# Patient Record
Sex: FEMALE | Race: WHITE | NOT HISPANIC OR LATINO | Employment: OTHER | URBAN - METROPOLITAN AREA
[De-identification: names, ages, dates, MRNs, and addresses within clinical notes are randomized per-mention and may not be internally consistent; named-entity substitution may affect disease eponyms.]

---

## 2017-01-24 ENCOUNTER — HOSPITAL ENCOUNTER (OUTPATIENT)
Dept: SLEEP MEDICINE | Facility: HOSPITAL | Age: 67
Discharge: HOME | End: 2017-01-24
Attending: INTERNAL MEDICINE | Admitting: INTERNAL MEDICINE

## 2017-02-13 ENCOUNTER — HOSPITAL ENCOUNTER (OUTPATIENT)
Dept: LAB | Facility: HOSPITAL | Age: 67
Setting detail: SPECIMEN
Discharge: HOME | End: 2017-02-13
Attending: INTERNAL MEDICINE | Admitting: INTERNAL MEDICINE

## 2017-02-13 LAB
ALBUMIN SERPL-MCNC: 3.3 G/DL (ref 3.5–4.8)
ALBUMIN/GLOB SERPL: 1 {RATIO} (ref 1–1.7)
ALP SERPL-CCNC: 58 IU/L (ref 32–91)
ALT SERPL-CCNC: 16 IU/L (ref 14–54)
ANION GAP SERPL CALC-SCNC: 16.5 MMOL/L (ref 10–20)
AST SERPL-CCNC: 21 IU/L (ref 15–41)
BILIRUB SERPL-MCNC: 0.7 MG/DL (ref 0.3–1.2)
BUN SERPL-MCNC: 9 MG/DL (ref 8–20)
BUN/CREAT SERPL: 11.3 (ref 5.4–26.2)
CALCIUM SERPL-MCNC: 9 MG/DL (ref 8.9–10.3)
CHLORIDE SERPL-SCNC: 103 MMOL/L (ref 101–111)
CHOLEST SERPL-MCNC: 152 MG/DL
CHOLEST/HDLC SERPL: 3.6 {RATIO}
CONV CO2: 27 MMOL/L (ref 22–32)
CONV LDL CHOLESTEROL DIRECT: 89 MG/DL (ref 0–100)
CONV MICROALBUM.,U,RANDOM: 30 MG/L
CONV TOTAL PROTEIN: 6.7 G/DL (ref 6.1–7.9)
CREAT 24H UR-MCNC: 227.2 MG/DL
CREAT UR-MCNC: 0.8 MG/DL (ref 0.4–1)
GLOBULIN UR ELPH-MCNC: 3.4 G/DL (ref 2.5–3.8)
GLUCOSE SERPL-MCNC: 158 MG/DL (ref 65–99)
HDLC SERPL-MCNC: 42 MG/DL
LDLC/HDLC SERPL: 2.1 {RATIO}
LIPID INTERPRETATION: NORMAL
MICROALBUMIN/CREAT UR: 13.2 UG/MG
POTASSIUM SERPL-SCNC: 3.5 MMOL/L (ref 3.6–5.1)
SODIUM SERPL-SCNC: 143 MMOL/L (ref 136–144)
T4 FREE SERPL-MCNC: 1.15 NG/DL (ref 0.58–1.64)
TRIGL SERPL-MCNC: 113 MG/DL
VLDLC SERPL CALC-MCNC: 20.5 MG/DL

## 2017-07-25 ENCOUNTER — HOSPITAL ENCOUNTER (OUTPATIENT)
Dept: SLEEP MEDICINE | Facility: HOSPITAL | Age: 67
Discharge: HOME | End: 2017-07-25
Attending: INTERNAL MEDICINE | Admitting: INTERNAL MEDICINE

## 2017-08-14 ENCOUNTER — HOSPITAL ENCOUNTER (OUTPATIENT)
Dept: LAB | Facility: HOSPITAL | Age: 67
Setting detail: SPECIMEN
Discharge: HOME | End: 2017-08-14
Attending: INTERNAL MEDICINE | Admitting: INTERNAL MEDICINE

## 2017-08-14 LAB
ALBUMIN SERPL-MCNC: 3.4 G/DL (ref 3.5–4.8)
ALBUMIN/GLOB SERPL: 1.2 {RATIO} (ref 1–1.7)
ALP SERPL-CCNC: 58 IU/L (ref 32–91)
ALT SERPL-CCNC: 13 IU/L (ref 14–54)
ANION GAP SERPL CALC-SCNC: 17.8 MMOL/L (ref 10–20)
AST SERPL-CCNC: 20 IU/L (ref 15–41)
BILIRUB SERPL-MCNC: 1.1 MG/DL (ref 0.3–1.2)
BUN SERPL-MCNC: 10 MG/DL (ref 8–20)
BUN/CREAT SERPL: 11.1 (ref 5.4–26.2)
CALCIUM SERPL-MCNC: 8.5 MG/DL (ref 8.9–10.3)
CHLORIDE SERPL-SCNC: 101 MMOL/L (ref 101–111)
CHOLEST SERPL-MCNC: 145 MG/DL
CHOLEST/HDLC SERPL: 3.2 {RATIO}
CONV CO2: 25 MMOL/L (ref 22–32)
CONV LDL CHOLESTEROL DIRECT: 82 MG/DL (ref 0–100)
CONV TOTAL PROTEIN: 6.3 G/DL (ref 6.1–7.9)
CREAT UR-MCNC: 0.9 MG/DL (ref 0.4–1)
GLOBULIN UR ELPH-MCNC: 2.9 G/DL (ref 2.5–3.8)
GLUCOSE SERPL-MCNC: 166 MG/DL (ref 65–99)
HDLC SERPL-MCNC: 45 MG/DL
LDLC/HDLC SERPL: 1.8 {RATIO}
LIPID INTERPRETATION: NORMAL
POTASSIUM SERPL-SCNC: 3.8 MMOL/L (ref 3.6–5.1)
SODIUM SERPL-SCNC: 140 MMOL/L (ref 136–144)
TRIGL SERPL-MCNC: 94 MG/DL
TSH SERPL-ACNC: 1.71 UIU/ML (ref 0.34–5.6)
VLDLC SERPL CALC-MCNC: 17.9 MG/DL

## 2018-02-12 ENCOUNTER — HOSPITAL ENCOUNTER (OUTPATIENT)
Dept: LAB | Facility: HOSPITAL | Age: 68
Setting detail: SPECIMEN
Discharge: HOME | End: 2018-02-12
Attending: INTERNAL MEDICINE | Admitting: INTERNAL MEDICINE

## 2018-02-12 LAB
ALBUMIN SERPL-MCNC: 3.4 G/DL (ref 3.5–4.8)
ALBUMIN/GLOB SERPL: 0.9 {RATIO} (ref 1–1.7)
ALP SERPL-CCNC: 58 IU/L (ref 32–91)
ALT SERPL-CCNC: 15 IU/L (ref 14–54)
ANION GAP SERPL CALC-SCNC: 13.4 MMOL/L (ref 10–20)
AST SERPL-CCNC: 22 IU/L (ref 15–41)
BILIRUB SERPL-MCNC: 0.7 MG/DL (ref 0.3–1.2)
BUN SERPL-MCNC: 10 MG/DL (ref 8–20)
BUN/CREAT SERPL: 12.5 (ref 5.4–26.2)
CALCIUM SERPL-MCNC: 8.9 MG/DL (ref 8.9–10.3)
CHLORIDE SERPL-SCNC: 102 MMOL/L (ref 101–111)
CHOLEST SERPL-MCNC: 148 MG/DL
CHOLEST/HDLC SERPL: 3.7 {RATIO}
CONV CO2: 27 MMOL/L (ref 22–32)
CONV LDL CHOLESTEROL DIRECT: 98 MG/DL (ref 0–100)
CONV MICROALBUM.,U,RANDOM: 656 MG/L
CONV TOTAL PROTEIN: 7 G/DL (ref 6.1–7.9)
CREAT 24H UR-MCNC: 187.1 MG/DL
CREAT UR-MCNC: 0.8 MG/DL (ref 0.4–1)
GLOBULIN UR ELPH-MCNC: 3.6 G/DL (ref 2.5–3.8)
GLUCOSE SERPL-MCNC: 166 MG/DL (ref 65–99)
HDLC SERPL-MCNC: 40 MG/DL
LDLC/HDLC SERPL: 2.4 {RATIO}
LIPID INTERPRETATION: NORMAL
MICROALBUMIN/CREAT UR: 350.6 UG/MG
POTASSIUM SERPL-SCNC: 3.4 MMOL/L (ref 3.6–5.1)
SODIUM SERPL-SCNC: 139 MMOL/L (ref 136–144)
TRIGL SERPL-MCNC: 95 MG/DL
VLDLC SERPL CALC-MCNC: 10.2 MG/DL

## 2018-05-29 ENCOUNTER — HOSPITAL ENCOUNTER (OUTPATIENT)
Dept: LAB | Facility: HOSPITAL | Age: 68
Setting detail: SPECIMEN
Discharge: HOME | End: 2018-05-29
Attending: INTERNAL MEDICINE | Admitting: INTERNAL MEDICINE

## 2018-05-29 LAB
ALBUMIN SERPL-MCNC: 3.3 G/DL (ref 3.5–4.8)
ALBUMIN/GLOB SERPL: 0.9 {RATIO} (ref 1–1.7)
ALP SERPL-CCNC: 63 IU/L (ref 32–91)
ALT SERPL-CCNC: 18 IU/L (ref 14–54)
ANION GAP SERPL CALC-SCNC: 16.4 MMOL/L (ref 10–20)
AST SERPL-CCNC: 25 IU/L (ref 15–41)
BILIRUB SERPL-MCNC: 0.7 MG/DL (ref 0.3–1.2)
BUN SERPL-MCNC: 9 MG/DL (ref 8–20)
BUN/CREAT SERPL: 11.3 (ref 5.4–26.2)
CALCIUM SERPL-MCNC: 9.3 MG/DL (ref 8.9–10.3)
CHLORIDE SERPL-SCNC: 98 MMOL/L (ref 101–111)
CHOLEST SERPL-MCNC: 149 MG/DL
CHOLEST/HDLC SERPL: 3.5 {RATIO}
CONV CO2: 27 MMOL/L (ref 22–32)
CONV LDL CHOLESTEROL DIRECT: 87 MG/DL (ref 0–100)
CONV TOTAL PROTEIN: 7 G/DL (ref 6.1–7.9)
CREAT UR-MCNC: 0.8 MG/DL (ref 0.4–1)
GLOBULIN UR ELPH-MCNC: 3.7 G/DL (ref 2.5–3.8)
GLUCOSE SERPL-MCNC: 219 MG/DL (ref 65–99)
HDLC SERPL-MCNC: 43 MG/DL
LDLC/HDLC SERPL: 2 {RATIO}
LIPID INTERPRETATION: NORMAL
POTASSIUM SERPL-SCNC: 3.4 MMOL/L (ref 3.6–5.1)
SODIUM SERPL-SCNC: 138 MMOL/L (ref 136–144)
TRIGL SERPL-MCNC: 122 MG/DL
VLDLC SERPL CALC-MCNC: 19.3 MG/DL

## 2018-09-05 ENCOUNTER — HOSPITAL ENCOUNTER (OUTPATIENT)
Dept: LAB | Facility: HOSPITAL | Age: 68
Setting detail: SPECIMEN
Discharge: HOME | End: 2018-09-05
Attending: NURSE PRACTITIONER | Admitting: NURSE PRACTITIONER

## 2018-09-05 LAB
ALBUMIN SERPL-MCNC: 3.1 G/DL (ref 3.5–4.8)
ALBUMIN/GLOB SERPL: 0.8 {RATIO} (ref 1–1.7)
ALP SERPL-CCNC: 58 IU/L (ref 32–91)
ALT SERPL-CCNC: 17 IU/L (ref 14–54)
ANION GAP SERPL CALC-SCNC: 16.3 MMOL/L (ref 10–20)
AST SERPL-CCNC: 24 IU/L (ref 15–41)
BILIRUB SERPL-MCNC: 0.7 MG/DL (ref 0.3–1.2)
BUN SERPL-MCNC: 9 MG/DL (ref 8–20)
BUN/CREAT SERPL: 11.3 (ref 5.4–26.2)
CALCIUM SERPL-MCNC: 8.3 MG/DL (ref 8.9–10.3)
CHLORIDE SERPL-SCNC: 101 MMOL/L (ref 101–111)
CHOLEST SERPL-MCNC: 117 MG/DL
CHOLEST/HDLC SERPL: 3.2 {RATIO}
CONV CO2: 24 MMOL/L (ref 22–32)
CONV LDL CHOLESTEROL DIRECT: 63 MG/DL (ref 0–100)
CONV MICROALBUM.,U,RANDOM: 88 MG/L
CONV TOTAL PROTEIN: 6.9 G/DL (ref 6.1–7.9)
CREAT 24H UR-MCNC: 261.5 MG/DL
CREAT UR-MCNC: 0.8 MG/DL (ref 0.4–1)
GLOBULIN UR ELPH-MCNC: 3.8 G/DL (ref 2.5–3.8)
GLUCOSE SERPL-MCNC: 157 MG/DL (ref 65–99)
HDLC SERPL-MCNC: 37 MG/DL
LDLC/HDLC SERPL: 1.7 {RATIO}
LIPID INTERPRETATION: ABNORMAL
MICROALBUMIN/CREAT UR: 33.7 UG/MG
POTASSIUM SERPL-SCNC: 3.3 MMOL/L (ref 3.6–5.1)
SODIUM SERPL-SCNC: 138 MMOL/L (ref 136–144)
TRIGL SERPL-MCNC: 97 MG/DL
VLDLC SERPL CALC-MCNC: 17.9 MG/DL

## 2018-11-30 ENCOUNTER — HOSPITAL ENCOUNTER (OUTPATIENT)
Dept: MAMMOGRAPHY | Facility: HOSPITAL | Age: 68
Discharge: HOME | End: 2018-11-30
Attending: FAMILY MEDICINE | Admitting: FAMILY MEDICINE

## 2018-12-10 ENCOUNTER — HOSPITAL ENCOUNTER (OUTPATIENT)
Dept: LAB | Facility: HOSPITAL | Age: 68
Setting detail: SPECIMEN
Discharge: HOME | End: 2018-12-10
Attending: INTERNAL MEDICINE | Admitting: INTERNAL MEDICINE

## 2018-12-10 LAB
ALBUMIN SERPL-MCNC: 3.4 G/DL (ref 3.5–4.8)
ALBUMIN/GLOB SERPL: 0.9 {RATIO} (ref 1–1.7)
ALP SERPL-CCNC: 58 IU/L (ref 32–91)
ALT SERPL-CCNC: 15 IU/L (ref 14–54)
ANION GAP SERPL CALC-SCNC: 16.8 MMOL/L (ref 10–20)
AST SERPL-CCNC: 23 IU/L (ref 15–41)
BILIRUB SERPL-MCNC: 0.7 MG/DL (ref 0.3–1.2)
BUN SERPL-MCNC: 11 MG/DL (ref 8–20)
BUN/CREAT SERPL: 12.2 (ref 5.4–26.2)
CALCIUM SERPL-MCNC: 8.9 MG/DL (ref 8.9–10.3)
CHLORIDE SERPL-SCNC: 103 MMOL/L (ref 101–111)
CONV CO2: 24 MMOL/L (ref 22–32)
CONV TOTAL PROTEIN: 7.1 G/DL (ref 6.1–7.9)
CREAT UR-MCNC: 0.9 MG/DL (ref 0.4–1)
GLOBULIN UR ELPH-MCNC: 3.7 G/DL (ref 2.5–3.8)
GLUCOSE SERPL-MCNC: 159 MG/DL (ref 65–99)
POTASSIUM SERPL-SCNC: 3.8 MMOL/L (ref 3.6–5.1)
SODIUM SERPL-SCNC: 140 MMOL/L (ref 136–144)
T4 FREE SERPL-MCNC: 1.28 NG/DL (ref 0.58–1.64)
TSH SERPL-ACNC: 0.4 UIU/ML (ref 0.34–5.6)

## 2018-12-11 LAB — HBA1C MFR BLD: 7.2 % (ref 0–5.6)

## 2018-12-17 ENCOUNTER — HOSPITAL ENCOUNTER (OUTPATIENT)
Dept: MAMMOGRAPHY | Facility: HOSPITAL | Age: 68
Discharge: HOME | End: 2018-12-17
Attending: FAMILY MEDICINE | Admitting: FAMILY MEDICINE

## 2019-01-04 ENCOUNTER — HOSPITAL ENCOUNTER (OUTPATIENT)
Dept: ONCOLOGY | Facility: CLINIC | Age: 69
Setting detail: INFUSION SERIES
Discharge: HOME | End: 2019-01-04
Attending: INTERNAL MEDICINE | Admitting: INTERNAL MEDICINE

## 2019-01-04 ENCOUNTER — HOSPITAL ENCOUNTER (OUTPATIENT)
Dept: ONCOLOGY | Facility: HOSPITAL | Age: 69
Discharge: HOME | End: 2019-01-04
Attending: INTERNAL MEDICINE | Admitting: INTERNAL MEDICINE

## 2019-01-04 ENCOUNTER — CLINICAL SUPPORT (OUTPATIENT)
Dept: ONCOLOGY | Facility: HOSPITAL | Age: 69
End: 2019-01-04

## 2019-01-04 LAB
ALBUMIN SERPL-MCNC: 3.3 G/DL (ref 3.5–4.8)
ALBUMIN/GLOB SERPL: 0.9 {RATIO} (ref 1–1.7)
ALP SERPL-CCNC: 66 IU/L (ref 32–91)
ALT SERPL-CCNC: 16 IU/L (ref 14–54)
ANION GAP SERPL CALC-SCNC: 16.5 MMOL/L (ref 10–20)
AST SERPL-CCNC: 22 IU/L (ref 15–41)
BILIRUB SERPL-MCNC: 0.7 MG/DL (ref 0.3–1.2)
BUN SERPL-MCNC: 8 MG/DL (ref 8–20)
BUN/CREAT SERPL: 10 (ref 5.4–26.2)
CALCIUM SERPL-MCNC: 7.6 MG/DL (ref 8.9–10.3)
CHLORIDE SERPL-SCNC: 101 MMOL/L (ref 101–111)
CONV CO2: 24 MMOL/L (ref 22–32)
CONV TOTAL PROTEIN: 7.1 G/DL (ref 6.1–7.9)
CREAT UR-MCNC: 0.8 MG/DL (ref 0.4–1)
GLOBULIN UR ELPH-MCNC: 3.8 G/DL (ref 2.5–3.8)
GLUCOSE SERPL-MCNC: 180 MG/DL (ref 65–99)
POTASSIUM SERPL-SCNC: 3.5 MMOL/L (ref 3.6–5.1)
SODIUM SERPL-SCNC: 138 MMOL/L (ref 136–144)

## 2019-01-04 NOTE — PROGRESS NOTES
PATIENTS ONCOLOGY RECORD LOCATED IN [facility]      Subjective     Name:  KARIN KHANNA HAI     Date:  2019  Address:  [address] [place] [address]  Home: [unfilled]  :  1950 AGE:  68 y.o.        RECORDS OBTAINED:  Patients Oncology Record is located in [facility]

## 2019-01-17 ENCOUNTER — HOSPITAL ENCOUNTER (OUTPATIENT)
Dept: MRI IMAGING | Facility: HOSPITAL | Age: 69
Discharge: HOME | End: 2019-01-17
Attending: SURGERY | Admitting: SURGERY

## 2019-01-21 ENCOUNTER — HOSPITAL ENCOUNTER (OUTPATIENT)
Dept: ONCOLOGY | Facility: HOSPITAL | Age: 69
Discharge: HOME | End: 2019-01-21
Attending: INTERNAL MEDICINE | Admitting: INTERNAL MEDICINE

## 2019-01-28 ENCOUNTER — HOSPITAL ENCOUNTER (OUTPATIENT)
Dept: PREADMISSION TESTING | Facility: HOSPITAL | Age: 69
Discharge: HOME | End: 2019-01-28
Attending: SURGERY | Admitting: SURGERY

## 2019-01-28 LAB
ABO + RH BLD: NORMAL
ANION GAP SERPL CALC-SCNC: 15.4 MMOL/L (ref 10–20)
APTT BLD: 23.7 SEC (ref 24–31)
ARMBAND: NORMAL
BACTERIA SPEC AEROBE CULT: NORMAL
BASOPHILS # BLD AUTO: 0 10*3/UL (ref 0–0.2)
BASOPHILS NFR BLD AUTO: 0 % (ref 0–2)
BLD COMPONENT TYPE: NORMAL
BLD GP AB SCN SERPL QL: NEGATIVE
BUN SERPL-MCNC: 8 MG/DL (ref 8–20)
BUN/CREAT SERPL: 10 (ref 5.4–26.2)
CALCIUM SERPL-MCNC: 8.6 MG/DL (ref 8.9–10.3)
CHLORIDE SERPL-SCNC: 102 MMOL/L (ref 101–111)
CONV CO2: 24 MMOL/L (ref 22–32)
CREAT UR-MCNC: 0.8 MG/DL (ref 0.4–1)
CROSSMATCH EXPIRATION: NORMAL
DIFFERENTIAL METHOD BLD: (no result)
EOSINOPHIL # BLD AUTO: 0.4 10*3/UL (ref 0–0.3)
EOSINOPHIL # BLD AUTO: 3 % (ref 0–3)
ERYTHROCYTE [DISTWIDTH] IN BLOOD BY AUTOMATED COUNT: 16.3 % (ref 11.5–14.5)
GLUCOSE SERPL-MCNC: 172 MG/DL (ref 65–99)
HCT VFR BLD AUTO: 41 % (ref 35–49)
HGB BLD-MCNC: 12.7 G/DL (ref 12–15)
INR PPP: 1.1
LYMPHOCYTES # BLD AUTO: 1.9 10*3/UL (ref 0.8–4.8)
LYMPHOCYTES NFR BLD AUTO: 13 % (ref 18–42)
Lab: NORMAL
MCH RBC QN AUTO: 25.3 PG (ref 26–32)
MCHC RBC AUTO-ENTMCNC: 30.9 G/DL (ref 32–36)
MCV RBC AUTO: 81.7 FL (ref 80–94)
MICRO REPORT STATUS: NORMAL
MONOCYTES # BLD AUTO: 0.4 10*3/UL (ref 0.1–1.3)
MONOCYTES NFR BLD AUTO: 3 % (ref 2–11)
NEUTROPHILS # BLD AUTO: 12.1 10*3/UL (ref 2.3–8.6)
NEUTROPHILS NFR BLD AUTO: 81 % (ref 50–75)
NRBC BLD AUTO-RTO: 0 /100{WBCS}
NRBC/RBC NFR BLD MANUAL: 0 10*3/UL
PLATELET # BLD AUTO: 319 10*3/UL (ref 150–450)
PMV BLD AUTO: 8.4 FL (ref 7.4–10.4)
POTASSIUM SERPL-SCNC: 3.4 MMOL/L (ref 3.6–5.1)
PROTHROMBIN TIME: 11 SEC (ref 9.6–11.7)
RBC # BLD AUTO: 5.01 10*6/UL (ref 4–5.4)
SODIUM SERPL-SCNC: 138 MMOL/L (ref 136–144)
SPECIMEN SOURCE: NORMAL
WBC # BLD AUTO: 14.5 10*3/UL (ref 4.5–11.5)

## 2019-01-31 ENCOUNTER — HOSPITAL ENCOUNTER (OUTPATIENT)
Dept: LAB | Facility: HOSPITAL | Age: 69
Discharge: HOME | End: 2019-01-31
Attending: SURGERY | Admitting: SURGERY

## 2019-01-31 LAB
BILIRUB UR QL STRIP: NEGATIVE MG/DL
CASTS URNS QL MICRO: ABNORMAL /[LPF]
COLOR UR: YELLOW
CONV BACTERIA IN URINE MICRO: NEGATIVE
CONV CLARITY OF URINE: ABNORMAL
CONV HYALINE CASTS IN URINE MICRO: 5 /[LPF] (ref 0–5)
CONV PROTEIN IN URINE BY AUTOMATED TEST STRIP: NEGATIVE MG/DL
CONV SMALL ROUND CELLS: ABNORMAL /[HPF]
CONV UROBILINOGEN IN URINE BY AUTOMATED TEST STRIP: 1 MG/DL
CULTURE INDICATED?: ABNORMAL
GLUCOSE UR QL: NEGATIVE MG/DL
HGB UR QL STRIP: NEGATIVE
KETONES UR QL STRIP: ABNORMAL MG/DL
LEUKOCYTE ESTERASE UR QL STRIP: NEGATIVE
NITRITE UR QL STRIP: NEGATIVE
PH UR STRIP.AUTO: 7.5 [PH] (ref 4.5–8)
RBC #/AREA URNS HPF: 1 /[HPF] (ref 0–3)
SP GR UR: 1.02 (ref 1–1.03)
SPERM URNS QL MICRO: ABNORMAL /[HPF]
SQUAMOUS SPT QL MICRO: 8 /[HPF] (ref 0–5)
UNIDENT CRYS URNS QL MICRO: ABNORMAL /[HPF]
WBC #/AREA URNS HPF: 4 /[HPF] (ref 0–5)
YEAST SPEC QL WET PREP: ABNORMAL /[HPF]

## 2019-02-05 ENCOUNTER — CLINICAL SUPPORT (OUTPATIENT)
Dept: ONCOLOGY | Facility: HOSPITAL | Age: 69
End: 2019-02-05

## 2019-02-05 ENCOUNTER — HOSPITAL ENCOUNTER (OUTPATIENT)
Dept: ONCOLOGY | Facility: CLINIC | Age: 69
Setting detail: INFUSION SERIES
Discharge: HOME | End: 2019-02-05
Attending: INTERNAL MEDICINE | Admitting: INTERNAL MEDICINE

## 2019-02-05 NOTE — PROGRESS NOTES
PATIENTS ONCOLOGY RECORD LOCATED IN [facility]      Subjective     Name:  KARIN KHANNA HAI     Date:  2019  Address:  [address] [place] [address]  Home: [unfilled]  :  1950 AGE:  68 y.o.        RECORDS OBTAINED:  Patients Oncology Record is located in [facility]

## 2019-02-07 ENCOUNTER — HOSPITAL ENCOUNTER (OUTPATIENT)
Dept: PERIOP | Facility: HOSPITAL | Age: 69
Setting detail: HOSPITAL OUTPATIENT SURGERY
Discharge: HOME | End: 2019-02-07
Attending: SURGERY | Admitting: SURGERY

## 2019-02-07 LAB
GLUCOSE BLD-MCNC: 128 MG/DL (ref 70–105)
GLUCOSE BLD-MCNC: 142 MG/DL (ref 70–105)
GLUCOSE BLD-MCNC: 162 MG/DL (ref 70–105)
POTASSIUM SERPL-SCNC: 3.5 MMOL/L (ref 3.6–5.1)

## 2019-02-12 ENCOUNTER — HOSPITAL ENCOUNTER (OUTPATIENT)
Dept: RADIATION ONCOLOGY | Facility: HOSPITAL | Age: 69
Setting detail: RECURRING SERIES
Discharge: HOME | End: 2019-02-12
Attending: RADIOLOGY | Admitting: RADIOLOGY

## 2019-02-20 ENCOUNTER — HOSPITAL ENCOUNTER (OUTPATIENT)
Dept: ONCOLOGY | Facility: CLINIC | Age: 69
Setting detail: INFUSION SERIES
Discharge: HOME | End: 2019-02-20
Attending: INTERNAL MEDICINE | Admitting: INTERNAL MEDICINE

## 2019-02-20 ENCOUNTER — CLINICAL SUPPORT (OUTPATIENT)
Dept: ONCOLOGY | Facility: HOSPITAL | Age: 69
End: 2019-02-20

## 2019-02-20 ENCOUNTER — HOSPITAL ENCOUNTER (OUTPATIENT)
Dept: ONCOLOGY | Facility: HOSPITAL | Age: 69
Discharge: HOME | End: 2019-02-20
Attending: INTERNAL MEDICINE | Admitting: INTERNAL MEDICINE

## 2019-02-20 NOTE — PROGRESS NOTES
PATIENTS ONCOLOGY RECORD LOCATED IN [facility]      Subjective     Name:  KARIN KHANNA HAI     Date:  2019  Address:  [address] [place] [address]  Home: [unfilled]  :  1950 AGE:  68 y.o.        RECORDS OBTAINED:  Patients Oncology Record is located in [facility]

## 2019-03-13 ENCOUNTER — HOSPITAL ENCOUNTER (OUTPATIENT)
Dept: RADIATION ONCOLOGY | Facility: HOSPITAL | Age: 69
Discharge: HOME | End: 2019-03-13
Attending: RADIOLOGY | Admitting: RADIOLOGY

## 2019-03-13 ENCOUNTER — CLINICAL SUPPORT (OUTPATIENT)
Dept: ONCOLOGY | Facility: HOSPITAL | Age: 69
End: 2019-03-13

## 2019-03-13 ENCOUNTER — HOSPITAL ENCOUNTER (OUTPATIENT)
Dept: ONCOLOGY | Facility: CLINIC | Age: 69
Setting detail: INFUSION SERIES
Discharge: HOME | End: 2019-03-13
Attending: INTERNAL MEDICINE | Admitting: INTERNAL MEDICINE

## 2019-03-13 NOTE — PROGRESS NOTES
PATIENTS ONCOLOGY RECORD LOCATED IN [facility]      Subjective     Name:  KARIN KHANNA HAI     Date:  2019  Address:  [address] [place] [address]  Home: [unfilled]  :  1950 AGE:  68 y.o.        RECORDS OBTAINED:  Patients Oncology Record is located in [facility]

## 2019-03-22 ENCOUNTER — HOSPITAL ENCOUNTER (OUTPATIENT)
Dept: MAMMOGRAPHY | Facility: HOSPITAL | Age: 69
Discharge: HOME | End: 2019-03-22
Attending: INTERNAL MEDICINE | Admitting: INTERNAL MEDICINE

## 2019-03-25 ENCOUNTER — HOSPITAL ENCOUNTER (OUTPATIENT)
Dept: RADIATION ONCOLOGY | Facility: HOSPITAL | Age: 69
Setting detail: RECURRING SERIES
Discharge: HOME | End: 2019-06-04
Attending: RADIOLOGY | Admitting: RADIOLOGY

## 2019-04-04 ENCOUNTER — HOSPITAL ENCOUNTER (OUTPATIENT)
Dept: ONCOLOGY | Facility: HOSPITAL | Age: 69
Discharge: HOME | End: 2019-04-04
Attending: INTERNAL MEDICINE | Admitting: INTERNAL MEDICINE

## 2019-04-04 LAB — GLUCOSE BLD-MCNC: 164 MG/DL (ref 70–105)

## 2019-05-08 ENCOUNTER — HOSPITAL ENCOUNTER (OUTPATIENT)
Dept: ONCOLOGY | Facility: HOSPITAL | Age: 69
Discharge: HOME | End: 2019-05-08
Attending: INTERNAL MEDICINE | Admitting: INTERNAL MEDICINE

## 2019-05-08 ENCOUNTER — HOSPITAL ENCOUNTER (OUTPATIENT)
Dept: ONCOLOGY | Facility: CLINIC | Age: 69
Setting detail: INFUSION SERIES
Discharge: HOME | End: 2019-05-08
Attending: INTERNAL MEDICINE | Admitting: INTERNAL MEDICINE

## 2019-05-08 ENCOUNTER — CLINICAL SUPPORT (OUTPATIENT)
Dept: ONCOLOGY | Facility: HOSPITAL | Age: 69
End: 2019-05-08

## 2019-05-08 LAB
ALBUMIN SERPL-MCNC: 3.8 G/DL (ref 3.5–4.8)
ALBUMIN/GLOB SERPL: 1 {RATIO} (ref 1–1.7)
ALP SERPL-CCNC: 67 IU/L (ref 32–91)
ALT SERPL-CCNC: 14 IU/L (ref 14–54)
ANION GAP SERPL CALC-SCNC: 16.9 MMOL/L (ref 10–20)
AST SERPL-CCNC: 21 IU/L (ref 15–41)
BILIRUB SERPL-MCNC: 0.6 MG/DL (ref 0.3–1.2)
BUN SERPL-MCNC: 11 MG/DL (ref 8–20)
BUN/CREAT SERPL: 12.2 (ref 5.4–26.2)
CALCIUM SERPL-MCNC: 8.8 MG/DL (ref 8.9–10.3)
CHLORIDE SERPL-SCNC: 104 MMOL/L (ref 101–111)
CONV CO2: 22 MMOL/L (ref 22–32)
CONV TOTAL PROTEIN: 7.5 G/DL (ref 6.1–7.9)
CREAT UR-MCNC: 0.9 MG/DL (ref 0.4–1)
GLOBULIN UR ELPH-MCNC: 3.7 G/DL (ref 2.5–3.8)
GLUCOSE SERPL-MCNC: 161 MG/DL (ref 65–99)
POTASSIUM SERPL-SCNC: 3.9 MMOL/L (ref 3.6–5.1)
SODIUM SERPL-SCNC: 139 MMOL/L (ref 136–144)

## 2019-05-08 NOTE — PROGRESS NOTES
PATIENTS ONCOLOGY RECORD LOCATED IN [facility]      Subjective     Name:  KARIN KHANNA HAI     Date:  2019  Address:  [address] [place] [address]  Home: [unfilled]  :  1950 AGE:  69 y.o.        RECORDS OBTAINED:  Patients Oncology Record is located in [facility]

## 2019-05-17 ENCOUNTER — HOSPITAL ENCOUNTER (OUTPATIENT)
Dept: RADIATION ONCOLOGY | Facility: HOSPITAL | Age: 69
Discharge: HOME | End: 2019-05-17
Attending: RADIOLOGY | Admitting: RADIOLOGY

## 2019-06-19 RX ORDER — POTASSIUM CHLORIDE 20 MEQ/1
TABLET, EXTENDED RELEASE ORAL
Qty: 180 TABLET | Refills: 0 | Status: SHIPPED | OUTPATIENT
Start: 2019-06-19 | End: 2019-09-11 | Stop reason: SDUPTHER

## 2019-07-01 DIAGNOSIS — I10 HYPERTENSION, UNSPECIFIED TYPE: ICD-10-CM

## 2019-07-01 DIAGNOSIS — E11.65 TYPE 2 DIABETES MELLITUS WITH HYPERGLYCEMIA, UNSPECIFIED WHETHER LONG TERM INSULIN USE (HCC): Primary | ICD-10-CM

## 2019-07-01 DIAGNOSIS — E78.5 HYPERLIPIDEMIA, UNSPECIFIED HYPERLIPIDEMIA TYPE: ICD-10-CM

## 2019-07-01 PROBLEM — E66.3 OVERWEIGHT: Status: ACTIVE | Noted: 2018-05-01

## 2019-07-01 PROBLEM — D72.829 LEUKOCYTOSIS: Status: ACTIVE | Noted: 2019-01-29

## 2019-07-01 PROBLEM — C50.919 PRIMARY MALIGNANT NEOPLASM OF FEMALE BREAST (HCC): Status: ACTIVE | Noted: 2019-02-27

## 2019-07-01 PROBLEM — N63.0 BREAST LUMP OR MASS: Status: ACTIVE | Noted: 2018-11-15

## 2019-07-01 PROBLEM — D05.10 DUCTAL CARCINOMA IN SITU (DCIS) OF BREAST: Status: ACTIVE | Noted: 2018-12-27

## 2019-07-01 PROBLEM — E03.9 HYPOTHYROIDISM: Status: ACTIVE | Noted: 2019-07-01

## 2019-07-01 PROBLEM — E11.21 DIABETIC NEPHROPATHY (HCC): Status: ACTIVE | Noted: 2018-02-19

## 2019-07-08 ENCOUNTER — LAB (OUTPATIENT)
Dept: LAB | Facility: HOSPITAL | Age: 69
End: 2019-07-08

## 2019-07-08 DIAGNOSIS — E11.65 TYPE 2 DIABETES MELLITUS WITH HYPERGLYCEMIA, UNSPECIFIED WHETHER LONG TERM INSULIN USE (HCC): ICD-10-CM

## 2019-07-08 DIAGNOSIS — E78.5 HYPERLIPIDEMIA, UNSPECIFIED HYPERLIPIDEMIA TYPE: ICD-10-CM

## 2019-07-08 DIAGNOSIS — I10 HYPERTENSION, UNSPECIFIED TYPE: ICD-10-CM

## 2019-07-08 LAB
ALBUMIN SERPL-MCNC: 3.7 G/DL (ref 3.5–4.8)
ALBUMIN/GLOB SERPL: 0.9 G/DL (ref 1–1.7)
ALP SERPL-CCNC: 64 U/L (ref 32–91)
ALT SERPL W P-5'-P-CCNC: 14 U/L (ref 14–54)
ANION GAP SERPL CALCULATED.3IONS-SCNC: 16.9 MMOL/L (ref 5–15)
AST SERPL-CCNC: 20 U/L (ref 15–41)
BILIRUB SERPL-MCNC: 0.8 MG/DL (ref 0.3–1.2)
BUN BLD-MCNC: 10 MG/DL (ref 8–20)
BUN/CREAT SERPL: 12.5 (ref 5.4–26.2)
CALCIUM SPEC-SCNC: 9 MG/DL (ref 8.9–10.3)
CHLORIDE SERPL-SCNC: 104 MMOL/L (ref 101–111)
CO2 SERPL-SCNC: 23 MMOL/L (ref 22–32)
CREAT BLD-MCNC: 0.8 MG/DL (ref 0.4–1)
GFR SERPL CREATININE-BSD FRML MDRD: 72 ML/MIN/1.73
GLOBULIN UR ELPH-MCNC: 3.9 GM/DL (ref 2.5–3.8)
GLUCOSE BLD-MCNC: 120 MG/DL (ref 65–99)
HBA1C MFR BLD: 6.7 % (ref 3.5–5.6)
POTASSIUM BLD-SCNC: 3.9 MMOL/L (ref 3.6–5.1)
PROT SERPL-MCNC: 7.6 G/DL (ref 6.1–7.9)
SODIUM BLD-SCNC: 140 MMOL/L (ref 136–144)

## 2019-07-08 PROCEDURE — 82043 UR ALBUMIN QUANTITATIVE: CPT | Performed by: INTERNAL MEDICINE

## 2019-07-08 PROCEDURE — 83036 HEMOGLOBIN GLYCOSYLATED A1C: CPT | Performed by: INTERNAL MEDICINE

## 2019-07-08 PROCEDURE — 80053 COMPREHEN METABOLIC PANEL: CPT | Performed by: INTERNAL MEDICINE

## 2019-07-08 PROCEDURE — 36415 COLL VENOUS BLD VENIPUNCTURE: CPT

## 2019-07-08 PROCEDURE — 82570 ASSAY OF URINE CREATININE: CPT | Performed by: INTERNAL MEDICINE

## 2019-07-11 RX ORDER — ATORVASTATIN CALCIUM 40 MG/1
TABLET, FILM COATED ORAL
Qty: 30 TABLET | Refills: 5 | Status: SHIPPED | OUTPATIENT
Start: 2019-07-11 | End: 2020-02-01

## 2019-07-12 RX ORDER — SYRINGE-NEEDLE,INSULIN,0.5 ML 27GX1/2"
SYRINGE, EMPTY DISPOSABLE MISCELLANEOUS
COMMUNITY
Start: 2018-08-14 | End: 2019-08-20 | Stop reason: SDUPTHER

## 2019-07-12 RX ORDER — ACETAZOLAMIDE 250 MG/1
TABLET ORAL EVERY 24 HOURS
COMMUNITY
Start: 2018-02-06 | End: 2019-08-07 | Stop reason: SDUPTHER

## 2019-07-12 RX ORDER — ANASTROZOLE 1 MG/1
TABLET ORAL
COMMUNITY
Start: 2019-05-08 | End: 2019-11-08

## 2019-07-12 RX ORDER — PANTOPRAZOLE SODIUM 40 MG/1
TABLET, DELAYED RELEASE ORAL EVERY 24 HOURS
COMMUNITY
Start: 2018-02-13 | End: 2019-08-20 | Stop reason: SDUPTHER

## 2019-07-12 RX ORDER — LEVOTHYROXINE SODIUM 0.1 MG/1
TABLET ORAL EVERY 24 HOURS
COMMUNITY
Start: 2018-02-06 | End: 2019-08-13 | Stop reason: SDUPTHER

## 2019-07-12 RX ORDER — ASPIRIN 81 MG/1
TABLET ORAL
COMMUNITY
Start: 2012-05-09 | End: 2022-11-07

## 2019-07-12 RX ORDER — ALBUTEROL SULFATE 90 UG/1
AEROSOL, METERED RESPIRATORY (INHALATION)
COMMUNITY
Start: 2014-06-04 | End: 2019-08-07

## 2019-07-12 RX ORDER — MONTELUKAST SODIUM 10 MG/1
TABLET ORAL
COMMUNITY
Start: 2018-02-06 | End: 2019-08-13 | Stop reason: SDUPTHER

## 2019-07-12 RX ORDER — IBANDRONATE SODIUM 150 MG/1
TABLET, FILM COATED ORAL
COMMUNITY
Start: 2011-11-08 | End: 2020-09-02

## 2019-07-12 RX ORDER — FUROSEMIDE 40 MG/1
TABLET ORAL
COMMUNITY
Start: 2018-08-07 | End: 2019-12-05 | Stop reason: SDUPTHER

## 2019-07-12 RX ORDER — LORATADINE 10 MG/1
TABLET ORAL EVERY 24 HOURS
COMMUNITY
Start: 2018-06-12 | End: 2019-12-26

## 2019-07-12 RX ORDER — RAMIPRIL 1.25 MG/1
CAPSULE ORAL
COMMUNITY
Start: 2018-05-08 | End: 2019-08-13 | Stop reason: SDUPTHER

## 2019-07-12 RX ORDER — POTASSIUM CHLORIDE 1.5 G/1.77G
POWDER, FOR SOLUTION ORAL
COMMUNITY
Start: 2014-06-03 | End: 2020-01-20

## 2019-07-15 ENCOUNTER — OFFICE VISIT (OUTPATIENT)
Dept: ENDOCRINOLOGY | Facility: CLINIC | Age: 69
End: 2019-07-15

## 2019-07-15 VITALS
SYSTOLIC BLOOD PRESSURE: 130 MMHG | OXYGEN SATURATION: 98 % | WEIGHT: 218 LBS | HEIGHT: 55 IN | HEART RATE: 97 BPM | DIASTOLIC BLOOD PRESSURE: 65 MMHG | BODY MASS INDEX: 50.45 KG/M2

## 2019-07-15 DIAGNOSIS — E03.9 ACQUIRED HYPOTHYROIDISM: ICD-10-CM

## 2019-07-15 DIAGNOSIS — E11.21 DIABETIC NEPHROPATHY ASSOCIATED WITH TYPE 2 DIABETES MELLITUS (HCC): ICD-10-CM

## 2019-07-15 DIAGNOSIS — E11.65 TYPE 2 DIABETES MELLITUS WITH HYPERGLYCEMIA, WITH LONG-TERM CURRENT USE OF INSULIN (HCC): Primary | ICD-10-CM

## 2019-07-15 DIAGNOSIS — Z79.4 TYPE 2 DIABETES MELLITUS WITH HYPERGLYCEMIA, WITH LONG-TERM CURRENT USE OF INSULIN (HCC): Primary | ICD-10-CM

## 2019-07-15 DIAGNOSIS — E78.5 HYPERLIPIDEMIA, UNSPECIFIED HYPERLIPIDEMIA TYPE: ICD-10-CM

## 2019-07-15 DIAGNOSIS — I10 ESSENTIAL HYPERTENSION: ICD-10-CM

## 2019-07-15 PROCEDURE — 99214 OFFICE O/P EST MOD 30 MIN: CPT | Performed by: INTERNAL MEDICINE

## 2019-07-15 RX ORDER — PENICILLIN V POTASSIUM 500 MG/1
TABLET ORAL
Refills: 0 | COMMUNITY
Start: 2019-07-11 | End: 2019-08-07

## 2019-07-15 NOTE — PROGRESS NOTES
Endocrine Progress Note Outpatient     Patient Care Team:  Lyell, Reggie Duane, MD as PCP - General (Family Medicine)  Thalia Davis MD as PCP - Claims Attributed  Mukesh De Leon MD as PCP - Family Medicine    Chief Complaint: Follow-up diabetes    HPI: 69-year-old female with history of type 2 diabetes, hypertension, hyperlipidemia, hypothyroidism and diabetic nephropathy is here for follow-up.  Patient is currently taking Kombiglyze XR 2.5/thousand, 1 tablet twice a day and Humulin 70/30 insulin 40 units before breakfast and 32 units before supper.  She did bring in blood sugar records for review and most of the numbers are doing fairly well, I do not see any blood sugars less than 70.  She is tolerating her medications well.  She is trying to work on her diet the best she can.    Hypertension: Well-controlled.  Hyperlipidemia: She is currently on atorvastatin.  Hypothyroidism: Currently on levothyroxine supplementation  Diabetic nephropathy: She is on ramipril.    Past Medical History:   Diagnosis Date   • Cataract    • DM type 2 (diabetes mellitus, type 2) (CMS/[facility])    • Ductal carcinoma in situ (DCIS) of left breast    • Fracture, femur (CMS/[facility]) 2014    fracture of left femur   • Hyperlipidemia    • Hypertension    • Hypothyroidism    • Left breast mass    • Obesity    • Osteopenia    • Pulmonary hypertension (CMS/[facility])        Social History     Socioeconomic History   • Marital status:      Spouse name: Not on file   • Number of children: Not on file   • Years of education: Not on file   • Highest education level: Not on file   Tobacco Use   • Smoking status: Never Smoker   Substance and Sexual Activity   • Alcohol use: No     Frequency: Never   • Drug use: No       Family History   Problem Relation Age of Onset   • Ovarian cancer Sister    • Cancer Other        Allergies no known allergies    ROS:   Constitutional:  Denies fatigue, tiredness.    Eyes:  Denies change in visual acuity   HENT:   Denies nasal congestion or sore throat   Respiratory: denies cough, shortness of breath.   Cardiovascular:  denies chest pain, edema   GI:  Denies abdominal pain, nausea, vomiting.   Musculoskeletal:  Denies back pain or joint pain   Integument:  Denies dry skin and rash   Neurologic:  Denies headache, focal weakness or sensory changes   Endocrine:  Denies polyuria or polydipsia   Psychiatric:  Denies depression or anxiety      Vitals:    07/15/19 1434   BP: 130/65   Pulse: 97   SpO2: 98%       Physical Exam:  GEN: NAD, conversant  EYES: EOMI, PERRL, no conjunctival erythema  NECK: no thyromegaly, full ROM   CV: RRR, no murmurs/rubs/gallops, no peripheral edema  LUNG: CTAB, no wheezes/rales/ronchi  SKIN: no rashes, no acanthosis  MSK: no deformities, full ROM of all extremities  NEURO: no tremors, DTR normal  PSYCH: AOX3, appropriate mood, affect normal      Results Review:     I reviewed the patient's new clinical results.    Lab Results   Component Value Date    HGBA1C 6.7 (H) 07/08/2019    HGBA1C 7.2 (H) 12/10/2018      Lab Results   Component Value Date    GLUCOSE 120 (H) 07/08/2019    BUN 10 07/08/2019    CREATININE 0.80 07/08/2019    EGFRIFNONA 72 07/08/2019    BCR 12.5 07/08/2019    K 3.9 07/08/2019    CO2 23.0 07/08/2019    CALCIUM 9.0 07/08/2019    ALBUMIN 3.70 07/08/2019    LABIL2 1.0 05/08/2019    AST 20 07/08/2019    ALT 14 07/08/2019    CHOL 117 09/05/2018    TRIG 97 09/05/2018    LDL 63 09/05/2018    HDL 37 (L) 09/05/2018     Lab Results   Component Value Date    TSH 0.40 12/10/2018    FREET4 1.28 12/10/2018         Medication Review: Reviewed.       Current Outpatient Medications:   •  acetaZOLAMIDE (DIAMOX) 250 MG tablet, Daily., Disp: , Rfl:   •  anastrozole (ARIMIDEX) 1 MG tablet, , Disp: , Rfl:   •  aspirin (ADULT ASPIRIN EC LOW STRENGTH) 81 MG EC tablet, ADULT ASPIRIN EC LOW STRENGTH 81 MG ORAL TABLET DELAYED RELEASE, Disp: , Rfl:   •  atorvastatin (LIPITOR) 40 MG tablet, TAKE ONE TABLET BY MOUTH  "EVERY NIGHT, Disp: 30 tablet, Rfl: 5  •  Cholecalciferol (VITAMIN D3) 5000 units capsule capsule, 1 capsule Daily., Disp: , Rfl:   •  furosemide (LASIX) 40 MG tablet, FUROSEMIDE 40 MG TABS, Disp: , Rfl:   •  glucose blood (ONE TOUCH ULTRA TEST) test strip, ONETOUCH ULTRA BLUE STRP, Disp: , Rfl:   •  ibandronate (BONIVA) 150 MG tablet, IBANDRONATE SODIUM 150 MG TABS, Disp: , Rfl:   •  insulin NPH-insulin regular (HUMULIN 70/30) (70-30) 100 UNIT/ML injection, HUMULIN 70/30 (70-30) 100 UNIT/ML SUSP, Disp: , Rfl:   •  Insulin Syringe-Needle U-100 (B-D INS SYR ULTRAFINE 1CC/31G) 31G X 5/16\" 1 ML misc, BD INSULIN SYR ULTRAFINE II 31G X 5/16\" 1 ML, Disp: , Rfl:   •  levothyroxine (SYNTHROID, LEVOTHROID) 100 MCG tablet, Daily., Disp: , Rfl:   •  loratadine (CLARITIN) 10 MG tablet, Daily., Disp: , Rfl:   •  montelukast (SINGULAIR) 10 MG tablet, MONTELUKAST SODIUM 10 MG TABS, Disp: , Rfl:   •  pantoprazole (PROTONIX) 40 MG EC tablet, Daily., Disp: , Rfl:   •  penicillin v potassium (VEETID) 500 MG tablet, TAKE ONE TABLET BY MOUTH EVERY SIX HOURS AS NEEDED FOR INFECTION, Disp: , Rfl: 0  •  potassium chloride (KLOR-CON) 20 MEQ packet, KLOR-CON 20 MEQ PACK, Disp: , Rfl:   •  ramipril (ALTACE) 1.25 MG capsule, RAMIPRIL 1.25 MG CAPS, Disp: , Rfl:   •  sAXagliptin-metFORMIN ER (KOMBIGLYZE XR) 2.5-1000 MG tablet sustained-release 24 hour, Every 12 (Twelve) Hours., Disp: , Rfl:   •  albuterol sulfate HFA (PROAIR HFA) 108 (90 Base) MCG/ACT inhaler, PROAIR  (90 Base) MCG/ACT AERS, Disp: , Rfl:   •  potassium chloride (K-DUR,KLOR-CON) 20 MEQ CR tablet, TAKE 1 TABLET BY MOUTH TWICE DAILY, Disp: 180 tablet, Rfl: 0      Assessment/Plan   Diabetes mellitus type 2: Excellent control with A1c 6.7% blood sugars are doing very well.  Will continue current management.  2.  Hypertension: Well-controlled, continue current medications  3.  Hyperlipidemia: On atorvastatin, will follow lipid panel.  4.  Hypothyroidism: Currently on " "levothyroxine supplementation, will follow TSH  5.  Diabetic nephropathy: On ramipril.  Will continue current medications.            Kosta Frank MD FACE.  06/15/19  4:34 PM      EMR Dragon / transcription disclaimer:     \"Dictated utilizing Dragon dictation\".                 "

## 2019-08-07 ENCOUNTER — OFFICE VISIT (OUTPATIENT)
Dept: ONCOLOGY | Facility: CLINIC | Age: 69
End: 2019-08-07

## 2019-08-07 ENCOUNTER — APPOINTMENT (OUTPATIENT)
Dept: LAB | Facility: HOSPITAL | Age: 69
End: 2019-08-07

## 2019-08-07 ENCOUNTER — RESULTS ENCOUNTER (OUTPATIENT)
Dept: ONCOLOGY | Facility: CLINIC | Age: 69
End: 2019-08-07

## 2019-08-07 VITALS
SYSTOLIC BLOOD PRESSURE: 121 MMHG | HEIGHT: 60 IN | DIASTOLIC BLOOD PRESSURE: 65 MMHG | TEMPERATURE: 98.1 F | BODY MASS INDEX: 42.68 KG/M2 | RESPIRATION RATE: 18 BRPM | WEIGHT: 217.4 LBS | HEART RATE: 120 BPM

## 2019-08-07 DIAGNOSIS — C50.919 PRIMARY MALIGNANT NEOPLASM OF FEMALE BREAST (HCC): Primary | ICD-10-CM

## 2019-08-07 DIAGNOSIS — C50.919 PRIMARY MALIGNANT NEOPLASM OF FEMALE BREAST (HCC): ICD-10-CM

## 2019-08-07 LAB
ALBUMIN SERPL-MCNC: 3.7 G/DL (ref 3.5–4.8)
ALBUMIN/GLOB SERPL: 1.1 G/DL (ref 1–1.7)
ALP SERPL-CCNC: 54 U/L (ref 32–91)
ALT SERPL W P-5'-P-CCNC: 16 U/L (ref 14–54)
ANION GAP SERPL CALCULATED.3IONS-SCNC: 18.7 MMOL/L (ref 5–15)
AST SERPL-CCNC: 20 U/L (ref 15–41)
BASOPHILS # BLD AUTO: 0.02 10*3/MM3 (ref 0–0.2)
BASOPHILS NFR BLD AUTO: 0.1 % (ref 0–1.5)
BILIRUB SERPL-MCNC: 1 MG/DL (ref 0.3–1.2)
BUN BLD-MCNC: 11 MG/DL (ref 8–20)
BUN/CREAT SERPL: 12.2 (ref 5.4–26.2)
CALCIUM SPEC-SCNC: 9.3 MG/DL (ref 8.9–10.3)
CHLORIDE SERPL-SCNC: 106 MMOL/L (ref 101–111)
CO2 SERPL-SCNC: 21 MMOL/L (ref 22–32)
CREAT BLD-MCNC: 0.9 MG/DL (ref 0.4–1)
DEPRECATED RDW RBC AUTO: 48.3 FL (ref 37–54)
EOSINOPHIL # BLD AUTO: 0.08 10*3/MM3 (ref 0–0.4)
EOSINOPHIL NFR BLD AUTO: 0.5 % (ref 0.3–6.2)
ERYTHROCYTE [DISTWIDTH] IN BLOOD BY AUTOMATED COUNT: 15.9 % (ref 12.3–15.4)
GFR SERPL CREATININE-BSD FRML MDRD: 62 ML/MIN/1.73
GLOBULIN UR ELPH-MCNC: 3.5 GM/DL (ref 2.5–3.8)
GLUCOSE BLD-MCNC: 129 MG/DL (ref 65–99)
HCT VFR BLD AUTO: 44.5 % (ref 34–46.6)
HGB BLD-MCNC: 13.8 G/DL (ref 12–15.9)
LYMPHOCYTES # BLD AUTO: 0.95 10*3/MM3 (ref 0.7–3.1)
LYMPHOCYTES NFR BLD AUTO: 6.1 % (ref 19.6–45.3)
MCH RBC QN AUTO: 26.3 PG (ref 26.6–33)
MCHC RBC AUTO-ENTMCNC: 31 G/DL (ref 31.5–35.7)
MCV RBC AUTO: 84.9 FL (ref 79–97)
MONOCYTES # BLD AUTO: 0.57 10*3/MM3 (ref 0.1–0.9)
MONOCYTES NFR BLD AUTO: 3.7 % (ref 5–12)
NEUTROPHILS # BLD AUTO: 13.86 10*3/MM3 (ref 1.7–7)
NEUTROPHILS NFR BLD AUTO: 89.6 % (ref 42.7–76)
PLATELET # BLD AUTO: 282 10*3/MM3 (ref 140–450)
PMV BLD AUTO: 10.5 FL (ref 6–12)
POTASSIUM BLD-SCNC: 3.7 MMOL/L (ref 3.6–5.1)
PROT SERPL-MCNC: 7.2 G/DL (ref 6.1–7.9)
RBC # BLD AUTO: 5.24 10*6/MM3 (ref 3.77–5.28)
SODIUM BLD-SCNC: 142 MMOL/L (ref 136–144)
WBC NRBC COR # BLD: 15.48 10*3/MM3 (ref 3.4–10.8)

## 2019-08-07 PROCEDURE — 85025 COMPLETE CBC W/AUTO DIFF WBC: CPT | Performed by: INTERNAL MEDICINE

## 2019-08-07 PROCEDURE — 80053 COMPREHEN METABOLIC PANEL: CPT | Performed by: NURSE PRACTITIONER

## 2019-08-07 PROCEDURE — 36415 COLL VENOUS BLD VENIPUNCTURE: CPT | Performed by: INTERNAL MEDICINE

## 2019-08-07 PROCEDURE — 99214 OFFICE O/P EST MOD 30 MIN: CPT | Performed by: INTERNAL MEDICINE

## 2019-08-07 NOTE — PROGRESS NOTES
Hematology/Oncology Outpatient Follow Up    Christiane Nazario  1950    Primary Care Physician: Lyell, Reggie Duane, MD  Referring Physician: Lyell, Reggie Duane, MD    Chief complaint:  pT 2 N0 infiltrating ductal carcinoma of the left breast  History of Present Illness:   Ms. Nazario had a mammogram in November 2018 for a palpable   abnormality.    · 11/30/18 - Diagnostic mammogram showed palpable abnormality corresponds to a 2.3 x 1.7 x 2.1 cm   spicular shadowing irregular mass in the lateral middle third of the left breast at 2 to 3   o'clock position.  No mammographic evidence of malignancy in the contralateral breast.    · 12/17/18 - Core needle biopsy of breast palpable nodule showed invasive moderately   differentiated ductal carcinoma.  Fenton grade 7/9.  DCIS seen.  Tumor was ER positive, VA   positive and [id number] negative by ICH.    · Patient was sent to the [facility] and seen initially on 1/4/19.    · 1/4/19 - Genetic testing for BRCA 1 and BRCA 2 sequencing and deletion duplication analysis   negative.    · 1/21/19 - CT scan of the chest with contrast showed 2.4 x 2.8 cm left breast mass consistent   with known carcinoma.  Coronary artery calcification consistent with atherosclerotic disease.  A   4 mm right upper lobe pulmonary nodule, stable compared to 2014, consistent with a benign   etiology.    · 2/7/19 - Patient underwent left breast lumpectomy and axillary sentinel lymph node resection.    Pathology report was invasive poorly differentiated ductal carcinoma, 3.3 cm, completely excised.    Negative margins.  Three sentinel lymph nodes were extracted and three were negative.       Fenton score total of 9.  DCIS not identified.  Skin showed invasive carcinoma focally   invading the dermis without skin ulceration.  Margins negative with invasive carcinoma 0.2 cm   from the closest.  Pathologic staging pT2, smpN0.   · 3/6/19 - Oncotype DX recurrent score 25  "(distant recurrence risk at 9 years 12% per TAILORx and   absolute chemotherapy benefit less than 1% per TAILORx).   · 3/22/19 - DEXA scan, normal.   · May 2019 - Patient completed 20 radiation treatments.    2019 patient started Arimidex treatment      Past Medical History:   Diagnosis Date   • Cataract    • DM type 2 (diabetes mellitus, type 2) (CMS/[facility])    • Ductal carcinoma in situ (DCIS) of left breast    • Fracture, femur (CMS/[facility])     fracture of left femur   • Hyperlipidemia    • Hypertension    • Hypothyroidism    • Left breast mass    • Obesity    • Osteopenia    • Pulmonary hypertension (CMS/HCC)        Past Surgical History:   Procedure Laterality Date   • BREAST BIOPSY  2018   •  SECTION     • FEMUR SURGERY Left 2014    @ [facility] - Dr Winston   • MASTECTOMY Left 2019    Dr Bills   • TUBAL ABDOMINAL LIGATION           Current Outpatient Medications:   •  acetaZOLAMIDE (DIAMOX) 250 MG tablet, Daily., Disp: , Rfl:   •  anastrozole (ARIMIDEX) 1 MG tablet, , Disp: , Rfl:   •  aspirin (ADULT ASPIRIN EC LOW STRENGTH) 81 MG EC tablet, ADULT ASPIRIN EC LOW STRENGTH 81 MG ORAL TABLET DELAYED RELEASE, Disp: , Rfl:   •  atorvastatin (LIPITOR) 40 MG tablet, TAKE ONE TABLET BY MOUTH EVERY NIGHT, Disp: 30 tablet, Rfl: 5  •  Cholecalciferol (VITAMIN D3) 5000 units capsule capsule, 1 capsule Daily., Disp: , Rfl:   •  furosemide (LASIX) 40 MG tablet, FUROSEMIDE 40 MG TABS, Disp: , Rfl:   •  glucose blood (ONE TOUCH ULTRA TEST) test strip, ONETOUCH ULTRA BLUE STRP, Disp: , Rfl:   •  ibandronate (BONIVA) 150 MG tablet, IBANDRONATE SODIUM 150 MG TABS, Disp: , Rfl:   •  insulin NPH-insulin regular (HUMULIN 70/30) (70-30) 100 UNIT/ML injection, HUMULIN 70/30 (70-30) 100 UNIT/ML SUSP, Disp: , Rfl:   •  Insulin Syringe-Needle U-100 (B-D INS SYR ULTRAFINE 1CC/31G) 31G X 5/16\" 1 ML misc, BD INSULIN SYR ULTRAFINE II 31G X 5/16\" 1 ML, Disp: , Rfl:   •  levothyroxine (SYNTHROID, LEVOTHROID) 100 MCG " tablet, Daily., Disp: , Rfl:   •  loratadine (CLARITIN) 10 MG tablet, Daily., Disp: , Rfl:   •  montelukast (SINGULAIR) 10 MG tablet, MONTELUKAST SODIUM 10 MG TABS, Disp: , Rfl:   •  pantoprazole (PROTONIX) 40 MG EC tablet, Daily., Disp: , Rfl:   •  potassium chloride (K-DUR,KLOR-CON) 20 MEQ CR tablet, TAKE 1 TABLET BY MOUTH TWICE DAILY, Disp: 180 tablet, Rfl: 0  •  ramipril (ALTACE) 1.25 MG capsule, RAMIPRIL 1.25 MG CAPS, Disp: , Rfl:   •  potassium chloride (KLOR-CON) 20 MEQ packet, KLOR-CON 20 MEQ PACK, Disp: , Rfl:     Allergies   Allergen Reactions   • Calcium-Containing Compounds Nausea Only       Family History   Problem Relation Age of Onset   • Ovarian cancer Sister    • Cancer Other        Cancer-related family history includes Cancer in her other; Ovarian cancer in her sister.    Social History     Tobacco Use   • Smoking status: Never Smoker   Substance Use Topics   • Alcohol use: No     Frequency: Never   • Drug use: No       I have reviewed the history of present illness, past medical history, family history, social history, lab results, all notes and other records since the patient was last seen on Visit date not found    SUBJECTIVE:      Patient is in my office for follow-up.  She is on Arimidex now has hot flashes.  No weight gain or fluid retention.  She is not tolerating oral calcium tablets reports to choking sensation    ROS:       Review of Systems   Constitutional: Negative for fever.   HENT: Negative for nosebleeds and trouble swallowing.    Eyes: Negative for visual disturbance.   Respiratory: Negative for cough, shortness of breath and wheezing.    Cardiovascular: Negative for chest pain.   Gastrointestinal: Negative for abdominal pain and blood in stool.   Endocrine: Negative for cold intolerance.   Genitourinary: Negative for dysuria and hematuria.   Musculoskeletal: Negative for joint swelling.   Skin: Negative for rash.   Allergic/Immunologic: Negative for environmental allergies.  "  Neurological: Negative for seizures.   Hematological: Does not bruise/bleed easily.   Psychiatric/Behavioral: The patient is not nervous/anxious.          Objective:       Vitals:    08/07/19 1057   BP: 121/65   Pulse: 120   Resp: 18   Temp: 98.1 °F (36.7 °C)   Weight: 98.6 kg (217 lb 6.4 oz)   Height: 152.4 cm (60\")   PainSc: 0-No pain         PHYSICAL EXAM:     Physical Exam   Constitutional: She is oriented to person, place, and time. No distress.   HENT:   Head: Normocephalic and atraumatic.   Eyes: Conjunctivae and EOM are normal. Right eye exhibits no discharge. Left eye exhibits no discharge. No scleral icterus.   Neck: Normal range of motion. Neck supple. No thyromegaly present.   Cardiovascular: Normal rate, regular rhythm and normal heart sounds. Exam reveals no gallop and no friction rub.   Pulmonary/Chest: Effort normal. No stridor. No respiratory distress. She has no wheezes.   Abdominal: Soft. Bowel sounds are normal. She exhibits no mass. There is no tenderness. There is no rebound and no guarding.   Musculoskeletal: Normal range of motion. She exhibits no tenderness.   Lymphadenopathy:     She has no cervical adenopathy.   Neurological: She is alert and oriented to person, place, and time. She exhibits normal muscle tone.   Skin: Skin is warm. No rash noted. She is not diaphoretic. No erythema.   Psychiatric: She has a normal mood and affect. Her behavior is normal.   Nursing note and vitals reviewed.       RECENT LABS:     WBC   Date Value Ref Range Status   08/07/2019 15.48 (H) 3.40 - 10.80 10*3/mm3 Final     RBC   Date Value Ref Range Status   08/07/2019 5.24 3.77 - 5.28 10*6/mm3 Final     Hemoglobin   Date Value Ref Range Status   08/07/2019 13.8 12.0 - 15.9 g/dL Final     Hematocrit   Date Value Ref Range Status   08/07/2019 44.5 34.0 - 46.6 % Final     MCV   Date Value Ref Range Status   08/07/2019 84.9 79.0 - 97.0 fL Final     MCH   Date Value Ref Range Status   08/07/2019 26.3 (L) 26.6 - " 33.0 pg Final     MCHC   Date Value Ref Range Status   08/07/2019 31.0 (L) 31.5 - 35.7 g/dL Final     RDW   Date Value Ref Range Status   08/07/2019 15.9 (H) 12.3 - 15.4 % Final     RDW-SD   Date Value Ref Range Status   08/07/2019 48.3 37.0 - 54.0 fl Final     MPV   Date Value Ref Range Status   08/07/2019 10.5 6.0 - 12.0 fL Final     Platelets   Date Value Ref Range Status   08/07/2019 282 140 - 450 10*3/mm3 Final     Neutrophil %   Date Value Ref Range Status   08/07/2019 89.6 (H) 42.7 - 76.0 % Final     Lymphocyte %   Date Value Ref Range Status   08/07/2019 6.1 (L) 19.6 - 45.3 % Final     Monocyte %   Date Value Ref Range Status   08/07/2019 3.7 (L) 5.0 - 12.0 % Final     Eosinophil %   Date Value Ref Range Status   08/07/2019 0.5 0.3 - 6.2 % Final     Basophil %   Date Value Ref Range Status   08/07/2019 0.1 0.0 - 1.5 % Final     Neutrophils, Absolute   Date Value Ref Range Status   08/07/2019 13.86 (H) 1.70 - 7.00 10*3/mm3 Final     Lymphocytes, Absolute   Date Value Ref Range Status   08/07/2019 0.95 0.70 - 3.10 10*3/mm3 Final     Monocytes, Absolute   Date Value Ref Range Status   08/07/2019 0.57 0.10 - 0.90 10*3/mm3 Final     Eosinophils, Absolute   Date Value Ref Range Status   08/07/2019 0.08 0.00 - 0.40 10*3/mm3 Final     Basophils, Absolute   Date Value Ref Range Status   08/07/2019 0.02 0.00 - 0.20 10*3/mm3 Final     nRBC   Date Value Ref Range Status   01/28/2019 0 0 /100[WBCs] Final       Lab Results   Component Value Date    GLUCOSE 120 (H) 07/08/2019    BUN 10 07/08/2019    CREATININE 0.80 07/08/2019    EGFRIFNONA 72 07/08/2019    BCR 12.5 07/08/2019    K 3.9 07/08/2019    CO2 23.0 07/08/2019    CALCIUM 9.0 07/08/2019    ALBUMIN 3.70 07/08/2019    LABIL2 1.0 05/08/2019    AST 20 07/08/2019    ALT 14 07/08/2019         Assessment/Plan      ASSESSMENT:     1. Left breast infiltrating ductal carcinoma  2. Obesity  3. Osteopenia  4. ECOG 1    PLAN:      1. Reviewed the laboratory work-up with the  patient CBC is within normal range.  Continue Arimidex 1 mg a day.  2. Encouraged her to lose weight control blood sugars well.  3. Patient could not tolerate oral calcium tablets, discussed with her about using calcium in gummy bear form  4. I will check CBC CMP and CA-27-29 today and on return to clinic in 3 months    I have reviewed labs results, imaging, vitals, and medications with the patient today.           Patient verbalized understanding and is in agreement of the above plan.          This report was compiled using Dragon voice recognition software. I have made every effort to proof read this document; however, typographical errors may persist.

## 2019-08-08 LAB
ALBUMIN UR-MCNC: 7 MG/L
CREAT UR-MCNC: 45.5 MG/DL
MICROALBUMIN/CREAT UR: 15.4 MG/G (ref 0–30)

## 2019-08-08 RX ORDER — ACETAZOLAMIDE 250 MG/1
TABLET ORAL
Qty: 90 TABLET | Refills: 0 | Status: SHIPPED | OUTPATIENT
Start: 2019-08-08 | End: 2019-11-06 | Stop reason: SDUPTHER

## 2019-08-13 RX ORDER — LEVOTHYROXINE SODIUM 0.1 MG/1
TABLET ORAL
Qty: 90 TABLET | Refills: 0 | Status: SHIPPED | OUTPATIENT
Start: 2019-08-13 | End: 2019-11-12 | Stop reason: SDUPTHER

## 2019-08-13 RX ORDER — MONTELUKAST SODIUM 10 MG/1
TABLET ORAL
Qty: 90 TABLET | Refills: 0 | Status: SHIPPED | OUTPATIENT
Start: 2019-08-13 | End: 2019-11-20 | Stop reason: SDUPTHER

## 2019-08-14 RX ORDER — RAMIPRIL 1.25 MG/1
CAPSULE ORAL
Qty: 90 CAPSULE | Refills: 1 | Status: SHIPPED | OUTPATIENT
Start: 2019-08-14 | End: 2020-03-06

## 2019-08-20 RX ORDER — PEN NEEDLE, DIABETIC 29 G X1/2"
NEEDLE, DISPOSABLE MISCELLANEOUS
Qty: 100 EACH | Refills: 1 | Status: SHIPPED | OUTPATIENT
Start: 2019-08-20 | End: 2020-08-24

## 2019-08-20 RX ORDER — PANTOPRAZOLE SODIUM 40 MG/1
TABLET, DELAYED RELEASE ORAL
Qty: 90 TABLET | Refills: 0 | Status: SHIPPED | OUTPATIENT
Start: 2019-08-20 | End: 2019-11-20 | Stop reason: SDUPTHER

## 2019-09-11 RX ORDER — POTASSIUM CHLORIDE 20 MEQ/1
TABLET, EXTENDED RELEASE ORAL
Qty: 180 TABLET | Refills: 0 | Status: SHIPPED | OUTPATIENT
Start: 2019-09-11 | End: 2019-12-31

## 2019-10-03 RX ORDER — SAXAGLIPTIN AND METFORMIN HYDROCHLORIDE 2.5; 1 MG/1; MG/1
TABLET, FILM COATED, EXTENDED RELEASE ORAL
Qty: 180 TABLET | Refills: 1 | Status: SHIPPED | OUTPATIENT
Start: 2019-10-03 | End: 2020-01-13

## 2019-10-13 ENCOUNTER — RESULTS ENCOUNTER (OUTPATIENT)
Dept: ENDOCRINOLOGY | Facility: CLINIC | Age: 69
End: 2019-10-13

## 2019-10-13 DIAGNOSIS — E03.9 ACQUIRED HYPOTHYROIDISM: ICD-10-CM

## 2019-10-13 DIAGNOSIS — I10 ESSENTIAL HYPERTENSION: ICD-10-CM

## 2019-10-13 DIAGNOSIS — E11.65 TYPE 2 DIABETES MELLITUS WITH HYPERGLYCEMIA, WITH LONG-TERM CURRENT USE OF INSULIN (HCC): ICD-10-CM

## 2019-10-13 DIAGNOSIS — E11.21 DIABETIC NEPHROPATHY ASSOCIATED WITH TYPE 2 DIABETES MELLITUS (HCC): ICD-10-CM

## 2019-10-13 DIAGNOSIS — E78.5 HYPERLIPIDEMIA, UNSPECIFIED HYPERLIPIDEMIA TYPE: ICD-10-CM

## 2019-10-13 DIAGNOSIS — Z79.4 TYPE 2 DIABETES MELLITUS WITH HYPERGLYCEMIA, WITH LONG-TERM CURRENT USE OF INSULIN (HCC): ICD-10-CM

## 2019-10-22 ENCOUNTER — FLU SHOT (OUTPATIENT)
Dept: FAMILY MEDICINE CLINIC | Facility: CLINIC | Age: 69
End: 2019-10-22

## 2019-10-22 DIAGNOSIS — Z23 IMMUNIZATION, PNEUMOCOCCUS AND INFLUENZA: ICD-10-CM

## 2019-10-22 PROCEDURE — 90653 IIV ADJUVANT VACCINE IM: CPT | Performed by: NURSE PRACTITIONER

## 2019-10-22 PROCEDURE — G0008 ADMIN INFLUENZA VIRUS VAC: HCPCS | Performed by: NURSE PRACTITIONER

## 2019-11-06 ENCOUNTER — RESULTS ENCOUNTER (OUTPATIENT)
Dept: ONCOLOGY | Facility: CLINIC | Age: 69
End: 2019-11-06

## 2019-11-06 ENCOUNTER — APPOINTMENT (OUTPATIENT)
Dept: LAB | Facility: HOSPITAL | Age: 69
End: 2019-11-06

## 2019-11-06 ENCOUNTER — OFFICE VISIT (OUTPATIENT)
Dept: ONCOLOGY | Facility: CLINIC | Age: 69
End: 2019-11-06

## 2019-11-06 VITALS
TEMPERATURE: 97.7 F | WEIGHT: 218.6 LBS | BODY MASS INDEX: 42.92 KG/M2 | SYSTOLIC BLOOD PRESSURE: 144 MMHG | DIASTOLIC BLOOD PRESSURE: 70 MMHG | RESPIRATION RATE: 16 BRPM | HEIGHT: 60 IN | HEART RATE: 104 BPM

## 2019-11-06 DIAGNOSIS — C50.919 PRIMARY MALIGNANT NEOPLASM OF FEMALE BREAST (HCC): ICD-10-CM

## 2019-11-06 DIAGNOSIS — C50.919 PRIMARY MALIGNANT NEOPLASM OF FEMALE BREAST (HCC): Primary | ICD-10-CM

## 2019-11-06 DIAGNOSIS — Z12.31 SCREENING MAMMOGRAM, ENCOUNTER FOR: ICD-10-CM

## 2019-11-06 LAB
ALBUMIN SERPL-MCNC: 4 G/DL (ref 3.5–5.2)
ALBUMIN/GLOB SERPL: 1.2 G/DL
ALP SERPL-CCNC: 90 U/L (ref 39–117)
ALT SERPL W P-5'-P-CCNC: 40 U/L (ref 1–33)
ANION GAP SERPL CALCULATED.3IONS-SCNC: 16 MMOL/L (ref 5–15)
AST SERPL-CCNC: 39 U/L (ref 1–32)
BASOPHILS # BLD AUTO: 0.02 10*3/MM3 (ref 0–0.2)
BASOPHILS NFR BLD AUTO: 0.1 % (ref 0–1.5)
BILIRUB SERPL-MCNC: 0.6 MG/DL (ref 0.2–1.2)
BUN BLD-MCNC: 12 MG/DL (ref 8–23)
BUN/CREAT SERPL: 13.2 (ref 7–25)
CALCIUM SPEC-SCNC: 9.4 MG/DL (ref 8.6–10.5)
CHLORIDE SERPL-SCNC: 100 MMOL/L (ref 98–107)
CO2 SERPL-SCNC: 25 MMOL/L (ref 22–29)
CREAT BLD-MCNC: 0.91 MG/DL (ref 0.57–1)
DEPRECATED RDW RBC AUTO: 49.6 FL (ref 37–54)
EOSINOPHIL # BLD AUTO: 0.08 10*3/MM3 (ref 0–0.4)
EOSINOPHIL NFR BLD AUTO: 0.6 % (ref 0.3–6.2)
ERYTHROCYTE [DISTWIDTH] IN BLOOD BY AUTOMATED COUNT: 16.4 % (ref 12.3–15.4)
GFR SERPL CREATININE-BSD FRML MDRD: 62 ML/MIN/1.73
GLOBULIN UR ELPH-MCNC: 3.4 GM/DL
GLUCOSE BLD-MCNC: 200 MG/DL (ref 65–99)
HCT VFR BLD AUTO: 43.4 % (ref 34–46.6)
HGB BLD-MCNC: 13.5 G/DL (ref 12–15.9)
LYMPHOCYTES # BLD AUTO: 1.23 10*3/MM3 (ref 0.7–3.1)
LYMPHOCYTES NFR BLD AUTO: 8.8 % (ref 19.6–45.3)
MCH RBC QN AUTO: 26.4 PG (ref 26.6–33)
MCHC RBC AUTO-ENTMCNC: 31.1 G/DL (ref 31.5–35.7)
MCV RBC AUTO: 84.9 FL (ref 79–97)
MONOCYTES # BLD AUTO: 0.46 10*3/MM3 (ref 0.1–0.9)
MONOCYTES NFR BLD AUTO: 3.3 % (ref 5–12)
NEUTROPHILS # BLD AUTO: 12.25 10*3/MM3 (ref 1.7–7)
NEUTROPHILS NFR BLD AUTO: 87.2 % (ref 42.7–76)
PLATELET # BLD AUTO: 330 10*3/MM3 (ref 140–450)
PMV BLD AUTO: 10.1 FL (ref 6–12)
POTASSIUM BLD-SCNC: 4.2 MMOL/L (ref 3.5–5.2)
PROT SERPL-MCNC: 7.4 G/DL (ref 6–8.5)
RBC # BLD AUTO: 5.11 10*6/MM3 (ref 3.77–5.28)
SODIUM BLD-SCNC: 141 MMOL/L (ref 136–145)
WBC NRBC COR # BLD: 14.04 10*3/MM3 (ref 3.4–10.8)

## 2019-11-06 PROCEDURE — 99214 OFFICE O/P EST MOD 30 MIN: CPT | Performed by: INTERNAL MEDICINE

## 2019-11-06 PROCEDURE — 85025 COMPLETE CBC W/AUTO DIFF WBC: CPT | Performed by: INTERNAL MEDICINE

## 2019-11-06 PROCEDURE — 80053 COMPREHEN METABOLIC PANEL: CPT | Performed by: NURSE PRACTITIONER

## 2019-11-06 NOTE — PROGRESS NOTES
Hematology/Oncology Outpatient Follow Up    Christiane Nazario  1950    Primary Care Physician: Lyell, Reggie Duane, MD  Referring Physician: Lyell, Reggie Duane, MD  Chief complaint:  pT 2 N0 infiltrating ductal carcinoma of the left breast  History of Present Illness:   Ms. Nazario had a mammogram in November 2018 for a palpable  abnormality.    · 11/30/18 - Diagnostic mammogram showed palpable abnormality corresponds to a 2.3 x 1.7 x 2.1 cm   spicular shadowing irregular mass in the lateral middle third of the left breast at 2 to 3   o'clock position.  No mammographic evidence of malignancy in the contralateral breast.    · 12/17/18 - Core needle biopsy of breast palpable nodule showed invasive moderately   differentiated ductal carcinoma.  Shakira grade 7/9.  DCIS seen.  Tumor was ER positive, WA   positive and [id number] negative by [facility].    · Patient was sent to the [facility] and seen initially on 1/4/19.    · 1/4/19 - Genetic testing for BRCA 1 and BRCA 2 sequencing and deletion duplication analysis   negative.    · 1/21/19 - CT scan of the chest with contrast showed 2.4 x 2.8 cm left breast mass consistent   with known carcinoma.  Coronary artery calcification consistent with atherosclerotic disease.  A   4 mm right upper lobe pulmonary nodule, stable compared to 2014, consistent with a benign   etiology.    · 2/7/19 - Patient underwent left breast lumpectomy and axillary sentinel lymph node resection.    Pathology report was invasive poorly differentiated ductal carcinoma, 3.3 cm, completely excised.    Negative margins.  Three sentinel lymph nodes were extracted and three were negative.       [place] score total of 9.  DCIS not identified.  Skin showed invasive carcinoma focally   invading the dermis without skin ulceration.  Margins negative with invasive carcinoma 0.2 cm   from the closest.  Pathologic staging pT2, smpN0.   · 3/6/19 - Oncotype DX recurrent score 25 (distant  "recurrence risk at 9 years 12% per TAILORx and   absolute chemotherapy benefit less than 1% per TAILORx).   · 3/22/19 - DEXA scan, normal.   · May 2019 - Patient completed 20 radiation treatments.    2019 patient started Arimidex treatment      Past Medical History:   Diagnosis Date   • Cataract    • DM type 2 (diabetes mellitus, type 2) (CMS/HCC)    • Ductal carcinoma in situ (DCIS) of left breast    • Fracture, femur (CMS/HCC)     fracture of left femur   • Hyperlipidemia    • Hypertension    • Hypothyroidism    • Left breast mass    • Obesity    • Osteopenia    • Pulmonary hypertension (CMS/HCC)        Past Surgical History:   Procedure Laterality Date   • BREAST BIOPSY  2018   •  SECTION     • FEMUR SURGERY Left 2014    @ [facility] - Dr Winston   • MASTECTOMY Left 2019    Dr Bills   • TUBAL ABDOMINAL LIGATION           Current Outpatient Medications:   •  acetaZOLAMIDE (DIAMOX) 250 MG tablet, TAKE ONE TABLET BY MOUTH EVERY MORNING, Disp: 90 tablet, Rfl: 0  •  anastrozole (ARIMIDEX) 1 MG tablet, , Disp: , Rfl:   •  aspirin (ADULT ASPIRIN EC LOW STRENGTH) 81 MG EC tablet, ADULT ASPIRIN EC LOW STRENGTH 81 MG ORAL TABLET DELAYED RELEASE, Disp: , Rfl:   •  atorvastatin (LIPITOR) 40 MG tablet, TAKE ONE TABLET BY MOUTH EVERY NIGHT, Disp: 30 tablet, Rfl: 5  •  BD INSULIN SYRINGE U/F 31G X \" 1 ML misc, USE TWICE A DAY WITH INSULIN INJECTIONS, Disp: 100 each, Rfl: 1  •  Cholecalciferol (VITAMIN D3) 5000 units capsule capsule, 1 capsule Daily., Disp: , Rfl:   •  furosemide (LASIX) 40 MG tablet, FUROSEMIDE 40 MG TABS, Disp: , Rfl:   •  glucose blood (ONE TOUCH ULTRA TEST) test strip, ONETOUCH ULTRA BLUE STRP, Disp: , Rfl:   •  ibandronate (BONIVA) 150 MG tablet, IBANDRONATE SODIUM 150 MG TABS, Disp: , Rfl:   •  insulin NPH-insulin regular (HUMULIN 70/30) (70-30) 100 UNIT/ML injection, HUMULIN 70/30 (70-30) 100 UNIT/ML SUSP, Disp: , Rfl:   •  KOMBIGLYZE XR 2.5-1000 MG tablet sustained-release " 24 hour, TAKE ONE TABLET BY MOUTH TWICE DAILY, Disp: 180 tablet, Rfl: 1  •  levothyroxine (SYNTHROID, LEVOTHROID) 100 MCG tablet, TAKE ONE TABLET BY MOUTH EVERY DAY, Disp: 90 tablet, Rfl: 0  •  loratadine (CLARITIN) 10 MG tablet, Daily., Disp: , Rfl:   •  montelukast (SINGULAIR) 10 MG tablet, TAKE ONE TABLET BY MOUTH EVERY NIGHT AT BEDTIME, Disp: 90 tablet, Rfl: 0  •  pantoprazole (PROTONIX) 40 MG EC tablet, TAKE ONE TABLET BY MOUTH EVERY DAY, Disp: 90 tablet, Rfl: 0  •  potassium chloride (K-DUR,KLOR-CON) 20 MEQ CR tablet, TAKE 1 TABLET BY MOUTH TWICE DAILY, Disp: 180 tablet, Rfl: 0  •  potassium chloride (KLOR-CON) 20 MEQ packet, KLOR-CON 20 MEQ PACK, Disp: , Rfl:   •  ramipril (ALTACE) 1.25 MG capsule, TAKE 1 CAPSULE BY MOUTH EVERY EVENING WITH DINNER, Disp: 90 capsule, Rfl: 1    Allergies   Allergen Reactions   • Calcium-Containing Compounds Nausea Only       Family History   Problem Relation Age of Onset   • Ovarian cancer Sister    • Cancer Other        Cancer-related family history includes Cancer in an other family member; Ovarian cancer in her sister.    Social History     Tobacco Use   • Smoking status: Never Smoker   Substance Use Topics   • Alcohol use: No     Frequency: Never   • Drug use: No       I have reviewed the history of present illness, past medical history, family history, social history, lab results, all notes and other records since the patient was last seen on 8/7/2019  SUBJECTIVE:      Patient is in my office for follow-up.  She is on Arimidex now has hot flashes.  No weight gain or fluid retention.  She is taking one calcium tablet a day.      ROS:       Review of Systems   Constitutional: Negative for fever.   HENT: Negative for nosebleeds and trouble swallowing.    Eyes: Negative for visual disturbance.   Respiratory: Negative for cough, shortness of breath and wheezing.    Cardiovascular: Negative for chest pain.   Gastrointestinal: Negative for abdominal pain and blood in stool.  "  Endocrine: Negative for cold intolerance.   Genitourinary: Negative for dysuria and hematuria.   Musculoskeletal: Negative for joint swelling.   Skin: Negative for rash.   Allergic/Immunologic: Negative for environmental allergies.   Neurological: Negative for seizures.   Hematological: Does not bruise/bleed easily.   Psychiatric/Behavioral: The patient is not nervous/anxious.          Objective:       Vitals:    11/06/19 1110   BP: 144/70   Pulse: 104   Resp: 16   Temp: 97.7 °F (36.5 °C)   Weight: 99.2 kg (218 lb 9.6 oz)   Height: 152.4 cm (60\")   PainSc: 0-No pain         PHYSICAL EXAM:     Physical Exam   Constitutional: She is oriented to person, place, and time. No distress.   HENT:   Head: Normocephalic and atraumatic.   Eyes: Conjunctivae and EOM are normal. Right eye exhibits no discharge. Left eye exhibits no discharge. No scleral icterus.   Neck: Normal range of motion. Neck supple. No thyromegaly present.   Cardiovascular: Normal rate, regular rhythm and normal heart sounds. Exam reveals no gallop and no friction rub.   Pulmonary/Chest: Effort normal. No stridor. No respiratory distress. She has no wheezes.   Abdominal: Soft. Bowel sounds are normal. She exhibits no mass. There is no tenderness. There is no rebound and no guarding.   Musculoskeletal: Normal range of motion. She exhibits no tenderness.   Lymphadenopathy:     She has no cervical adenopathy.   Neurological: She is alert and oriented to person, place, and time. She exhibits normal muscle tone.   Skin: Skin is warm. No rash noted. She is not diaphoretic. No erythema.   Psychiatric: She has a normal mood and affect. Her behavior is normal.   Nursing note and vitals reviewed.       RECENT LABS:     WBC   Date Value Ref Range Status   11/06/2019 14.04 (H) 3.40 - 10.80 10*3/mm3 Final     RBC   Date Value Ref Range Status   11/06/2019 5.11 3.77 - 5.28 10*6/mm3 Final     Hemoglobin   Date Value Ref Range Status   11/06/2019 13.5 12.0 - 15.9 g/dL " Final     Hematocrit   Date Value Ref Range Status   11/06/2019 43.4 34.0 - 46.6 % Final     MCV   Date Value Ref Range Status   11/06/2019 84.9 79.0 - 97.0 fL Final     MCH   Date Value Ref Range Status   11/06/2019 26.4 (L) 26.6 - 33.0 pg Final     MCHC   Date Value Ref Range Status   11/06/2019 31.1 (L) 31.5 - 35.7 g/dL Final     RDW   Date Value Ref Range Status   11/06/2019 16.4 (H) 12.3 - 15.4 % Final     RDW-SD   Date Value Ref Range Status   11/06/2019 49.6 37.0 - 54.0 fl Final     MPV   Date Value Ref Range Status   11/06/2019 10.1 6.0 - 12.0 fL Final     Platelets   Date Value Ref Range Status   11/06/2019 330 140 - 450 10*3/mm3 Final     Neutrophil %   Date Value Ref Range Status   11/06/2019 87.2 (H) 42.7 - 76.0 % Final     Lymphocyte %   Date Value Ref Range Status   11/06/2019 8.8 (L) 19.6 - 45.3 % Final     Monocyte %   Date Value Ref Range Status   11/06/2019 3.3 (L) 5.0 - 12.0 % Final     Eosinophil %   Date Value Ref Range Status   11/06/2019 0.6 0.3 - 6.2 % Final     Basophil %   Date Value Ref Range Status   11/06/2019 0.1 0.0 - 1.5 % Final     Neutrophils, Absolute   Date Value Ref Range Status   11/06/2019 12.25 (H) 1.70 - 7.00 10*3/mm3 Final     Lymphocytes, Absolute   Date Value Ref Range Status   11/06/2019 1.23 0.70 - 3.10 10*3/mm3 Final     Monocytes, Absolute   Date Value Ref Range Status   11/06/2019 0.46 0.10 - 0.90 10*3/mm3 Final     Eosinophils, Absolute   Date Value Ref Range Status   11/06/2019 0.08 0.00 - 0.40 10*3/mm3 Final     Basophils, Absolute   Date Value Ref Range Status   11/06/2019 0.02 0.00 - 0.20 10*3/mm3 Final     nRBC   Date Value Ref Range Status   01/28/2019 0 0 /100[WBCs] Final       Lab Results   Component Value Date    GLUCOSE 129 (H) 08/07/2019    BUN 11 08/07/2019    CREATININE 0.90 08/07/2019    EGFRIFNONA 62 08/07/2019    BCR 12.2 08/07/2019    K 3.7 08/07/2019    CO2 21.0 (L) 08/07/2019    CALCIUM 9.3 08/07/2019    ALBUMIN 3.70 08/07/2019    LABIL2 1.0  05/08/2019    AST 20 08/07/2019    ALT 16 08/07/2019         Assessment/Plan      ASSESSMENT:     1. Left breast infiltrating ductal carcinoma  2. Obesity  3. Osteopenia  4. ECOG 1    PLAN:      1. Reviewed the laboratory work-up with the patient CBC is within normal range.  Continue Arimidex 1 mg a day.  2. Encouraged her to lose weight control blood sugars well.  3. Continue using oral calcium tablet   4. Osteoporosis resolved she has osteopenia continue monthly Boniva.  5. I will check CBC CMP and CA-27-29 today and on return to clinic in 3 months  6. Schedule bilateral screening mammogram which she is due for one-year follow-up  7. Schedule bilateral screening mammogram next month    I have reviewed labs results, imaging, vitals, and medications with the patient today.           Patient verbalized understanding and is in agreement of the above plan.          This report was compiled using Dragon voice recognition software. I have made every effort to proof read this document; however, typographical errors may persist.

## 2019-11-07 RX ORDER — ANASTROZOLE 1 MG/1
TABLET ORAL
Qty: 90 TABLET | Refills: 1 | OUTPATIENT
Start: 2019-11-07

## 2019-11-07 RX ORDER — ACETAZOLAMIDE 250 MG/1
TABLET ORAL
Qty: 90 TABLET | Refills: 0 | Status: SHIPPED | OUTPATIENT
Start: 2019-11-07 | End: 2020-02-18 | Stop reason: SDUPTHER

## 2019-11-08 DIAGNOSIS — C50.919 PRIMARY MALIGNANT NEOPLASM OF FEMALE BREAST (HCC): Primary | ICD-10-CM

## 2019-11-08 RX ORDER — ANASTROZOLE 1 MG/1
1 TABLET ORAL DAILY
Qty: 90 TABLET | Refills: 1 | Status: SHIPPED | OUTPATIENT
Start: 2019-11-08 | End: 2021-09-03 | Stop reason: SDUPTHER

## 2019-11-13 RX ORDER — LEVOTHYROXINE SODIUM 0.1 MG/1
TABLET ORAL
Qty: 90 TABLET | Refills: 0 | Status: SHIPPED | OUTPATIENT
Start: 2019-11-13 | End: 2020-02-27

## 2019-11-18 ENCOUNTER — TELEPHONE (OUTPATIENT)
Dept: RADIATION ONCOLOGY | Facility: HOSPITAL | Age: 69
End: 2019-11-18

## 2019-11-21 RX ORDER — PANTOPRAZOLE SODIUM 40 MG/1
TABLET, DELAYED RELEASE ORAL
Qty: 90 TABLET | Refills: 0 | Status: ON HOLD | OUTPATIENT
Start: 2019-11-21 | End: 2022-09-27

## 2019-11-21 RX ORDER — MONTELUKAST SODIUM 10 MG/1
TABLET ORAL
Qty: 90 TABLET | Refills: 0 | Status: SHIPPED | OUTPATIENT
Start: 2019-11-21 | End: 2020-03-06

## 2019-11-26 RX ORDER — FUROSEMIDE 40 MG/1
TABLET ORAL
Qty: 90 TABLET | Refills: 1 | OUTPATIENT
Start: 2019-11-26

## 2019-12-05 RX ORDER — FUROSEMIDE 40 MG/1
TABLET ORAL
Qty: 90 TABLET | Refills: 1 | OUTPATIENT
Start: 2019-12-05

## 2019-12-05 RX ORDER — FUROSEMIDE 40 MG/1
40 TABLET ORAL DAILY
Qty: 30 TABLET | Refills: 0 | Status: SHIPPED | OUTPATIENT
Start: 2019-12-05 | End: 2019-12-31

## 2019-12-18 ENCOUNTER — APPOINTMENT (OUTPATIENT)
Dept: MAMMOGRAPHY | Facility: HOSPITAL | Age: 69
End: 2019-12-18

## 2019-12-26 RX ORDER — LORATADINE 10 MG/1
TABLET ORAL
Qty: 90 TABLET | Refills: 1 | Status: SHIPPED | OUTPATIENT
Start: 2019-12-26 | End: 2022-11-07

## 2019-12-31 RX ORDER — POTASSIUM CHLORIDE 20 MEQ/1
TABLET, EXTENDED RELEASE ORAL
Qty: 180 TABLET | Refills: 0 | Status: SHIPPED | OUTPATIENT
Start: 2019-12-31 | End: 2020-04-09

## 2019-12-31 RX ORDER — FUROSEMIDE 40 MG/1
TABLET ORAL
Qty: 30 TABLET | Refills: 0 | Status: SHIPPED | OUTPATIENT
Start: 2019-12-31 | End: 2020-02-01

## 2020-01-06 ENCOUNTER — OFFICE VISIT (OUTPATIENT)
Dept: FAMILY MEDICINE CLINIC | Facility: CLINIC | Age: 70
End: 2020-01-06

## 2020-01-06 VITALS
BODY MASS INDEX: 42.8 KG/M2 | HEIGHT: 60 IN | OXYGEN SATURATION: 93 % | DIASTOLIC BLOOD PRESSURE: 75 MMHG | RESPIRATION RATE: 18 BRPM | HEART RATE: 102 BPM | SYSTOLIC BLOOD PRESSURE: 129 MMHG | TEMPERATURE: 98.3 F | WEIGHT: 218 LBS

## 2020-01-06 DIAGNOSIS — J45.20 MILD INTERMITTENT ASTHMA WITHOUT COMPLICATION: ICD-10-CM

## 2020-01-06 DIAGNOSIS — E03.9 ACQUIRED HYPOTHYROIDISM: ICD-10-CM

## 2020-01-06 DIAGNOSIS — R60.9 PERIPHERAL EDEMA: ICD-10-CM

## 2020-01-06 DIAGNOSIS — E11.21 DIABETIC NEPHROPATHY ASSOCIATED WITH TYPE 2 DIABETES MELLITUS (HCC): ICD-10-CM

## 2020-01-06 DIAGNOSIS — E78.5 HYPERLIPIDEMIA, UNSPECIFIED HYPERLIPIDEMIA TYPE: ICD-10-CM

## 2020-01-06 DIAGNOSIS — C50.919 PRIMARY MALIGNANT NEOPLASM OF FEMALE BREAST (HCC): ICD-10-CM

## 2020-01-06 DIAGNOSIS — M81.6 LOCALIZED OSTEOPOROSIS WITHOUT CURRENT PATHOLOGICAL FRACTURE: ICD-10-CM

## 2020-01-06 DIAGNOSIS — Z79.4 TYPE 2 DIABETES MELLITUS WITH HYPERGLYCEMIA, WITH LONG-TERM CURRENT USE OF INSULIN (HCC): ICD-10-CM

## 2020-01-06 DIAGNOSIS — D05.12 DUCTAL CARCINOMA IN SITU (DCIS) OF LEFT BREAST: ICD-10-CM

## 2020-01-06 DIAGNOSIS — E11.65 TYPE 2 DIABETES MELLITUS WITH HYPERGLYCEMIA, WITH LONG-TERM CURRENT USE OF INSULIN (HCC): ICD-10-CM

## 2020-01-06 DIAGNOSIS — Z00.00 MEDICARE ANNUAL WELLNESS VISIT, SUBSEQUENT: Primary | ICD-10-CM

## 2020-01-06 DIAGNOSIS — D72.825 BANDEMIA: ICD-10-CM

## 2020-01-06 DIAGNOSIS — I10 ESSENTIAL HYPERTENSION: ICD-10-CM

## 2020-01-06 DIAGNOSIS — J30.1 SEASONAL ALLERGIC RHINITIS DUE TO POLLEN: ICD-10-CM

## 2020-01-06 DIAGNOSIS — K21.9 GASTROESOPHAGEAL REFLUX DISEASE WITHOUT ESOPHAGITIS: ICD-10-CM

## 2020-01-06 PROBLEM — N63.0 BREAST LUMP OR MASS: Status: RESOLVED | Noted: 2018-11-15 | Resolved: 2020-01-06

## 2020-01-06 PROBLEM — R30.0 DYSURIA: Status: ACTIVE | Noted: 2020-01-06

## 2020-01-06 PROCEDURE — G0439 PPPS, SUBSEQ VISIT: HCPCS | Performed by: FAMILY MEDICINE

## 2020-01-06 PROCEDURE — 99214 OFFICE O/P EST MOD 30 MIN: CPT | Performed by: FAMILY MEDICINE

## 2020-01-06 RX ORDER — SULFAMETHOXAZOLE AND TRIMETHOPRIM 800; 160 MG/1; MG/1
1 TABLET ORAL 2 TIMES DAILY
Qty: 20 TABLET | Refills: 0 | Status: SHIPPED | OUTPATIENT
Start: 2020-01-06 | End: 2020-01-20 | Stop reason: SINTOL

## 2020-01-06 NOTE — PROGRESS NOTES
Patient presents for subsequent Medicare wellness visit.  She also presents for follow-up of multiple chronic medical problems.  Her diabetes is managed by endocrinology.  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  I manage her hypertension, hyperlipidemia, hypothyroidism, and osteopenia.  Patient continues on medications without side effect.  She has been going through chemotherapy and radiation treatments and has been tolerating them well.  She continues with heartburn which is controlled by proton pump inhibitor.  She denies any chest pain, cough, dizziness, or headache.  She does have fatigue.    Past Medical History:   Diagnosis Date   • Cataract    • DM type 2 (diabetes mellitus, type 2) (CMS/[facility])    • Ductal carcinoma in situ (DCIS) of left breast    • Fracture, femur (CMS/[facility])     fracture of left femur   • Hyperlipidemia    • Hypertension    • Hypothyroidism    • Osteopenia    • Pulmonary hypertension (CMS/[facility])      Past Surgical History:   Procedure Laterality Date   • BREAST BIOPSY  2018   •  SECTION     • FEMUR SURGERY Left 2014    @ [facility] - Dr Winston   • MASTECTOMY Left 2019    Dr Bills   • TUBAL ABDOMINAL LIGATION       Family History   Problem Relation Age of Onset   • Ovarian cancer Sister    • Cancer Other      Social History     Tobacco Use   • Smoking status: Never Smoker   • Smokeless tobacco: Never Used   Substance Use Topics   • Alcohol use: No     Frequency: Never       No visits with results within 7 Day(s) from this visit.   Latest known visit with results is:   Office Visit on 2019   Component Date Value Ref Range Status   • Glucose 2019 200* 65 - 99 mg/dL Final   • BUN 2019 12  8 - 23 mg/dL Final   • Creatinine 2019 0.91  0.57 - 1.00 mg/dL Final   • Sodium 2019 141  136 - 145 mmol/L Final   • Potassium 2019 4.2  3.5 - 5.2 mmol/L Final   • Chloride 2019 100  98 - 107 mmol/L Final   • CO2 2019 25.0  22.0  - 29.0 mmol/L Final   • Calcium 11/06/2019 9.4  8.6 - 10.5 mg/dL Final   • Total Protein 11/06/2019 7.4  6.0 - 8.5 g/dL Final   • Albumin 11/06/2019 4.00  3.50 - 5.20 g/dL Final   • ALT (SGPT) 11/06/2019 40* 1 - 33 U/L Final   • AST (SGOT) 11/06/2019 39* 1 - 32 U/L Final   • Alkaline Phosphatase 11/06/2019 90  39 - 117 U/L Final   • Total Bilirubin 11/06/2019 0.6  0.2 - 1.2 mg/dL Final   • eGFR Non African Amer 11/06/2019 62  >60 mL/min/1.73 Final   • Globulin 11/06/2019 3.4  gm/dL Final   • A/G Ratio 11/06/2019 1.2  g/dL Final   • BUN/Creatinine Ratio 11/06/2019 13.2  7.0 - 25.0 Final   • Anion Gap 11/06/2019 16.0* 5.0 - 15.0 mmol/L Final   • WBC 11/06/2019 14.04* 3.40 - 10.80 10*3/mm3 Final   • RBC 11/06/2019 5.11  3.77 - 5.28 10*6/mm3 Final   • Hemoglobin 11/06/2019 13.5  12.0 - 15.9 g/dL Final   • Hematocrit 11/06/2019 43.4  34.0 - 46.6 % Final   • MCV 11/06/2019 84.9  79.0 - 97.0 fL Final   • MCH 11/06/2019 26.4* 26.6 - 33.0 pg Final   • MCHC 11/06/2019 31.1* 31.5 - 35.7 g/dL Final   • RDW 11/06/2019 16.4* 12.3 - 15.4 % Final   • RDW-SD 11/06/2019 49.6  37.0 - 54.0 fl Final   • MPV 11/06/2019 10.1  6.0 - 12.0 fL Final   • Platelets 11/06/2019 330  140 - 450 10*3/mm3 Final   • Neutrophil % 11/06/2019 87.2* 42.7 - 76.0 % Final   • Lymphocyte % 11/06/2019 8.8* 19.6 - 45.3 % Final   • Monocyte % 11/06/2019 3.3* 5.0 - 12.0 % Final   • Eosinophil % 11/06/2019 0.6  0.3 - 6.2 % Final   • Basophil % 11/06/2019 0.1  0.0 - 1.5 % Final   • Neutrophils, Absolute 11/06/2019 12.25* 1.70 - 7.00 10*3/mm3 Final   • Lymphocytes, Absolute 11/06/2019 1.23  0.70 - 3.10 10*3/mm3 Final   • Monocytes, Absolute 11/06/2019 0.46  0.10 - 0.90 10*3/mm3 Final   • Eosinophils, Absolute 11/06/2019 0.08  0.00 - 0.40 10*3/mm3 Final   • Basophils, Absolute 11/06/2019 0.02  0.00 - 0.20 10*3/mm3 Final         Diagnoses and all orders for this visit:    1. Medicare annual wellness visit, subsequent (Primary)  Assessment & Plan:  Discussed injury  prevention, diet and exercise, safe sexual practices, and screening for common diseases. Encouraged use of sunscreen and seatbelts. Encouraged SBE, avoidance of tobacco, limiting alcohol, and yearly dental and eye exams.        2. Essential hypertension  Assessment & Plan:  No medication adjustments needed.  Continue beta-blocker and diuretic.  We will continue monitoring blood work for renal function.      3. Hyperlipidemia, unspecified hyperlipidemia type  Assessment & Plan:  We will repeat lipid profile next time she has blood work done for monitoring of her diabetes.  Continue statin therapy.  She will need long-term use of high-CM statin to lessen her risk of ASCVD.      4. Seasonal allergic rhinitis due to pollen    5. Mild intermittent asthma without complication  Assessment & Plan:  No recent flare.  Continue leukotriene inhibitor.          6. Gastroesophageal reflux disease without esophagitis  Assessment & Plan:  Unable to wean off proton pump inhibitor.Discussed link between PPI's and increased risk of hip, wrist, and spine fractures as well as renal issues.       7. Type 2 diabetes mellitus with hyperglycemia, with long-term current use of insulin (CMS/[facility])  Assessment & Plan:  Managed by endocrinology.      8. Acquired hypothyroidism  Assessment & Plan:  We will repeat TSH.  May need adjustment in thyroid medication dosage.      9. Diabetic nephropathy associated with type 2 diabetes mellitus (CMS/[facility])    10. Localized osteoporosis without current pathological fracture  Assessment & Plan:  Continue vitamin D, calcium, and bisphosphonate.  She will be due for a DEXA scan next year.      11. Primary malignant neoplasm of female breast (CMS/[facility])    12. Bandemia    13. Peripheral edema    14. Ductal carcinoma in situ (DCIS) of left breast    Other orders  -     sulfamethoxazole-trimethoprim (BACTRIM DS) 800-160 MG per tablet; Take 1 tablet by mouth 2 (Two) Times a Day.  Dispense: 20 tablet; Refill:  0      Chief Complaint   Patient presents with   • Medicare Wellness-subsequent       Subjective   History of Present Illness:  Christiane Nazario is a 69 y.o. female who presents for a Subsequent Medicare Wellness Visit.    HEALTH RISK ASSESSMENT    Recent Hospitalizations:  Recently treated at the following:  [facility]     Current Medical Providers:  Patient Care Team:  Lyell, Reggie Duane, MD as PCP - General (Family Medicine)  Thalia Davis MD as PCP - Claims Attributed  Kosta Frank MD as Consulting Physician (Endocrinology)  Guillermo Landeros MD as Consulting Physician (Pulmonary Disease)  Thalia Davis MD as Consulting Physician (Hematology and Oncology)    Smoking Status:  Social History     Tobacco Use   Smoking Status Never Smoker   Smokeless Tobacco Never Used       Alcohol Consumption:  Social History     Substance and Sexual Activity   Alcohol Use No   • Frequency: Never       Depression Screen:   PHQ-2/PHQ-9 Depression Screening 1/6/2020   Little interest or pleasure in doing things 0   Feeling down, depressed, or hopeless 0   Trouble falling or staying asleep, or sleeping too much 0   Feeling tired or having little energy 0   Poor appetite or overeating 0   Feeling bad about yourself - or that you are a failure or have let yourself or your family down 0   Trouble concentrating on things, such as reading the newspaper or watching television 0   Moving or speaking so slowly that other people could have noticed. Or the opposite - being so fidgety or restless that you have been moving around a lot more than usual 0   Thoughts that you would be better off dead, or of hurting yourself in some way 0   Total Score 0   If you checked off any problems, how difficult have these problems made it for you to do your work, take care of things at home, or get along with other people? Not difficult at all       Fall Risk Screen:  STEADI Fall Risk Assessment was completed, and patient is at  MODERATE risk for falls. Assessment completed on:1/6/2020    Health Habits and Functional and Cognitive Screening:  Functional & Cognitive Status 1/6/2020   Do you have difficulty preparing food and eating? No   Do you have difficulty bathing yourself, getting dressed or grooming yourself? No   Do you have difficulty using the toilet? No   Do you have difficulty moving around from place to place? No   Do you have trouble with steps or getting out of a bed or a chair? No   Current Diet Unhealthy Diet   Dental Exam Not up to date   Eye Exam Up to date   Exercise (times per week) 0 times per week   Current Exercise Activities Include None   Do you need help using the phone?  No   Are you deaf or do you have serious difficulty hearing?  No   Do you need help with transportation? No   Do you need help shopping? No   Do you need help preparing meals?  No   Do you need help with housework?  No   Do you need help with laundry? No   Do you need help taking your medications? No   Do you need help managing money? No   Do you ever drive or ride in a car without wearing a seat belt? No   Have you felt unusual stress, anger or loneliness in the last month? No   Who do you live with? Spouse   If you need help, do you have trouble finding someone available to you? No   Have you been bothered in the last four weeks by sexual problems? No   Do you have difficulty concentrating, remembering or making decisions? No         Does the patient have evidence of cognitive impairment? Yes    Asprin use counseling:Taking ASA appropriately as indicated    Age-appropriate Screening Schedule:  Refer to the list below for future screening recommendations based on patient's age, sex and/or medical conditions. Orders for these recommended tests are listed in the plan section. The patient has been provided with a written plan.    Health Maintenance   Topic Date Due   • TDAP/TD VACCINES (1 - Tdap) 03/31/1961   • ZOSTER VACCINE (1 of 2) 03/31/2002   •  "DIABETIC FOOT EXAM  01/04/2019   • COLONOSCOPY  01/04/2019   • LIPID PANEL  09/05/2019   • PNEUMOCOCCAL VACCINE (65+ HIGH RISK) (2 of 2 - PPSV23) 09/16/2019   • DIABETIC EYE EXAM  09/20/2019   • HEMOGLOBIN A1C  01/08/2020   • URINE MICROALBUMIN  07/08/2020   • MAMMOGRAM  11/30/2020   • DXA SCAN  03/22/2021   • INFLUENZA VACCINE  Completed          The following portions of the patient's history were reviewed and updated as appropriate: allergies, current medications, past family history, past medical history, past social history, past surgical history and problem list.    Outpatient Medications Prior to Visit   Medication Sig Dispense Refill   • acetaZOLAMIDE (DIAMOX) 250 MG tablet TAKE ONE TABLET BY MOUTH EVERY MORNING 90 tablet 0   • anastrozole (ARIMIDEX) 1 MG tablet Take 1 tablet by mouth Daily. 90 tablet 1   • aspirin (ADULT ASPIRIN EC LOW STRENGTH) 81 MG EC tablet ADULT ASPIRIN EC LOW STRENGTH 81 MG ORAL TABLET DELAYED RELEASE     • atorvastatin (LIPITOR) 40 MG tablet TAKE ONE TABLET BY MOUTH EVERY NIGHT 30 tablet 5   • BD INSULIN SYRINGE U/F 31G X 5/16\" 1 ML misc USE TWICE A DAY WITH INSULIN INJECTIONS 100 each 1   • Cholecalciferol (VITAMIN D3) 5000 units capsule capsule 1 capsule Daily.     • furosemide (LASIX) 40 MG tablet TAKE ONE TABLET BY MOUTH EVERY DAY 30 tablet 0   • glucose blood (ONE TOUCH ULTRA TEST) test strip ONETOUCH ULTRA BLUE STRP     • ibandronate (BONIVA) 150 MG tablet IBANDRONATE SODIUM 150 MG TABS     • insulin NPH-insulin regular (HUMULIN 70/30) (70-30) 100 UNIT/ML injection Inject 40 units SQ every morning and 32 units every evening at dinner DX E11.65 70 mL 2   • KOMBIGLYZE XR 2.5-1000 MG tablet sustained-release 24 hour TAKE ONE TABLET BY MOUTH TWICE DAILY 180 tablet 1   • levothyroxine (SYNTHROID, LEVOTHROID) 100 MCG tablet TAKE ONE TABLET BY MOUTH EVERY DAY 90 tablet 0   • loratadine (CLARITIN) 10 MG tablet TAKE ONE TABLET BY MOUTH EVERY DAY 90 tablet 1   • montelukast (SINGULAIR) " 10 MG tablet TAKE ONE TABLET BY MOUTH EVERY NIGHT AT BEDTIME 90 tablet 0   • pantoprazole (PROTONIX) 40 MG EC tablet TAKE ONE TABLET BY MOUTH EVERY DAY 90 tablet 0   • potassium chloride (K-DUR,KLOR-CON) 20 MEQ CR tablet TAKE 1 TABLET BY MOUTH TWICE DAILY 180 tablet 0   • potassium chloride (KLOR-CON) 20 MEQ packet KLOR-CON 20 MEQ PACK     • ramipril (ALTACE) 1.25 MG capsule TAKE 1 CAPSULE BY MOUTH EVERY EVENING WITH DINNER 90 capsule 1     No facility-administered medications prior to visit.        Patient Active Problem List   Diagnosis   • Allergic rhinitis, seasonal   • Asthma   • Diabetic nephropathy (CMS/H[facility])   • Ductal carcinoma in situ (DCIS) of breast   • Encounter for immunization   • Medicare annual wellness visit, subsequent   • Gastroesophageal reflux disease   • Hyperlipidemia   • Hypertension   • Hypokalemia   • Hypothyroidism   • Leukocytosis   • Body mass index (BMI) of 45.0-49.9 in adult (CMS/H[facility])   • Body mass index 40.0-44.9, adult (CMS/HCC)   • Obesity   • Osteoporosis   • Other long term (current) drug therapy   • Overweight   • Peripheral edema   • Primary malignant neoplasm of female breast (CMS/[facility])   • Primary pulmonary hypertension (CMS/[facility])   • Type 2 diabetes mellitus with hyperglycemia (CMS/[facility])   • Dysuria       Advanced Care Planning:  Patient does not have an advance directive - information provided to the patient today    Review of Systems   Constitutional: Positive for fatigue. Negative for activity change, appetite change, chills, fever and unexpected weight change.   HENT: Negative for congestion, ear discharge, ear pain, facial swelling, hearing loss, nosebleeds, postnasal drip, rhinorrhea, sinus pressure, sinus pain, sneezing, sore throat, tinnitus, trouble swallowing and voice change.    Eyes: Negative for photophobia, pain, discharge, redness, itching and visual disturbance.   Respiratory: Negative for apnea, cough, choking, chest tightness, shortness of breath and wheezing.   "  Cardiovascular: Negative for chest pain and palpitations.   Gastrointestinal: Negative for abdominal distention, abdominal pain, blood in stool, constipation, diarrhea, nausea and vomiting.   Endocrine: Negative for cold intolerance, heat intolerance, polydipsia and polyphagia.   Genitourinary: Negative for difficulty urinating, dysuria, enuresis, flank pain, frequency, hematuria, pelvic pain and urgency.   Musculoskeletal: Positive for back pain. Negative for arthralgias, gait problem, joint swelling, myalgias, neck pain and neck stiffness.   Skin: Negative for pallor, rash and wound.   Allergic/Immunologic: Negative for environmental allergies and food allergies.   Neurological: Negative for dizziness, tremors, seizures, syncope, speech difficulty, weakness, light-headedness, numbness and headaches.   Hematological: Negative for adenopathy. Does not bruise/bleed easily.   Psychiatric/Behavioral: Negative for confusion, decreased concentration, hallucinations and sleep disturbance. The patient is not nervous/anxious.        Compared to one year ago, the patient feels her physical health is worse.  Compared to one year ago, the patient feels her mental health is worse.    Reviewed chart for potential of high risk medication in the elderly: yes  Reviewed chart for potential of harmful drug interactions in the elderly:yes    Objective         Vitals:    01/06/20 1414   BP: 129/75   BP Location: Left arm   Patient Position: Sitting   Cuff Size: Adult   Pulse: 102   Resp: 18   Temp: 98.3 °F (36.8 °C)   TempSrc: Oral   SpO2: 93%   Weight: 98.9 kg (218 lb)   Height: 152.4 cm (60\")   PainSc: 0-No pain       Body mass index is 42.58 kg/m².  Discussed the patient's BMI with her. The BMI is above average; BMI management plan is completed.    Physical Exam   Constitutional: She is oriented to person, place, and time. She appears well-developed and well-nourished.   HENT:   Head: Normocephalic and atraumatic.   Eyes: Pupils " are equal, round, and reactive to light. Conjunctivae and EOM are normal. Right eye exhibits no discharge. Left eye exhibits no discharge. No scleral icterus.   Neck: No thyromegaly present.   Cardiovascular: Normal rate, regular rhythm and normal heart sounds. Exam reveals no gallop and no friction rub.   No murmur heard.  Pulmonary/Chest: Effort normal and breath sounds normal. No respiratory distress. She has no wheezes. She has no rales.   Bring from previous surgery for breast mass   Abdominal: Soft. Bowel sounds are normal. She exhibits no distension and no mass. There is no tenderness. There is no rebound and no guarding.   Musculoskeletal: Normal range of motion. She exhibits no edema, tenderness or deformity.   Lymphadenopathy:     She has no cervical adenopathy.   Neurological: She is alert and oriented to person, place, and time. She displays normal reflexes. No cranial nerve deficit or sensory deficit. She exhibits normal muscle tone. Coordination normal.   Skin: Skin is warm and dry. No rash noted. No erythema.   Psychiatric: She has a normal mood and affect. Her behavior is normal. Judgment and thought content normal.             Assessment/Plan   Medicare Risks and Personalized Health Plan  CMS Preventative Services Quick Reference  Advance Directive Discussion  Fall Risk  Obesity/Overweight     The above risks/problems have been discussed with the patient.  Pertinent information has been shared with the patient in the After Visit Summary.  Follow up plans and orders are seen below in the Assessment/Plan Section.    Diagnoses and all orders for this visit:    1. Medicare annual wellness visit, subsequent (Primary)  Assessment & Plan:  Discussed injury prevention, diet and exercise, safe sexual practices, and screening for common diseases. Encouraged use of sunscreen and seatbelts. Encouraged SBE, avoidance of tobacco, limiting alcohol, and yearly dental and eye exams.        2. Essential  hypertension  Assessment & Plan:  No medication adjustments needed.  Continue beta-blocker and diuretic.  We will continue monitoring blood work for renal function.      3. Hyperlipidemia, unspecified hyperlipidemia type  Assessment & Plan:  We will repeat lipid profile next time she has blood work done for monitoring of her diabetes.  Continue statin therapy.  She will need long-term use of high-CM statin to lessen her risk of ASCVD.      4. Seasonal allergic rhinitis due to pollen    5. Mild intermittent asthma without complication  Assessment & Plan:  No recent flare.  Continue leukotriene inhibitor.          6. Gastroesophageal reflux disease without esophagitis  Assessment & Plan:  Unable to wean off proton pump inhibitor.Discussed link between PPI's and increased risk of hip, wrist, and spine fractures as well as renal issues.       7. Type 2 diabetes mellitus with hyperglycemia, with long-term current use of insulin (CMS/[facility])  Assessment & Plan:  Managed by endocrinology.      8. Acquired hypothyroidism  Assessment & Plan:  We will repeat TSH.  May need adjustment in thyroid medication dosage.      9. Diabetic nephropathy associated with type 2 diabetes mellitus (CMS/[facility])    10. Localized osteoporosis without current pathological fracture  Assessment & Plan:  Continue vitamin D, calcium, and bisphosphonate.  She will be due for a DEXA scan next year.      11. Primary malignant neoplasm of female breast (CMS/[facility])    12. Bandemia    13. Peripheral edema    14. Ductal carcinoma in situ (DCIS) of left breast    Other orders  -     sulfamethoxazole-trimethoprim (BACTRIM DS) 800-160 MG per tablet; Take 1 tablet by mouth 2 (Two) Times a Day.  Dispense: 20 tablet; Refill: 0    Follow Up:  No follow-ups on file.     An After Visit Summary and PPPS were given to the patient.

## 2020-01-08 NOTE — ASSESSMENT & PLAN NOTE
Discussed injury prevention, diet and exercise, safe sexual practices, and screening for common diseases. Encouraged use of sunscreen and seatbelts. Encouraged SBE, avoidance of tobacco, limiting alcohol, and yearly dental and eye exams.

## 2020-01-08 NOTE — ASSESSMENT & PLAN NOTE
No medication adjustments needed.  Continue beta-blocker and diuretic.  We will continue monitoring blood work for renal function.

## 2020-01-08 NOTE — ASSESSMENT & PLAN NOTE
We will repeat lipid profile next time she has blood work done for monitoring of her diabetes.  Continue statin therapy.  She will need long-term use of high-CM statin to lessen her risk of ASCVD.

## 2020-01-08 NOTE — ASSESSMENT & PLAN NOTE
Unable to wean off proton pump inhibitor.Discussed link between PPI's and increased risk of hip, wrist, and spine fractures as well as renal issues.

## 2020-01-11 ENCOUNTER — RESULTS ENCOUNTER (OUTPATIENT)
Dept: ENDOCRINOLOGY | Facility: CLINIC | Age: 70
End: 2020-01-11

## 2020-01-11 DIAGNOSIS — E11.65 TYPE 2 DIABETES MELLITUS WITH HYPERGLYCEMIA, WITH LONG-TERM CURRENT USE OF INSULIN (HCC): ICD-10-CM

## 2020-01-11 DIAGNOSIS — E78.5 HYPERLIPIDEMIA, UNSPECIFIED HYPERLIPIDEMIA TYPE: ICD-10-CM

## 2020-01-11 DIAGNOSIS — E03.9 ACQUIRED HYPOTHYROIDISM: ICD-10-CM

## 2020-01-11 DIAGNOSIS — I10 ESSENTIAL HYPERTENSION: ICD-10-CM

## 2020-01-11 DIAGNOSIS — Z79.4 TYPE 2 DIABETES MELLITUS WITH HYPERGLYCEMIA, WITH LONG-TERM CURRENT USE OF INSULIN (HCC): ICD-10-CM

## 2020-01-11 DIAGNOSIS — E11.21 DIABETIC NEPHROPATHY ASSOCIATED WITH TYPE 2 DIABETES MELLITUS (HCC): ICD-10-CM

## 2020-01-13 ENCOUNTER — LAB (OUTPATIENT)
Dept: LAB | Facility: HOSPITAL | Age: 70
End: 2020-01-13

## 2020-01-13 DIAGNOSIS — E11.65 TYPE 2 DIABETES MELLITUS WITH HYPERGLYCEMIA, UNSPECIFIED WHETHER LONG TERM INSULIN USE (HCC): Primary | ICD-10-CM

## 2020-01-13 DIAGNOSIS — I10 HYPERTENSION, UNSPECIFIED TYPE: ICD-10-CM

## 2020-01-13 DIAGNOSIS — E78.5 HYPERLIPIDEMIA, UNSPECIFIED HYPERLIPIDEMIA TYPE: ICD-10-CM

## 2020-01-13 LAB
ALBUMIN SERPL-MCNC: 3.6 G/DL (ref 3.5–5.2)
ALBUMIN/GLOB SERPL: 1.2 G/DL
ALP SERPL-CCNC: 296 U/L (ref 39–117)
ALT SERPL W P-5'-P-CCNC: 142 U/L (ref 1–33)
ANION GAP SERPL CALCULATED.3IONS-SCNC: 20.9 MMOL/L (ref 5–15)
AST SERPL-CCNC: 175 U/L (ref 1–32)
BILIRUB SERPL-MCNC: 1.4 MG/DL (ref 0.2–1.2)
BUN BLD-MCNC: 15 MG/DL (ref 8–23)
BUN/CREAT SERPL: 8.9 (ref 7–25)
CALCIUM SPEC-SCNC: 7 MG/DL (ref 8.6–10.5)
CHLORIDE SERPL-SCNC: 99 MMOL/L (ref 98–107)
CHOLEST SERPL-MCNC: 118 MG/DL (ref 0–200)
CO2 SERPL-SCNC: 18.1 MMOL/L (ref 22–29)
CREAT BLD-MCNC: 1.68 MG/DL (ref 0.57–1)
GFR SERPL CREATININE-BSD FRML MDRD: 30 ML/MIN/1.73
GLOBULIN UR ELPH-MCNC: 3.1 GM/DL
GLUCOSE BLD-MCNC: 120 MG/DL (ref 65–99)
HBA1C MFR BLD: 7.2 % (ref 3.5–5.6)
HDLC SERPL-MCNC: 39 MG/DL (ref 40–60)
LDLC SERPL CALC-MCNC: 50 MG/DL (ref 0–100)
LDLC/HDLC SERPL: 1.27 {RATIO}
POTASSIUM BLD-SCNC: 5 MMOL/L (ref 3.5–5.2)
PROT SERPL-MCNC: 6.7 G/DL (ref 6–8.5)
SODIUM BLD-SCNC: 138 MMOL/L (ref 136–145)
TRIGL SERPL-MCNC: 147 MG/DL (ref 0–150)
VLDLC SERPL-MCNC: 29.4 MG/DL (ref 5–40)

## 2020-01-13 PROCEDURE — 84443 ASSAY THYROID STIM HORMONE: CPT | Performed by: INTERNAL MEDICINE

## 2020-01-13 PROCEDURE — 80061 LIPID PANEL: CPT

## 2020-01-13 PROCEDURE — 80053 COMPREHEN METABOLIC PANEL: CPT

## 2020-01-13 PROCEDURE — 36415 COLL VENOUS BLD VENIPUNCTURE: CPT

## 2020-01-13 PROCEDURE — 83036 HEMOGLOBIN GLYCOSYLATED A1C: CPT

## 2020-01-13 PROCEDURE — 84439 ASSAY OF FREE THYROXINE: CPT | Performed by: INTERNAL MEDICINE

## 2020-01-13 RX ORDER — SAXAGLIPTIN AND METFORMIN HYDROCHLORIDE 2.5; 1 MG/1; MG/1
TABLET, FILM COATED, EXTENDED RELEASE ORAL
Qty: 180 TABLET | Refills: 1 | Status: SHIPPED | OUTPATIENT
Start: 2020-01-13 | End: 2020-10-12

## 2020-01-14 LAB
T4 FREE SERPL-MCNC: 1.26 NG/DL (ref 0.82–1.77)
TSH SERPL-ACNC: 1.36 UIU/ML (ref 0.45–4.5)

## 2020-01-20 ENCOUNTER — OFFICE VISIT (OUTPATIENT)
Dept: ENDOCRINOLOGY | Facility: CLINIC | Age: 70
End: 2020-01-20

## 2020-01-20 VITALS
HEIGHT: 60 IN | DIASTOLIC BLOOD PRESSURE: 70 MMHG | OXYGEN SATURATION: 97 % | WEIGHT: 198 LBS | SYSTOLIC BLOOD PRESSURE: 125 MMHG | BODY MASS INDEX: 38.87 KG/M2 | HEART RATE: 97 BPM

## 2020-01-20 DIAGNOSIS — E03.9 ACQUIRED HYPOTHYROIDISM: ICD-10-CM

## 2020-01-20 DIAGNOSIS — E78.2 MIXED HYPERLIPIDEMIA: ICD-10-CM

## 2020-01-20 DIAGNOSIS — E83.51 HYPOCALCEMIA: ICD-10-CM

## 2020-01-20 DIAGNOSIS — I10 ESSENTIAL HYPERTENSION: ICD-10-CM

## 2020-01-20 DIAGNOSIS — K75.9 HEPATITIS: ICD-10-CM

## 2020-01-20 DIAGNOSIS — Z79.4 TYPE 2 DIABETES MELLITUS WITH HYPERGLYCEMIA, WITH LONG-TERM CURRENT USE OF INSULIN (HCC): Primary | ICD-10-CM

## 2020-01-20 DIAGNOSIS — E11.65 TYPE 2 DIABETES MELLITUS WITH HYPERGLYCEMIA, WITH LONG-TERM CURRENT USE OF INSULIN (HCC): Primary | ICD-10-CM

## 2020-01-20 PROCEDURE — 99214 OFFICE O/P EST MOD 30 MIN: CPT | Performed by: INTERNAL MEDICINE

## 2020-01-20 NOTE — PATIENT INSTRUCTIONS
DC Bactrim  Check CMP with magnesium and phosphorus and vitamin D in 5 days  If you start having any abdominal pain, nausea, vomiting or any other symptoms you either go to the hospital or you call us or call your family doctor.  Follow-up in 6 months with labs.

## 2020-01-20 NOTE — PROGRESS NOTES
Endocrine Progress Note Outpatient     Patient Care Team:  Lyell, Reggie Duane, MD as PCP - General (Family Medicine)  Thalia Davis MD as PCP - Claims Attributed  Kosta Frank MD as Consulting Physician (Endocrinology)  Guillermo Landeros MD as Consulting Physician (Pulmonary Disease)  Thalia Davis MD as Consulting Physician (Hematology and Oncology)    Chief Complaint: Follow-up diabetes    HPI: 69-year-old female with history of type 2 diabetes, hypertension, hyperlipidemia, hypothyroidism and diabetic nephropathy is here for follow-up.  Patient is currently taking Kombiglyze XR 2.5/thousand, 1 tablet twice a day and Humulin 70/30 insulin 40 units before breakfast and 32 units before supper.  She did bring in blood sugar records for review and most of the numbers are doing fairly well, I do not see any blood sugars less than 70.  She is tolerating her medications well.  She is trying to work on her diet the best she can.    Hypertension: Well-controlled.  Hyperlipidemia: She is currently on atorvastatin.  Hypothyroidism: Currently on levothyroxine supplementation  Diabetic nephropathy: She is on ramipril.    Was recently diagnosed with UTI and was prescribed Bactrim through her primary care physician Dr. Mcneal and she is taking it 1 tablet daily and is still have 3 days of worth of medication left.    Past Medical History:   Diagnosis Date   • Cataract    • DM type 2 (diabetes mellitus, type 2) (CMS/[facility])    • Ductal carcinoma in situ (DCIS) of left breast    • Fracture, femur (CMS/[facility]) 2014    fracture of left femur   • Hyperlipidemia    • Hypertension    • Hypothyroidism    • Osteopenia    • Pulmonary hypertension (CMS/[facility])        Social History     Socioeconomic History   • Marital status:      Spouse name: Not on file   • Number of children: Not on file   • Years of education: Not on file   • Highest education level: Not on file   Tobacco Use   • Smoking status: Never Smoker   • Smokeless  tobacco: Never Used   Substance and Sexual Activity   • Alcohol use: No     Frequency: Never   • Drug use: No       Family History   Problem Relation Age of Onset   • Ovarian cancer Sister    • Cancer Other        Allergies   Allergen Reactions   • Calcium-Containing Compounds Nausea Only       ROS:   Constitutional:  Denies fatigue, tiredness.    Eyes:  Denies change in visual acuity   HENT:  Denies nasal congestion or sore throat   Respiratory: denies cough, shortness of breath.   Cardiovascular:  denies chest pain, edema   GI:  Denies abdominal pain, nausea, vomiting.   Musculoskeletal:  Denies back pain or joint pain   Integument:  Denies dry skin and rash   Neurologic:  Denies headache, focal weakness or sensory changes   Endocrine:  Denies polyuria or polydipsia   Psychiatric:  Denies depression or anxiety      Vitals:    01/20/20 1256   BP: 125/70   Pulse: 97   SpO2: 97%       Physical Exam:  GEN: NAD, conversant  EYES: EOMI, PERRL, no conjunctival erythema  NECK: no thyromegaly, full ROM   CV: RRR, no murmurs/rubs/gallops, no peripheral edema  LUNG: CTAB, no wheezes/rales/ronchi  SKIN: no rashes, no acanthosis  MSK: no deformities, full ROM of all extremities  NEURO: no tremors, DTR normal  PSYCH: AOX3, appropriate mood, affect normal      Results Review:     I reviewed the patient's new clinical results.    Lab Results   Component Value Date    HGBA1C 7.2 (H) 01/13/2020    HGBA1C 6.7 (H) 07/08/2019    HGBA1C 7.2 (H) 12/10/2018      Lab Results   Component Value Date    GLUCOSE 120 (H) 01/13/2020    BUN 15 01/13/2020    CREATININE 1.68 (H) 01/13/2020    EGFRIFNONA 30 (L) 01/13/2020    BCR 8.9 01/13/2020    K 5.0 01/13/2020    CO2 18.1 (L) 01/13/2020    CALCIUM 7.0 (L) 01/13/2020    ALBUMIN 3.60 01/13/2020    LABIL2 1.0 05/08/2019     (H) 01/13/2020     (H) 01/13/2020    CHOL 118 01/13/2020    TRIG 147 01/13/2020    LDL 50 01/13/2020    HDL 39 (L) 01/13/2020     Lab Results   Component Value  "Date    TSH 1.360 01/13/2020    FREET4 1.26 01/13/2020         Medication Review: Reviewed.       Current Outpatient Medications:   •  acetaZOLAMIDE (DIAMOX) 250 MG tablet, TAKE ONE TABLET BY MOUTH EVERY MORNING, Disp: 90 tablet, Rfl: 0  •  anastrozole (ARIMIDEX) 1 MG tablet, Take 1 tablet by mouth Daily., Disp: 90 tablet, Rfl: 1  •  aspirin (ADULT ASPIRIN EC LOW STRENGTH) 81 MG EC tablet, ADULT ASPIRIN EC LOW STRENGTH 81 MG ORAL TABLET DELAYED RELEASE, Disp: , Rfl:   •  atorvastatin (LIPITOR) 40 MG tablet, TAKE ONE TABLET BY MOUTH EVERY NIGHT, Disp: 30 tablet, Rfl: 5  •  BD INSULIN SYRINGE U/F 31G X 5/16\" 1 ML misc, USE TWICE A DAY WITH INSULIN INJECTIONS, Disp: 100 each, Rfl: 1  •  CALCIUM ACETATE IJ, Inject  as directed., Disp: , Rfl:   •  Cholecalciferol (VITAMIN D3) 5000 units capsule capsule, 1 capsule Daily., Disp: , Rfl:   •  furosemide (LASIX) 40 MG tablet, TAKE ONE TABLET BY MOUTH EVERY DAY, Disp: 30 tablet, Rfl: 0  •  glucose blood (ONE TOUCH ULTRA TEST) test strip, ONETOUCH ULTRA BLUE STRP, Disp: , Rfl:   •  ibandronate (BONIVA) 150 MG tablet, IBANDRONATE SODIUM 150 MG TABS, Disp: , Rfl:   •  insulin NPH-insulin regular (HUMULIN 70/30) (70-30) 100 UNIT/ML injection, Inject 40 units SQ every morning and 32 units every evening at dinner DX E11.65, Disp: 70 mL, Rfl: 2  •  KOMBIGLYZE XR 2.5-1000 MG tablet sustained-release 24 hour, TAKE ONE TABLET BY MOUTH TWICE DAILY, Disp: 180 tablet, Rfl: 1  •  levothyroxine (SYNTHROID, LEVOTHROID) 100 MCG tablet, TAKE ONE TABLET BY MOUTH EVERY DAY, Disp: 90 tablet, Rfl: 0  •  loratadine (CLARITIN) 10 MG tablet, TAKE ONE TABLET BY MOUTH EVERY DAY, Disp: 90 tablet, Rfl: 1  •  montelukast (SINGULAIR) 10 MG tablet, TAKE ONE TABLET BY MOUTH EVERY NIGHT AT BEDTIME, Disp: 90 tablet, Rfl: 0  •  pantoprazole (PROTONIX) 40 MG EC tablet, TAKE ONE TABLET BY MOUTH EVERY DAY, Disp: 90 tablet, Rfl: 0  •  potassium chloride (K-DUR,KLOR-CON) 20 MEQ CR tablet, TAKE 1 TABLET BY MOUTH TWICE " "DAILY, Disp: 180 tablet, Rfl: 0  •  ramipril (ALTACE) 1.25 MG capsule, TAKE 1 CAPSULE BY MOUTH EVERY EVENING WITH DINNER, Disp: 90 capsule, Rfl: 1  •  sulfamethoxazole-trimethoprim (BACTRIM DS) 800-160 MG per tablet, Take 1 tablet by mouth 2 (Two) Times a Day., Disp: 20 tablet, Rfl: 0      Assessment/Plan   1.  Diabetes mellitus type 2: Fair control with A1c 7.2% blood sugars are doing very well.  Will continue current management.  2.  Hypertension: Well-controlled, continue current medications  3.  Hyperlipidemia: On atorvastatin, will follow lipid panel.  4.  Hypothyroidism: Currently on levothyroxine supplementation, will follow TSH  5.  Diabetic nephropathy: On ramipril.  Will continue current medications.  6.  Hepatitis with elevated ALT: Could be due to Bactrim, I have asked her to DC Bactrim and notify Dr. Fox and I will check CMP in 2 to 3 days.  7.  Hypocalcemia: Her serum calcium is low at 7 with albumin of 3.4, she is not having any symptoms, will follow CMP and I will check vitamin D level and advised to continue calcium supplementation that she is taking.  You            Kosta Frank MD FACE.  06/15/19  4:34 PM      EMR Dragon / transcription disclaimer:     \"Dictated utilizing Dragon dictation\".                 "

## 2020-01-21 ENCOUNTER — HOSPITAL ENCOUNTER (OUTPATIENT)
Dept: MAMMOGRAPHY | Facility: HOSPITAL | Age: 70
Discharge: HOME | End: 2020-01-21
Admitting: NURSE PRACTITIONER

## 2020-01-21 DIAGNOSIS — C50.919 PRIMARY MALIGNANT NEOPLASM OF FEMALE BREAST (HCC): ICD-10-CM

## 2020-01-21 DIAGNOSIS — Z12.31 SCREENING MAMMOGRAM, ENCOUNTER FOR: ICD-10-CM

## 2020-01-21 PROCEDURE — 77063 BREAST TOMOSYNTHESIS BI: CPT

## 2020-01-21 PROCEDURE — 77067 SCR MAMMO BI INCL CAD: CPT

## 2020-01-27 ENCOUNTER — LAB (OUTPATIENT)
Dept: LAB | Facility: HOSPITAL | Age: 70
End: 2020-01-27

## 2020-01-27 DIAGNOSIS — E11.65 TYPE 2 DIABETES MELLITUS WITH HYPERGLYCEMIA, WITH LONG-TERM CURRENT USE OF INSULIN (HCC): ICD-10-CM

## 2020-01-27 DIAGNOSIS — E78.2 MIXED HYPERLIPIDEMIA: ICD-10-CM

## 2020-01-27 DIAGNOSIS — K75.9 HEPATITIS: ICD-10-CM

## 2020-01-27 DIAGNOSIS — Z79.4 TYPE 2 DIABETES MELLITUS WITH HYPERGLYCEMIA, WITH LONG-TERM CURRENT USE OF INSULIN (HCC): ICD-10-CM

## 2020-01-27 DIAGNOSIS — E03.9 ACQUIRED HYPOTHYROIDISM: ICD-10-CM

## 2020-01-27 DIAGNOSIS — E83.51 HYPOCALCEMIA: ICD-10-CM

## 2020-01-27 DIAGNOSIS — I10 ESSENTIAL HYPERTENSION: ICD-10-CM

## 2020-01-27 LAB
25(OH)D3 SERPL-MCNC: 99.5 NG/ML (ref 30–100)
ALBUMIN SERPL-MCNC: 3.7 G/DL (ref 3.5–5.2)
ALBUMIN/GLOB SERPL: 1.3 G/DL
ALP SERPL-CCNC: 90 U/L (ref 39–117)
ALT SERPL W P-5'-P-CCNC: 12 U/L (ref 1–33)
ANION GAP SERPL CALCULATED.3IONS-SCNC: 13.6 MMOL/L (ref 5–15)
AST SERPL-CCNC: 12 U/L (ref 1–32)
BILIRUB SERPL-MCNC: 0.6 MG/DL (ref 0.2–1.2)
BUN BLD-MCNC: 14 MG/DL (ref 8–23)
BUN/CREAT SERPL: 17.5 (ref 7–25)
CALCIUM SPEC-SCNC: 8.7 MG/DL (ref 8.6–10.5)
CHLORIDE SERPL-SCNC: 104 MMOL/L (ref 98–107)
CO2 SERPL-SCNC: 24.4 MMOL/L (ref 22–29)
CREAT BLD-MCNC: 0.8 MG/DL (ref 0.57–1)
GFR SERPL CREATININE-BSD FRML MDRD: 72 ML/MIN/1.73
GLOBULIN UR ELPH-MCNC: 2.9 GM/DL
GLUCOSE BLD-MCNC: 89 MG/DL (ref 65–99)
MAGNESIUM SERPL-MCNC: 0.9 MG/DL (ref 1.6–2.4)
PHOSPHATE SERPL-MCNC: 3.4 MG/DL (ref 2.5–4.5)
POTASSIUM BLD-SCNC: 4.5 MMOL/L (ref 3.5–5.2)
PROT SERPL-MCNC: 6.6 G/DL (ref 6–8.5)
SODIUM BLD-SCNC: 142 MMOL/L (ref 136–145)

## 2020-01-27 PROCEDURE — 83735 ASSAY OF MAGNESIUM: CPT

## 2020-01-27 PROCEDURE — 36415 COLL VENOUS BLD VENIPUNCTURE: CPT

## 2020-01-27 PROCEDURE — 80053 COMPREHEN METABOLIC PANEL: CPT

## 2020-01-27 PROCEDURE — 82306 VITAMIN D 25 HYDROXY: CPT

## 2020-01-27 PROCEDURE — 84100 ASSAY OF PHOSPHORUS: CPT

## 2020-01-28 ENCOUNTER — TELEPHONE (OUTPATIENT)
Dept: FAMILY MEDICINE CLINIC | Facility: CLINIC | Age: 70
End: 2020-01-28

## 2020-01-28 DIAGNOSIS — E03.9 ACQUIRED HYPOTHYROIDISM: ICD-10-CM

## 2020-01-28 DIAGNOSIS — E11.65 TYPE 2 DIABETES MELLITUS WITH HYPERGLYCEMIA, WITH LONG-TERM CURRENT USE OF INSULIN (HCC): ICD-10-CM

## 2020-01-28 DIAGNOSIS — I10 ESSENTIAL HYPERTENSION: ICD-10-CM

## 2020-01-28 DIAGNOSIS — E78.2 MIXED HYPERLIPIDEMIA: ICD-10-CM

## 2020-01-28 DIAGNOSIS — Z79.4 TYPE 2 DIABETES MELLITUS WITH HYPERGLYCEMIA, WITH LONG-TERM CURRENT USE OF INSULIN (HCC): ICD-10-CM

## 2020-01-28 DIAGNOSIS — E87.6 HYPOKALEMIA: ICD-10-CM

## 2020-01-28 DIAGNOSIS — E83.51 HYPOCALCEMIA: Primary | ICD-10-CM

## 2020-01-28 RX ORDER — MAGNESIUM OXIDE 400 MG/1
400 TABLET ORAL 2 TIMES DAILY
Qty: 60 TABLET | Refills: 3 | Status: SHIPPED | OUTPATIENT
Start: 2020-01-28 | End: 2020-05-22

## 2020-01-28 RX ORDER — MULTIVIT-MIN/IRON/FOLIC ACID/K 18-600-40
2000 CAPSULE ORAL DAILY
Qty: 30 CAPSULE | Refills: 3 | Status: SHIPPED | OUTPATIENT
Start: 2020-01-28 | End: 2020-05-22

## 2020-01-28 NOTE — TELEPHONE ENCOUNTER
Dr. Frank is wanting patient to stop her  Protonix. She is wanting to make sure you are okay with that. Please advise?

## 2020-02-01 RX ORDER — FUROSEMIDE 40 MG/1
TABLET ORAL
Qty: 30 TABLET | Refills: 0 | Status: SHIPPED | OUTPATIENT
Start: 2020-02-01 | End: 2020-04-09

## 2020-02-01 RX ORDER — ATORVASTATIN CALCIUM 40 MG/1
TABLET, FILM COATED ORAL
Qty: 30 TABLET | Refills: 4 | Status: SHIPPED | OUTPATIENT
Start: 2020-02-01 | End: 2020-08-04

## 2020-02-04 ENCOUNTER — LAB (OUTPATIENT)
Dept: LAB | Facility: HOSPITAL | Age: 70
End: 2020-02-04

## 2020-02-04 DIAGNOSIS — E87.6 HYPOKALEMIA: ICD-10-CM

## 2020-02-04 DIAGNOSIS — E78.2 MIXED HYPERLIPIDEMIA: ICD-10-CM

## 2020-02-04 DIAGNOSIS — I10 ESSENTIAL HYPERTENSION: ICD-10-CM

## 2020-02-04 DIAGNOSIS — Z79.4 TYPE 2 DIABETES MELLITUS WITH HYPERGLYCEMIA, WITH LONG-TERM CURRENT USE OF INSULIN (HCC): ICD-10-CM

## 2020-02-04 DIAGNOSIS — E03.9 ACQUIRED HYPOTHYROIDISM: ICD-10-CM

## 2020-02-04 DIAGNOSIS — E83.51 HYPOCALCEMIA: ICD-10-CM

## 2020-02-04 DIAGNOSIS — E11.65 TYPE 2 DIABETES MELLITUS WITH HYPERGLYCEMIA, WITH LONG-TERM CURRENT USE OF INSULIN (HCC): ICD-10-CM

## 2020-02-04 LAB — MAGNESIUM SERPL-MCNC: 1.4 MG/DL (ref 1.6–2.4)

## 2020-02-04 PROCEDURE — 83735 ASSAY OF MAGNESIUM: CPT

## 2020-02-04 PROCEDURE — 80053 COMPREHEN METABOLIC PANEL: CPT | Performed by: INTERNAL MEDICINE

## 2020-02-04 PROCEDURE — 36415 COLL VENOUS BLD VENIPUNCTURE: CPT

## 2020-02-05 ENCOUNTER — OFFICE VISIT (OUTPATIENT)
Dept: LAB | Facility: HOSPITAL | Age: 70
End: 2020-02-05

## 2020-02-05 ENCOUNTER — OFFICE VISIT (OUTPATIENT)
Dept: ONCOLOGY | Facility: CLINIC | Age: 70
End: 2020-02-05

## 2020-02-05 VITALS
HEART RATE: 96 BPM | DIASTOLIC BLOOD PRESSURE: 62 MMHG | SYSTOLIC BLOOD PRESSURE: 106 MMHG | BODY MASS INDEX: 39.7 KG/M2 | TEMPERATURE: 98.3 F | HEIGHT: 60 IN | WEIGHT: 202.2 LBS | RESPIRATION RATE: 24 BRPM

## 2020-02-05 DIAGNOSIS — E83.51 HYPOCALCEMIA: Primary | ICD-10-CM

## 2020-02-05 DIAGNOSIS — C50.919 PRIMARY MALIGNANT NEOPLASM OF FEMALE BREAST (HCC): Primary | ICD-10-CM

## 2020-02-05 LAB
ALBUMIN SERPL-MCNC: 3.9 G/DL (ref 3.5–5.2)
ALBUMIN/GLOB SERPL: 1.2 G/DL
ALP SERPL-CCNC: 69 U/L (ref 39–117)
ALT SERPL W P-5'-P-CCNC: 7 U/L (ref 1–33)
ANION GAP SERPL CALCULATED.3IONS-SCNC: 16.4 MMOL/L (ref 5–15)
AST SERPL-CCNC: 13 U/L (ref 1–32)
BASOPHILS # BLD AUTO: 0.03 10*3/MM3 (ref 0–0.2)
BASOPHILS NFR BLD AUTO: 0.2 % (ref 0–1.5)
BILIRUB SERPL-MCNC: 0.5 MG/DL (ref 0.2–1.2)
BUN BLD-MCNC: 12 MG/DL (ref 8–23)
BUN/CREAT SERPL: 14.1 (ref 7–25)
CALCIUM SPEC-SCNC: 10.2 MG/DL (ref 8.6–10.5)
CHLORIDE SERPL-SCNC: 102 MMOL/L (ref 98–107)
CO2 SERPL-SCNC: 24.6 MMOL/L (ref 22–29)
CREAT BLD-MCNC: 0.85 MG/DL (ref 0.57–1)
DEPRECATED RDW RBC AUTO: 64.7 FL (ref 37–54)
EOSINOPHIL # BLD AUTO: 0.14 10*3/MM3 (ref 0–0.4)
EOSINOPHIL NFR BLD AUTO: 1.1 % (ref 0.3–6.2)
ERYTHROCYTE [DISTWIDTH] IN BLOOD BY AUTOMATED COUNT: 19.6 % (ref 12.3–15.4)
GFR SERPL CREATININE-BSD FRML MDRD: 67 ML/MIN/1.73
GLOBULIN UR ELPH-MCNC: 3.2 GM/DL
GLUCOSE BLD-MCNC: 96 MG/DL (ref 65–99)
HCT VFR BLD AUTO: 39 % (ref 34–46.6)
HGB BLD-MCNC: 11.9 G/DL (ref 12–15.9)
LYMPHOCYTES # BLD AUTO: 1.68 10*3/MM3 (ref 0.7–3.1)
LYMPHOCYTES NFR BLD AUTO: 12.7 % (ref 19.6–45.3)
MCH RBC QN AUTO: 28.2 PG (ref 26.6–33)
MCHC RBC AUTO-ENTMCNC: 30.5 G/DL (ref 31.5–35.7)
MCV RBC AUTO: 92.4 FL (ref 79–97)
MONOCYTES # BLD AUTO: 0.51 10*3/MM3 (ref 0.1–0.9)
MONOCYTES NFR BLD AUTO: 3.9 % (ref 5–12)
NEUTROPHILS # BLD AUTO: 10.87 10*3/MM3 (ref 1.7–7)
NEUTROPHILS NFR BLD AUTO: 82.1 % (ref 42.7–76)
PLATELET # BLD AUTO: 316 10*3/MM3 (ref 140–450)
PMV BLD AUTO: 9.7 FL (ref 6–12)
POTASSIUM BLD-SCNC: 4.5 MMOL/L (ref 3.5–5.2)
PROT SERPL-MCNC: 7.1 G/DL (ref 6–8.5)
RBC # BLD AUTO: 4.22 10*6/MM3 (ref 3.77–5.28)
SODIUM BLD-SCNC: 143 MMOL/L (ref 136–145)
WBC NRBC COR # BLD: 13.23 10*3/MM3 (ref 3.4–10.8)

## 2020-02-05 PROCEDURE — 36415 COLL VENOUS BLD VENIPUNCTURE: CPT | Performed by: INTERNAL MEDICINE

## 2020-02-05 PROCEDURE — 99214 OFFICE O/P EST MOD 30 MIN: CPT | Performed by: INTERNAL MEDICINE

## 2020-02-05 PROCEDURE — 85025 COMPLETE CBC W/AUTO DIFF WBC: CPT

## 2020-02-05 NOTE — PROGRESS NOTES
Hematology/Oncology Outpatient Follow Up    Christiane Nazario  1950    Primary Care Physician: Lyell, Reggie Duane, MD  Referring Physician: Lyell, Reggie Duane, MD  Chief complaint:  pT 2 N0 infiltrating ductal carcinoma of the left breast  History of Present Illness:   Ms. Nazario had a mammogram in November 2018 for a palpable  abnormality.    · 11/30/18 - Diagnostic mammogram showed palpable abnormality corresponds to a 2.3 x 1.7 x 2.1 cm   spicular shadowing irregular mass in the lateral middle third of the left breast at 2 to 3   o'clock position.  No mammographic evidence of malignancy in the contralateral breast.    · 12/17/18 - Core needle biopsy of breast palpable nodule showed invasive moderately   differentiated ductal carcinoma.  Shakira grade 7/9.  DCIS seen.  Tumor was ER positive, ID   positive and [id number] negative by ICH.    · Patient was sent to the [facility] and seen initially on 1/4/19.    · 1/4/19 - Genetic testing for BRCA 1 and BRCA 2 sequencing and deletion duplication analysis   negative.    · 1/21/19 - CT scan of the chest with contrast showed 2.4 x 2.8 cm left breast mass consistent   with known carcinoma.  Coronary artery calcification consistent with atherosclerotic disease.  A   4 mm right upper lobe pulmonary nodule, stable compared to 2014, consistent with a benign   etiology.    · 2/7/19 - Patient underwent left breast lumpectomy and axillary sentinel lymph node resection.    Pathology report was invasive poorly differentiated ductal carcinoma, 3.3 cm, completely excised.    Negative margins.  Three sentinel lymph nodes were extracted and three were negative.       Anson score total of 9.  DCIS not identified.  Skin showed invasive carcinoma focally   invading the dermis without skin ulceration.  Margins negative with invasive carcinoma 0.2 cm   from the closest.  Pathologic staging pT2, smpN0.   · 3/6/19 - Oncotype DX recurrent score 25 (distant  "recurrence risk at 9 years 12% per TAILORx and   absolute chemotherapy benefit less than 1% per TAILORx).   · 3/22/19 - DEXA scan, normal.   · May 2019 - Patient completed 20 radiation treatments.    2019 patient started Arimidex treatment  2020 bilateral screening mammogram benign      Past Medical History:   Diagnosis Date   • Cataract    • DM type 2 (diabetes mellitus, type 2) (CMS/[facility])    • Ductal carcinoma in situ (DCIS) of left breast    • Fracture, femur (CMS/[facility])     fracture of left femur   • Hyperlipidemia    • Hypertension    • Hypothyroidism    • Osteopenia    • Pulmonary hypertension (CMS/HCC)        Past Surgical History:   Procedure Laterality Date   • BREAST BIOPSY  2018   • CATARACT EXTRACTION     •  SECTION     • FEMUR SURGERY Left 2014    @ [facility] - Dr Winston   • MASTECTOMY Left 2019    Dr Bills   • TUBAL ABDOMINAL LIGATION           Current Outpatient Medications:   •  acetaZOLAMIDE (DIAMOX) 250 MG tablet, TAKE ONE TABLET BY MOUTH EVERY MORNING, Disp: 90 tablet, Rfl: 0  •  anastrozole (ARIMIDEX) 1 MG tablet, Take 1 tablet by mouth Daily., Disp: 90 tablet, Rfl: 1  •  aspirin (ADULT ASPIRIN EC LOW STRENGTH) 81 MG EC tablet, ADULT ASPIRIN EC LOW STRENGTH 81 MG ORAL TABLET DELAYED RELEASE, Disp: , Rfl:   •  atorvastatin (LIPITOR) 40 MG tablet, T1T PO EVERY NIGHT, Disp: 30 tablet, Rfl: 4  •  BD INSULIN SYRINGE U/F 31G X \" 1 ML misc, USE TWICE A DAY WITH INSULIN INJECTIONS, Disp: 100 each, Rfl: 1  •  CALCIUM ACETATE IJ, Inject  as directed., Disp: , Rfl:   •  Cholecalciferol (VITAMIN D) 50 MCG (2000) capsule, Take 1 capsule by mouth Daily., Disp: 30 capsule, Rfl: 3  •  furosemide (LASIX) 40 MG tablet, T1T PO QD, Disp: 30 tablet, Rfl: 0  •  glucose blood (ONE TOUCH ULTRA TEST) test strip, ONETOUCH ULTRA BLUE STRP, Disp: , Rfl:   •  ibandronate (BONIVA) 150 MG tablet, IBANDRONATE SODIUM 150 MG TABS, Disp: , Rfl:   •  insulin NPH-insulin regular (HUMULIN 70/30) " (70-30) 100 UNIT/ML injection, Inject 40 units SQ every morning and 32 units every evening at dinner DX E11.65, Disp: 70 mL, Rfl: 2  •  KOMBIGLYZE XR 2.5-1000 MG tablet sustained-release 24 hour, TAKE ONE TABLET BY MOUTH TWICE DAILY, Disp: 180 tablet, Rfl: 1  •  levothyroxine (SYNTHROID, LEVOTHROID) 100 MCG tablet, TAKE ONE TABLET BY MOUTH EVERY DAY, Disp: 90 tablet, Rfl: 0  •  loratadine (CLARITIN) 10 MG tablet, TAKE ONE TABLET BY MOUTH EVERY DAY, Disp: 90 tablet, Rfl: 1  •  magnesium oxide (MAG-OX) 400 MG tablet, Take 1 tablet by mouth 2 (Two) Times a Day., Disp: 60 tablet, Rfl: 3  •  montelukast (SINGULAIR) 10 MG tablet, TAKE ONE TABLET BY MOUTH EVERY NIGHT AT BEDTIME, Disp: 90 tablet, Rfl: 0  •  potassium chloride (K-DUR,KLOR-CON) 20 MEQ CR tablet, TAKE 1 TABLET BY MOUTH TWICE DAILY, Disp: 180 tablet, Rfl: 0  •  ramipril (ALTACE) 1.25 MG capsule, TAKE 1 CAPSULE BY MOUTH EVERY EVENING WITH DINNER, Disp: 90 capsule, Rfl: 1  •  pantoprazole (PROTONIX) 40 MG EC tablet, TAKE ONE TABLET BY MOUTH EVERY DAY, Disp: 90 tablet, Rfl: 0    Allergies   Allergen Reactions   • Calcium-Containing Compounds Nausea Only       Family History   Problem Relation Age of Onset   • Ovarian cancer Sister    • Cancer Other        Cancer-related family history includes Cancer in an other family member; Ovarian cancer in her sister.    Social History     Tobacco Use   • Smoking status: Never Smoker   • Smokeless tobacco: Never Used   Substance Use Topics   • Alcohol use: No     Frequency: Never   • Drug use: No       I have reviewed the history of present illness, past medical history, family history, social history, lab results, all notes and other records since the patient was last seen [facility].  SUBJECTIVE:      Patient is in my office for follow-up.  She is on Arimidex now has hot flashes.  No weight gain or fluid retention.  She is taking one calcium tablet a day.      ROS:       Review of Systems   Constitutional:  "Negative for fever.   HENT: Negative for nosebleeds and trouble swallowing.    Eyes: Negative for visual disturbance.   Respiratory: Negative for cough, shortness of breath and wheezing.    Cardiovascular: Negative for chest pain.   Gastrointestinal: Negative for abdominal pain and blood in stool.   Endocrine: Negative for cold intolerance.   Genitourinary: Negative for dysuria and hematuria.   Musculoskeletal: Negative for joint swelling.   Skin: Negative for rash.   Allergic/Immunologic: Negative for environmental allergies.   Neurological: Negative for seizures.   Hematological: Does not bruise/bleed easily.   Psychiatric/Behavioral: The patient is not nervous/anxious.          Objective:       Vitals:    02/05/20 1249   BP: 106/62   Pulse: 96   Resp: 24   Temp: 98.3 °F (36.8 °C)   Weight: 91.7 kg (202 lb 3.2 oz)   Height: 152.4 cm (60\")         PHYSICAL EXAM:     Physical Exam   Constitutional: She is oriented to person, place, and time. No distress.   Moderately built obese   HENT:   Head: Normocephalic and atraumatic.   Eyes: Conjunctivae and EOM are normal. Right eye exhibits no discharge. Left eye exhibits no discharge. No scleral icterus.   Neck: Normal range of motion. Neck supple. No thyromegaly present.   Cardiovascular: Normal rate, regular rhythm and normal heart sounds. Exam reveals no gallop and no friction rub.   Pulmonary/Chest: Effort normal. No stridor. No respiratory distress. She has no wheezes.   Decreased breath sounds both bases   Abdominal: Soft. Bowel sounds are normal. She exhibits no mass. There is no tenderness. There is no rebound and no guarding.   Musculoskeletal: Normal range of motion. She exhibits no tenderness.   Trace bilateral ankle edema   Lymphadenopathy:     She has no cervical adenopathy.   Neurological: She is alert and oriented to person, place, and time. She exhibits normal muscle tone.   Skin: Skin is warm. No rash noted. She is not diaphoretic. No erythema. "   Psychiatric: She has a normal mood and affect. Her behavior is normal.   Nursing note and vitals reviewed.       RECENT LABS:     WBC   Date Value Ref Range Status   02/05/2020 13.23 (H) 3.40 - 10.80 10*3/mm3 Final     RBC   Date Value Ref Range Status   02/05/2020 4.22 3.77 - 5.28 10*6/mm3 Final     Hemoglobin   Date Value Ref Range Status   02/05/2020 11.9 (L) 12.0 - 15.9 g/dL Final     Hematocrit   Date Value Ref Range Status   02/05/2020 39.0 34.0 - 46.6 % Final     MCV   Date Value Ref Range Status   02/05/2020 92.4 79.0 - 97.0 fL Final     MCH   Date Value Ref Range Status   02/05/2020 28.2 26.6 - 33.0 pg Final     MCHC   Date Value Ref Range Status   02/05/2020 30.5 (L) 31.5 - 35.7 g/dL Final     RDW   Date Value Ref Range Status   02/05/2020 19.6 (H) 12.3 - 15.4 % Final     RDW-SD   Date Value Ref Range Status   02/05/2020 64.7 (H) 37.0 - 54.0 fl Final     MPV   Date Value Ref Range Status   02/05/2020 9.7 6.0 - 12.0 fL Final     Platelets   Date Value Ref Range Status   02/05/2020 316 140 - 450 10*3/mm3 Final     Neutrophil %   Date Value Ref Range Status   02/05/2020 82.1 (H) 42.7 - 76.0 % Final     Lymphocyte %   Date Value Ref Range Status   02/05/2020 12.7 (L) 19.6 - 45.3 % Final     Monocyte %   Date Value Ref Range Status   02/05/2020 3.9 (L) 5.0 - 12.0 % Final     Eosinophil %   Date Value Ref Range Status   02/05/2020 1.1 0.3 - 6.2 % Final     Basophil %   Date Value Ref Range Status   02/05/2020 0.2 0.0 - 1.5 % Final     Neutrophils, Absolute   Date Value Ref Range Status   02/05/2020 10.87 (H) 1.70 - 7.00 10*3/mm3 Final     Lymphocytes, Absolute   Date Value Ref Range Status   02/05/2020 1.68 0.70 - 3.10 10*3/mm3 Final     Monocytes, Absolute   Date Value Ref Range Status   02/05/2020 0.51 0.10 - 0.90 10*3/mm3 Final     Eosinophils, Absolute   Date Value Ref Range Status   02/05/2020 0.14 0.00 - 0.40 10*3/mm3 Final     Basophils, Absolute   Date Value Ref Range Status   02/05/2020 0.03 0.00 -  0.20 10*3/mm3 Final     [facility]   Date Value Ref Range Status   01/28/2019 0 0 /100[WBCs] Final       Lab Results   Component Value Date    GLUCOSE 89 01/27/2020    BUN 14 01/27/2020    CREATININE 0.80 01/27/2020    EGFRIFNONA 72 01/27/2020    BCR 17.5 01/27/2020    K 4.5 01/27/2020    CO2 24.4 01/27/2020    CALCIUM 8.7 01/27/2020    ALBUMIN 3.70 01/27/2020    LABIL2 1.0 05/08/2019    AST 12 01/27/2020    ALT 12 01/27/2020         Assessment/Plan      ASSESSMENT:     1. Left breast infiltrating ductal carcinoma  2. Obesity with BMI of 40  3. Osteopenia  4. ECOG 1    PLAN:      1. Reviewed the laboratory work-up with the patient CBC is within normal range.  Continue Arimidex 1 mg a day.  2. Encouraged her to lose weight and control blood sugars well.  3. Continue using oral calcium tablet   4. Osteoporosis resolved she has osteopenia continue monthly Boniva.  5. I will check CBC CMP and CA-27-29 today and on return to clinic in 3 months    I have reviewed labs results, imaging, vitals, and medications with the patient today.   Patient verbalized understanding and is in agreement of the above plan.  Addendum electronically signed by Thalia Davis MD, 03/25/20, 8:18 PM.          This report was compiled using Dragon voice recognition software. I have made every effort to proof read this document; however, typographical errors may persist.

## 2020-02-06 LAB — CANCER AG27-29 SERPL-ACNC: 9.6 U/ML (ref 0–38.6)

## 2020-02-18 RX ORDER — ACETAZOLAMIDE 250 MG/1
TABLET ORAL
Qty: 90 TABLET | Refills: 0 | Status: SHIPPED | OUTPATIENT
Start: 2020-02-18 | End: 2020-05-21

## 2020-02-27 RX ORDER — LEVOTHYROXINE SODIUM 0.1 MG/1
TABLET ORAL
Qty: 90 TABLET | Refills: 0 | Status: SHIPPED | OUTPATIENT
Start: 2020-02-27 | End: 2020-05-26 | Stop reason: SDUPTHER

## 2020-03-06 RX ORDER — MONTELUKAST SODIUM 10 MG/1
TABLET ORAL
Qty: 90 TABLET | Refills: 0 | Status: SHIPPED | OUTPATIENT
Start: 2020-03-06 | End: 2020-06-01 | Stop reason: SDUPTHER

## 2020-03-06 RX ORDER — RAMIPRIL 1.25 MG/1
CAPSULE ORAL
Qty: 90 CAPSULE | Refills: 1 | Status: SHIPPED | OUTPATIENT
Start: 2020-03-06 | End: 2020-09-09

## 2020-04-09 RX ORDER — FUROSEMIDE 40 MG/1
TABLET ORAL
Qty: 30 TABLET | Refills: 0 | Status: SHIPPED | OUTPATIENT
Start: 2020-04-09 | End: 2020-05-06

## 2020-04-09 RX ORDER — POTASSIUM CHLORIDE 20 MEQ/1
TABLET, EXTENDED RELEASE ORAL
Qty: 180 TABLET | Refills: 0 | Status: SHIPPED | OUTPATIENT
Start: 2020-04-09 | End: 2020-07-13

## 2020-05-06 RX ORDER — FUROSEMIDE 40 MG/1
TABLET ORAL
Qty: 30 TABLET | Refills: 0 | Status: SHIPPED | OUTPATIENT
Start: 2020-05-06 | End: 2020-06-08

## 2020-05-21 RX ORDER — ACETAZOLAMIDE 250 MG/1
TABLET ORAL
Qty: 90 TABLET | Refills: 0 | Status: SHIPPED | OUTPATIENT
Start: 2020-05-21 | End: 2020-08-18

## 2020-05-22 DIAGNOSIS — C50.919 PRIMARY MALIGNANT NEOPLASM OF FEMALE BREAST (HCC): ICD-10-CM

## 2020-05-22 RX ORDER — CHOLECALCIFEROL (VITAMIN D3) 50 MCG
CAPSULE ORAL
Qty: 30 CAPSULE | Refills: 2 | Status: SHIPPED | OUTPATIENT
Start: 2020-05-22 | End: 2020-09-25

## 2020-05-22 RX ORDER — ANASTROZOLE 1 MG/1
1 TABLET ORAL DAILY
Qty: 90 TABLET | Refills: 1 | OUTPATIENT
Start: 2020-05-22

## 2020-05-22 NOTE — TELEPHONE ENCOUNTER
PT NEEDS A REFILL ON HER   anastrozole (ARIMIDEX) 1 MG tablet    [facility] [phone]

## 2020-05-26 DIAGNOSIS — C50.919 PRIMARY MALIGNANT NEOPLASM OF FEMALE BREAST (HCC): Primary | ICD-10-CM

## 2020-05-26 RX ORDER — ANASTROZOLE 1 MG/1
1 TABLET ORAL DAILY
Qty: 90 TABLET | Refills: 1 | OUTPATIENT
Start: 2020-05-26 | End: 2020-07-14

## 2020-05-26 RX ORDER — LEVOTHYROXINE SODIUM 0.1 MG/1
TABLET ORAL
Qty: 90 TABLET | Refills: 0 | Status: SHIPPED | OUTPATIENT
Start: 2020-05-26 | End: 2020-08-25

## 2020-05-27 ENCOUNTER — TELEPHONE (OUTPATIENT)
Dept: ONCOLOGY | Facility: CLINIC | Age: 70
End: 2020-05-27

## 2020-05-27 NOTE — TELEPHONE ENCOUNTER
Spoke with C[facility] who states that they did not receive the refill on the Arimidex. After looking into why, it shows that it printed out.      I gave a verbal order for 90 day supply with 1 refill which is what was signed by MALLY Payton NP.   Cherelle repeated back and v/u.

## 2020-06-01 RX ORDER — MONTELUKAST SODIUM 10 MG/1
TABLET ORAL
Qty: 90 TABLET | Refills: 0 | Status: SHIPPED | OUTPATIENT
Start: 2020-06-01 | End: 2020-09-09 | Stop reason: SDUPTHER

## 2020-06-08 RX ORDER — FUROSEMIDE 40 MG/1
TABLET ORAL
Qty: 30 TABLET | Refills: 0 | Status: SHIPPED | OUTPATIENT
Start: 2020-06-08 | End: 2020-07-09

## 2020-07-07 ENCOUNTER — LAB (OUTPATIENT)
Dept: LAB | Facility: HOSPITAL | Age: 70
End: 2020-07-07

## 2020-07-07 DIAGNOSIS — K75.9 HEPATITIS: ICD-10-CM

## 2020-07-07 DIAGNOSIS — E83.51 HYPOCALCEMIA: ICD-10-CM

## 2020-07-07 DIAGNOSIS — Z79.4 TYPE 2 DIABETES MELLITUS WITH HYPERGLYCEMIA, WITH LONG-TERM CURRENT USE OF INSULIN (HCC): ICD-10-CM

## 2020-07-07 DIAGNOSIS — E03.9 ACQUIRED HYPOTHYROIDISM: ICD-10-CM

## 2020-07-07 DIAGNOSIS — E11.65 TYPE 2 DIABETES MELLITUS WITH HYPERGLYCEMIA, WITH LONG-TERM CURRENT USE OF INSULIN (HCC): ICD-10-CM

## 2020-07-07 DIAGNOSIS — I10 ESSENTIAL HYPERTENSION: ICD-10-CM

## 2020-07-07 DIAGNOSIS — E78.2 MIXED HYPERLIPIDEMIA: ICD-10-CM

## 2020-07-07 LAB
ALBUMIN SERPL-MCNC: 4 G/DL (ref 3.5–5.2)
ALBUMIN UR-MCNC: 2.9 MG/DL
ALBUMIN/GLOB SERPL: 1.4 G/DL
ALP SERPL-CCNC: 63 U/L (ref 39–117)
ALT SERPL W P-5'-P-CCNC: 22 U/L (ref 1–33)
ANION GAP SERPL CALCULATED.3IONS-SCNC: 12.8 MMOL/L (ref 5–15)
AST SERPL-CCNC: 26 U/L (ref 1–32)
BILIRUB SERPL-MCNC: 0.3 MG/DL (ref 0–1.2)
BUN SERPL-MCNC: 20 MG/DL (ref 8–23)
BUN/CREAT SERPL: 19.8 (ref 7–25)
CALCIUM SPEC-SCNC: 10.3 MG/DL (ref 8.6–10.5)
CHLORIDE SERPL-SCNC: 104 MMOL/L (ref 98–107)
CHOLEST SERPL-MCNC: 107 MG/DL (ref 0–200)
CO2 SERPL-SCNC: 25.2 MMOL/L (ref 22–29)
CREAT SERPL-MCNC: 1.01 MG/DL (ref 0.57–1)
CREAT UR-MCNC: 93.6 MG/DL
GFR SERPL CREATININE-BSD FRML MDRD: 55 ML/MIN/1.73
GLOBULIN UR ELPH-MCNC: 2.9 GM/DL
GLUCOSE SERPL-MCNC: 83 MG/DL (ref 65–99)
HBA1C MFR BLD: 5.9 % (ref 3.5–5.6)
HDLC SERPL-MCNC: 54 MG/DL (ref 40–60)
LDLC SERPL CALC-MCNC: 37 MG/DL (ref 0–100)
LDLC/HDLC SERPL: 0.69 {RATIO}
MICROALBUMIN/CREAT UR: 31 MG/G
POTASSIUM SERPL-SCNC: 4.4 MMOL/L (ref 3.5–5.2)
PROT SERPL-MCNC: 6.9 G/DL (ref 6–8.5)
SODIUM SERPL-SCNC: 142 MMOL/L (ref 136–145)
T4 FREE SERPL-MCNC: 1.27 NG/DL (ref 0.93–1.7)
TRIGL SERPL-MCNC: 78 MG/DL (ref 0–150)
TSH SERPL DL<=0.05 MIU/L-ACNC: 0.09 UIU/ML (ref 0.27–4.2)
VLDLC SERPL-MCNC: 15.6 MG/DL (ref 5–40)

## 2020-07-07 PROCEDURE — 82043 UR ALBUMIN QUANTITATIVE: CPT

## 2020-07-07 PROCEDURE — 82570 ASSAY OF URINE CREATININE: CPT

## 2020-07-07 PROCEDURE — 84439 ASSAY OF FREE THYROXINE: CPT

## 2020-07-07 PROCEDURE — 83036 HEMOGLOBIN GLYCOSYLATED A1C: CPT

## 2020-07-07 PROCEDURE — 84443 ASSAY THYROID STIM HORMONE: CPT

## 2020-07-07 PROCEDURE — 80053 COMPREHEN METABOLIC PANEL: CPT

## 2020-07-07 PROCEDURE — 36415 COLL VENOUS BLD VENIPUNCTURE: CPT

## 2020-07-07 PROCEDURE — 80061 LIPID PANEL: CPT

## 2020-07-09 RX ORDER — FUROSEMIDE 40 MG/1
TABLET ORAL
Qty: 30 TABLET | Refills: 0 | Status: SHIPPED | OUTPATIENT
Start: 2020-07-09 | End: 2020-08-04

## 2020-07-13 RX ORDER — POTASSIUM CHLORIDE 20 MEQ/1
TABLET, EXTENDED RELEASE ORAL
Qty: 180 TABLET | Refills: 0 | Status: SHIPPED | OUTPATIENT
Start: 2020-07-13 | End: 2021-01-18

## 2020-07-14 ENCOUNTER — OFFICE VISIT (OUTPATIENT)
Dept: ENDOCRINOLOGY | Facility: CLINIC | Age: 70
End: 2020-07-14

## 2020-07-14 VITALS
DIASTOLIC BLOOD PRESSURE: 70 MMHG | TEMPERATURE: 98.4 F | SYSTOLIC BLOOD PRESSURE: 125 MMHG | HEART RATE: 83 BPM | OXYGEN SATURATION: 98 % | HEIGHT: 60 IN | WEIGHT: 201 LBS | BODY MASS INDEX: 39.46 KG/M2

## 2020-07-14 DIAGNOSIS — E03.9 ACQUIRED HYPOTHYROIDISM: ICD-10-CM

## 2020-07-14 DIAGNOSIS — E78.2 MIXED HYPERLIPIDEMIA: ICD-10-CM

## 2020-07-14 DIAGNOSIS — Z79.4 TYPE 2 DIABETES MELLITUS WITH HYPERGLYCEMIA, WITH LONG-TERM CURRENT USE OF INSULIN (HCC): Primary | ICD-10-CM

## 2020-07-14 DIAGNOSIS — R73.9 HYPERGLYCEMIA: ICD-10-CM

## 2020-07-14 DIAGNOSIS — I10 ESSENTIAL HYPERTENSION: ICD-10-CM

## 2020-07-14 DIAGNOSIS — E11.65 TYPE 2 DIABETES MELLITUS WITH HYPERGLYCEMIA, WITH LONG-TERM CURRENT USE OF INSULIN (HCC): Primary | ICD-10-CM

## 2020-07-14 LAB
GLUCOSE BLDC GLUCOMTR-MCNC: 118 MG/DL (ref 70–105)
GLUCOSE BLDC GLUCOMTR-MCNC: 118 MG/DL (ref 70–130)

## 2020-07-14 PROCEDURE — 82962 GLUCOSE BLOOD TEST: CPT | Performed by: INTERNAL MEDICINE

## 2020-07-14 PROCEDURE — 99214 OFFICE O/P EST MOD 30 MIN: CPT | Performed by: INTERNAL MEDICINE

## 2020-07-14 NOTE — PATIENT INSTRUCTIONS
Decrease levothyroxine to 88 mcg p.o. daily  Continue rest of the medication  Check TSH and free T4 in 6 weeks  Follow-up in 6 months with labs  Always keep glucose source in case of low blood sugar  Always get regular eye exam and flu vaccine

## 2020-07-14 NOTE — PROGRESS NOTES
Endocrine Progress Note Outpatient     Patient Care Team:  Lyell, Reggie Duane, MD as PCP - General (Family Medicine)  Thalia Akins MD as PCP - Claims Attributed  Kosta Frank MD as Consulting Physician (Endocrinology)  Guillermo Landeros MD as Consulting Physician (Pulmonary Disease)  Thalia Akins MD as Consulting Physician (Hematology and Oncology)    Chief Complaint: Follow-up diabetes    HPI: 70-year-old female with history of type 2 diabetes, hypertension, hyperlipidemia, hypothyroidism and diabetic nephropathy is here for follow-up.      For type 2 diabetes: Patient is currently taking Kombiglyze XR 2.5/thousand, 1 tablet twice a day and Humulin 70/30 insulin 40 units before breakfast and 32 units before supper.  She did bring in blood sugar records for review and most of the numbers are doing fairly well, I do not see any blood sugars less than 70.  She is tolerating her medications well.  She is trying to work on her diet the best she can.    Hypertension: Well-controlled.  Hyperlipidemia: She is currently on atorvastatin.  Hypothyroidism: Currently on levothyroxine supplementation  Diabetic nephropathy: She is on ramipril.      Past Medical History:   Diagnosis Date   • Cataract    • DM type 2 (diabetes mellitus, type 2) (CMS/[facility])    • Ductal carcinoma in situ (DCIS) of left breast    • Fracture, femur (CMS/[facility]) 2014    fracture of left femur   • Hyperlipidemia    • Hypertension    • Hypothyroidism    • Osteopenia    • Pulmonary hypertension (CMS/[facility])        Social History     Socioeconomic History   • Marital status:      Spouse name: Not on file   • Number of children: Not on file   • Years of education: Not on file   • Highest education level: Not on file   Tobacco Use   • Smoking status: Never Smoker   • Smokeless tobacco: Never Used   Substance and Sexual Activity   • Alcohol use: No     Frequency: Never   • Drug use: No       Family History   Problem Relation Age of Onset   •  Ovarian cancer Sister    • Cancer Other        Allergies   Allergen Reactions   • Calcium-Containing Compounds Nausea Only       ROS:   Constitutional:  Denies fatigue, tiredness.    Eyes:  Denies change in visual acuity   HENT:  Denies nasal congestion or sore throat   Respiratory: denies cough, shortness of breath.   Cardiovascular:  denies chest pain, edema   GI:  Denies abdominal pain, nausea, vomiting.   Musculoskeletal:  Denies back pain or joint pain   Integument:  Denies dry skin and rash   Neurologic:  Denies headache, focal weakness or sensory changes   Endocrine:  Denies polyuria or polydipsia   Psychiatric:  Denies depression or anxiety      There were no vitals filed for this visit.    Physical Exam:  GEN: NAD, conversant  EYES: EOMI, PERRL, no conjunctival erythema  NECK: no thyromegaly, full ROM   CV: RRR, no murmurs/rubs/gallops, no peripheral edema  LUNG: CTAB, no wheezes/rales/ronchi  SKIN: no rashes, no acanthosis  MSK: no deformities, full ROM of all extremities  NEURO: no tremors, DTR normal  PSYCH: AOX3, appropriate mood, affect normal      Results Review:     I reviewed the patient's new clinical results.    Lab Results   Component Value Date    HGBA1C 5.9 (H) 07/07/2020    HGBA1C 7.2 (H) 01/13/2020    HGBA1C 6.7 (H) 07/08/2019      Lab Results   Component Value Date    GLUCOSE 83 07/07/2020    BUN 20 07/07/2020    CREATININE 1.01 (H) 07/07/2020    EGFRIFNONA 55 (L) 07/07/2020    BCR 19.8 07/07/2020    K 4.4 07/07/2020    CO2 25.2 07/07/2020    CALCIUM 10.3 07/07/2020    ALBUMIN 4.00 07/07/2020    LABIL2 1.0 05/08/2019    AST 26 07/07/2020    ALT 22 07/07/2020    CHOL 107 07/07/2020    TRIG 78 07/07/2020    LDL 37 07/07/2020    HDL 54 07/07/2020     Lab Results   Component Value Date    TSH 0.089 (L) 07/07/2020    FREET4 1.27 07/07/2020         Medication Review: Reviewed.       Current Outpatient Medications:   •  acetaZOLAMIDE (DIAMOX) 250 MG tablet, TAKE ONE TABLET BY MOUTH EVERY MORNING,  "Disp: 90 tablet, Rfl: 0  •  anastrozole (ARIMIDEX) 1 MG tablet, Take 1 tablet by mouth Daily., Disp: 90 tablet, Rfl: 1  •  anastrozole (ARIMIDEX) 1 MG tablet, Take 1 tablet by mouth Daily., Disp: 90 tablet, Rfl: 1  •  aspirin (ADULT ASPIRIN EC LOW STRENGTH) 81 MG EC tablet, ADULT ASPIRIN EC LOW STRENGTH 81 MG ORAL TABLET DELAYED RELEASE, Disp: , Rfl:   •  atorvastatin (LIPITOR) 40 MG tablet, T1T PO EVERY NIGHT, Disp: 30 tablet, Rfl: 4  •  BD INSULIN SYRINGE U/F 31G X 5/16\" 1 ML misc, USE TWICE A DAY WITH INSULIN INJECTIONS, Disp: 100 each, Rfl: 1  •  CALCIUM ACETATE IJ, Inject  as directed., Disp: , Rfl:   •  D3 SUPER STRENGTH 50 MCG (2000 UT) capsule, TAKE 1 CAPSULE BY MOUTH DAILY., Disp: 30 capsule, Rfl: 2  •  furosemide (LASIX) 40 MG tablet, TAKE ONE TABLET BY MOUTH EVERY DAY, Disp: 30 tablet, Rfl: 0  •  glucose blood (ONE TOUCH ULTRA TEST) test strip, ONETOUCH ULTRA BLUE STRP, Disp: , Rfl:   •  ibandronate (BONIVA) 150 MG tablet, IBANDRONATE SODIUM 150 MG TABS, Disp: , Rfl:   •  insulin NPH-insulin regular (HUMULIN 70/30) (70-30) 100 UNIT/ML injection, Inject 40 units SQ every morning and 32 units every evening at dinner DX E11.65, Disp: 70 mL, Rfl: 2  •  KOMBIGLYZE XR 2.5-1000 MG tablet sustained-release 24 hour, TAKE ONE TABLET BY MOUTH TWICE DAILY, Disp: 180 tablet, Rfl: 1  •  levothyroxine (SYNTHROID, LEVOTHROID) 100 MCG tablet, TAKE ONE TABLET BY MOUTH EVERY DAY, Disp: 90 tablet, Rfl: 0  •  loratadine (CLARITIN) 10 MG tablet, TAKE ONE TABLET BY MOUTH EVERY DAY, Disp: 90 tablet, Rfl: 1  •   (241.3 Mg) MG tablet tablet, TAKE 1 TABLET BY MOUTH 2 (TWO) TIMES A DAY., Disp: 60 tablet, Rfl: 2  •  montelukast (SINGULAIR) 10 MG tablet, TAKE ONE TABLET BY MOUTH EVERY NIGHT AT BEDTIME, Disp: 90 tablet, Rfl: 0  •  pantoprazole (PROTONIX) 40 MG EC tablet, TAKE ONE TABLET BY MOUTH EVERY DAY, Disp: 90 tablet, Rfl: 0  •  potassium chloride (K-DUR,KLOR-CON) 20 MEQ CR tablet, TAKE 1 TABLET BY MOUTH TWICE DAILY, Disp: " "180 tablet, Rfl: 0  •  ramipril (ALTACE) 1.25 MG capsule, TAKE 1 CAPSULE BY MOUTH EVERY EVENING WITH DINNER, Disp: 90 capsule, Rfl: 1      Assessment/Plan   1.  Diabetes mellitus type 2: Fair control with A1c 5.9% blood sugars are doing very well.  I have asked her to change Humulin 70/30 insulin to 40 units in the morning and 30 units in the evening half an hour before each meal.  Always keep glucose source in case of low blood sugar and get regular eye exam and flu vaccine.  She verbalized understanding.   2.  Hypertension: Well-controlled, continue current medications  3.  Hyperlipidemia: Well-controlled, on atorvastatin, continue current medications.  Will follow lipid panel.  4.  Hypothyroidism: Uncontrolled with low TSH, will reduce levothyroxine to 88 mcg p.o. daily and follow TSH and free T4.  5.  Diabetic nephropathy: On ramipril.  Will continue current medications.  6.  Hepatitis with elevated ALT: Repeat AST and ALT are now normal.            Kosta Frank MD FACE.        EMR Dragon / transcription disclaimer:     \"Dictated utilizing Dragon dictation\".                 "

## 2020-08-03 ENCOUNTER — TELEPHONE (OUTPATIENT)
Dept: ONCOLOGY | Facility: CLINIC | Age: 70
End: 2020-08-03

## 2020-08-04 RX ORDER — ATORVASTATIN CALCIUM 40 MG/1
TABLET, FILM COATED ORAL
Qty: 30 TABLET | Refills: 4 | Status: SHIPPED | OUTPATIENT
Start: 2020-08-04 | End: 2021-01-04

## 2020-08-04 RX ORDER — FUROSEMIDE 40 MG/1
TABLET ORAL
Qty: 30 TABLET | Refills: 0 | Status: SHIPPED | OUTPATIENT
Start: 2020-08-04 | End: 2020-09-09 | Stop reason: SDUPTHER

## 2020-08-05 ENCOUNTER — APPOINTMENT (OUTPATIENT)
Dept: LAB | Facility: HOSPITAL | Age: 70
End: 2020-08-05

## 2020-08-18 RX ORDER — ACETAZOLAMIDE 250 MG/1
TABLET ORAL
Qty: 90 TABLET | Refills: 0 | Status: SHIPPED | OUTPATIENT
Start: 2020-08-18 | End: 2020-11-16 | Stop reason: SDUPTHER

## 2020-08-19 ENCOUNTER — LAB (OUTPATIENT)
Dept: LAB | Facility: HOSPITAL | Age: 70
End: 2020-08-19

## 2020-08-19 ENCOUNTER — OFFICE VISIT (OUTPATIENT)
Dept: ONCOLOGY | Facility: CLINIC | Age: 70
End: 2020-08-19

## 2020-08-19 VITALS
WEIGHT: 204.8 LBS | BODY MASS INDEX: 40.21 KG/M2 | TEMPERATURE: 98.4 F | HEART RATE: 88 BPM | SYSTOLIC BLOOD PRESSURE: 118 MMHG | RESPIRATION RATE: 18 BRPM | HEIGHT: 60 IN | DIASTOLIC BLOOD PRESSURE: 50 MMHG

## 2020-08-19 DIAGNOSIS — C50.919 PRIMARY MALIGNANT NEOPLASM OF FEMALE BREAST (HCC): ICD-10-CM

## 2020-08-19 DIAGNOSIS — C50.919 PRIMARY MALIGNANT NEOPLASM OF FEMALE BREAST (HCC): Primary | ICD-10-CM

## 2020-08-19 LAB
ALBUMIN SERPL-MCNC: 4 G/DL (ref 3.5–5.2)
ALBUMIN/GLOB SERPL: 1.4 G/DL
ALP SERPL-CCNC: 75 U/L (ref 39–117)
ALT SERPL W P-5'-P-CCNC: 18 U/L (ref 1–33)
ANION GAP SERPL CALCULATED.3IONS-SCNC: 13 MMOL/L (ref 5–15)
AST SERPL-CCNC: 20 U/L (ref 1–32)
BASOPHILS # BLD AUTO: 0.01 10*3/MM3 (ref 0–0.2)
BASOPHILS NFR BLD AUTO: 0.1 % (ref 0–1.5)
BILIRUB SERPL-MCNC: 0.4 MG/DL (ref 0–1.2)
BUN SERPL-MCNC: 19 MG/DL (ref 8–23)
BUN SERPL-MCNC: ABNORMAL MG/DL
BUN/CREAT SERPL: ABNORMAL
CALCIUM SPEC-SCNC: 10.1 MG/DL (ref 8.6–10.5)
CHLORIDE SERPL-SCNC: 105 MMOL/L (ref 98–107)
CO2 SERPL-SCNC: 24 MMOL/L (ref 22–29)
CREAT SERPL-MCNC: 1.09 MG/DL (ref 0.57–1)
DEPRECATED RDW RBC AUTO: 46.2 FL (ref 37–54)
EOSINOPHIL # BLD AUTO: 0.1 10*3/MM3 (ref 0–0.4)
EOSINOPHIL NFR BLD AUTO: 0.8 % (ref 0.3–6.2)
ERYTHROCYTE [DISTWIDTH] IN BLOOD BY AUTOMATED COUNT: 14.5 % (ref 12.3–15.4)
GFR SERPL CREATININE-BSD FRML MDRD: 50 ML/MIN/1.73
GLOBULIN UR ELPH-MCNC: 2.9 GM/DL
GLUCOSE SERPL-MCNC: 115 MG/DL (ref 65–99)
HCT VFR BLD AUTO: 39 % (ref 34–46.6)
HGB BLD-MCNC: 11.9 G/DL (ref 12–15.9)
LYMPHOCYTES # BLD AUTO: 1.37 10*3/MM3 (ref 0.7–3.1)
LYMPHOCYTES NFR BLD AUTO: 11.5 % (ref 19.6–45.3)
MCH RBC QN AUTO: 27.4 PG (ref 26.6–33)
MCHC RBC AUTO-ENTMCNC: 30.5 G/DL (ref 31.5–35.7)
MCV RBC AUTO: 89.9 FL (ref 79–97)
MONOCYTES # BLD AUTO: 0.53 10*3/MM3 (ref 0.1–0.9)
MONOCYTES NFR BLD AUTO: 4.4 % (ref 5–12)
NEUTROPHILS NFR BLD AUTO: 83.2 % (ref 42.7–76)
NEUTROPHILS NFR BLD AUTO: 9.94 10*3/MM3 (ref 1.7–7)
PLATELET # BLD AUTO: 150 10*3/MM3 (ref 140–450)
PMV BLD AUTO: 10.9 FL (ref 6–12)
POTASSIUM SERPL-SCNC: 4.2 MMOL/L (ref 3.5–5.2)
PROT SERPL-MCNC: 6.9 G/DL (ref 6–8.5)
RBC # BLD AUTO: 4.34 10*6/MM3 (ref 3.77–5.28)
SODIUM SERPL-SCNC: 142 MMOL/L (ref 136–145)
WBC # BLD AUTO: 11.95 10*3/MM3 (ref 3.4–10.8)

## 2020-08-19 PROCEDURE — 99214 OFFICE O/P EST MOD 30 MIN: CPT | Performed by: INTERNAL MEDICINE

## 2020-08-19 PROCEDURE — 85025 COMPLETE CBC W/AUTO DIFF WBC: CPT

## 2020-08-19 PROCEDURE — 36415 COLL VENOUS BLD VENIPUNCTURE: CPT

## 2020-08-19 PROCEDURE — 80053 COMPREHEN METABOLIC PANEL: CPT

## 2020-08-19 NOTE — ACP (ADVANCE CARE PLANNING)
Advance Care Planning   ACP discussion was declined by the patient. Patient does not have an advance directive, information provided. Patient does not have an advance directive, declines further assistance.

## 2020-08-19 NOTE — PROGRESS NOTES
Hematology/Oncology Outpatient Follow Up    Christiane Nazario  1950    Primary Care Physician: Lyell, Reggie Duane, MD  Referring Physician: Lyell, Reggie Duane, MD  Chief complaint:  pT 2 pN0 infiltrating ductal carcinoma of the left breast  History of Present Illness:   · Ms. Nazario had a mammogram in November 2018 for a palpable  abnormality.    · 11/30/18 - Diagnostic mammogram showed palpable abnormality corresponds to a 2.3 x 1.7 x 2.1 cm spicular shadowing irregular mass in the lateral middle third of the left breast at 2 to 3  o'clock position.  No mammographic evidence of malignancy in the contralateral breast.    · 12/17/18 - Core needle biopsy of breast palpable nodule showed invasive moderately differentiated ductal carcinoma.  Shakira grade 7/9.  DCIS seen.  Tumor was ER positive, PA  positive and ndf2vvq negative by ICH.    · Patient was sent to the [facility] and seen initially on 1/4/19.     · 1/4/19 - Genetic testing for BRCA 1 and BRCA 2 sequencing and deletion duplication analysis negative.    · 1/21/19 - CT scan of the chest with contrast showed 2.4 x 2.8 cm left breast mass consistent  with known carcinoma.  Coronary artery calcification consistent with atherosclerotic disease.  A  4 mm right upper lobe pulmonary nodule, stable compared to 2014, consistent with a benign  etiology.  · 2/7/19 - Patient underwent left breast lumpectomy and axillary sentinel lymph node resection. Pathology report was invasive poorly differentiated ductal carcinoma, 3.3 cm, completely excised.  Negative margins.  Three sentinel lymph nodes were extracted and three were negative.  [place] score total of 9.  DCIS not identified.  Skin showed invasive carcinoma focally  invading the dermis without skin ulceration.  Margins negative with invasive carcinoma 0.2 cm  from the closest.  Pathologic staging pT2, smpN0.    · 3/6/19 - Oncotype DX recurrent score 25 (distant recurrence risk at 9  "years 12% per TAILORx and absolute chemotherapy benefit less than 1% per TAILORx).   · 3/22/19 - DEXA scan, normal.   · May 2019 - Patient completed 20 radiation treatments.    · 2019 patient started Arimidex treatment  · 2020 bilateral screening mammogram benign      Past Medical History:   Diagnosis Date   • Cataract    • DM type 2 (diabetes mellitus, type 2) (CMS/HCC)    • Ductal carcinoma in situ (DCIS) of left breast    • Fracture, femur (CMS/HCC)     fracture of left femur   • Hyperlipidemia    • Hypertension    • Hypothyroidism    • Osteopenia    • Pulmonary hypertension (CMS/HCC)        Past Surgical History:   Procedure Laterality Date   • BREAST BIOPSY  2018   • CATARACT EXTRACTION     •  SECTION     • FEMUR SURGERY Left 2014    @ [facility] - Dr Winston   • MASTECTOMY Left 2019    Dr Bills   • TUBAL ABDOMINAL LIGATION           Current Outpatient Medications:   •  acetaZOLAMIDE (DIAMOX) 250 MG tablet, TAKE ONE TABLET BY MOUTH EVERY MORNING, Disp: 90 tablet, Rfl: 0  •  aspirin (ADULT ASPIRIN EC LOW STRENGTH) 81 MG EC tablet, ADULT ASPIRIN EC LOW STRENGTH 81 MG ORAL TABLET DELAYED RELEASE, Disp: , Rfl:   •  atorvastatin (LIPITOR) 40 MG tablet, TAKE ONE TABLET BY MOUTH EVERY NIGHT, Disp: 30 tablet, Rfl: 4  •  BD INSULIN SYRINGE U/F 31G X \" 1 ML misc, USE TWICE A DAY WITH INSULIN INJECTIONS, Disp: 100 each, Rfl: 1  •  D3 SUPER STRENGTH 50 MCG ( UT) capsule, TAKE 1 CAPSULE BY MOUTH DAILY., Disp: 30 capsule, Rfl: 2  •  furosemide (LASIX) 40 MG tablet, TAKE ONE TABLET BY MOUTH EVERY DAY, Disp: 30 tablet, Rfl: 0  •  glucose blood (ONE TOUCH ULTRA TEST) test strip, ONETOUCH ULTRA BLUE STRP, Disp: , Rfl:   •  ibandronate (BONIVA) 150 MG tablet, IBANDRONATE SODIUM 150 MG TABS, Disp: , Rfl:   •  insulin NPH-insulin regular (HumuLIN 70/30) (70-30) 100 UNIT/ML injection, Inject 40 units SQ every morning and 30 units every evening at dinner DX E11.65 (Patient taking differently: " Inject 30 units SQ every morning and 40 units every evening at dinner DX E11.65), Disp: 70 mL, Rfl: 2  •  KOMBIGLYZE XR 2.5-1000 MG tablet sustained-release 24 hour, TAKE ONE TABLET BY MOUTH TWICE DAILY, Disp: 180 tablet, Rfl: 1  •  levothyroxine (SYNTHROID, LEVOTHROID) 100 MCG tablet, TAKE ONE TABLET BY MOUTH EVERY DAY, Disp: 90 tablet, Rfl: 0  •  loratadine (CLARITIN) 10 MG tablet, TAKE ONE TABLET BY MOUTH EVERY DAY, Disp: 90 tablet, Rfl: 1  •   (241.3 Mg) MG tablet tablet, TAKE ONE TABLET BY MOUTH TWICE DAILY, Disp: 60 tablet, Rfl: 5  •  montelukast (SINGULAIR) 10 MG tablet, TAKE ONE TABLET BY MOUTH EVERY NIGHT AT BEDTIME, Disp: 90 tablet, Rfl: 0  •  pantoprazole (PROTONIX) 40 MG EC tablet, TAKE ONE TABLET BY MOUTH EVERY DAY, Disp: 90 tablet, Rfl: 0  •  potassium chloride (K-DUR,KLOR-CON) 20 MEQ CR tablet, TAKE 1 TABLET BY MOUTH TWICE DAILY, Disp: 180 tablet, Rfl: 0  •  ramipril (ALTACE) 1.25 MG capsule, TAKE 1 CAPSULE BY MOUTH EVERY EVENING WITH DINNER, Disp: 90 capsule, Rfl: 1  •  anastrozole (ARIMIDEX) 1 MG tablet, Take 1 tablet by mouth Daily., Disp: 90 tablet, Rfl: 1  •  CALCIUM ACETATE IJ, Inject  as directed., Disp: , Rfl:     Allergies   Allergen Reactions   • Calcium-Containing Compounds Nausea Only       Family History   Problem Relation Age of Onset   • Ovarian cancer Sister    • Cancer Other        Cancer-related family history includes Cancer in an other family member; Ovarian cancer in her sister.    Social History     Tobacco Use   • Smoking status: Never Smoker   • Smokeless tobacco: Never Used   Substance Use Topics   • Alcohol use: No     Frequency: Never   • Drug use: No       I have reviewed the history of present illness, past medical history, family history, social history, lab results, all notes and other records since the patient was last seen [facility] the [facility].  SUBJECTIVE:      Patient is in my office for follow-up.  She is on Arimidex now. Has chronic arthritis,  "ocassional  hot flashes.  No weight gain or fluid retention.  She is taking one calcium tablet a day.      ROS:       Review of Systems   Constitutional: Negative for fever.   HENT: Negative for nosebleeds and trouble swallowing.    Eyes: Negative for visual disturbance.   Respiratory: Negative for cough, shortness of breath and wheezing.    Cardiovascular: Negative for chest pain.   Gastrointestinal: Negative for abdominal pain and blood in stool.   Endocrine: Negative for cold intolerance.   Genitourinary: Negative for dysuria and hematuria.   Musculoskeletal: Negative for joint swelling.   Skin: Negative for rash.   Allergic/Immunologic: Negative for environmental allergies.   Neurological: Negative for seizures.   Hematological: Does not bruise/bleed easily.   Psychiatric/Behavioral: The patient is not nervous/anxious.      MD performed ROS and are negative except as mentioned in Subjective.      Objective:       Vitals:    08/19/20 1304   BP: 118/50   Pulse: 88   Resp: 18   Temp: 98.4 °F (36.9 °C)   Weight: 92.9 kg (204 lb 12.8 oz)   Height: 152.4 cm (60\")   PainSc: 0-No pain         PHYSICAL EXAM:     Physical Exam   Constitutional: She is oriented to person, place, and time. No distress.   Moderately built obese   HENT:   Head: Normocephalic and atraumatic.   Eyes: Conjunctivae and EOM are normal. Right eye exhibits no discharge. Left eye exhibits no discharge. No scleral icterus.   Neck: Normal range of motion. Neck supple. No thyromegaly present.   Cardiovascular: Normal rate, regular rhythm and normal heart sounds. Exam reveals no gallop and no friction rub.   Pulmonary/Chest: Effort normal. No stridor. No respiratory distress. She has no wheezes.   Decreased breath sounds both bases   Abdominal: Soft. Bowel sounds are normal. She exhibits no mass. There is no tenderness. There is no rebound and no guarding.   Musculoskeletal: Normal range of motion. She exhibits no tenderness.   Trace bilateral ankle " edema   Lymphadenopathy:     She has no cervical adenopathy.   Neurological: She is alert and oriented to person, place, and time. She exhibits normal muscle tone.   Skin: Skin is warm. No rash noted. She is not diaphoretic. No erythema.   Psychiatric: She has a normal mood and affect. Her behavior is normal.   Nursing note and vitals reviewed.   Physical exam done by MD.      RECENT LABS:     WBC   Date Value Ref Range Status   08/19/2020 11.95 (H) 3.40 - 10.80 10*3/mm3 Final     RBC   Date Value Ref Range Status   08/19/2020 4.34 3.77 - 5.28 10*6/mm3 Final     Hemoglobin   Date Value Ref Range Status   08/19/2020 11.9 (L) 12.0 - 15.9 g/dL Final     Hematocrit   Date Value Ref Range Status   08/19/2020 39.0 34.0 - 46.6 % Final     MCV   Date Value Ref Range Status   08/19/2020 89.9 79.0 - 97.0 fL Final     MCH   Date Value Ref Range Status   08/19/2020 27.4 26.6 - 33.0 pg Final     MCHC   Date Value Ref Range Status   08/19/2020 30.5 (L) 31.5 - 35.7 g/dL Final     RDW   Date Value Ref Range Status   08/19/2020 14.5 12.3 - 15.4 % Final     RDW-SD   Date Value Ref Range Status   08/19/2020 46.2 37.0 - 54.0 fl Final     MPV   Date Value Ref Range Status   08/19/2020 10.9 6.0 - 12.0 fL Final     Platelets   Date Value Ref Range Status   08/19/2020 150 140 - 450 10*3/mm3 Final     Neutrophil %   Date Value Ref Range Status   08/19/2020 83.2 (H) 42.7 - 76.0 % Final     Lymphocyte %   Date Value Ref Range Status   08/19/2020 11.5 (L) 19.6 - 45.3 % Final     Monocyte %   Date Value Ref Range Status   08/19/2020 4.4 (L) 5.0 - 12.0 % Final     Eosinophil %   Date Value Ref Range Status   08/19/2020 0.8 0.3 - 6.2 % Final     Basophil %   Date Value Ref Range Status   08/19/2020 0.1 0.0 - 1.5 % Final     Neutrophils, Absolute   Date Value Ref Range Status   08/19/2020 9.94 (H) 1.70 - 7.00 10*3/mm3 Final     Lymphocytes, Absolute   Date Value Ref Range Status   08/19/2020 1.37 0.70 - 3.10 10*3/mm3 Final     Monocytes, Absolute    Date Value Ref Range Status   08/19/2020 0.53 0.10 - 0.90 10*3/mm3 Final     Eosinophils, Absolute   Date Value Ref Range Status   08/19/2020 0.10 0.00 - 0.40 10*3/mm3 Final     Basophils, Absolute   Date Value Ref Range Status   08/19/2020 0.01 0.00 - 0.20 10*3/mm3 Final     nRBC   Date Value Ref Range Status   01/28/2019 0 0 /100[WBCs] Final       Lab Results   Component Value Date    GLUCOSE 115 (H) 08/19/2020    BUN  08/19/2020      Comment:      Testing performed by alternate method    BUN 19 08/19/2020    CREATININE 1.09 (H) 08/19/2020    EGFRIFNONA 50 (L) 08/19/2020    BCR  08/19/2020      Comment:      Testing not performed    K 4.2 08/19/2020    CO2 24.0 08/19/2020    CALCIUM 10.1 08/19/2020    ALBUMIN 4.00 08/19/2020    LABIL2 1.0 05/08/2019    AST 20 08/19/2020    ALT 18 08/19/2020         Assessment/Plan      ASSESSMENT:     1. Left breast infiltrating ductal carcinoma  2. Obesity with BMI of 40  3. Osteopenia  4. ECOG 1    PLAN:      1. Reviewed the laboratory work-up with the patient CBC is within normal range.  Continue Arimidex 1 mg a day.  2. Encouraged her to lose weight and control blood sugars well.  3. Continue using oral calcium tablet   4. Continue Boniva monthly for osteopenia.   5. I will check CBC CMP and CA-27-29 today and on return to clinic in 3 months    I have reviewed labs results, imaging, vitals, and medications with the patient today.   Patient verbalized understanding and is in agreement of the above plan.  Electronically signed by Thalia Davis MD, 08/20/20, 5:43 PM.            This report was compiled using Dragon voice recognition software. I have made every effort to proof read this document; however, typographical errors may persist.

## 2020-08-20 LAB — CANCER AG27-29 SERPL-ACNC: 9.8 U/ML (ref 0–38.6)

## 2020-08-24 RX ORDER — PEN NEEDLE, DIABETIC 29 G X1/2"
NEEDLE, DISPOSABLE MISCELLANEOUS
Qty: 200 EACH | Refills: 3 | Status: SHIPPED | OUTPATIENT
Start: 2020-08-24 | End: 2021-10-11

## 2020-08-25 ENCOUNTER — LAB (OUTPATIENT)
Dept: LAB | Facility: HOSPITAL | Age: 70
End: 2020-08-25

## 2020-08-25 DIAGNOSIS — E78.2 MIXED HYPERLIPIDEMIA: ICD-10-CM

## 2020-08-25 DIAGNOSIS — E11.65 TYPE 2 DIABETES MELLITUS WITH HYPERGLYCEMIA, WITH LONG-TERM CURRENT USE OF INSULIN (HCC): ICD-10-CM

## 2020-08-25 DIAGNOSIS — I10 ESSENTIAL HYPERTENSION: ICD-10-CM

## 2020-08-25 DIAGNOSIS — E03.9 ACQUIRED HYPOTHYROIDISM: ICD-10-CM

## 2020-08-25 DIAGNOSIS — Z79.4 TYPE 2 DIABETES MELLITUS WITH HYPERGLYCEMIA, WITH LONG-TERM CURRENT USE OF INSULIN (HCC): ICD-10-CM

## 2020-08-25 LAB
T4 FREE SERPL-MCNC: 1.2 NG/DL (ref 0.93–1.7)
TSH SERPL DL<=0.05 MIU/L-ACNC: 0.11 UIU/ML (ref 0.27–4.2)

## 2020-08-25 PROCEDURE — 84439 ASSAY OF FREE THYROXINE: CPT

## 2020-08-25 PROCEDURE — 36415 COLL VENOUS BLD VENIPUNCTURE: CPT

## 2020-08-25 PROCEDURE — 84443 ASSAY THYROID STIM HORMONE: CPT

## 2020-08-25 RX ORDER — LEVOTHYROXINE SODIUM 0.1 MG/1
TABLET ORAL
Qty: 90 TABLET | Refills: 0 | Status: SHIPPED | OUTPATIENT
Start: 2020-08-25 | End: 2020-08-28

## 2020-08-28 DIAGNOSIS — E03.9 ACQUIRED HYPOTHYROIDISM: Primary | ICD-10-CM

## 2020-08-28 RX ORDER — LEVOTHYROXINE SODIUM 88 UG/1
88 TABLET ORAL DAILY
Qty: 30 TABLET | Refills: 5 | Status: SHIPPED | OUTPATIENT
Start: 2020-08-28 | End: 2021-03-03

## 2020-09-02 RX ORDER — IBANDRONATE SODIUM 150 MG/1
TABLET, FILM COATED ORAL
Qty: 3 TABLET | Refills: 2 | Status: SHIPPED | OUTPATIENT
Start: 2020-09-02 | End: 2021-08-02

## 2020-09-02 RX ORDER — IBANDRONATE SODIUM 150 MG/1
TABLET, FILM COATED ORAL
Qty: 3 TABLET | Refills: 2 | OUTPATIENT
Start: 2020-09-02

## 2020-09-09 RX ORDER — MONTELUKAST SODIUM 10 MG/1
TABLET ORAL
Qty: 90 TABLET | Refills: 0 | Status: SHIPPED | OUTPATIENT
Start: 2020-09-09 | End: 2020-12-08

## 2020-09-09 RX ORDER — RAMIPRIL 1.25 MG/1
CAPSULE ORAL
Qty: 90 CAPSULE | Refills: 0 | Status: SHIPPED | OUTPATIENT
Start: 2020-09-09 | End: 2020-12-08

## 2020-09-09 RX ORDER — FUROSEMIDE 40 MG/1
TABLET ORAL
Qty: 30 TABLET | Refills: 0 | Status: SHIPPED | OUTPATIENT
Start: 2020-09-09 | End: 2020-10-07

## 2020-09-25 RX ORDER — CHOLECALCIFEROL (VITAMIN D3) 50 MCG
CAPSULE ORAL
Qty: 30 CAPSULE | Refills: 3 | Status: SHIPPED | OUTPATIENT
Start: 2020-09-25 | End: 2021-01-26

## 2020-10-06 ENCOUNTER — TELEPHONE (OUTPATIENT)
Dept: ONCOLOGY | Facility: CLINIC | Age: 70
End: 2020-10-06

## 2020-10-06 NOTE — TELEPHONE ENCOUNTER
Patient said she wants to make sure whoever she sees in February 2021 that it is a doctor and not a nurse. Would like callback to confirm appointment with her new doctor.    Callback# [phone]  
no

## 2020-10-07 RX ORDER — FUROSEMIDE 40 MG/1
TABLET ORAL
Qty: 30 TABLET | Refills: 0 | Status: SHIPPED | OUTPATIENT
Start: 2020-10-07 | End: 2020-11-12

## 2020-10-08 ENCOUNTER — FLU SHOT (OUTPATIENT)
Dept: FAMILY MEDICINE CLINIC | Facility: CLINIC | Age: 70
End: 2020-10-08

## 2020-10-08 DIAGNOSIS — Z23 NEED FOR INFLUENZA VACCINATION: ICD-10-CM

## 2020-10-08 PROCEDURE — G0008 ADMIN INFLUENZA VIRUS VAC: HCPCS | Performed by: FAMILY MEDICINE

## 2020-10-08 PROCEDURE — 90694 VACC AIIV4 NO PRSRV 0.5ML IM: CPT | Performed by: FAMILY MEDICINE

## 2020-10-08 NOTE — PATIENT INSTRUCTIONS
"Chief Complaint   Patient presents with     Gyn Exam       Initial /80   Ht 1.626 m (5' 4\")   Wt 59.3 kg (130 lb 12.8 oz)   LMP 2020 (Approximate)   BMI 22.45 kg/m   Estimated body mass index is 22.45 kg/m  as calculated from the following:    Height as of this encounter: 1.626 m (5' 4\").    Weight as of this encounter: 59.3 kg (130 lb 12.8 oz).  BP completed using cuff size: regular    Questioned patient about current smoking habits.  Current smoker        The following HM Due: NONE             " Please continue current medications  Please get all labs done before follow-up in 6 months

## 2020-10-12 ENCOUNTER — LAB (OUTPATIENT)
Dept: LAB | Facility: HOSPITAL | Age: 70
End: 2020-10-12

## 2020-10-12 DIAGNOSIS — E03.9 ACQUIRED HYPOTHYROIDISM: ICD-10-CM

## 2020-10-12 LAB
T4 FREE SERPL-MCNC: 1.16 NG/DL (ref 0.93–1.7)
TSH SERPL DL<=0.05 MIU/L-ACNC: 1.68 UIU/ML (ref 0.27–4.2)

## 2020-10-12 PROCEDURE — 84443 ASSAY THYROID STIM HORMONE: CPT

## 2020-10-12 PROCEDURE — 84439 ASSAY OF FREE THYROXINE: CPT

## 2020-10-12 PROCEDURE — 36415 COLL VENOUS BLD VENIPUNCTURE: CPT

## 2020-10-12 RX ORDER — SAXAGLIPTIN AND METFORMIN HYDROCHLORIDE 2.5; 1 MG/1; MG/1
TABLET, FILM COATED, EXTENDED RELEASE ORAL
Qty: 180 TABLET | Refills: 1 | Status: SHIPPED | OUTPATIENT
Start: 2020-10-12 | End: 2021-04-20

## 2020-11-12 RX ORDER — FUROSEMIDE 40 MG/1
TABLET ORAL
Qty: 30 TABLET | Refills: 0 | Status: SHIPPED | OUTPATIENT
Start: 2020-11-12 | End: 2020-12-08

## 2020-11-16 RX ORDER — ACETAZOLAMIDE 250 MG/1
TABLET ORAL
Qty: 90 TABLET | Refills: 0 | Status: SHIPPED | OUTPATIENT
Start: 2020-11-16 | End: 2021-02-16

## 2020-11-19 ENCOUNTER — TELEPHONE (OUTPATIENT)
Dept: ONCOLOGY | Facility: CLINIC | Age: 70
End: 2020-11-19

## 2020-11-19 DIAGNOSIS — C50.919 PRIMARY MALIGNANT NEOPLASM OF FEMALE BREAST (HCC): Primary | ICD-10-CM

## 2020-11-19 RX ORDER — ANASTROZOLE 1 MG/1
1 TABLET ORAL DAILY
Qty: 90 TABLET | Refills: 1 | Status: SHIPPED | OUTPATIENT
Start: 2020-11-19 | End: 2021-06-02

## 2020-12-08 RX ORDER — MONTELUKAST SODIUM 10 MG/1
TABLET ORAL
Qty: 90 TABLET | Refills: 0 | Status: SHIPPED | OUTPATIENT
Start: 2020-12-08 | End: 2021-03-16 | Stop reason: SDUPTHER

## 2020-12-08 RX ORDER — FUROSEMIDE 40 MG/1
TABLET ORAL
Qty: 30 TABLET | Refills: 0 | Status: SHIPPED | OUTPATIENT
Start: 2020-12-08 | End: 2021-01-12

## 2020-12-08 RX ORDER — RAMIPRIL 1.25 MG/1
CAPSULE ORAL
Qty: 90 CAPSULE | Refills: 3 | Status: SHIPPED | OUTPATIENT
Start: 2020-12-08 | End: 2021-12-21

## 2021-01-04 RX ORDER — ATORVASTATIN CALCIUM 40 MG/1
TABLET, FILM COATED ORAL
Qty: 30 TABLET | Refills: 3 | Status: SHIPPED | OUTPATIENT
Start: 2021-01-04 | End: 2021-05-11

## 2021-01-07 ENCOUNTER — LAB (OUTPATIENT)
Dept: LAB | Facility: HOSPITAL | Age: 71
End: 2021-01-07

## 2021-01-07 DIAGNOSIS — E78.2 MIXED HYPERLIPIDEMIA: ICD-10-CM

## 2021-01-07 DIAGNOSIS — Z79.4 TYPE 2 DIABETES MELLITUS WITH HYPERGLYCEMIA, WITH LONG-TERM CURRENT USE OF INSULIN (HCC): ICD-10-CM

## 2021-01-07 DIAGNOSIS — I10 ESSENTIAL HYPERTENSION: ICD-10-CM

## 2021-01-07 DIAGNOSIS — E11.65 TYPE 2 DIABETES MELLITUS WITH HYPERGLYCEMIA, WITH LONG-TERM CURRENT USE OF INSULIN (HCC): ICD-10-CM

## 2021-01-07 DIAGNOSIS — E03.9 ACQUIRED HYPOTHYROIDISM: ICD-10-CM

## 2021-01-07 LAB
ALBUMIN SERPL-MCNC: 4.4 G/DL (ref 3.5–5.2)
ALBUMIN UR-MCNC: 2.8 MG/DL
ALBUMIN/GLOB SERPL: 1.5 G/DL
ALP SERPL-CCNC: 69 U/L (ref 39–117)
ALT SERPL W P-5'-P-CCNC: 16 U/L (ref 1–33)
ANION GAP SERPL CALCULATED.3IONS-SCNC: 12.1 MMOL/L (ref 5–15)
AST SERPL-CCNC: 18 U/L (ref 1–32)
BILIRUB SERPL-MCNC: 0.5 MG/DL (ref 0–1.2)
BUN SERPL-MCNC: 26 MG/DL (ref 8–23)
BUN/CREAT SERPL: 22 (ref 7–25)
CALCIUM SPEC-SCNC: 9.8 MG/DL (ref 8.6–10.5)
CHLORIDE SERPL-SCNC: 102 MMOL/L (ref 98–107)
CHOLEST SERPL-MCNC: 122 MG/DL (ref 0–200)
CO2 SERPL-SCNC: 24.9 MMOL/L (ref 22–29)
CREAT SERPL-MCNC: 1.18 MG/DL (ref 0.57–1)
CREAT UR-MCNC: 140.9 MG/DL
GFR SERPL CREATININE-BSD FRML MDRD: 46 ML/MIN/1.73
GLOBULIN UR ELPH-MCNC: 2.9 GM/DL
GLUCOSE SERPL-MCNC: 109 MG/DL (ref 65–99)
HBA1C MFR BLD: 6.1 % (ref 3.5–5.6)
HDLC SERPL-MCNC: 52 MG/DL (ref 40–60)
LDLC SERPL CALC-MCNC: 54 MG/DL (ref 0–100)
LDLC/HDLC SERPL: 1.05 {RATIO}
MICROALBUMIN/CREAT UR: 19.9 MG/G
POTASSIUM SERPL-SCNC: 4.2 MMOL/L (ref 3.5–5.2)
PROT SERPL-MCNC: 7.3 G/DL (ref 6–8.5)
SODIUM SERPL-SCNC: 139 MMOL/L (ref 136–145)
TRIGL SERPL-MCNC: 78 MG/DL (ref 0–150)
VLDLC SERPL-MCNC: 16 MG/DL (ref 5–40)

## 2021-01-07 PROCEDURE — 82570 ASSAY OF URINE CREATININE: CPT

## 2021-01-07 PROCEDURE — 80053 COMPREHEN METABOLIC PANEL: CPT

## 2021-01-07 PROCEDURE — 83036 HEMOGLOBIN GLYCOSYLATED A1C: CPT

## 2021-01-07 PROCEDURE — 80061 LIPID PANEL: CPT

## 2021-01-07 PROCEDURE — 84439 ASSAY OF FREE THYROXINE: CPT

## 2021-01-07 PROCEDURE — 36415 COLL VENOUS BLD VENIPUNCTURE: CPT

## 2021-01-07 PROCEDURE — 82043 UR ALBUMIN QUANTITATIVE: CPT

## 2021-01-07 PROCEDURE — 84443 ASSAY THYROID STIM HORMONE: CPT

## 2021-01-08 LAB
T4 FREE SERPL-MCNC: 1.28 NG/DL (ref 0.93–1.7)
TSH SERPL DL<=0.05 MIU/L-ACNC: 0.63 UIU/ML (ref 0.27–4.2)

## 2021-01-11 ENCOUNTER — OFFICE VISIT (OUTPATIENT)
Dept: FAMILY MEDICINE CLINIC | Facility: CLINIC | Age: 71
End: 2021-01-11

## 2021-01-11 VITALS
HEIGHT: 60 IN | TEMPERATURE: 97.5 F | DIASTOLIC BLOOD PRESSURE: 69 MMHG | HEART RATE: 79 BPM | RESPIRATION RATE: 16 BRPM | BODY MASS INDEX: 41.62 KG/M2 | OXYGEN SATURATION: 97 % | WEIGHT: 212 LBS | SYSTOLIC BLOOD PRESSURE: 133 MMHG

## 2021-01-11 DIAGNOSIS — Z00.00 MEDICARE ANNUAL WELLNESS VISIT, SUBSEQUENT: Primary | ICD-10-CM

## 2021-01-11 DIAGNOSIS — M81.6 LOCALIZED OSTEOPOROSIS WITHOUT CURRENT PATHOLOGICAL FRACTURE: ICD-10-CM

## 2021-01-11 DIAGNOSIS — J45.20 MILD INTERMITTENT ASTHMA WITHOUT COMPLICATION: ICD-10-CM

## 2021-01-11 DIAGNOSIS — E11.65 TYPE 2 DIABETES MELLITUS WITH HYPERGLYCEMIA, WITH LONG-TERM CURRENT USE OF INSULIN (HCC): ICD-10-CM

## 2021-01-11 DIAGNOSIS — J30.1 SEASONAL ALLERGIC RHINITIS DUE TO POLLEN: ICD-10-CM

## 2021-01-11 DIAGNOSIS — K21.9 GASTROESOPHAGEAL REFLUX DISEASE WITHOUT ESOPHAGITIS: ICD-10-CM

## 2021-01-11 DIAGNOSIS — Z12.11 COLON CANCER SCREENING: ICD-10-CM

## 2021-01-11 DIAGNOSIS — I27.0 PRIMARY PULMONARY HYPERTENSION (HCC): ICD-10-CM

## 2021-01-11 DIAGNOSIS — E78.2 MIXED HYPERLIPIDEMIA: ICD-10-CM

## 2021-01-11 DIAGNOSIS — E03.9 ACQUIRED HYPOTHYROIDISM: ICD-10-CM

## 2021-01-11 DIAGNOSIS — I10 ESSENTIAL HYPERTENSION: ICD-10-CM

## 2021-01-11 DIAGNOSIS — D05.12 DUCTAL CARCINOMA IN SITU (DCIS) OF LEFT BREAST: ICD-10-CM

## 2021-01-11 DIAGNOSIS — E11.21 DIABETIC NEPHROPATHY ASSOCIATED WITH TYPE 2 DIABETES MELLITUS (HCC): ICD-10-CM

## 2021-01-11 DIAGNOSIS — Z79.4 TYPE 2 DIABETES MELLITUS WITH HYPERGLYCEMIA, WITH LONG-TERM CURRENT USE OF INSULIN (HCC): ICD-10-CM

## 2021-01-11 PROBLEM — E83.51 HYPOCALCEMIA: Status: RESOLVED | Noted: 2020-01-20 | Resolved: 2021-01-11

## 2021-01-11 PROBLEM — E66.3 OVERWEIGHT: Status: RESOLVED | Noted: 2018-05-01 | Resolved: 2021-01-11

## 2021-01-11 PROBLEM — R30.0 DYSURIA: Status: RESOLVED | Noted: 2020-01-06 | Resolved: 2021-01-11

## 2021-01-11 PROCEDURE — 99214 OFFICE O/P EST MOD 30 MIN: CPT | Performed by: FAMILY MEDICINE

## 2021-01-11 PROCEDURE — G0439 PPPS, SUBSEQ VISIT: HCPCS | Performed by: FAMILY MEDICINE

## 2021-01-11 NOTE — ASSESSMENT & PLAN NOTE
Updated annual wellness visit checklist.  Immunizations up-to-date.  Screening up-to-date.  Recommend yearly dental and eye exams. Also discussed monitoring of blood pressure and lipids.

## 2021-01-11 NOTE — ASSESSMENT & PLAN NOTE
Bilateral diagnostic mammograms are ordered.  Her oncologist is no longer in the area and she will be seeing a new one but is unsure who that will be.  We will continue yearly monitoring and make recommendations based upon mammogram report.

## 2021-01-11 NOTE — PROGRESS NOTES
"Chief Complaint  Medicare Wellness-subsequent    Subjective    History of Present Illness    Christiane Nazario presents for Annual Wellness Visit as well as for follow-up on chronic medical problems including hypertension, hyperlipidemia, hypothyroidism, arthritis and chronic back pain. Patient denies chest pain and abdominal pain. Patient complains of shortness of breath, muscle aches and fatigue. Patient is here for monitoring of chronic issues due to need for monitoring of renal function, liver function, side effects and interactions   Past Medical History:   Diagnosis Date   • Cataract    • DM type 2 (diabetes mellitus, type 2) (CMS/[facility])    • Ductal carcinoma in situ (DCIS) of left breast    • Fracture, femur (CMS/[facility])    • Hyperlipidemia    • Hypertension    • Hypothyroidism    • Osteopenia    • Pulmonary hypertension (CMS/[facility])      Past Surgical History:   Procedure Laterality Date   • BREAST BIOPSY  2018   • CATARACT EXTRACTION     •  SECTION     • FEMUR SURGERY Left 2014    @ [facility] - Dr Winston   • MASTECTOMY Left 2019    Dr Bills   • TUBAL ABDOMINAL LIGATION       Family History   Problem Relation Age of Onset   • Ovarian cancer Sister    • Cancer Other      Social History     Tobacco Use   • Smoking status: Never Smoker   • Smokeless tobacco: Never Used   Substance Use Topics   • Alcohol use: No     Frequency: Never     Current Outpatient Medications on File Prior to Visit   Medication Sig Dispense Refill   • acetaZOLAMIDE (DIAMOX) 250 MG tablet TAKE ONE TABLET BY MOUTH EVERY MORNING 90 tablet 0   • anastrozole (ARIMIDEX) 1 MG tablet Take 1 tablet by mouth Daily. 90 tablet 1   • aspirin (ADULT ASPIRIN EC LOW STRENGTH) 81 MG EC tablet ADULT ASPIRIN EC LOW STRENGTH 81 MG ORAL TABLET DELAYED RELEASE     • atorvastatin (LIPITOR) 40 MG tablet TAKE ONE TABLET BY MOUTH EVERY NIGHT 30 tablet 3   • BD INSULIN SYRINGE U/F 31G X \" 1 ML misc USE TWICE A DAY WITH INSULIN INJECTIONS 200 " each 3   • CALCIUM ACETATE IJ Inject  as directed.     • D3 Super Strength 50 MCG (2000 UT) capsule TAKE 1 CAPSULE BY MOUTH EVERY DAY 30 capsule 3   • furosemide (LASIX) 40 MG tablet TAKE ONE TABLET BY MOUTH EVERY DAY 30 tablet 0   • glucose blood (ONE TOUCH ULTRA TEST) test strip ONETOUCH ULTRA BLUE STRP     • ibandronate (BONIVA) 150 MG tablet TAKE ONE TABLET BY MOUTH EVERY MONTH 3 tablet 2   • insulin NPH-insulin regular (HumuLIN 70/30) (70-30) 100 UNIT/ML injection Inject 40 units SQ every morning and 30 units every evening at dinner DX E11.65 (Patient taking differently: Inject 30 units SQ every morning and 40 units every evening at dinner DX E11.65) 70 mL 2   • Kombiglyze XR 2.5-1000 MG tablet sustained-release 24 hour TAKE ONE TABLET BY MOUTH TWICE DAILY 180 tablet 1   • levothyroxine (Synthroid) 88 MCG tablet Take 1 tablet by mouth Daily. 30 tablet 5   • loratadine (CLARITIN) 10 MG tablet TAKE ONE TABLET BY MOUTH EVERY DAY 90 tablet 1   •  (241.3 Mg) MG tablet tablet TAKE ONE TABLET BY MOUTH TWICE DAILY 60 tablet 5   • montelukast (SINGULAIR) 10 MG tablet TAKE ONE TABLET BY MOUTH EVERY NIGHT AT BEDTIME 90 tablet 0   • pantoprazole (PROTONIX) 40 MG EC tablet TAKE ONE TABLET BY MOUTH EVERY DAY 90 tablet 0   • potassium chloride (K-DUR,KLOR-CON) 20 MEQ CR tablet TAKE 1 TABLET BY MOUTH TWICE DAILY 180 tablet 0   • ramipril (ALTACE) 1.25 MG capsule TAKE 1 CAPSULE BY MOUTH EVERY EVENING WITH DINNER 90 capsule 3   • anastrozole (ARIMIDEX) 1 MG tablet Take 1 tablet by mouth Daily. 90 tablet 1   • [DISCONTINUED] acetaZOLAMIDE (DIAMOX) 250 MG tablet TAKE ONE TABLET BY MOUTH EVERY MORNING 90 tablet 0     No current facility-administered medications on file prior to visit.       Vitals:    01/11/21 1058   BP: 133/69   BP Location: Right arm   Patient Position: Sitting   Cuff Size: Large Adult   Pulse: 79   Resp: 16   Temp: 97.5 °F (36.4 °C)   TempSrc: Temporal   SpO2: 97%   Weight: 96.2 kg (212 lb)   Height:  "152.4 cm (60\")     Body mass index is 41.4 kg/m².    Result Review :                    Chief Complaint   Patient presents with   • Medicare Wellness-subsequent       Subjective   History of Present Illness:  Christiane Nazario is a 70 y.o. female who presents for a Subsequent Medicare Wellness Visit.    HEALTH RISK ASSESSMENT    Recent Hospitalizations:  No hospitalization(s) within the last year.    Current Medical Providers:  Patient Care Team:  Lyell, Reggie Duane, MD as PCP - General (Family Medicine)  Kosta Frank MD as Consulting Physician (Endocrinology)  Guillermo Landeros MD as Consulting Physician (Pulmonary Disease)  Thalia Davis MD as Consulting Physician (Hematology and Oncology)    Smoking Status:  Social History     Tobacco Use   Smoking Status Never Smoker   Smokeless Tobacco Never Used       Alcohol Consumption:  Social History     Substance and Sexual Activity   Alcohol Use No   • Frequency: Never       Depression Screen:   PHQ-2/PHQ-9 Depression Screening 1/11/2021   Little interest or pleasure in doing things 0   Feeling down, depressed, or hopeless 0   Trouble falling or staying asleep, or sleeping too much 0   Feeling tired or having little energy 0   Poor appetite or overeating 0   Feeling bad about yourself - or that you are a failure or have let yourself or your family down 0   Trouble concentrating on things, such as reading the newspaper or watching television 0   Moving or speaking so slowly that other people could have noticed. Or the opposite - being so fidgety or restless that you have been moving around a lot more than usual 0   Thoughts that you would be better off dead, or of hurting yourself in some way 0   Total Score 0   If you checked off any problems, how difficult have these problems made it for you to do your work, take care of things at home, or get along with other people? Not difficult at all       Fall Risk Screen:  TONYADI Fall Risk Assessment was completed, and " patient is at LOW risk for falls.Assessment completed on:1/11/2021    Health Habits and Functional and Cognitive Screening:  Functional & Cognitive Status 1/11/2021   Do you have difficulty preparing food and eating? -   Do you have difficulty bathing yourself, getting dressed or grooming yourself? No   Do you have difficulty using the toilet? No   Do you have difficulty moving around from place to place? No   Do you have trouble with steps or getting out of a bed or a chair? No   Current Diet Limited Junk Food   Dental Exam Other        Dental Exam Comment was up to date before covid   Eye Exam Up to date   Exercise (times per week) 0 times per week   Current Exercises Include No Regular Exercise   Current Exercise Activities Include -   Do you need help using the phone?  No   Are you deaf or do you have serious difficulty hearing?  No   Do you need help with transportation? No   Do you need help shopping? No   Do you need help preparing meals?  No   Do you need help with housework?  No   Do you need help with laundry? No   Do you need help taking your medications? No   Do you need help managing money? No   Do you ever drive or ride in a car without wearing a seat belt? No   Have you felt unusual stress, anger or loneliness in the last month? No   Who do you live with? Spouse   If you need help, do you have trouble finding someone available to you? No   Have you been bothered in the last four weeks by sexual problems? No   Do you have difficulty concentrating, remembering or making decisions? No         Does the patient have evidence of cognitive impairment? No    Asprin use counseling:Does not need ASA (and currently is not on it)    Age-appropriate Screening Schedule:  Refer to the list below for future screening recommendations based on patient's age, sex and/or medical conditions. Orders for these recommended tests are listed in the plan section. The patient has been provided with a written plan.    Health  "Maintenance   Topic Date Due   • COLONOSCOPY  1950   • TDAP/TD VACCINES (1 - Tdap) 03/31/1971   • ZOSTER VACCINE (1 of 2) 03/31/2002   • DIABETIC FOOT EXAM  01/04/2019   • MAMMOGRAM  01/21/2021   • DXA SCAN  03/22/2021   • DIABETIC EYE EXAM  07/02/2021   • HEMOGLOBIN A1C  07/07/2021   • LIPID PANEL  01/07/2022   • URINE MICROALBUMIN  01/07/2022   • INFLUENZA VACCINE  Completed          The following portions of the patient's history were reviewed and updated as appropriate: allergies, current medications, past family history, past medical history, past social history, past surgical history and problem list.    Outpatient Medications Prior to Visit   Medication Sig Dispense Refill   • acetaZOLAMIDE (DIAMOX) 250 MG tablet TAKE ONE TABLET BY MOUTH EVERY MORNING 90 tablet 0   • anastrozole (ARIMIDEX) 1 MG tablet Take 1 tablet by mouth Daily. 90 tablet 1   • aspirin (ADULT ASPIRIN EC LOW STRENGTH) 81 MG EC tablet ADULT ASPIRIN EC LOW STRENGTH 81 MG ORAL TABLET DELAYED RELEASE     • atorvastatin (LIPITOR) 40 MG tablet TAKE ONE TABLET BY MOUTH EVERY NIGHT 30 tablet 3   • BD INSULIN SYRINGE U/F 31G X 5/16\" 1 ML misc USE TWICE A DAY WITH INSULIN INJECTIONS 200 each 3   • CALCIUM ACETATE IJ Inject  as directed.     • D3 Super Strength 50 MCG (2000 UT) capsule TAKE 1 CAPSULE BY MOUTH EVERY DAY 30 capsule 3   • furosemide (LASIX) 40 MG tablet TAKE ONE TABLET BY MOUTH EVERY DAY 30 tablet 0   • glucose blood (ONE TOUCH ULTRA TEST) test strip ONETOUCH ULTRA BLUE STRP     • ibandronate (BONIVA) 150 MG tablet TAKE ONE TABLET BY MOUTH EVERY MONTH 3 tablet 2   • insulin NPH-insulin regular (HumuLIN 70/30) (70-30) 100 UNIT/ML injection Inject 40 units SQ every morning and 30 units every evening at dinner DX E11.65 (Patient taking differently: Inject 30 units SQ every morning and 40 units every evening at dinner DX E11.65) 70 mL 2   • Kombiglyze XR 2.5-1000 MG tablet sustained-release 24 hour TAKE ONE TABLET BY MOUTH TWICE DAILY " 180 tablet 1   • levothyroxine (Synthroid) 88 MCG tablet Take 1 tablet by mouth Daily. 30 tablet 5   • loratadine (CLARITIN) 10 MG tablet TAKE ONE TABLET BY MOUTH EVERY DAY 90 tablet 1   •  (241.3 Mg) MG tablet tablet TAKE ONE TABLET BY MOUTH TWICE DAILY 60 tablet 5   • montelukast (SINGULAIR) 10 MG tablet TAKE ONE TABLET BY MOUTH EVERY NIGHT AT BEDTIME 90 tablet 0   • pantoprazole (PROTONIX) 40 MG EC tablet TAKE ONE TABLET BY MOUTH EVERY DAY 90 tablet 0   • potassium chloride (K-DUR,KLOR-CON) 20 MEQ CR tablet TAKE 1 TABLET BY MOUTH TWICE DAILY 180 tablet 0   • ramipril (ALTACE) 1.25 MG capsule TAKE 1 CAPSULE BY MOUTH EVERY EVENING WITH DINNER 90 capsule 3   • anastrozole (ARIMIDEX) 1 MG tablet Take 1 tablet by mouth Daily. 90 tablet 1     No facility-administered medications prior to visit.        Patient Active Problem List   Diagnosis   • Allergic rhinitis, seasonal   • Asthma   • Diabetic nephropathy (CMS/HCC)   • Ductal carcinoma in situ (DCIS) of breast   • Medicare annual wellness visit, subsequent   • Gastroesophageal reflux disease   • Mixed hyperlipidemia   • Essential hypertension   • Acquired hypothyroidism   • Leukocytosis   • Body mass index 40.0-44.9, adult (CMS/HCC)   • Osteoporosis   • Other long term (current) drug therapy   • Peripheral edema   • Primary malignant neoplasm of female breast (CMS/HCC)   • Primary pulmonary hypertension (CMS/HCC)   • Type 2 diabetes mellitus with hyperglycemia, with long-term current use of insulin (CMS/HCC)   • Hepatitis       Advanced Care Planning:  ACP discussion was held with the patient during this visit. Patient does not have an advance directive, information provided.    Review of Systems   Constitutional: Positive for fatigue. Negative for activity change, appetite change, chills, fever and unexpected weight change.   HENT: Negative for congestion, ear discharge, ear pain, facial swelling, hearing loss, nosebleeds, postnasal drip, rhinorrhea, sinus  "pressure, sinus pain, sneezing, sore throat, tinnitus, trouble swallowing and voice change.    Eyes: Negative for photophobia, pain, discharge, redness, itching and visual disturbance.   Respiratory: Positive for shortness of breath. Negative for apnea, cough, choking, chest tightness and wheezing.    Cardiovascular: Negative for chest pain and palpitations.   Gastrointestinal: Negative for abdominal distention, abdominal pain, blood in stool, constipation, diarrhea, nausea and vomiting.   Endocrine: Negative for cold intolerance, heat intolerance, polydipsia and polyphagia.   Genitourinary: Negative for difficulty urinating, dysuria, enuresis, flank pain, frequency, hematuria, pelvic pain and urgency.   Musculoskeletal: Positive for arthralgias, back pain, gait problem and neck stiffness. Negative for joint swelling, myalgias and neck pain.   Skin: Negative for pallor, rash and wound.   Allergic/Immunologic: Positive for environmental allergies. Negative for food allergies.   Neurological: Positive for weakness. Negative for dizziness, tremors, seizures, syncope, speech difficulty, light-headedness, numbness and headaches.   Hematological: Negative for adenopathy. Does not bruise/bleed easily.   Psychiatric/Behavioral: Negative for confusion, decreased concentration, hallucinations and sleep disturbance. The patient is not nervous/anxious.        Compared to one year ago, the patient feels her physical health is the same.  Compared to one year ago, the patient feels her mental health is the same.    Reviewed chart for potential of high risk medication in the elderly: yes  Reviewed chart for potential of harmful drug interactions in the elderly:yes    Objective         Vitals:    01/11/21 1058   BP: 133/69   BP Location: Right arm   Patient Position: Sitting   Cuff Size: Large Adult   Pulse: 79   Resp: 16   Temp: 97.5 °F (36.4 °C)   TempSrc: Temporal   SpO2: 97%   Weight: 96.2 kg (212 lb)   Height: 152.4 cm (60\") "   PainSc: 0-No pain       Body mass index is 41.4 kg/m².  Discussed the patient's BMI with her. The BMI is above average; BMI management plan is completed.    Physical Exam  Constitutional:       Appearance: She is well-developed. She is ill-appearing.   HENT:      Head: Normocephalic and atraumatic.   Eyes:      General: No scleral icterus.        Right eye: No discharge.         Left eye: No discharge.      Conjunctiva/sclera: Conjunctivae normal.      Pupils: Pupils are equal, round, and reactive to light.   Neck:      Thyroid: No thyromegaly.   Cardiovascular:      Rate and Rhythm: Normal rate and regular rhythm.      Heart sounds: Normal heart sounds. No murmur. No friction rub. No gallop.    Pulmonary:      Effort: Pulmonary effort is normal. No respiratory distress.      Breath sounds: Normal breath sounds. No wheezing or rales.   Abdominal:      General: Bowel sounds are normal. There is no distension.      Palpations: Abdomen is soft. There is no mass.      Tenderness: There is no abdominal tenderness. There is no guarding or rebound.   Musculoskeletal: Normal range of motion.         General: No tenderness or deformity.      Comments: Uses walker for ambulation   Lymphadenopathy:      Cervical: No cervical adenopathy.   Skin:     General: Skin is warm and dry.      Findings: No erythema or rash.   Neurological:      Mental Status: She is alert and oriented to person, place, and time.      Cranial Nerves: No cranial nerve deficit.      Sensory: No sensory deficit.      Motor: No abnormal muscle tone.      Coordination: Coordination normal.      Deep Tendon Reflexes: Reflexes normal.   Psychiatric:         Behavior: Behavior normal.         Thought Content: Thought content normal.         Judgment: Judgment normal.         Lab Results   Component Value Date    TRIG 78 01/07/2021    HDL 52 01/07/2021    LDL 54 01/07/2021    VLDL 16 01/07/2021    HGBA1C 6.1 (H) 01/07/2021        Assessment/Plan   Medicare  Risks and Personalized Health Plan  CMS Preventative Services Quick Reference  Advance Directive Discussion  Fall Risk  Obesity/Overweight     The above risks/problems have been discussed with the patient.  Pertinent information has been shared with the patient in the After Visit Summary.  Follow up plans and orders are seen below in the Assessment/Plan Section.    Diagnoses and all orders for this visit:    1. Medicare annual wellness visit, subsequent (Primary)  Assessment & Plan:  Updated annual wellness visit checklist.  Immunizations up-to-date.  Screening up-to-date.  Recommend yearly dental and eye exams. Also discussed monitoring of blood pressure and lipids.      2. Seasonal allergic rhinitis due to pollen  Assessment & Plan:  Responds to loratadine and Singulair.      3. Essential hypertension  Assessment & Plan:  Stable.  Continue ACE inhibitor and diuretic.      4. Mixed hyperlipidemia  Assessment & Plan:  Continues on high potency statin therapy.  Atorvastatin continued at current dose.      5. Acquired hypothyroidism  Assessment & Plan:  TSH reviewed.  Continue to monitor.  Continue current dose of thyroid replacement.      6. Body mass index 40.0-44.9, adult (CMS/[facility])    7. Type 2 diabetes mellitus with hyperglycemia, with long-term current use of insulin (CMS/[facility])  Assessment & Plan:  Managed by endocrinology.  Most recent A1c was 6.1% with no evidence of hypoglycemic episodes.      8. Gastroesophageal reflux disease without esophagitis  Assessment & Plan:  Unable to wean off of proton pump inhibitor.  We will continue pantoprazole and monitor for hypomagnesemia and chronic kidney disease as well as risk for C. difficile enterocolitis.      9. Diabetic nephropathy associated with type 2 diabetes mellitus (CMS/[facility])    10. Ductal carcinoma in situ (DCIS) of left breast  Assessment & Plan:  Bilateral diagnostic mammograms are ordered.  Her oncologist is no longer in the area and she will be seeing a new  one but is unsure who that will be.  We will continue yearly monitoring and make recommendations based upon mammogram report.    Orders:  -     Mammo Diagnostic Digital Tomosynthesis Left With CAD; Future  -     Mammo Diagnostic Digital Tomosynthesis Right With CAD; Future    11. Localized osteoporosis without current pathological fracture  Assessment & Plan:  Repeat DEXA after March 2021.       12. Primary pulmonary hypertension (CMS/HCC)  Assessment & Plan:  Recent change in her baseline dyspnea.  Continue to monitor.  Stable on diuretics.      13. Mild intermittent asthma without complication  Assessment & Plan:  At baseline.          14. Colon cancer screening  -     Cologuard - Stool, Per Rectum; Future    Follow Up:  Return in about 6 months (around 7/11/2021) for Recheck blood pressure.     An After Visit Summary and PPPS were given to the patient.

## 2021-01-11 NOTE — ASSESSMENT & PLAN NOTE
Unable to wean off of proton pump inhibitor.  We will continue pantoprazole and monitor for hypomagnesemia and chronic kidney disease as well as risk for C. difficile enterocolitis.

## 2021-01-12 RX ORDER — FUROSEMIDE 40 MG/1
TABLET ORAL
Qty: 30 TABLET | Refills: 0 | Status: SHIPPED | OUTPATIENT
Start: 2021-01-12 | End: 2021-02-08

## 2021-01-14 ENCOUNTER — OFFICE VISIT (OUTPATIENT)
Dept: ENDOCRINOLOGY | Facility: CLINIC | Age: 71
End: 2021-01-14

## 2021-01-14 VITALS
TEMPERATURE: 97 F | BODY MASS INDEX: 41.82 KG/M2 | WEIGHT: 213 LBS | HEART RATE: 76 BPM | DIASTOLIC BLOOD PRESSURE: 80 MMHG | OXYGEN SATURATION: 95 % | SYSTOLIC BLOOD PRESSURE: 145 MMHG | HEIGHT: 60 IN

## 2021-01-14 DIAGNOSIS — E11.21 DIABETIC NEPHROPATHY ASSOCIATED WITH TYPE 2 DIABETES MELLITUS (HCC): ICD-10-CM

## 2021-01-14 DIAGNOSIS — E11.65 TYPE 2 DIABETES MELLITUS WITH HYPERGLYCEMIA, WITH LONG-TERM CURRENT USE OF INSULIN (HCC): Primary | ICD-10-CM

## 2021-01-14 DIAGNOSIS — Z79.4 TYPE 2 DIABETES MELLITUS WITH HYPERGLYCEMIA, WITH LONG-TERM CURRENT USE OF INSULIN (HCC): Primary | ICD-10-CM

## 2021-01-14 DIAGNOSIS — I10 ESSENTIAL HYPERTENSION: ICD-10-CM

## 2021-01-14 DIAGNOSIS — E03.9 ACQUIRED HYPOTHYROIDISM: ICD-10-CM

## 2021-01-14 DIAGNOSIS — E78.2 MIXED HYPERLIPIDEMIA: ICD-10-CM

## 2021-01-14 LAB — GLUCOSE BLDC GLUCOMTR-MCNC: 130 MG/DL (ref 70–105)

## 2021-01-14 PROCEDURE — 82962 GLUCOSE BLOOD TEST: CPT | Performed by: INTERNAL MEDICINE

## 2021-01-14 PROCEDURE — 99214 OFFICE O/P EST MOD 30 MIN: CPT | Performed by: INTERNAL MEDICINE

## 2021-01-14 NOTE — PROGRESS NOTES
Endocrine Progress Note Outpatient     Patient Care Team:  Lyell, Reggie Duane, MD as PCP - General (Family Medicine)  Kosta Frank MD as Consulting Physician (Endocrinology)  Guillermo Landeros MD as Consulting Physician (Pulmonary Disease)  Thalia Dvais MD as Consulting Physician (Hematology and Oncology)    Chief Complaint: Follow-up diabetes    HPI: 70-year-old female with history of type 2 diabetes, hypertension, hyperlipidemia, hypothyroidism and diabetic nephropathy is here for follow-up.      For type 2 diabetes: Patient is currently taking Kombiglyze XR 2.5/thousand, 1 tablet twice a day and Humulin 70/30 insulin 40 units before breakfast and 30 units before supper.  However she is taking 30 units in the morning and 40 in the evening and she is having some low blood sugars.  She has not required any assistance or hospitalization since last seen.      Hypertension: Well-controlled.    Hyperlipidemia: She is currently on atorvastatin.    Hypothyroidism: Currently on levothyroxine supplementation.    Diabetic nephropathy: She is on ramipril.      Past Medical History:   Diagnosis Date   • Cataract    • DM type 2 (diabetes mellitus, type 2) (CMS/[facility])    • Ductal carcinoma in situ (DCIS) of left breast    • Fracture, femur (CMS/[facility]) 2014    fracture of left femur   • Hyperlipidemia    • Hypertension    • Hypothyroidism    • Osteopenia    • Pulmonary hypertension (CMS/[facility])        Social History     Socioeconomic History   • Marital status:      Spouse name: Not on file   • Number of children: Not on file   • Years of education: Not on file   • Highest education level: Not on file   Tobacco Use   • Smoking status: Never Smoker   • Smokeless tobacco: Never Used   Substance and Sexual Activity   • Alcohol use: No     Frequency: Never   • Drug use: No   • Sexual activity: Defer       Family History   Problem Relation Age of Onset   • Ovarian cancer Sister    • Cancer Other        Allergies   Allergen  Reactions   • Calcium-Containing Compounds Nausea Only       ROS:   Constitutional:  Denies fatigue, tiredness.    Eyes:  Denies change in visual acuity   HENT:  Denies nasal congestion or sore throat   Respiratory: denies cough, shortness of breath.   Cardiovascular:  denies chest pain, edema   GI:  Denies abdominal pain, nausea, vomiting.   Musculoskeletal:  Denies back pain or joint pain   Integument:  Denies dry skin and rash   Neurologic:  Denies headache, focal weakness or sensory changes   Endocrine:  Denies polyuria or polydipsia   Psychiatric:  Denies depression or anxiety      Vitals:    01/14/21 1122   BP: 145/80   Pulse: 76   Temp: 97 °F (36.1 °C)   SpO2: 95%       Physical Exam:  GEN: NAD, conversant  EYES: EOMI, PERRL, no conjunctival erythema  NECK: no thyromegaly, full ROM   CV: RRR, no murmurs/rubs/gallops, no peripheral edema  LUNG: CTAB, no wheezes/rales/ronchi  SKIN: no rashes, no acanthosis  MSK: no deformities, full ROM of all extremities  NEURO: no tremors, DTR normal  PSYCH: AOX3, appropriate mood, affect normal      Results Review:     I reviewed the patient's new clinical results.    Lab Results   Component Value Date    HGBA1C 6.1 (H) 01/07/2021    HGBA1C 5.9 (H) 07/07/2020    HGBA1C 7.2 (H) 01/13/2020      Lab Results   Component Value Date    GLUCOSE 109 (H) 01/07/2021    BUN 26 (H) 01/07/2021    CREATININE 1.18 (H) 01/07/2021    EGFRIFNONA 46 (L) 01/07/2021    BCR 22.0 01/07/2021    K 4.2 01/07/2021    CO2 24.9 01/07/2021    CALCIUM 9.8 01/07/2021    ALBUMIN 4.40 01/07/2021    LABIL2 1.0 05/08/2019    AST 18 01/07/2021    ALT 16 01/07/2021    CHOL 122 01/07/2021    TRIG 78 01/07/2021    LDL 54 01/07/2021    HDL 52 01/07/2021     Lab Results   Component Value Date    TSH 0.633 01/07/2021    FREET4 1.28 01/07/2021         Medication Review: Reviewed.       Current Outpatient Medications:   •  acetaZOLAMIDE (DIAMOX) 250 MG tablet, TAKE ONE TABLET BY MOUTH EVERY MORNING, Disp: 90 tablet,  "Rfl: 0  •  anastrozole (ARIMIDEX) 1 MG tablet, Take 1 tablet by mouth Daily., Disp: 90 tablet, Rfl: 1  •  aspirin (ADULT ASPIRIN EC LOW STRENGTH) 81 MG EC tablet, ADULT ASPIRIN EC LOW STRENGTH 81 MG ORAL TABLET DELAYED RELEASE, Disp: , Rfl:   •  atorvastatin (LIPITOR) 40 MG tablet, TAKE ONE TABLET BY MOUTH EVERY NIGHT, Disp: 30 tablet, Rfl: 3  •  BD INSULIN SYRINGE U/F 31G X 5/16\" 1 ML misc, USE TWICE A DAY WITH INSULIN INJECTIONS, Disp: 200 each, Rfl: 3  •  CALCIUM ACETATE IJ, Inject  as directed., Disp: , Rfl:   •  D3 Super Strength 50 MCG (2000 UT) capsule, TAKE 1 CAPSULE BY MOUTH EVERY DAY, Disp: 30 capsule, Rfl: 3  •  furosemide (LASIX) 40 MG tablet, TAKE ONE TABLET BY MOUTH EVERY DAY, Disp: 30 tablet, Rfl: 0  •  glucose blood (ONE TOUCH ULTRA TEST) test strip, ONETOUCH ULTRA BLUE STRP, Disp: , Rfl:   •  ibandronate (BONIVA) 150 MG tablet, TAKE ONE TABLET BY MOUTH EVERY MONTH, Disp: 3 tablet, Rfl: 2  •  insulin NPH-insulin regular (HumuLIN 70/30) (70-30) 100 UNIT/ML injection, Inject 40 units SQ every morning and 30 units every evening at dinner DX E11.65 (Patient taking differently: Inject 30 units SQ every morning and 40 units every evening at dinner DX E11.65), Disp: 70 mL, Rfl: 2  •  Kombiglyze XR 2.5-1000 MG tablet sustained-release 24 hour, TAKE ONE TABLET BY MOUTH TWICE DAILY, Disp: 180 tablet, Rfl: 1  •  levothyroxine (Synthroid) 88 MCG tablet, Take 1 tablet by mouth Daily., Disp: 30 tablet, Rfl: 5  •  loratadine (CLARITIN) 10 MG tablet, TAKE ONE TABLET BY MOUTH EVERY DAY, Disp: 90 tablet, Rfl: 1  •   (241.3 Mg) MG tablet tablet, TAKE ONE TABLET BY MOUTH TWICE DAILY, Disp: 60 tablet, Rfl: 5  •  montelukast (SINGULAIR) 10 MG tablet, TAKE ONE TABLET BY MOUTH EVERY NIGHT AT BEDTIME, Disp: 90 tablet, Rfl: 0  •  pantoprazole (PROTONIX) 40 MG EC tablet, TAKE ONE TABLET BY MOUTH EVERY DAY, Disp: 90 tablet, Rfl: 0  •  potassium chloride (K-DUR,KLOR-CON) 20 MEQ CR tablet, TAKE 1 TABLET BY MOUTH TWICE " "DAILY, Disp: 180 tablet, Rfl: 0  •  ramipril (ALTACE) 1.25 MG capsule, TAKE 1 CAPSULE BY MOUTH EVERY EVENING WITH DINNER, Disp: 90 capsule, Rfl: 3  •  anastrozole (ARIMIDEX) 1 MG tablet, Take 1 tablet by mouth Daily., Disp: 90 tablet, Rfl: 1      Assessment/Plan   1.  Diabetes mellitus type 2: A1c 6.1%, has some low BS, advised to change Humulin 70/30 insulin to 40 units in the morning and 30 units in the evening half an hour before each meal.  Always keep glucose source in case of low blood sugar and get regular eye exam and flu vaccine.  She verbalized understanding.     2.  Hypertension: Well-controlled, continue current medications    3.  Hyperlipidemia: Well-controlled, on atorvastatin, continue current medications.  Will follow lipid panel.    4.  Hypothyroidism: Well controlled. CPM.     5.  Diabetic nephropathy: On ramipril.  Will continue current medications.                Kosta Frank MD FACE.        EMR Dragon / transcription disclaimer:     \"Dictated utilizing Dragon dictation\".                 "

## 2021-01-14 NOTE — PATIENT INSTRUCTIONS
Please change your 70/30 insulin to 40 units half an hour before breakfast and 30 units on her before supper  Continue to work on your diet and activity  Always keep glucose source in case of low blood sugar  Annual eye exam and flu vaccine  Labs before follow-up.

## 2021-01-18 RX ORDER — POTASSIUM CHLORIDE 20 MEQ/1
TABLET, EXTENDED RELEASE ORAL
Qty: 180 TABLET | Refills: 0 | Status: SHIPPED | OUTPATIENT
Start: 2021-01-18 | End: 2021-04-27

## 2021-01-26 RX ORDER — CHOLECALCIFEROL (VITAMIN D3) 50 MCG
CAPSULE ORAL
Qty: 30 CAPSULE | Refills: 2 | Status: SHIPPED | OUTPATIENT
Start: 2021-01-26 | End: 2021-04-20

## 2021-02-08 RX ORDER — FUROSEMIDE 40 MG/1
TABLET ORAL
Qty: 30 TABLET | Refills: 0 | Status: SHIPPED | OUTPATIENT
Start: 2021-02-08 | End: 2021-03-10

## 2021-02-10 ENCOUNTER — RESEARCH ENCOUNTER (OUTPATIENT)
Dept: ONCOLOGY | Facility: CLINIC | Age: 71
End: 2021-02-10

## 2021-02-10 NOTE — RESEARCH
Reviewed chart to determine if patient is candidate for ERIN trial; patient does not meet criteria due to staging

## 2021-02-11 ENCOUNTER — APPOINTMENT (OUTPATIENT)
Dept: MAMMOGRAPHY | Facility: HOSPITAL | Age: 71
End: 2021-02-11

## 2021-02-16 ENCOUNTER — TELEPHONE (OUTPATIENT)
Dept: ONCOLOGY | Facility: CLINIC | Age: 71
End: 2021-02-16

## 2021-02-16 RX ORDER — ACETAZOLAMIDE 250 MG/1
TABLET ORAL
Qty: 90 TABLET | Refills: 0 | Status: SHIPPED | OUTPATIENT
Start: 2021-02-16 | End: 2021-05-18

## 2021-02-16 NOTE — TELEPHONE ENCOUNTER
Caller: PATIENT    Relationship to patient:     Best call back number: [phone]    Chief complaint: PATIENT WANTS TO RESCHEDULE HER APPT FOR 2-17-21 TO 1 TO 2 WEEKS OUT DUE TO THE WEATHER, PLEASE ADVISE?    Type of visit: LAB & FOLLOWUP    Requested date: 1 TO 2 WEEKS OUT    If rescheduling, when is the original appointment: 2-17-21    Additional notes:

## 2021-02-17 ENCOUNTER — APPOINTMENT (OUTPATIENT)
Dept: LAB | Facility: HOSPITAL | Age: 71
End: 2021-02-17

## 2021-02-25 ENCOUNTER — HOSPITAL ENCOUNTER (OUTPATIENT)
Dept: MAMMOGRAPHY | Facility: HOSPITAL | Age: 71
Discharge: HOME | End: 2021-02-25
Admitting: FAMILY MEDICINE

## 2021-02-25 PROCEDURE — G0279 TOMOSYNTHESIS, MAMMO: HCPCS

## 2021-02-25 PROCEDURE — 77066 DX MAMMO INCL CAD BI: CPT

## 2021-03-03 RX ORDER — LEVOTHYROXINE SODIUM 88 UG/1
TABLET ORAL
Qty: 30 TABLET | Refills: 4 | Status: SHIPPED | OUTPATIENT
Start: 2021-03-03 | End: 2021-08-03

## 2021-03-10 RX ORDER — FUROSEMIDE 40 MG/1
TABLET ORAL
Qty: 30 TABLET | Refills: 0 | Status: SHIPPED | OUTPATIENT
Start: 2021-03-10 | End: 2021-04-06

## 2021-03-16 RX ORDER — MONTELUKAST SODIUM 10 MG/1
TABLET ORAL
Qty: 90 TABLET | Refills: 0 | Status: SHIPPED | OUTPATIENT
Start: 2021-03-16 | End: 2021-06-15

## 2021-03-19 NOTE — PROGRESS NOTES
Hematology/Oncology Outpatient Follow Up    Christiane Nazario  1950    Primary Care Physician: Lyell, Reggie Duane, MD  Referring Physician: Lyell, Reggie Duane, MD  Chief complaint:    pT 2 pN0 infiltrating ductal carcinoma of the left breast    History of Present Illness:     · Ms. Nazario had a mammogram in November 2018 for a palpable  abnormality.    · 11/30/18 - Diagnostic mammogram showed palpable abnormality corresponds to a 2.3 x 1.7 x 2.1 cm spicular shadowing irregular mass in the lateral middle third of the left breast at 2 to 3  o'clock position.  No mammographic evidence of malignancy in the contralateral breast.    · 12/17/18 - Core needle biopsy of breast palpable nodule showed invasive moderately differentiated ductal carcinoma.  [place] grade 7/9.  DCIS seen.  Tumor was ER positive, DC  positive and whc3ciz negative by ICH.    · Patient was sent to the [facility] and seen initially on 1/4/19.     · 1/4/19 - Genetic testing for BRCA 1 and BRCA 2 sequencing and deletion duplication analysis negative.    · 1/21/19 - CT scan of the chest with contrast showed 2.4 x 2.8 cm left breast mass consistent  with known carcinoma.  Coronary artery calcification consistent with atherosclerotic disease.  A  4 mm right upper lobe pulmonary nodule, stable compared to 2014, consistent with a benign  etiology.  · 2/7/19 - Patient underwent left breast lumpectomy and axillary sentinel lymph node resection. Pathology report was invasive poorly differentiated ductal carcinoma, 3.3 cm, completely excised.  Negative margins.  Three sentinel lymph nodes were extracted and three were negative.  [place] score total of 9.  DCIS not identified.  Skin showed invasive carcinoma focally  invading the dermis without skin ulceration.  Margins negative with invasive carcinoma 0.2 cm  from the closest.  Pathologic staging pT2, smpN0.    · 3/6/19 - Oncotype DX recurrent score 25 (distant recurrence  "risk at 9 years 12% per TAILORx and absolute chemotherapy benefit less than 1% per TAILORx).   · 3/22/19 - DEXA scan, normal.   · May 2019 - Patient completed 20 radiation treatments.    · 2019 patient started Arimidex treatment  · 2020 bilateral screening mammogram benign  · 2021: Patient had bilateral screening mammogram      Past Medical History:   Diagnosis Date   • Cataract    • DM type 2 (diabetes mellitus, type 2) (CMS/H[facility])    • Ductal carcinoma in situ (DCIS) of left breast    • Fracture, femur (CMS/HCC)     fracture of left femur   • Hyperlipidemia    • Hypertension    • Hypothyroidism    • Osteopenia    • Pulmonary hypertension (CMS/HCC)        Past Surgical History:   Procedure Laterality Date   • BREAST BIOPSY  2018   • CATARACT EXTRACTION     •  SECTION     • FEMUR SURGERY Left 2014    @ [facility] - Dr Winston   • MASTECTOMY Left 2019    Dr Bills   • TUBAL ABDOMINAL LIGATION           Current Outpatient Medications:   •  acetaZOLAMIDE (DIAMOX) 250 MG tablet, TAKE ONE TABLET BY MOUTH EVERY MORNING, Disp: 90 tablet, Rfl: 0  •  anastrozole (ARIMIDEX) 1 MG tablet, Take 1 tablet by mouth Daily., Disp: 90 tablet, Rfl: 1  •  anastrozole (ARIMIDEX) 1 MG tablet, Take 1 tablet by mouth Daily., Disp: 90 tablet, Rfl: 1  •  aspirin (ADULT ASPIRIN EC LOW STRENGTH) 81 MG EC tablet, ADULT ASPIRIN EC LOW STRENGTH 81 MG ORAL TABLET DELAYED RELEASE, Disp: , Rfl:   •  atorvastatin (LIPITOR) 40 MG tablet, TAKE ONE TABLET BY MOUTH EVERY NIGHT, Disp: 30 tablet, Rfl: 3  •  BD INSULIN SYRINGE U/F 31G X \" 1 ML misc, USE TWICE A DAY WITH INSULIN INJECTIONS, Disp: 200 each, Rfl: 3  •  CALCIUM ACETATE IJ, Inject  as directed., Disp: , Rfl:   •  D3 Super Strength 50 MCG ( UT) capsule, TAKE 1 CAPSULE BY MOUTH EVERY DAY, Disp: 30 capsule, Rfl: 2  •  furosemide (LASIX) 40 MG tablet, TAKE ONE TABLET BY MOUTH EVERY DAY, Disp: 30 tablet, Rfl: 0  •  glucose blood (ONE TOUCH ULTRA TEST) " test strip, ONETOUCH ULTRA BLUE STRP, Disp: , Rfl:   •  ibandronate (BONIVA) 150 MG tablet, TAKE ONE TABLET BY MOUTH EVERY MONTH, Disp: 3 tablet, Rfl: 2  •  insulin NPH-insulin regular (HumuLIN 70/30) (70-30) 100 UNIT/ML injection, Inject 40 units SQ every morning and 30 units every evening at dinner DX E11.65 (Patient taking differently: Inject 30 units SQ every morning and 40 units every evening at dinner DX E11.65), Disp: 70 mL, Rfl: 2  •  Kombiglyze XR 2.5-1000 MG tablet sustained-release 24 hour, TAKE ONE TABLET BY MOUTH TWICE DAILY, Disp: 180 tablet, Rfl: 1  •  levothyroxine (SYNTHROID, LEVOTHROID) 88 MCG tablet, TAKE ONE TABLET BY MOUTH EVERY DAY, Disp: 30 tablet, Rfl: 4  •  loratadine (CLARITIN) 10 MG tablet, TAKE ONE TABLET BY MOUTH EVERY DAY, Disp: 90 tablet, Rfl: 1  •  MgO 400 (241.3 Mg) MG tablet tablet, TAKE ONE TABLET BY MOUTH TWICE DAILY, Disp: 60 tablet, Rfl: 4  •  montelukast (SINGULAIR) 10 MG tablet, TAKE ONE TABLET BY MOUTH EVERY NIGHT AT BEDTIME, Disp: 90 tablet, Rfl: 0  •  pantoprazole (PROTONIX) 40 MG EC tablet, TAKE ONE TABLET BY MOUTH EVERY DAY, Disp: 90 tablet, Rfl: 0  •  potassium chloride (K-DUR,KLOR-CON) 20 MEQ CR tablet, TAKE 1 TABLET BY MOUTH TWICE DAILY, Disp: 180 tablet, Rfl: 0  •  ramipril (ALTACE) 1.25 MG capsule, TAKE 1 CAPSULE BY MOUTH EVERY EVENING WITH DINNER, Disp: 90 capsule, Rfl: 3    Allergies   Allergen Reactions   • Calcium-Containing Compounds Nausea Only       Family History   Problem Relation Age of Onset   • Ovarian cancer Sister    • Cancer Other        Cancer-related family history includes Cancer in an other family member; Ovarian cancer in her sister.    Social History     Tobacco Use   • Smoking status: Never Smoker   • Smokeless tobacco: Never Used   Substance Use Topics   • Alcohol use: No   • Drug use: No       I have reviewed and confirmed the accuracy of the patient's history: Chief complaint, HPI and ROS as entered by the MA/LPN/RN. Venita Sarkar MD  03/22/21       SUBJECTIVE:        Patient does not have any specific complaints today.  Remains on Arimidex and calcium    ROS:       Review of Systems   Constitutional: Negative for fever.   HENT: Negative for nosebleeds and trouble swallowing.    Eyes: Negative for visual disturbance.   Respiratory: Negative for cough, shortness of breath and wheezing.    Cardiovascular: Negative for chest pain.   Gastrointestinal: Negative for abdominal pain and blood in stool.   Endocrine: Negative for cold intolerance.   Genitourinary: Negative for dysuria and hematuria.   Musculoskeletal: Negative for joint swelling.   Skin: Negative for rash.   Allergic/Immunologic: Negative for environmental allergies.   Neurological: Negative for seizures.   Hematological: Does not bruise/bleed easily.   Psychiatric/Behavioral: The patient is not nervous/anxious.            Objective:       There were no vitals filed for this visit.      PHYSICAL EXAM:     Physical Exam   Constitutional: She is oriented to person, place, and time. No distress.   Moderately built obese   HENT:   Head: Normocephalic and atraumatic.   Eyes: Conjunctivae are normal. Right eye exhibits no discharge. Left eye exhibits no discharge. No scleral icterus.   Neck: No thyromegaly present.   Cardiovascular: Normal rate, regular rhythm and normal heart sounds. Exam reveals no gallop and no friction rub.   Pulmonary/Chest: Effort normal. No stridor. No respiratory distress. She has no wheezes.   Decreased breath sounds both bases   Abdominal: Soft. Bowel sounds are normal. She exhibits no mass. There is no abdominal tenderness. There is no rebound and no guarding.   Musculoskeletal: Normal range of motion. No tenderness.      Comments: Trace bilateral ankle edema   Lymphadenopathy:     She has no cervical adenopathy.   Neurological: She is alert and oriented to person, place, and time. She exhibits normal muscle tone.   Skin: Skin is warm. No rash noted. She is not  diaphoretic. No erythema.   Psychiatric: Her behavior is normal.   Nursing note and vitals reviewed.    I have reexamined the patient and the results are consistent with the previously documented exam. Venita Sarkar MD     RECENT LABS:     WBC   Date Value Ref Range Status   08/19/2020 11.95 (H) 3.40 - 10.80 10*3/mm3 Final     RBC   Date Value Ref Range Status   08/19/2020 4.34 3.77 - 5.28 10*6/mm3 Final     Hemoglobin   Date Value Ref Range Status   08/19/2020 11.9 (L) 12.0 - 15.9 g/dL Final     Hematocrit   Date Value Ref Range Status   08/19/2020 39.0 34.0 - 46.6 % Final     MCV   Date Value Ref Range Status   08/19/2020 89.9 79.0 - 97.0 fL Final     MCH   Date Value Ref Range Status   08/19/2020 27.4 26.6 - 33.0 pg Final     MCHC   Date Value Ref Range Status   08/19/2020 30.5 (L) 31.5 - 35.7 g/dL Final     RDW   Date Value Ref Range Status   08/19/2020 14.5 12.3 - 15.4 % Final     RDW-SD   Date Value Ref Range Status   08/19/2020 46.2 37.0 - 54.0 fl Final     MPV   Date Value Ref Range Status   08/19/2020 10.9 6.0 - 12.0 fL Final     Platelets   Date Value Ref Range Status   08/19/2020 150 140 - 450 10*3/mm3 Final     Neutrophil %   Date Value Ref Range Status   08/19/2020 83.2 (H) 42.7 - 76.0 % Final     Lymphocyte %   Date Value Ref Range Status   08/19/2020 11.5 (L) 19.6 - 45.3 % Final     Monocyte %   Date Value Ref Range Status   08/19/2020 4.4 (L) 5.0 - 12.0 % Final     Eosinophil %   Date Value Ref Range Status   08/19/2020 0.8 0.3 - 6.2 % Final     Basophil %   Date Value Ref Range Status   08/19/2020 0.1 0.0 - 1.5 % Final     Neutrophils, Absolute   Date Value Ref Range Status   08/19/2020 9.94 (H) 1.70 - 7.00 10*3/mm3 Final     Lymphocytes, Absolute   Date Value Ref Range Status   08/19/2020 1.37 0.70 - 3.10 10*3/mm3 Final     Monocytes, Absolute   Date Value Ref Range Status   08/19/2020 0.53 0.10 - 0.90 10*3/mm3 Final     Eosinophils, Absolute   Date Value Ref Range Status   08/19/2020 0.10  0.00 - 0.40 10*3/mm3 Final     Basophils, Absolute   Date Value Ref Range Status   08/19/2020 0.01 0.00 - 0.20 10*3/mm3 Final     nRBC   Date Value Ref Range Status   01/28/2019 0 0 /100[WBCs] Final       Lab Results   Component Value Date    GLUCOSE 109 (H) 01/07/2021    BUN 26 (H) 01/07/2021    CREATININE 1.18 (H) 01/07/2021    EGFRIFNONA 46 (L) 01/07/2021    BCR 22.0 01/07/2021    K 4.2 01/07/2021    CO2 24.9 01/07/2021    CALCIUM 9.8 01/07/2021    ALBUMIN 4.40 01/07/2021    LABIL2 1.0 05/08/2019    AST 18 01/07/2021    ALT 16 01/07/2021         Assessment/Plan      ASSESSMENT:     1. Left breast infiltrating ductal carcinoma ER +100%, GA positive and HER-2/willi was negative: Status  post left lumpectomy with sentinel lymph node biopsy.  That is post adjuvant left breast radiation.  Oncotype DX assay with recurrence score of 25.  Chemotherapy not recommended.  Hormone receptor positive and has been on Arimidex initiated in June 2019.  On surveillance protocol  2. Obesity with BMI of 40  3. Osteopenia: On Boniva, calcium supplements  4. ECOG 1    PLANS:      1. Reviewed the laboratory work-up with the patient CBC is within normal range.  Continue Arimidex 1 mg a day.  2. Continue Os-Quintin D 600 mg twice a day  3. Bilateral diagnostic mammogram will be due again in February 2022  4. DEXA scan is due now: We will go ahead and schedule  5. Patient to follow-up with her primary care physician  6. Continue using oral calcium tablet   7. Continue Boniva monthly for osteopenia.   8. Follow-up 4 months  9. All questions answered    I have reviewed labs results, imaging, vitals, and medications with the patient today.   Patient verbalized understanding and is in agreement of the above plan.

## 2021-03-22 ENCOUNTER — OFFICE VISIT (OUTPATIENT)
Dept: ONCOLOGY | Facility: CLINIC | Age: 71
End: 2021-03-22

## 2021-03-22 ENCOUNTER — LAB (OUTPATIENT)
Dept: LAB | Facility: HOSPITAL | Age: 71
End: 2021-03-22

## 2021-03-22 VITALS
DIASTOLIC BLOOD PRESSURE: 68 MMHG | HEIGHT: 60 IN | HEART RATE: 96 BPM | TEMPERATURE: 97.1 F | RESPIRATION RATE: 16 BRPM | WEIGHT: 215 LBS | SYSTOLIC BLOOD PRESSURE: 138 MMHG | BODY MASS INDEX: 42.21 KG/M2

## 2021-03-22 DIAGNOSIS — C50.919 PRIMARY MALIGNANT NEOPLASM OF FEMALE BREAST (HCC): Primary | ICD-10-CM

## 2021-03-22 DIAGNOSIS — Z12.31 SCREENING MAMMOGRAM, ENCOUNTER FOR: ICD-10-CM

## 2021-03-22 DIAGNOSIS — Z78.0 POST-MENOPAUSAL: ICD-10-CM

## 2021-03-22 DIAGNOSIS — N64.9 DISORDER OF BREAST, UNSPECIFIED: ICD-10-CM

## 2021-03-22 DIAGNOSIS — C50.919 PRIMARY MALIGNANT NEOPLASM OF FEMALE BREAST (HCC): ICD-10-CM

## 2021-03-22 LAB
BASOPHILS # BLD AUTO: 0.02 10*3/MM3 (ref 0–0.2)
BASOPHILS NFR BLD AUTO: 0.1 % (ref 0–1.5)
DEPRECATED RDW RBC AUTO: 49.3 FL (ref 37–54)
EOSINOPHIL # BLD AUTO: 0.14 10*3/MM3 (ref 0–0.4)
EOSINOPHIL NFR BLD AUTO: 1 % (ref 0.3–6.2)
ERYTHROCYTE [DISTWIDTH] IN BLOOD BY AUTOMATED COUNT: 15.4 % (ref 12.3–15.4)
HCT VFR BLD AUTO: 41.2 % (ref 34–46.6)
HGB BLD-MCNC: 12.3 G/DL (ref 12–15.9)
LYMPHOCYTES # BLD AUTO: 1.54 10*3/MM3 (ref 0.7–3.1)
LYMPHOCYTES NFR BLD AUTO: 11.1 % (ref 19.6–45.3)
MCH RBC QN AUTO: 26.9 PG (ref 26.6–33)
MCHC RBC AUTO-ENTMCNC: 29.9 G/DL (ref 31.5–35.7)
MCV RBC AUTO: 90 FL (ref 79–97)
MONOCYTES # BLD AUTO: 0.65 10*3/MM3 (ref 0.1–0.9)
MONOCYTES NFR BLD AUTO: 4.7 % (ref 5–12)
NEUTROPHILS NFR BLD AUTO: 11.57 10*3/MM3 (ref 1.7–7)
NEUTROPHILS NFR BLD AUTO: 83.1 % (ref 42.7–76)
PLATELET # BLD AUTO: 260 10*3/MM3 (ref 140–450)
PMV BLD AUTO: 10.5 FL (ref 6–12)
RBC # BLD AUTO: 4.58 10*6/MM3 (ref 3.77–5.28)
WBC # BLD AUTO: 13.92 10*3/MM3 (ref 3.4–10.8)

## 2021-03-22 PROCEDURE — 85025 COMPLETE CBC W/AUTO DIFF WBC: CPT

## 2021-03-22 PROCEDURE — 36415 COLL VENOUS BLD VENIPUNCTURE: CPT

## 2021-03-22 PROCEDURE — 99214 OFFICE O/P EST MOD 30 MIN: CPT | Performed by: INTERNAL MEDICINE

## 2021-04-06 RX ORDER — FUROSEMIDE 40 MG/1
TABLET ORAL
Qty: 30 TABLET | Refills: 3 | Status: SHIPPED | OUTPATIENT
Start: 2021-04-06 | End: 2021-08-02

## 2021-04-08 ENCOUNTER — HOSPITAL ENCOUNTER (OUTPATIENT)
Dept: BONE DENSITY | Facility: HOSPITAL | Age: 71
Discharge: HOME | End: 2021-04-08
Admitting: INTERNAL MEDICINE

## 2021-04-08 DIAGNOSIS — C50.919 PRIMARY MALIGNANT NEOPLASM OF FEMALE BREAST (HCC): ICD-10-CM

## 2021-04-08 DIAGNOSIS — Z78.0 POST-MENOPAUSAL: ICD-10-CM

## 2021-04-08 PROCEDURE — 77080 DXA BONE DENSITY AXIAL: CPT

## 2021-04-12 ENCOUNTER — TELEPHONE (OUTPATIENT)
Dept: ONCOLOGY | Facility: CLINIC | Age: 71
End: 2021-04-12

## 2021-04-12 NOTE — TELEPHONE ENCOUNTER
Called pt to let her know that her bone scan showed osteopenia and to continue Boniva and Oscal D twice daily. Pt verbalized understanding.

## 2021-04-12 NOTE — TELEPHONE ENCOUNTER
----- Message from Venita Sarkar MD sent at 4/12/2021  8:10 AM EDT -----  Bone density shows Osteopenia.Continue Boniva and Oscal D twice a day.

## 2021-04-20 RX ORDER — SAXAGLIPTIN AND METFORMIN HYDROCHLORIDE 2.5; 1 MG/1; MG/1
TABLET, FILM COATED, EXTENDED RELEASE ORAL
Qty: 180 TABLET | Refills: 3 | Status: SHIPPED | OUTPATIENT
Start: 2021-04-20 | End: 2022-03-21

## 2021-04-20 RX ORDER — CHOLECALCIFEROL (VITAMIN D3) 50 MCG
CAPSULE ORAL
Qty: 30 CAPSULE | Refills: 11 | Status: SHIPPED | OUTPATIENT
Start: 2021-04-20 | End: 2022-04-19

## 2021-04-27 RX ORDER — POTASSIUM CHLORIDE 20 MEQ/1
TABLET, EXTENDED RELEASE ORAL
Qty: 180 TABLET | Refills: 0 | Status: SHIPPED | OUTPATIENT
Start: 2021-04-27 | End: 2021-07-27 | Stop reason: SDUPTHER

## 2021-05-11 RX ORDER — ATORVASTATIN CALCIUM 40 MG/1
TABLET, FILM COATED ORAL
Qty: 30 TABLET | Refills: 2 | Status: SHIPPED | OUTPATIENT
Start: 2021-05-11 | End: 2021-08-10

## 2021-05-18 RX ORDER — ACETAZOLAMIDE 250 MG/1
TABLET ORAL
Qty: 90 TABLET | Refills: 0 | Status: SHIPPED | OUTPATIENT
Start: 2021-05-18 | End: 2021-08-18

## 2021-06-01 DIAGNOSIS — C50.919 PRIMARY MALIGNANT NEOPLASM OF FEMALE BREAST (HCC): ICD-10-CM

## 2021-06-02 RX ORDER — ANASTROZOLE 1 MG/1
1 TABLET ORAL DAILY
Qty: 90 TABLET | Refills: 0 | Status: SHIPPED | OUTPATIENT
Start: 2021-06-02 | End: 2022-02-25 | Stop reason: SDUPTHER

## 2021-06-15 RX ORDER — MONTELUKAST SODIUM 10 MG/1
TABLET ORAL
Qty: 90 TABLET | Refills: 0 | Status: SHIPPED | OUTPATIENT
Start: 2021-06-15 | End: 2021-09-13

## 2021-07-06 ENCOUNTER — LAB (OUTPATIENT)
Dept: LAB | Facility: HOSPITAL | Age: 71
End: 2021-07-06

## 2021-07-06 DIAGNOSIS — E11.65 TYPE 2 DIABETES MELLITUS WITH HYPERGLYCEMIA, WITH LONG-TERM CURRENT USE OF INSULIN (HCC): ICD-10-CM

## 2021-07-06 DIAGNOSIS — Z79.4 TYPE 2 DIABETES MELLITUS WITH HYPERGLYCEMIA, WITH LONG-TERM CURRENT USE OF INSULIN (HCC): ICD-10-CM

## 2021-07-06 DIAGNOSIS — E03.9 ACQUIRED HYPOTHYROIDISM: ICD-10-CM

## 2021-07-06 LAB
ALBUMIN SERPL-MCNC: 4.2 G/DL (ref 3.5–5.2)
ALBUMIN UR-MCNC: 12.8 MG/DL
ALBUMIN/GLOB SERPL: 1.6 G/DL
ALP SERPL-CCNC: 69 U/L (ref 39–117)
ALT SERPL W P-5'-P-CCNC: 12 U/L (ref 1–33)
ANION GAP SERPL CALCULATED.3IONS-SCNC: 15.1 MMOL/L (ref 5–15)
AST SERPL-CCNC: 17 U/L (ref 1–32)
BILIRUB SERPL-MCNC: 0.6 MG/DL (ref 0–1.2)
BUN SERPL-MCNC: 15 MG/DL (ref 8–23)
BUN/CREAT SERPL: 13.9 (ref 7–25)
CALCIUM SPEC-SCNC: 9.5 MG/DL (ref 8.6–10.5)
CHLORIDE SERPL-SCNC: 102 MMOL/L (ref 98–107)
CHOLEST SERPL-MCNC: 107 MG/DL (ref 0–200)
CO2 SERPL-SCNC: 22.9 MMOL/L (ref 22–29)
CREAT SERPL-MCNC: 1.08 MG/DL (ref 0.57–1)
CREAT UR-MCNC: 239.7 MG/DL
GFR SERPL CREATININE-BSD FRML MDRD: 50 ML/MIN/1.73
GLOBULIN UR ELPH-MCNC: 2.7 GM/DL
GLUCOSE SERPL-MCNC: 150 MG/DL (ref 65–99)
HBA1C MFR BLD: 6.4 % (ref 3.5–5.6)
HDLC SERPL-MCNC: 45 MG/DL (ref 40–60)
LDLC SERPL CALC-MCNC: 46 MG/DL (ref 0–100)
LDLC/HDLC SERPL: 1.03 {RATIO}
MICROALBUMIN/CREAT UR: 53.4 MG/G
POTASSIUM SERPL-SCNC: 4.3 MMOL/L (ref 3.5–5.2)
PROT SERPL-MCNC: 6.9 G/DL (ref 6–8.5)
SODIUM SERPL-SCNC: 140 MMOL/L (ref 136–145)
T4 FREE SERPL-MCNC: 1.33 NG/DL (ref 0.93–1.7)
TRIGL SERPL-MCNC: 78 MG/DL (ref 0–150)
TSH SERPL DL<=0.05 MIU/L-ACNC: 0.51 UIU/ML (ref 0.27–4.2)
VLDLC SERPL-MCNC: 16 MG/DL (ref 5–40)

## 2021-07-06 PROCEDURE — 36415 COLL VENOUS BLD VENIPUNCTURE: CPT

## 2021-07-06 PROCEDURE — 80061 LIPID PANEL: CPT

## 2021-07-06 PROCEDURE — 82043 UR ALBUMIN QUANTITATIVE: CPT

## 2021-07-06 PROCEDURE — 84439 ASSAY OF FREE THYROXINE: CPT

## 2021-07-06 PROCEDURE — 80053 COMPREHEN METABOLIC PANEL: CPT

## 2021-07-06 PROCEDURE — 83036 HEMOGLOBIN GLYCOSYLATED A1C: CPT

## 2021-07-06 PROCEDURE — 82570 ASSAY OF URINE CREATININE: CPT

## 2021-07-06 PROCEDURE — 84443 ASSAY THYROID STIM HORMONE: CPT

## 2021-07-12 ENCOUNTER — OFFICE VISIT (OUTPATIENT)
Dept: FAMILY MEDICINE CLINIC | Facility: CLINIC | Age: 71
End: 2021-07-12

## 2021-07-12 VITALS
RESPIRATION RATE: 16 BRPM | WEIGHT: 211.2 LBS | BODY MASS INDEX: 41.46 KG/M2 | TEMPERATURE: 97.8 F | HEART RATE: 81 BPM | SYSTOLIC BLOOD PRESSURE: 124 MMHG | HEIGHT: 60 IN | OXYGEN SATURATION: 97 % | DIASTOLIC BLOOD PRESSURE: 73 MMHG

## 2021-07-12 DIAGNOSIS — E66.01 MORBIDLY OBESE (HCC): ICD-10-CM

## 2021-07-12 DIAGNOSIS — E03.9 ACQUIRED HYPOTHYROIDISM: ICD-10-CM

## 2021-07-12 DIAGNOSIS — I10 ESSENTIAL HYPERTENSION: Primary | ICD-10-CM

## 2021-07-12 PROCEDURE — 99213 OFFICE O/P EST LOW 20 MIN: CPT | Performed by: FAMILY MEDICINE

## 2021-07-12 NOTE — PROGRESS NOTES
"Chief Complaint  Hypertension    Subjective         Christiane Nazario presents to [facility] for   Routine follow-up of hypertension.  She is doing well without any adverse effects of medications.  She continues to follow-up with endocrinology as well as oncology for her stable medical issues.  She has not had any adverse effects of her medications.    See review of systems below. Pertinent past medical history includes type 2 diabetes, DCIS of the left breast, hypertension, hypothyroidism, pulmonary hypertension.       PHQ-2 Total Score:    PHQ-9 Total Score:      Review of Systems   Constitutional: Negative for chills, fatigue and fever.   Respiratory: Negative for cough and shortness of breath.    Cardiovascular: Negative for chest pain and palpitations.   Gastrointestinal: Negative for constipation, diarrhea, nausea and vomiting.   Musculoskeletal: Negative for back pain and neck pain.   Skin: Negative for rash.   Neurological: Negative for dizziness and headaches.       Objective   Vital Signs:   /73 (BP Location: Right arm, Patient Position: Sitting, Cuff Size: Large Adult)   Pulse 81   Temp 97.8 °F (36.6 °C) (Infrared)   Resp 16   Ht 152.4 cm (60\")   Wt 95.8 kg (211 lb 3.2 oz)   SpO2 97%   BMI 41.25 kg/m²     Physical Exam  Constitutional:       General: She is not in acute distress.     Appearance: She is well-developed.   Cardiovascular:      Rate and Rhythm: Normal rate and regular rhythm.      Heart sounds: Normal heart sounds. No murmur heard.     Pulmonary:      Effort: Pulmonary effort is normal.      Breath sounds: Normal breath sounds. No wheezing or rales.   Abdominal:      General: Bowel sounds are normal. There is no distension.      Palpations: Abdomen is soft.   Musculoskeletal:         General: Normal range of motion.   Skin:     General: Skin is warm and dry.   Neurological:      Mental Status: She is alert and oriented to person, place, and time.   Psychiatric:    "      Behavior: Behavior normal.        Result Review :   The following data was reviewed by: Reggie Duane Lyell, MD on 07/12/2021:  CMP    CMP 8/19/20 8/19/20 1/7/21 7/6/21    1258 1258     Glucose 115 (A)  109 (A) 150 (A)   BUN  19 26 (A) 15   Creatinine 1.09 (A)  1.18 (A) 1.08 (A)   eGFR Non  Am 50 (A)  46 (A) 50 (A)   Sodium 142  139 140   Potassium 4.2  4.2 4.3   Chloride 105  102 102   Calcium 10.1  9.8 9.5   Albumin 4.00  4.40 4.20   Total Bilirubin 0.4  0.5 0.6   Alkaline Phosphatase 75  69 69   AST (SGOT) 20  18 17   ALT (SGPT) 18  16 12   (A) Abnormal value       Comments are available for some flowsheets but are not being displayed.           CBC    CBC 8/19/20 3/22/21   WBC 11.95 (A) 13.92 (A)   RBC 4.34 4.58   Hemoglobin 11.9 (A) 12.3   Hematocrit 39.0 41.2   MCV 89.9 90.0   MCH 27.4 26.9   MCHC 30.5 (A) 29.9 (A)   RDW 14.5 15.4   Platelets 150 260   (A) Abnormal value            TSH    TSH 10/12/20 1/7/21 7/6/21   TSH 1.680 0.633 0.506                       Diagnoses and all orders for this visit:    1. Essential hypertension (Primary)  Assessment & Plan:  Doing well.  Blood work reviewed.  Continue Altace and recheck in 6 months with wellness visit.      2. Acquired hypothyroidism  Assessment & Plan:  Managed by endocrinology.      3. Morbidly obese (CMS/[facility])  Assessment & Plan:  Patient's (Body mass index is 41.25 kg/m².) indicates that they are morbidly obese (BMI > 40 or > 35 with obesity - related health condition) with obesity-related health conditions that include hypertension and diabetes mellitus . Obesity is unchanged. BMI is is above average; BMI management plan is completed. We discussed portion control and increasing exercise.           Follow Up   Return in about 6 months (around 1/12/2022) for Medicare Wellness, Recheck blood pressure.  Patient was given instructions and counseling regarding her condition or for health maintenance advice. Please see specific information pulled into  the AVS if appropriate.     Reggie Duane Lyell, MD  7/12/2021  This note was electronically signed.

## 2021-07-12 NOTE — ASSESSMENT & PLAN NOTE
Patient's (Body mass index is 41.25 kg/m².) indicates that they are morbidly obese (BMI > 40 or > 35 with obesity - related health condition) with obesity-related health conditions that include hypertension and diabetes mellitus . Obesity is unchanged. BMI is is above average; BMI management plan is completed. We discussed portion control and increasing exercise.

## 2021-07-13 ENCOUNTER — OFFICE VISIT (OUTPATIENT)
Dept: ENDOCRINOLOGY | Facility: CLINIC | Age: 71
End: 2021-07-13

## 2021-07-13 VITALS
WEIGHT: 209 LBS | SYSTOLIC BLOOD PRESSURE: 140 MMHG | OXYGEN SATURATION: 98 % | HEIGHT: 60 IN | HEART RATE: 83 BPM | DIASTOLIC BLOOD PRESSURE: 82 MMHG | BODY MASS INDEX: 41.03 KG/M2

## 2021-07-13 DIAGNOSIS — E78.2 MIXED HYPERLIPIDEMIA: ICD-10-CM

## 2021-07-13 DIAGNOSIS — E11.21 DIABETIC NEPHROPATHY ASSOCIATED WITH TYPE 2 DIABETES MELLITUS (HCC): ICD-10-CM

## 2021-07-13 DIAGNOSIS — I10 ESSENTIAL HYPERTENSION: ICD-10-CM

## 2021-07-13 DIAGNOSIS — E03.9 ACQUIRED HYPOTHYROIDISM: ICD-10-CM

## 2021-07-13 DIAGNOSIS — E11.65 TYPE 2 DIABETES MELLITUS WITH HYPERGLYCEMIA, WITH LONG-TERM CURRENT USE OF INSULIN (HCC): Primary | ICD-10-CM

## 2021-07-13 DIAGNOSIS — Z79.4 TYPE 2 DIABETES MELLITUS WITH HYPERGLYCEMIA, WITH LONG-TERM CURRENT USE OF INSULIN (HCC): Primary | ICD-10-CM

## 2021-07-13 LAB — GLUCOSE BLDC GLUCOMTR-MCNC: 142 MG/DL (ref 70–105)

## 2021-07-13 PROCEDURE — 99214 OFFICE O/P EST MOD 30 MIN: CPT | Performed by: INTERNAL MEDICINE

## 2021-07-13 PROCEDURE — 82962 GLUCOSE BLOOD TEST: CPT | Performed by: INTERNAL MEDICINE

## 2021-07-13 NOTE — PROGRESS NOTES
Endocrine Progress Note Outpatient     Patient Care Team:  Lyell, Reggie Duane, MD as PCP - General (Family Medicine)  Kosta Frank MD as Consulting Physician (Endocrinology)  Guillermo Landeros MD as Consulting Physician (Pulmonary Disease)  Thalia Davis MD as Consulting Physician (Hematology and Oncology)    Chief Complaint: Follow-up diabetes    HPI: 71-year-old female with history of type 2 diabetes, hypertension, hyperlipidemia, hypothyroidism and diabetic nephropathy is here for follow-up.      For type 2 diabetes: Patient is currently taking Kombiglyze XR 2.5/thousand, 1 tablet twice a day and Humulin 70/30 insulin 40 units before breakfast and 30 units before supper.  She did bring in blood sugar records for review mostly running good she does have some low blood sugars and she feels like she overslept and did not eat well.  She has not required any assistance or hospitalization since last seen.      Hypertension: Well-controlled.    Hyperlipidemia: She is currently on atorvastatin.    Hypothyroidism: Currently on levothyroxine supplementation.    Diabetic nephropathy: She is on ramipril.      Past Medical History:   Diagnosis Date   • Cataract    • DM type 2 (diabetes mellitus, type 2) (CMS/[facility])    • Ductal carcinoma in situ (DCIS) of left breast    • Fracture, femur (CMS/[facility]) 2014    fracture of left femur   • Hyperlipidemia    • Hypertension    • Hypothyroidism    • Osteopenia    • Pulmonary hypertension (CMS/[facility])        Social History     Socioeconomic History   • Marital status:      Spouse name: Not on file   • Number of children: Not on file   • Years of education: Not on file   • Highest education level: Not on file   Tobacco Use   • Smoking status: Never Smoker   • Smokeless tobacco: Never Used   Vaping Use   • Vaping Use: Never used   Substance and Sexual Activity   • Alcohol use: No   • Drug use: No   • Sexual activity: Defer       Family History   Problem Relation Age of Onset   •  Ovarian cancer Sister    • Cancer Other        Allergies   Allergen Reactions   • Calcium-Containing Compounds Nausea Only       ROS:   Constitutional:  Denies fatigue, tiredness.    Eyes:  Denies change in visual acuity   HENT:  Denies nasal congestion or sore throat   Respiratory: denies cough, shortness of breath.   Cardiovascular:  denies chest pain, edema   GI:  Denies abdominal pain, nausea, vomiting.   Musculoskeletal:  Denies back pain or joint pain   Integument:  Denies dry skin and rash   Neurologic:  Denies headache, focal weakness or sensory changes   Endocrine:  Denies polyuria or polydipsia   Psychiatric:  Denies depression or anxiety      Vitals:    07/13/21 1308   BP: 140/82   Pulse: 83   SpO2: 98%       Physical Exam:  GEN: NAD, conversant  EYES: EOMI, PERRL, no conjunctival erythema  NECK: no thyromegaly, full ROM   CV: RRR, no murmurs/rubs/gallops, no peripheral edema  LUNG: CTAB, no wheezes/rales/ronchi  SKIN: no rashes, no acanthosis  MSK: no deformities, full ROM of all extremities  NEURO: no tremors, DTR normal  PSYCH: AOX3, appropriate mood, affect normal      Results Review:     I reviewed the patient's new clinical results.    Lab Results   Component Value Date    HGBA1C 6.4 (H) 07/06/2021    HGBA1C 6.1 (H) 01/07/2021    HGBA1C 5.9 (H) 07/07/2020      Lab Results   Component Value Date    GLUCOSE 150 (H) 07/06/2021    BUN 15 07/06/2021    CREATININE 1.08 (H) 07/06/2021    EGFRIFNONA 50 (L) 07/06/2021    BCR 13.9 07/06/2021    K 4.3 07/06/2021    CO2 22.9 07/06/2021    CALCIUM 9.5 07/06/2021    ALBUMIN 4.20 07/06/2021    LABIL2 1.0 05/08/2019    AST 17 07/06/2021    ALT 12 07/06/2021    CHOL 107 07/06/2021    TRIG 78 07/06/2021    LDL 46 07/06/2021    HDL 45 07/06/2021     Lab Results   Component Value Date    TSH 0.506 07/06/2021    FREET4 1.33 07/06/2021         Medication Review: Reviewed.       Current Outpatient Medications:   •  acetaZOLAMIDE (DIAMOX) 250 MG tablet, TAKE ONE TABLET BY  "MOUTH EVERY MORNING, Disp: 90 tablet, Rfl: 0  •  anastrozole (ARIMIDEX) 1 MG tablet, Take 1 tablet by mouth Daily., Disp: 90 tablet, Rfl: 1  •  anastrozole (ARIMIDEX) 1 MG tablet, TAKE 1 TABLET BY MOUTH DAILY., Disp: 90 tablet, Rfl: 0  •  aspirin (ADULT ASPIRIN EC LOW STRENGTH) 81 MG EC tablet, ADULT ASPIRIN EC LOW STRENGTH 81 MG ORAL TABLET DELAYED RELEASE, Disp: , Rfl:   •  atorvastatin (LIPITOR) 40 MG tablet, TAKE ONE TABLET BY MOUTH EVERY NIGHT, Disp: 30 tablet, Rfl: 2  •  BD INSULIN SYRINGE U/F 31G X 5/16\" 1 ML misc, USE TWICE A DAY WITH INSULIN INJECTIONS, Disp: 200 each, Rfl: 3  •  CALCIUM ACETATE IJ, Inject  as directed., Disp: , Rfl:   •  D3 Super Strength 50 MCG (2000 UT) capsule, TAKE 1 CAPSULE BY MOUTH EVERY DAY, Disp: 30 capsule, Rfl: 11  •  furosemide (LASIX) 40 MG tablet, TAKE ONE TABLET BY MOUTH EVERY DAY, Disp: 30 tablet, Rfl: 3  •  glucose blood (ONE TOUCH ULTRA TEST) test strip, ONETOUCH ULTRA BLUE STRP, Disp: , Rfl:   •  ibandronate (BONIVA) 150 MG tablet, TAKE ONE TABLET BY MOUTH EVERY MONTH, Disp: 3 tablet, Rfl: 2  •  insulin NPH-insulin regular (HumuLIN 70/30) (70-30) 100 UNIT/ML injection, Inject 40 units SQ every morning and 30 units every evening at dinner DX E11.65 (Patient taking differently: Inject 30 units SQ every morning and 40 units every evening at dinner DX E11.65), Disp: 70 mL, Rfl: 2  •  Kombiglyze XR 2.5-1000 MG tablet sustained-release 24 hour, TAKE ONE TABLET BY MOUTH TWICE DAILY, Disp: 180 tablet, Rfl: 3  •  levothyroxine (SYNTHROID, LEVOTHROID) 88 MCG tablet, TAKE ONE TABLET BY MOUTH EVERY DAY, Disp: 30 tablet, Rfl: 4  •  loratadine (CLARITIN) 10 MG tablet, TAKE ONE TABLET BY MOUTH EVERY DAY, Disp: 90 tablet, Rfl: 1  •  MgO 400 (241.3 Mg) MG tablet tablet, TAKE ONE TABLET BY MOUTH TWICE DAILY, Disp: 60 tablet, Rfl: 11  •  montelukast (SINGULAIR) 10 MG tablet, TAKE ONE TABLET BY MOUTH EVERY NIGHT AT BEDTIME, Disp: 90 tablet, Rfl: 0  •  pantoprazole (PROTONIX) 40 MG EC tablet, " "TAKE ONE TABLET BY MOUTH EVERY DAY, Disp: 90 tablet, Rfl: 0  •  potassium chloride (K-DUR,KLOR-CON) 20 MEQ CR tablet, TAKE 1 TABLET BY MOUTH TWICE DAILY, Disp: 180 tablet, Rfl: 0  •  ramipril (ALTACE) 1.25 MG capsule, TAKE 1 CAPSULE BY MOUTH EVERY EVENING WITH DINNER, Disp: 90 capsule, Rfl: 3      Assessment/Plan   1.  Diabetes mellitus type 2: A1c 6.4%, has some low BS, advised to change Humulin 70/30 insulin to 40 units in the morning and 30 units in the evening half an hour before each meal.  Always keep glucose source in case of low blood sugar and get regular eye exam and flu vaccine.  She verbalized understanding.     2.  Hypertension: Well-controlled, continue current medications    3.  Hyperlipidemia: Well-controlled, on atorvastatin, continue current medications.  Will follow lipid panel.    4.  Hypothyroidism: Well controlled. CPM.     5.  Diabetic nephropathy: On ramipril.  Will continue current medications.                Kosta Frank MD FACE.        EMR Dragon / transcription disclaimer:     \"Dictated utilizing Dragon dictation\".                 "

## 2021-07-14 NOTE — PROGRESS NOTES
Hematology/Oncology Outpatient Follow Up    Christiane Nazario  1950    Primary Care Physician: Lyell, Reggie Duane, MD  Referring Physician: Lyell, Reggie Duane, MD  Chief complaint:    pT 2 pN0 infiltrating ductal carcinoma of the left breast    History of Present Illness:     · Ms. Nazario had a mammogram in November 2018 for a palpable  abnormality.    · 11/30/18 - Diagnostic mammogram showed palpable abnormality corresponds to a 2.3 x 1.7 x 2.1 cm spicular shadowing irregular mass in the lateral middle third of the left breast at 2 to 3  o'clock position.  No mammographic evidence of malignancy in the contralateral breast.    · 12/17/18 - Core needle biopsy of breast palpable nodule showed invasive moderately differentiated ductal carcinoma.  Buckholts grade 7/9.  DCIS seen.  Tumor was ER positive, [place]  positive and [id number] negative by ICH.    · Patient was sent to the [facility] and seen initially on 1/4/19.     · 1/4/19 - Genetic testing for BRCA 1 and BRCA 2 sequencing and deletion duplication analysis negative.    · 1/21/19 - CT scan of the chest with contrast showed 2.4 x 2.8 cm left breast mass consistent  with known carcinoma.  Coronary artery calcification consistent with atherosclerotic disease.  A  4 mm right upper lobe pulmonary nodule, stable compared to 2014, consistent with a benign  etiology.  · 2/7/19 - Patient underwent left breast lumpectomy and axillary sentinel lymph node resection. Pathology report was invasive poorly differentiated ductal carcinoma, 3.3 cm, completely excised.  Negative margins.  Three sentinel lymph nodes were extracted and three were negative.  Buckholts score total of 9.  DCIS not identified.  Skin showed invasive carcinoma focally  invading the dermis without skin ulceration.  Margins negative with invasive carcinoma 0.2 cm  from the closest.  Pathologic staging pT2, smpN0.    · 3/6/19 - Oncotype DX recurrent score 25 (distant recurrence  "risk at 9 years 12% per TAILORx and absolute chemotherapy benefit less than 1% per TAILORx).   · 3/22/19 - DEXA scan, normal.   · May 2019 - Patient completed 20 radiation treatments.    · 2019 patient started Arimidex treatment  · 2020 bilateral screening mammogram benign  · 2021: Patient had bilateral screening mammogram      Past Medical History:   Diagnosis Date   • Cataract    • DM type 2 (diabetes mellitus, type 2) (CMS/HCC)    • Ductal carcinoma in situ (DCIS) of left breast    • Fracture, femur (CMS/HCC)     fracture of left femur   • Hyperlipidemia    • Hypertension    • Hypothyroidism    • Osteopenia    • Pulmonary hypertension (CMS/HCC)        Past Surgical History:   Procedure Laterality Date   • BREAST BIOPSY  2018   • CATARACT EXTRACTION     •  SECTION     • FEMUR SURGERY Left 2014    @ [facility] - Dr Winston   • MASTECTOMY Left 2019    Dr Bills   • TUBAL ABDOMINAL LIGATION           Current Outpatient Medications:   •  acetaZOLAMIDE (DIAMOX) 250 MG tablet, TAKE ONE TABLET BY MOUTH EVERY MORNING, Disp: 90 tablet, Rfl: 0  •  anastrozole (ARIMIDEX) 1 MG tablet, Take 1 tablet by mouth Daily., Disp: 90 tablet, Rfl: 1  •  anastrozole (ARIMIDEX) 1 MG tablet, TAKE 1 TABLET BY MOUTH DAILY., Disp: 90 tablet, Rfl: 0  •  aspirin (ADULT ASPIRIN EC LOW STRENGTH) 81 MG EC tablet, ADULT ASPIRIN EC LOW STRENGTH 81 MG ORAL TABLET DELAYED RELEASE, Disp: , Rfl:   •  atorvastatin (LIPITOR) 40 MG tablet, TAKE ONE TABLET BY MOUTH EVERY NIGHT, Disp: 30 tablet, Rfl: 2  •  BD INSULIN SYRINGE U/F 31G X \" 1 ML misc, USE TWICE A DAY WITH INSULIN INJECTIONS, Disp: 200 each, Rfl: 3  •  CALCIUM ACETATE IJ, Inject  as directed., Disp: , Rfl:   •  D3 Super Strength 50 MCG ( UT) capsule, TAKE 1 CAPSULE BY MOUTH EVERY DAY, Disp: 30 capsule, Rfl: 11  •  furosemide (LASIX) 40 MG tablet, TAKE ONE TABLET BY MOUTH EVERY DAY, Disp: 30 tablet, Rfl: 3  •  glucose blood (ONE TOUCH ULTRA TEST) " test strip, ONETOUCH ULTRA BLUE STRP, Disp: , Rfl:   •  ibandronate (BONIVA) 150 MG tablet, TAKE ONE TABLET BY MOUTH EVERY MONTH, Disp: 3 tablet, Rfl: 2  •  insulin NPH-insulin regular (HumuLIN 70/30) (70-30) 100 UNIT/ML injection, Inject 40 units SQ every morning and 30 units every evening at dinner DX E11.65 (Patient taking differently: Inject 30 units SQ every morning and 40 units every evening at dinner DX E11.65), Disp: 70 mL, Rfl: 2  •  Kombiglyze XR 2.5-1000 MG tablet sustained-release 24 hour, TAKE ONE TABLET BY MOUTH TWICE DAILY, Disp: 180 tablet, Rfl: 3  •  levothyroxine (SYNTHROID, LEVOTHROID) 88 MCG tablet, TAKE ONE TABLET BY MOUTH EVERY DAY, Disp: 30 tablet, Rfl: 4  •  loratadine (CLARITIN) 10 MG tablet, TAKE ONE TABLET BY MOUTH EVERY DAY, Disp: 90 tablet, Rfl: 1  •  MgO 400 (241.3 Mg) MG tablet tablet, TAKE ONE TABLET BY MOUTH TWICE DAILY, Disp: 60 tablet, Rfl: 11  •  montelukast (SINGULAIR) 10 MG tablet, TAKE ONE TABLET BY MOUTH EVERY NIGHT AT BEDTIME, Disp: 90 tablet, Rfl: 0  •  pantoprazole (PROTONIX) 40 MG EC tablet, TAKE ONE TABLET BY MOUTH EVERY DAY, Disp: 90 tablet, Rfl: 0  •  potassium chloride (K-DUR,KLOR-CON) 20 MEQ CR tablet, TAKE 1 TABLET BY MOUTH TWICE DAILY, Disp: 180 tablet, Rfl: 0  •  ramipril (ALTACE) 1.25 MG capsule, TAKE 1 CAPSULE BY MOUTH EVERY EVENING WITH DINNER, Disp: 90 capsule, Rfl: 3    Allergies   Allergen Reactions   • Calcium-Containing Compounds Nausea Only       Family History   Problem Relation Age of Onset   • Ovarian cancer Sister    • Cancer Other        Cancer-related family history includes Cancer in an other family member; Ovarian cancer in her sister.    Social History     Tobacco Use   • Smoking status: Never Smoker   • Smokeless tobacco: Never Used   Vaping Use   • Vaping Use: Never used   Substance Use Topics   • Alcohol use: No   • Drug use: No       I have reviewed and confirmed the accuracy of the patient's history: Chief complaint, HPI and ROS as entered by  "the MA/LPN/RN. Venita Sarkar MD 07/19/21       SUBJECTIVE:        Patient does not have any specific complaints today.  Remains on Arimidex and calcium.  There are no planned surgical procedures.  She denies any breast [place] complaints today.    ROS:       Review of Systems   Constitutional: Negative for fever.   HENT: Negative for nosebleeds and trouble swallowing.    Eyes: Negative for visual disturbance.   Respiratory: Negative for cough, shortness of breath and wheezing.    Cardiovascular: Negative for chest pain.   Gastrointestinal: Negative for abdominal pain and blood in stool.   Endocrine: Negative for cold intolerance.   Genitourinary: Negative for dysuria and hematuria.   Musculoskeletal: Negative for joint swelling.   Skin: Negative for rash.   Allergic/Immunologic: Negative for environmental allergies.   Neurological: Negative for seizures.   Hematological: Does not bruise/bleed easily.   Psychiatric/Behavioral: The patient is not nervous/anxious.            Objective:       Vitals:    07/19/21 1318   BP: 110/59   Pulse: 102   Resp: 20   Temp: 97.7 °F (36.5 °C)   TempSrc: Infrared   Weight: 95.3 kg (210 lb)   Height: 152.4 cm (60\")   PainSc: 0-No pain         PHYSICAL EXAM:     Physical Exam   Constitutional: She is oriented to person, place, and time. No distress.   Moderately built obese   HENT:   Head: Normocephalic and atraumatic.   Eyes: Conjunctivae are normal. Right eye exhibits no discharge. Left eye exhibits no discharge. No scleral icterus.   Neck: No thyromegaly present.   Cardiovascular: Normal rate, regular rhythm and normal heart sounds. Exam reveals no gallop and no friction rub.   Pulmonary/Chest: Effort normal. No stridor. No respiratory distress. She has no wheezes.   Decreased breath sounds both bases   Abdominal: Soft. Bowel sounds are normal. She exhibits no mass. There is no abdominal tenderness. There is no rebound and no guarding.   Musculoskeletal: Normal range of " motion. No tenderness.      Comments: Trace bilateral ankle edema   Lymphadenopathy:     She has no cervical adenopathy.   Neurological: She is alert and oriented to person, place, and time. She exhibits normal muscle tone.   Skin: Skin is warm. No rash noted. She is not diaphoretic. No erythema.   Psychiatric: Her behavior is normal.   Nursing note and vitals reviewed.    Bilateral breast exam does not reveal any palpable masses, nipple discharge or skin discoloration.    I have reexamined the patient and the results are consistent with the previously documented exam. Venita Sarkar MD     RECENT LABS:       WBC   Date Value Ref Range Status   07/19/2021 14.13 (H) 3.40 - 10.80 10*3/mm3 Final     RBC   Date Value Ref Range Status   07/19/2021 4.49 3.77 - 5.28 10*6/mm3 Final     Hemoglobin   Date Value Ref Range Status   07/19/2021 11.9 (L) 12.0 - 15.9 g/dL Final     Hematocrit   Date Value Ref Range Status   07/19/2021 40.1 34.0 - 46.6 % Final     MCV   Date Value Ref Range Status   07/19/2021 89.3 79.0 - 97.0 fL Final     MCH   Date Value Ref Range Status   07/19/2021 26.5 (L) 26.6 - 33.0 pg Final     MCHC   Date Value Ref Range Status   07/19/2021 29.7 (L) 31.5 - 35.7 g/dL Final     RDW   Date Value Ref Range Status   07/19/2021 15.1 12.3 - 15.4 % Final     RDW-SD   Date Value Ref Range Status   07/19/2021 48.2 37.0 - 54.0 fl Final     MPV   Date Value Ref Range Status   07/19/2021 10.3 6.0 - 12.0 fL Final     Platelets   Date Value Ref Range Status   07/19/2021 289 140 - 450 10*3/mm3 Final     Neutrophil %   Date Value Ref Range Status   07/19/2021 85.0 (H) 42.7 - 76.0 % Final     Lymphocyte %   Date Value Ref Range Status   07/19/2021 9.9 (L) 19.6 - 45.3 % Final     Monocyte %   Date Value Ref Range Status   07/19/2021 4.3 (L) 5.0 - 12.0 % Final     Eosinophil %   Date Value Ref Range Status   07/19/2021 0.7 0.3 - 6.2 % Final     Basophil %   Date Value Ref Range Status   07/19/2021 0.1 0.0 - 1.5 % Final      Neutrophils, Absolute   Date Value Ref Range Status   07/19/2021 12.00 (H) 1.70 - 7.00 10*3/mm3 Final     Lymphocytes, Absolute   Date Value Ref Range Status   07/19/2021 1.40 0.70 - 3.10 10*3/mm3 Final     Monocytes, Absolute   Date Value Ref Range Status   07/19/2021 0.61 0.10 - 0.90 10*3/mm3 Final     Eosinophils, Absolute   Date Value Ref Range Status   07/19/2021 0.10 0.00 - 0.40 10*3/mm3 Final     Basophils, Absolute   Date Value Ref Range Status   07/19/2021 0.02 0.00 - 0.20 10*3/mm3 Final     nRBC   Date Value Ref Range Status   01/28/2019 0 0 /100[WBCs] Final       Lab Results   Component Value Date    GLUCOSE 150 (H) 07/06/2021    BUN 15 07/06/2021    CREATININE 1.08 (H) 07/06/2021    EGFRIFNONA 50 (L) 07/06/2021    BCR 13.9 07/06/2021    K 4.3 07/06/2021    CO2 22.9 07/06/2021    CALCIUM 9.5 07/06/2021    ALBUMIN 4.20 07/06/2021    LABIL2 1.0 05/08/2019    AST 17 07/06/2021    ALT 12 07/06/2021         Assessment/Plan      ASSESSMENT:     1. Left breast infiltrating ductal carcinoma ER +100%, SD positive and HER-2/willi was negative: Status  post left lumpectomy with sentinel lymph node biopsy.  Status post adjuvant left breast radiation.  On surveillance protocol  2. Oncotype DX assay with recurrence score of 25.  Chemotherapy not recommended.  Hormone receptor positive and has been on Arimidex initiated in June 2019.  Continue same.  3. Obesity with BMI of 40  4. Osteopenia: On Boniva, calcium supplements reviewed her recent bone density  5. ECOG 1    PLANS:      1. Continue Arimidex  2. Continue Os-Quintin D 600 mg twice a day  3. Bilateral diagnostic mammogram will be due again in February 2022. We will schedule at the next visit  4. DEXA scan will be due again 4/8/2023.  She will continue Boniva  5. Patient to follow-up with her primary care physician  6. Follow-up with Dr. Devon Fox  7. Continue Boniva monthly for osteopenia.  Reviewed  8. Follow-up 4 months  9. All questions answered    I have  reviewed labs results, imaging, vitals, and medications with the patient today.   Patient verbalized understanding and is in agreement of the above plan.          I spent 30 total minutes, face-to-face, caring for Christiane today.  90% of this time involved counseling and/or coordination of care as documented within this note.

## 2021-07-19 ENCOUNTER — OFFICE VISIT (OUTPATIENT)
Dept: ONCOLOGY | Facility: CLINIC | Age: 71
End: 2021-07-19

## 2021-07-19 ENCOUNTER — LAB (OUTPATIENT)
Dept: LAB | Facility: HOSPITAL | Age: 71
End: 2021-07-19

## 2021-07-19 VITALS
TEMPERATURE: 97.7 F | HEART RATE: 102 BPM | SYSTOLIC BLOOD PRESSURE: 110 MMHG | DIASTOLIC BLOOD PRESSURE: 59 MMHG | RESPIRATION RATE: 20 BRPM | WEIGHT: 210 LBS | HEIGHT: 60 IN | BODY MASS INDEX: 41.23 KG/M2

## 2021-07-19 DIAGNOSIS — C50.919 PRIMARY MALIGNANT NEOPLASM OF FEMALE BREAST (HCC): ICD-10-CM

## 2021-07-19 DIAGNOSIS — C50.919 PRIMARY MALIGNANT NEOPLASM OF FEMALE BREAST (HCC): Primary | ICD-10-CM

## 2021-07-19 LAB
BASOPHILS # BLD AUTO: 0.02 10*3/MM3 (ref 0–0.2)
BASOPHILS NFR BLD AUTO: 0.1 % (ref 0–1.5)
DEPRECATED RDW RBC AUTO: 48.2 FL (ref 37–54)
EOSINOPHIL # BLD AUTO: 0.1 10*3/MM3 (ref 0–0.4)
EOSINOPHIL NFR BLD AUTO: 0.7 % (ref 0.3–6.2)
ERYTHROCYTE [DISTWIDTH] IN BLOOD BY AUTOMATED COUNT: 15.1 % (ref 12.3–15.4)
HCT VFR BLD AUTO: 40.1 % (ref 34–46.6)
HGB BLD-MCNC: 11.9 G/DL (ref 12–15.9)
LYMPHOCYTES # BLD AUTO: 1.4 10*3/MM3 (ref 0.7–3.1)
LYMPHOCYTES NFR BLD AUTO: 9.9 % (ref 19.6–45.3)
MCH RBC QN AUTO: 26.5 PG (ref 26.6–33)
MCHC RBC AUTO-ENTMCNC: 29.7 G/DL (ref 31.5–35.7)
MCV RBC AUTO: 89.3 FL (ref 79–97)
MONOCYTES # BLD AUTO: 0.61 10*3/MM3 (ref 0.1–0.9)
MONOCYTES NFR BLD AUTO: 4.3 % (ref 5–12)
NEUTROPHILS NFR BLD AUTO: 12 10*3/MM3 (ref 1.7–7)
NEUTROPHILS NFR BLD AUTO: 85 % (ref 42.7–76)
PLATELET # BLD AUTO: 289 10*3/MM3 (ref 140–450)
PMV BLD AUTO: 10.3 FL (ref 6–12)
RBC # BLD AUTO: 4.49 10*6/MM3 (ref 3.77–5.28)
WBC # BLD AUTO: 14.13 10*3/MM3 (ref 3.4–10.8)

## 2021-07-19 PROCEDURE — 36415 COLL VENOUS BLD VENIPUNCTURE: CPT

## 2021-07-19 PROCEDURE — 99214 OFFICE O/P EST MOD 30 MIN: CPT | Performed by: INTERNAL MEDICINE

## 2021-07-19 PROCEDURE — 85025 COMPLETE CBC W/AUTO DIFF WBC: CPT

## 2021-07-22 RX ORDER — INSULIN NPH HUM/REG INSULIN HM 70-30/ML
VIAL (ML) SUBCUTANEOUS
Qty: 70 ML | Refills: 1 | Status: SHIPPED | OUTPATIENT
Start: 2021-07-22 | End: 2022-01-17 | Stop reason: SDUPTHER

## 2021-07-27 RX ORDER — POTASSIUM CHLORIDE 20 MEQ/1
TABLET, EXTENDED RELEASE ORAL
Qty: 180 TABLET | Refills: 0 | Status: SHIPPED | OUTPATIENT
Start: 2021-07-27 | End: 2021-10-26

## 2021-08-02 RX ORDER — IBANDRONATE SODIUM 150 MG/1
TABLET, FILM COATED ORAL
Qty: 3 TABLET | Refills: 1 | Status: SHIPPED | OUTPATIENT
Start: 2021-08-02 | End: 2022-01-31

## 2021-08-02 RX ORDER — FUROSEMIDE 40 MG/1
TABLET ORAL
Qty: 30 TABLET | Refills: 2 | Status: SHIPPED | OUTPATIENT
Start: 2021-08-02 | End: 2021-11-01

## 2021-08-03 RX ORDER — LEVOTHYROXINE SODIUM 88 UG/1
TABLET ORAL
Qty: 30 TABLET | Refills: 5 | Status: SHIPPED | OUTPATIENT
Start: 2021-08-03 | End: 2022-01-24

## 2021-08-10 RX ORDER — ATORVASTATIN CALCIUM 40 MG/1
TABLET, FILM COATED ORAL
Qty: 30 TABLET | Refills: 1 | Status: SHIPPED | OUTPATIENT
Start: 2021-08-10 | End: 2021-10-11

## 2021-08-18 RX ORDER — ACETAZOLAMIDE 250 MG/1
TABLET ORAL
Qty: 90 TABLET | Refills: 0 | Status: SHIPPED | OUTPATIENT
Start: 2021-08-18 | End: 2021-11-16

## 2021-09-03 DIAGNOSIS — C50.919 PRIMARY MALIGNANT NEOPLASM OF FEMALE BREAST (HCC): ICD-10-CM

## 2021-09-03 RX ORDER — ANASTROZOLE 1 MG/1
1 TABLET ORAL DAILY
Qty: 90 TABLET | Refills: 1 | Status: SHIPPED | OUTPATIENT
Start: 2021-09-03 | End: 2022-02-25 | Stop reason: SDUPTHER

## 2021-09-03 NOTE — TELEPHONE ENCOUNTER
Rx Refill Note  Requested Prescriptions      No prescriptions requested or ordered in this encounter      Last office visit with prescribing clinician: 7/19/2021      Next office visit with prescribing clinician: 11/15/2021            Jolene Solorzano MA  09/03/21, 10:20 EDT

## 2021-09-13 RX ORDER — MONTELUKAST SODIUM 10 MG/1
TABLET ORAL
Qty: 90 TABLET | Refills: 0 | Status: SHIPPED | OUTPATIENT
Start: 2021-09-13 | End: 2021-12-20

## 2021-10-11 RX ORDER — PEN NEEDLE, DIABETIC 29 G X1/2"
NEEDLE, DISPOSABLE MISCELLANEOUS
Qty: 200 EACH | Refills: 4 | Status: SHIPPED | OUTPATIENT
Start: 2021-10-11 | End: 2022-11-07

## 2021-10-11 RX ORDER — ATORVASTATIN CALCIUM 40 MG/1
TABLET, FILM COATED ORAL
Qty: 30 TABLET | Refills: 0 | Status: SHIPPED | OUTPATIENT
Start: 2021-10-11 | End: 2021-11-09

## 2021-10-19 ENCOUNTER — FLU SHOT (OUTPATIENT)
Dept: FAMILY MEDICINE CLINIC | Facility: CLINIC | Age: 71
End: 2021-10-19

## 2021-10-19 DIAGNOSIS — Z23 FLU VACCINE NEED: Primary | ICD-10-CM

## 2021-10-19 PROCEDURE — G0008 ADMIN INFLUENZA VIRUS VAC: HCPCS | Performed by: FAMILY MEDICINE

## 2021-10-19 PROCEDURE — 90662 IIV NO PRSV INCREASED AG IM: CPT | Performed by: FAMILY MEDICINE

## 2021-10-26 RX ORDER — POTASSIUM CHLORIDE 20 MEQ/1
TABLET, EXTENDED RELEASE ORAL
Qty: 180 TABLET | Refills: 0 | Status: SHIPPED | OUTPATIENT
Start: 2021-10-26 | End: 2022-01-25

## 2021-11-01 RX ORDER — FUROSEMIDE 40 MG/1
TABLET ORAL
Qty: 30 TABLET | Refills: 1 | Status: SHIPPED | OUTPATIENT
Start: 2021-11-01 | End: 2021-12-27

## 2021-11-09 RX ORDER — ATORVASTATIN CALCIUM 40 MG/1
TABLET, FILM COATED ORAL
Qty: 30 TABLET | Refills: 0 | Status: SHIPPED | OUTPATIENT
Start: 2021-11-09 | End: 2021-12-13

## 2021-11-12 NOTE — PROGRESS NOTES
Hematology/Oncology Outpatient Follow Up    Christiane Nazario  1950    Primary Care Physician: Lyell, Reggie Duane, MD  Referring Physician: Lyell, Reggie Duane, MD  Chief complaint:    pT 2 pN0 infiltrating ductal carcinoma of the left breast    History of Present Illness:     · Ms. Nazario had a mammogram in November 2018 for a palpable  abnormality.    · 11/30/18 - Diagnostic mammogram showed palpable abnormality corresponds to a 2.3 x 1.7 x 2.1 cm spicular shadowing irregular mass in the lateral middle third of the left breast at 2 to 3  o'clock position.  No mammographic evidence of malignancy in the contralateral breast.    · 12/17/18 - Core needle biopsy of breast palpable nodule showed invasive moderately differentiated ductal carcinoma.  Winslow grade 7/9.  DCIS seen.  Tumor was ER positive, CO  positive and [id number] negative by ICH.    · Patient was sent to the [facility] and seen initially on 1/4/19.     · 1/4/19 - Genetic testing for BRCA 1 and BRCA 2 sequencing and deletion duplication analysis negative.    · 1/21/19 - CT scan of the chest with contrast showed 2.4 x 2.8 cm left breast mass consistent  with known carcinoma.  Coronary artery calcification consistent with atherosclerotic disease.  A  4 mm right upper lobe pulmonary nodule, stable compared to 2014, consistent with a benign  etiology.  · 2/7/19 - Patient underwent left breast lumpectomy and axillary sentinel lymph node resection. Pathology report was invasive poorly differentiated ductal carcinoma, 3.3 cm, completely excised.  Negative margins.  Three sentinel lymph nodes were extracted and three were negative.  Winslow score total of 9.  DCIS not identified.  Skin showed invasive carcinoma focally  invading the dermis without skin ulceration.  Margins negative with invasive carcinoma 0.2 cm  from the closest.  Pathologic staging pT2, smpN0.    · 3/6/19 - Oncotype DX recurrent score 25 (distant recurrence  "risk at 9 years 12% per TAILORx and absolute chemotherapy benefit less than 1% per TAILORx).   · 3/22/19 - DEXA scan, normal.   · May 2019 - Patient completed 20 radiation treatments.    · 2019 patient started Arimidex treatment  · 2020 bilateral screening mammogram benign  · 2021: Patient had bilateral screening mammogram      Past Medical History:   Diagnosis Date   • Cataract    • DM type 2 (diabetes mellitus, type 2) (HCC)    • Ductal carcinoma in situ (DCIS) of left breast    • Fracture, femur (HCC)     fracture of left femur   • Hyperlipidemia    • Hypertension    • Hypothyroidism    • Osteopenia    • Pulmonary hypertension (HCC)        Past Surgical History:   Procedure Laterality Date   • BREAST BIOPSY  2018   • CATARACT EXTRACTION     •  SECTION     • FEMUR SURGERY Left 2014    @ [facility] - Dr Winston   • MASTECTOMY Left 2019    Dr Bills   • TUBAL ABDOMINAL LIGATION           Current Outpatient Medications:   •  acetaZOLAMIDE (DIAMOX) 250 MG tablet, TAKE ONE TABLET BY MOUTH EVERY MORNING, Disp: 90 tablet, Rfl: 0  •  anastrozole (ARIMIDEX) 1 MG tablet, TAKE 1 TABLET BY MOUTH DAILY., Disp: 90 tablet, Rfl: 0  •  anastrozole (ARIMIDEX) 1 MG tablet, Take 1 tablet by mouth Daily., Disp: 90 tablet, Rfl: 1  •  aspirin (ADULT ASPIRIN EC LOW STRENGTH) 81 MG EC tablet, ADULT ASPIRIN EC LOW STRENGTH 81 MG ORAL TABLET DELAYED RELEASE, Disp: , Rfl:   •  atorvastatin (LIPITOR) 40 MG tablet, TAKE ONE TABLET BY MOUTH EVERY NIGHT, Disp: 30 tablet, Rfl: 0  •  BD Insulin Syringe U/F 31G X 5/16\" 1 ML misc, USE TWICE A DAY WITH INSULIN INJECTIONS, Disp: 200 each, Rfl: 4  •  CALCIUM ACETATE IJ, Inject  as directed., Disp: , Rfl:   •  D3 Super Strength 50 MCG (2000 UT) capsule, TAKE 1 CAPSULE BY MOUTH EVERY DAY, Disp: 30 capsule, Rfl: 11  •  furosemide (LASIX) 40 MG tablet, TAKE ONE TABLET BY MOUTH EVERY DAY, Disp: 30 tablet, Rfl: 1  •  glucose blood (ONE TOUCH ULTRA TEST) test strip, " ONETOUCH ULTRA BLUE STRP, Disp: , Rfl:   •  ibandronate (BONIVA) 150 MG tablet, TAKE ONE TABLET BY MOUTH EVERY MONTH, Disp: 3 tablet, Rfl: 1  •  insulin NPH-insulin regular (HumuLIN 70/30) (70-30) 100 UNIT/ML injection, INJECT 40 UNITS SUBCUTANEOUSLY EVERY MORNING AND INJECT 30 UNITS EVERY EVENING AT DINNER, Disp: 70 mL, Rfl: 1  •  Kombiglyze XR 2.5-1000 MG tablet sustained-release 24 hour, TAKE ONE TABLET BY MOUTH TWICE DAILY, Disp: 180 tablet, Rfl: 3  •  levothyroxine (SYNTHROID, LEVOTHROID) 88 MCG tablet, TAKE ONE TABLET BY MOUTH EVERY DAY, Disp: 30 tablet, Rfl: 5  •  loratadine (CLARITIN) 10 MG tablet, TAKE ONE TABLET BY MOUTH EVERY DAY, Disp: 90 tablet, Rfl: 1  •  MgO 400 (241.3 Mg) MG tablet tablet, TAKE ONE TABLET BY MOUTH TWICE DAILY, Disp: 60 tablet, Rfl: 11  •  montelukast (SINGULAIR) 10 MG tablet, TAKE ONE TABLET BY MOUTH EVERY NIGHT AT BEDTIME, Disp: 90 tablet, Rfl: 0  •  pantoprazole (PROTONIX) 40 MG EC tablet, TAKE ONE TABLET BY MOUTH EVERY DAY, Disp: 90 tablet, Rfl: 0  •  potassium chloride (K-DUR,KLOR-CON) 20 MEQ CR tablet, TAKE 1 TABLET BY MOUTH TWICE DAILY, Disp: 180 tablet, Rfl: 0  •  ramipril (ALTACE) 1.25 MG capsule, TAKE 1 CAPSULE BY MOUTH EVERY EVENING WITH DINNER, Disp: 90 capsule, Rfl: 3    Allergies   Allergen Reactions   • Calcium-Containing Compounds Nausea Only       Family History   Problem Relation Age of Onset   • Ovarian cancer Sister    • Cancer Other        Cancer-related family history includes Cancer in an other family member; Ovarian cancer in her sister.    Social History     Tobacco Use   • Smoking status: Never Smoker   • Smokeless tobacco: Never Used   Vaping Use   • Vaping Use: Never used   Substance Use Topics   • Alcohol use: No   • Drug use: No       I have reviewed and confirmed the accuracy of the patient's history: Chief complaint, HPI and ROS as entered by the MA/LPN/RN. Venita Sarkar MD 11/15/21       SUBJECTIVE:        Patient does not have any specific  "complaints today.  Remains on Arimidex and calcium.  There are no planned surgical procedures.     She is accompanied today by her spouse for this appointment    ROS:       Review of Systems   Constitutional: Negative for fever.   HENT: Negative for nosebleeds and trouble swallowing.    Eyes: Negative for visual disturbance.   Respiratory: Negative for cough, shortness of breath and wheezing.    Cardiovascular: Negative for chest pain.   Gastrointestinal: Negative for abdominal pain and blood in stool.   Endocrine: Negative for cold intolerance.   Genitourinary: Negative for dysuria and hematuria.   Musculoskeletal: Negative for joint swelling.   Skin: Negative for rash.   Allergic/Immunologic: Negative for environmental allergies.   Neurological: Negative for seizures.   Hematological: Does not bruise/bleed easily.   Psychiatric/Behavioral: The patient is not nervous/anxious.            Objective:       Vitals:    11/15/21 1310   BP: 127/65   Pulse: 103   Resp: 20   Temp: 97.1 °F (36.2 °C)   TempSrc: Infrared   Weight: 94.8 kg (209 lb)   Height: 152.4 cm (60\")   PainSc: 0-No pain         PHYSICAL EXAM:     Physical Exam   Constitutional: She is oriented to person, place, and time. No distress.   Moderately built obese   HENT:   Head: Normocephalic and atraumatic.   Eyes: Conjunctivae are normal. Right eye exhibits no discharge. Left eye exhibits no discharge. No scleral icterus.   Neck: No thyromegaly present.   Cardiovascular: Normal rate, regular rhythm and normal heart sounds. Exam reveals no gallop and no friction rub.   Pulmonary/Chest: Effort normal. No stridor. No respiratory distress. She has no wheezes.   Decreased breath sounds both bases   Abdominal: Soft. Bowel sounds are normal. She exhibits no mass. There is no abdominal tenderness. There is no rebound and no guarding.   Musculoskeletal: Normal range of motion. No tenderness.      Comments: Trace bilateral ankle edema   Lymphadenopathy:     She has no " cervical adenopathy.   Neurological: She is alert and oriented to person, place, and time. She exhibits normal muscle tone.   Skin: Skin is warm. No rash noted. She is not diaphoretic. No erythema.   Psychiatric: Her behavior is normal.   Nursing note and vitals reviewed.    Bilateral breast exam does not reveal any palpable masses, nipple discharge or skin discoloration.    I have reexamined the patient and the results are consistent with the previously documented exam. Venitasravani Sarkar MD     RECENT LABS:       WBC   Date Value Ref Range Status   11/15/2021 14.59 (H) 3.40 - 10.80 10*3/mm3 Final     RBC   Date Value Ref Range Status   11/15/2021 4.75 3.77 - 5.28 10*6/mm3 Final     Hemoglobin   Date Value Ref Range Status   11/15/2021 12.5 12.0 - 15.9 g/dL Final     Hematocrit   Date Value Ref Range Status   11/15/2021 40.4 34.0 - 46.6 % Final     MCV   Date Value Ref Range Status   11/15/2021 85.1 79.0 - 97.0 fL Final     MCH   Date Value Ref Range Status   11/15/2021 26.3 (L) 26.6 - 33.0 pg Final     MCHC   Date Value Ref Range Status   11/15/2021 30.9 (L) 31.5 - 35.7 g/dL Final     RDW   Date Value Ref Range Status   11/15/2021 15.9 (H) 12.3 - 15.4 % Final     RDW-SD   Date Value Ref Range Status   11/15/2021 48.6 37.0 - 54.0 fl Final     MPV   Date Value Ref Range Status   11/15/2021 11.5 6.0 - 12.0 fL Final     Platelets   Date Value Ref Range Status   11/15/2021 204 140 - 450 10*3/mm3 Final     Neutrophil %   Date Value Ref Range Status   11/15/2021 82.5 (H) 42.7 - 76.0 % Final     Lymphocyte %   Date Value Ref Range Status   11/15/2021 12.0 (L) 19.6 - 45.3 % Final     Monocyte %   Date Value Ref Range Status   11/15/2021 4.5 (L) 5.0 - 12.0 % Final     Eosinophil %   Date Value Ref Range Status   11/15/2021 0.9 0.3 - 6.2 % Final     Basophil %   Date Value Ref Range Status   11/15/2021 0.1 0.0 - 1.5 % Final     Neutrophils, Absolute   Date Value Ref Range Status   11/15/2021 12.03 (H) 1.70 - 7.00  10*3/mm3 Final     Lymphocytes, Absolute   Date Value Ref Range Status   11/15/2021 1.75 0.70 - 3.10 10*3/mm3 Final     Monocytes, Absolute   Date Value Ref Range Status   11/15/2021 0.66 0.10 - 0.90 10*3/mm3 Final     Eosinophils, Absolute   Date Value Ref Range Status   11/15/2021 0.13 0.00 - 0.40 10*3/mm3 Final     Basophils, Absolute   Date Value Ref Range Status   11/15/2021 0.02 0.00 - 0.20 10*3/mm3 Final     nRBC   Date Value Ref Range Status   01/28/2019 0 0 /100[WBCs] Final       Lab Results   Component Value Date    GLUCOSE 150 (H) 07/06/2021    BUN 15 07/06/2021    CREATININE 1.08 (H) 07/06/2021    EGFRIFNONA 50 (L) 07/06/2021    BCR 13.9 07/06/2021    K 4.3 07/06/2021    CO2 22.9 07/06/2021    CALCIUM 9.5 07/06/2021    ALBUMIN 4.20 07/06/2021    LABIL2 1.0 05/08/2019    AST 17 07/06/2021    ALT 12 07/06/2021         Assessment/Plan      ASSESSMENT:     1. Left breast infiltrating ductal carcinoma ER +100%, LA positive and HER-2/willi was negative: Status  post left lumpectomy with sentinel lymph node biopsy.  Status post adjuvant left breast radiation.  On surveillance protocol  2. Oncotype DX assay with recurrence score of 25.  Chemotherapy not recommended.  Hormone receptor positive and has been on Arimidex initiated in June 2019.  She will continue the same.  3. Obesity with BMI of 40  4. Osteopenia: On Boniva, calcium supplements reviewed her recent bone density  5. ECOG 1    PLANS:      1. She will continue Arimidex  2. Continue Os-Quintin D 600 mg twice a day  3. Bilateral diagnostic mammogram will be due again in February 2022.  We will go ahead and schedule  4. DEXA scan will be due again 4/8/2023.  She will continue Boniva  5. Patient to follow-up with her primary care physician  6. Follow-up with Dr. Devon Fox  7. CMP today  8. Continue Boniva monthly for osteopenia.  Reviewed  9. Follow-up 4 months  10. All questions answered    I have reviewed labs results, imaging, vitals, and medications with  the patient today.   Patient verbalized understanding and is in agreement of the above plan.          I spent 30 total minutes, face-to-face, caring for Christiane today.  90% of this time involved counseling and/or coordination of care as documented within this note.

## 2021-11-15 ENCOUNTER — LAB (OUTPATIENT)
Dept: LAB | Facility: HOSPITAL | Age: 71
End: 2021-11-15

## 2021-11-15 ENCOUNTER — OFFICE VISIT (OUTPATIENT)
Dept: ONCOLOGY | Facility: CLINIC | Age: 71
End: 2021-11-15

## 2021-11-15 VITALS
BODY MASS INDEX: 41.03 KG/M2 | SYSTOLIC BLOOD PRESSURE: 127 MMHG | DIASTOLIC BLOOD PRESSURE: 65 MMHG | TEMPERATURE: 97.1 F | RESPIRATION RATE: 20 BRPM | WEIGHT: 209 LBS | HEART RATE: 103 BPM | HEIGHT: 60 IN

## 2021-11-15 DIAGNOSIS — C50.919 PRIMARY MALIGNANT NEOPLASM OF FEMALE BREAST (HCC): ICD-10-CM

## 2021-11-15 DIAGNOSIS — N64.9 DISORDER OF BREAST, UNSPECIFIED: ICD-10-CM

## 2021-11-15 DIAGNOSIS — C50.919 PRIMARY MALIGNANT NEOPLASM OF FEMALE BREAST (HCC): Primary | ICD-10-CM

## 2021-11-15 LAB
BASOPHILS # BLD AUTO: 0.02 10*3/MM3 (ref 0–0.2)
BASOPHILS NFR BLD AUTO: 0.1 % (ref 0–1.5)
DEPRECATED RDW RBC AUTO: 48.6 FL (ref 37–54)
EOSINOPHIL # BLD AUTO: 0.13 10*3/MM3 (ref 0–0.4)
EOSINOPHIL NFR BLD AUTO: 0.9 % (ref 0.3–6.2)
ERYTHROCYTE [DISTWIDTH] IN BLOOD BY AUTOMATED COUNT: 15.9 % (ref 12.3–15.4)
HCT VFR BLD AUTO: 40.4 % (ref 34–46.6)
HGB BLD-MCNC: 12.5 G/DL (ref 12–15.9)
LYMPHOCYTES # BLD AUTO: 1.75 10*3/MM3 (ref 0.7–3.1)
LYMPHOCYTES NFR BLD AUTO: 12 % (ref 19.6–45.3)
MCH RBC QN AUTO: 26.3 PG (ref 26.6–33)
MCHC RBC AUTO-ENTMCNC: 30.9 G/DL (ref 31.5–35.7)
MCV RBC AUTO: 85.1 FL (ref 79–97)
MONOCYTES # BLD AUTO: 0.66 10*3/MM3 (ref 0.1–0.9)
MONOCYTES NFR BLD AUTO: 4.5 % (ref 5–12)
NEUTROPHILS NFR BLD AUTO: 12.03 10*3/MM3 (ref 1.7–7)
NEUTROPHILS NFR BLD AUTO: 82.5 % (ref 42.7–76)
PLATELET # BLD AUTO: 204 10*3/MM3 (ref 140–450)
PMV BLD AUTO: 11.5 FL (ref 6–12)
RBC # BLD AUTO: 4.75 10*6/MM3 (ref 3.77–5.28)
WBC # BLD AUTO: 14.59 10*3/MM3 (ref 3.4–10.8)

## 2021-11-15 PROCEDURE — 85025 COMPLETE CBC W/AUTO DIFF WBC: CPT

## 2021-11-15 PROCEDURE — 99214 OFFICE O/P EST MOD 30 MIN: CPT | Performed by: INTERNAL MEDICINE

## 2021-11-16 RX ORDER — ACETAZOLAMIDE 250 MG/1
TABLET ORAL
Qty: 90 TABLET | Refills: 0 | Status: SHIPPED | OUTPATIENT
Start: 2021-11-16 | End: 2022-02-14

## 2021-12-13 RX ORDER — ATORVASTATIN CALCIUM 40 MG/1
TABLET, FILM COATED ORAL
Qty: 30 TABLET | Refills: 0 | Status: SHIPPED | OUTPATIENT
Start: 2021-12-13 | End: 2022-01-17

## 2021-12-17 NOTE — ASSESSMENT & PLAN NOTE
We will repeat TSH.  May need adjustment in thyroid medication dosage.   Done in electric scooter          UE [place] on L  - Flexion  - Abduction   - ER      , cued to push to end range of motion and then pause    Seated EOB wand lift overhead        Reaching with L UE  - supine, ~40 deg flexion, ~75 deg flex, ~      Supine with 2 pillows,    Seated EOB scap squeezes      Sitting in scooter - Bicep curls on L arm [place] TB                          Therapeutic Activities (77099)              Ballet barre - partial stand from scooter  - requires use of forearms on bar to stand, then once in partial stand can get forearms off of bar. Trials: 22\", 18\", 12\", 49\"             Sitting in scooter - using wash cloth along bar   -elbow ext and horiz abd   - flexion/ext     Facing bar  // to bar      1010      Stair rail flexion slides   2 10 Slight OP from PT at end range, 2nd set used a washcloth    Seated EOB - pushing wand away with resistance from PT    Pushing wand away and up                    Neuromuscular Re-ed (90876)                                                 Manual Intervention (04638)       PROM L shoulder - all directions with distraction                                              Modalities:     Pt. Education:  -patient educated on diagnosis, prognosis and expectations for rehab  -all patient questions were answered    Home Exercise Program:      Therapeutic Exercise and NMR EXR  [x] (45643) Provided verbal/tactile cueing for activities related to strengthening, flexibility, endurance, ROM for improvements in  [] LE / Lumbar: LE, proximal hip, and core control with self care, mobility, lifting, ambulation.   [x] UE / Cervical: cervical, postural, scapular, scapulothoracic and UE control with self care, reaching, carrying, lifting, house/yardwork, driving, computer work.  [] (53837) Provided verbal/tactile cueing for activities related to improving balance, coordination, kinesthetic sense, posture, motor skill, proprioception to assist with   [] LE / lumbar: LE, proximal hip, and core control in self care, mobility, lifting, ambulation and eccentric single leg control. [] UE / cervical: cervical, scapular, scapulothoracic and UE control with self care, reaching, carrying, lifting, house/yardwork, driving, computer work.   [] (14837) Therapist is in constant attendance of 2 or more patients providing skilled therapy interventions, but not providing any significant amount of measurable one-on-one time to either patient, for improvements in  [] LE / lumbar: LE, proximal hip, and core control in self care, mobility, lifting, ambulation and eccentric single leg control. [] UE / cervical: cervical, scapular, scapulothoracic and UE control with self care, reaching, carrying, lifting, house/yardwork, driving, computer work. NMR and Therapeutic Activities:    [] (62237 or 34529) Provided verbal/tactile cueing for activities related to improving balance, coordination, kinesthetic sense, posture, motor skill, proprioception and motor activation to allow for proper function of   [] LE: / Lumbar core, proximal hip and LE with self care and ADLs  [] UE / Cervical: cervical, postural, scapular, scapulothoracic and UE control with self care, carrying, lifting, driving, computer work.   [] (71614) Gait Re-education- Provided training and instruction to the patient for proper LE, core and proximal hip recruitment and positioning and eccentric body weight control with ambulation re-education including up and down stairs     Home Management Training / Self Care:  [] (51074) Provided self-care/home management training related to activities of daily living and compensatory training, and/or use of adaptive equipment for improvement with: ADLs and compensatory training, meal preparation, safety procedures and instruction in use of adaptive equipment, including bathing, grooming, dressing, personal hygiene, basic household cleaning and chores.      Home Exercise Program:    [x] (32868) Reviewed/Progressed HEP activities related to strengthening, flexibility, endurance, ROM of   [] LE / Lumbar: core, proximal hip and LE for functional self-care, mobility, lifting and ambulation/stair navigation   [] UE / Cervical: cervical, postural, scapular, scapulothoracic and UE control with self care, reaching, carrying, lifting, house/yardwork, driving, computer work  [] (05809)Reviewed/Progressed HEP activities related to improving balance, coordination, kinesthetic sense, posture, motor skill, proprioception of   [] LE: core, proximal hip and LE for self care, mobility, lifting, and ambulation/stair navigation    [] UE / Cervical: cervical, postural,  scapular, scapulothoracic and UE control with self care, reaching, carrying, lifting, house/yardwork, driving, computer work    Manual Treatments:  PROM / STM / Oscillations-Mobs:  G-I, II, III, IV (PA's, Inf., Post.)  [] (34546) Provided manual therapy to mobilize LE, proximal hip and/or LS spine soft tissue/joints for the purpose of modulating pain, promoting relaxation,  increasing ROM, reducing/eliminating soft tissue swelling/inflammation/restriction, improving soft tissue extensibility and allowing for proper ROM for normal function with   [] LE / lumbar: self care, mobility, lifting and ambulation. [] UE / Cervical: self care, reaching, carrying, lifting, house/yardwork, driving, computer work. Modalities:  [] (08252) Vasopneumatic compression: Utilized vasopneumatic compression to decrease edema / swelling for the purpose of improving mobility and quad tone / recruitment which will allow for increased overall function including but not limited to self-care, transfers, ambulation, and ascending / descending stairs. Charges:  Timed Code Treatment Minutes:     Total Treatment Minutes:      [] EVAL - LOW (53492)   [] EVAL - MOD (03528)  [] EVAL - HIGH (78436)  [] RE-EVAL (23539)  [x] SZ(06669) x 1      [] Ionto  [] NMR (12792) x       [] Vaso  [] Manual (35527) x [] Ultrasound  [x] TA x 2        [] [facility] Traction (07343)  [] Aquatic Therapy x     [] ES (un) (02244):   [] Home Management Training x  [] ES(attended) (40651)   [] Dry Needling 1-2 muscles (39403):  [] Dry Needling 3+ muscles (863961)  [] Group:      [] Other:     GOALS:   Patient stated goal: to get better use of left arm and leg   []? Progressing: []? Met: []? Not Met: []? Adjusted     Therapist goals for Patient:   Short Term Goals: To be achieved in: 2 weeks  1. Independent in HEP and progression per patient tolerance, in order to prevent re-injury. []? Progressing: []? Met: []? Not Met: []? Adjusted  2. Patient will have a decrease in pain to facilitate improvement in movement, function, and ADLs as indicated by Functional Deficits. []? Progressing: []? Met: []? Not Met: []? Adjusted     Long Term Goals: To be achieved in: 5 weeks  1. Disability index score of 45% or less for the Quick DASH to assist with reaching prior level of function. []? Progressing: []? Met: []? Not Met: []? Adjusted  2. Patient will demonstrate increased L shoulder abduction and flexion AROM to at least 100 deg to allow for proper joint functioning as indicated by patients Functional Deficits. []? Progressing: []? Met: []? Not Met: []? Adjusted  3. Patient will demonstrate an increase in Strength to at least 4-/5 for gross L shoulder strength to allow for proper functional mobility as indicated by patients Functional Deficits. []? Progressing: []? Met: []? Not Met: []? Adjusted  4. Patient will return to functional activities including ability to  light weight items and move them with L UE while completing bathing and other ADLs without increased symptoms or restriction. []? Progressing: []? Met: []? Not Met: []? Adjusted  5. Patient will demonstrate ability to use L UE during transfers, while being able to pull and tolerate weight bearing through the L UE.   []? Progressing: []? Met: []? Not Met: []?  Adjusted Overall Progression Towards Functional goals/ Treatment Progress Update:  [] Patient is progressing as expected towards functional goals listed. [] Progression is slowed due to complexities/Impairments listed. [] Progression has been slowed due to co-morbidities. [x] Plan just implemented, too soon to assess goals progression <30days   [] Goals require adjustment due to lack of progress  [] Patient is not progressing as expected and requires additional follow up with physician  [] Other    Persisting Functional Limitations/Impairments:  []Sleeping []Sitting               []Standing []Transfers        [x]Walking []Kneeling               [x]Stairs [x]Squatting / bending   [x]ADLs [x]Reaching  []Lifting  []Housework  []Driving []Job related tasks  []Sports/Recreation []Other:        ASSESSMENT:  Completed partial standing at [facility] again this date with SBA/CGA. Pt seemed disappointed in her performance this date and stated she has not been doing as much of this at home. Pt reported a strain in the L bicep with the slides along the barre, so pt was instructed in ways to stretch the bicep. Next session will resume supine exercises; it is too difficult to perform both partial standing and supine exercising in same session because the partial stands fatigue the patient so much. Will continue with strengthening and ROM and hopefully after the start of the new year will be able to include some aquatics sessions. Treatment/Activity Tolerance:  [] Patient able to complete tx  [] Patient limited by fatigue  [] Patient limited by pain  [x] Patient limited by other medical complications  [] Other:     Prognosis: [] Good [x] Fair  [] Poor    Patient Requires Follow-up: [x] Yes  [] No    Plan for next treatment session:    PLAN: See emily. PT 3x / week for 5 weeks.    [x] Continue per plan of care [] Alter current plan (see comments)  [] Plan of care initiated [] Hold pending MD visit [] Discharge    Electronically signed by: Can Amato PT  DPT    Note: If patient does not return for scheduled/ recommended follow up visits, this note will serve as a discharge from care along with most recent update on progress.

## 2021-12-20 RX ORDER — MONTELUKAST SODIUM 10 MG/1
TABLET ORAL
Qty: 90 TABLET | Refills: 0 | Status: SHIPPED | OUTPATIENT
Start: 2021-12-20 | End: 2022-03-21

## 2021-12-21 RX ORDER — RAMIPRIL 1.25 MG/1
CAPSULE ORAL
Qty: 90 CAPSULE | Refills: 4 | Status: SHIPPED | OUTPATIENT
Start: 2021-12-21 | End: 2022-11-07

## 2021-12-27 RX ORDER — FUROSEMIDE 40 MG/1
TABLET ORAL
Qty: 30 TABLET | Refills: 0 | Status: SHIPPED | OUTPATIENT
Start: 2021-12-27 | End: 2022-01-31

## 2022-01-10 ENCOUNTER — LAB (OUTPATIENT)
Dept: LAB | Facility: HOSPITAL | Age: 72
End: 2022-01-10

## 2022-01-10 DIAGNOSIS — E03.9 ACQUIRED HYPOTHYROIDISM: ICD-10-CM

## 2022-01-10 DIAGNOSIS — E11.65 TYPE 2 DIABETES MELLITUS WITH HYPERGLYCEMIA, WITH LONG-TERM CURRENT USE OF INSULIN: ICD-10-CM

## 2022-01-10 DIAGNOSIS — C50.919 PRIMARY MALIGNANT NEOPLASM OF FEMALE BREAST: ICD-10-CM

## 2022-01-10 DIAGNOSIS — N64.9 DISORDER OF BREAST, UNSPECIFIED: ICD-10-CM

## 2022-01-10 DIAGNOSIS — Z79.4 TYPE 2 DIABETES MELLITUS WITH HYPERGLYCEMIA, WITH LONG-TERM CURRENT USE OF INSULIN: ICD-10-CM

## 2022-01-10 LAB
ALBUMIN SERPL-MCNC: 4.2 G/DL (ref 3.5–5.2)
ALBUMIN UR-MCNC: 3.9 MG/DL
ALBUMIN/GLOB SERPL: 1.6 G/DL
ALP SERPL-CCNC: 60 U/L (ref 39–117)
ALT SERPL W P-5'-P-CCNC: 15 U/L (ref 1–33)
ANION GAP SERPL CALCULATED.3IONS-SCNC: 10.5 MMOL/L (ref 5–15)
AST SERPL-CCNC: 16 U/L (ref 1–32)
BILIRUB SERPL-MCNC: 0.5 MG/DL (ref 0–1.2)
BUN SERPL-MCNC: 21 MG/DL (ref 8–23)
BUN/CREAT SERPL: 20.2 (ref 7–25)
CALCIUM SPEC-SCNC: 10.1 MG/DL (ref 8.6–10.5)
CHLORIDE SERPL-SCNC: 106 MMOL/L (ref 98–107)
CHOLEST SERPL-MCNC: 99 MG/DL (ref 0–200)
CO2 SERPL-SCNC: 23.5 MMOL/L (ref 22–29)
CREAT SERPL-MCNC: 1.04 MG/DL (ref 0.57–1)
CREAT UR-MCNC: 127.9 MG/DL
GFR SERPL CREATININE-BSD FRML MDRD: 53 ML/MIN/1.73
GLOBULIN UR ELPH-MCNC: 2.7 GM/DL
GLUCOSE SERPL-MCNC: 105 MG/DL (ref 65–99)
HBA1C MFR BLD: 6 % (ref 3.5–5.6)
HDLC SERPL-MCNC: 44 MG/DL (ref 40–60)
LDLC SERPL CALC-MCNC: 40 MG/DL (ref 0–100)
LDLC/HDLC SERPL: 0.92 {RATIO}
MICROALBUMIN/CREAT UR: 30.5 MG/G
POTASSIUM SERPL-SCNC: 4.1 MMOL/L (ref 3.5–5.2)
PROT SERPL-MCNC: 6.9 G/DL (ref 6–8.5)
SODIUM SERPL-SCNC: 140 MMOL/L (ref 136–145)
T4 FREE SERPL-MCNC: 1.22 NG/DL (ref 0.93–1.7)
TRIGL SERPL-MCNC: 73 MG/DL (ref 0–150)
TSH SERPL DL<=0.05 MIU/L-ACNC: 1.22 UIU/ML (ref 0.27–4.2)
VLDLC SERPL-MCNC: 15 MG/DL (ref 5–40)

## 2022-01-10 PROCEDURE — 82570 ASSAY OF URINE CREATININE: CPT

## 2022-01-10 PROCEDURE — 83036 HEMOGLOBIN GLYCOSYLATED A1C: CPT

## 2022-01-10 PROCEDURE — 84439 ASSAY OF FREE THYROXINE: CPT

## 2022-01-10 PROCEDURE — 82043 UR ALBUMIN QUANTITATIVE: CPT

## 2022-01-10 PROCEDURE — 80053 COMPREHEN METABOLIC PANEL: CPT

## 2022-01-10 PROCEDURE — 36415 COLL VENOUS BLD VENIPUNCTURE: CPT

## 2022-01-10 PROCEDURE — 80061 LIPID PANEL: CPT

## 2022-01-10 PROCEDURE — 84443 ASSAY THYROID STIM HORMONE: CPT

## 2022-01-13 ENCOUNTER — OFFICE VISIT (OUTPATIENT)
Dept: FAMILY MEDICINE CLINIC | Facility: CLINIC | Age: 72
End: 2022-01-13

## 2022-01-13 VITALS
TEMPERATURE: 97.5 F | HEART RATE: 99 BPM | BODY MASS INDEX: 40.84 KG/M2 | WEIGHT: 208 LBS | DIASTOLIC BLOOD PRESSURE: 73 MMHG | HEIGHT: 60 IN | SYSTOLIC BLOOD PRESSURE: 131 MMHG | OXYGEN SATURATION: 95 % | RESPIRATION RATE: 18 BRPM

## 2022-01-13 DIAGNOSIS — E78.2 MIXED HYPERLIPIDEMIA: ICD-10-CM

## 2022-01-13 DIAGNOSIS — I27.0 PRIMARY PULMONARY HYPERTENSION: ICD-10-CM

## 2022-01-13 DIAGNOSIS — M81.6 LOCALIZED OSTEOPOROSIS WITHOUT CURRENT PATHOLOGICAL FRACTURE: ICD-10-CM

## 2022-01-13 DIAGNOSIS — R60.9 PERIPHERAL EDEMA: ICD-10-CM

## 2022-01-13 DIAGNOSIS — Z79.4 TYPE 2 DIABETES MELLITUS WITH HYPERGLYCEMIA, WITH LONG-TERM CURRENT USE OF INSULIN: ICD-10-CM

## 2022-01-13 DIAGNOSIS — E66.01 MORBIDLY OBESE: ICD-10-CM

## 2022-01-13 DIAGNOSIS — E03.9 ACQUIRED HYPOTHYROIDISM: ICD-10-CM

## 2022-01-13 DIAGNOSIS — J30.1 SEASONAL ALLERGIC RHINITIS DUE TO POLLEN: ICD-10-CM

## 2022-01-13 DIAGNOSIS — D05.12 DUCTAL CARCINOMA IN SITU (DCIS) OF LEFT BREAST: ICD-10-CM

## 2022-01-13 DIAGNOSIS — K21.9 GASTROESOPHAGEAL REFLUX DISEASE WITHOUT ESOPHAGITIS: ICD-10-CM

## 2022-01-13 DIAGNOSIS — E11.21 DIABETIC NEPHROPATHY ASSOCIATED WITH TYPE 2 DIABETES MELLITUS: ICD-10-CM

## 2022-01-13 DIAGNOSIS — E11.65 TYPE 2 DIABETES MELLITUS WITH HYPERGLYCEMIA, WITH LONG-TERM CURRENT USE OF INSULIN: ICD-10-CM

## 2022-01-13 DIAGNOSIS — J45.20 MILD INTERMITTENT ASTHMA WITHOUT COMPLICATION: ICD-10-CM

## 2022-01-13 DIAGNOSIS — Z00.00 MEDICARE ANNUAL WELLNESS VISIT, SUBSEQUENT: Primary | ICD-10-CM

## 2022-01-13 DIAGNOSIS — I10 ESSENTIAL HYPERTENSION: ICD-10-CM

## 2022-01-13 PROCEDURE — 1159F MED LIST DOCD IN RCRD: CPT | Performed by: FAMILY MEDICINE

## 2022-01-13 PROCEDURE — 99214 OFFICE O/P EST MOD 30 MIN: CPT | Performed by: FAMILY MEDICINE

## 2022-01-13 PROCEDURE — 1170F FXNL STATUS ASSESSED: CPT | Performed by: FAMILY MEDICINE

## 2022-01-13 PROCEDURE — G0439 PPPS, SUBSEQ VISIT: HCPCS | Performed by: FAMILY MEDICINE

## 2022-01-13 NOTE — ASSESSMENT & PLAN NOTE
Significantly improved.  Last DEXA scan showed osteopenia.  Continue weightbearing exercises and repeat DEXA scan in 2 years.

## 2022-01-13 NOTE — PROGRESS NOTES
"The ABCs of the Annual Wellness Visit  Subsequent Medicare Wellness Visit    Chief Complaint   Patient presents with   • Medicare Wellness-subsequent      Subjective    History of Present Illness:  Christiane Nazario is a 71 y.o. female who presents for a Subsequent Medicare Wellness Visit.    The following portions of the patient's history were reviewed and   updated as appropriate: allergies, past family history, past medical history, past social history, past surgical history and problem list.    Compared to one year ago, the patient feels her physical   health is the same.    Compared to one year ago, the patient feels her mental   health is the same.    Recent Hospitalizations:  She was not admitted to the hospital during the last year.       Current Medical Providers:  Patient Care Team:  Lyell, Reggie Duane, MD as PCP - General (Family Medicine)  Kosta Frank MD as Consulting Physician (Endocrinology)  Guillermo Landeros MD as Consulting Physician (Pulmonary Disease)    Outpatient Medications Prior to Visit   Medication Sig Dispense Refill   • acetaZOLAMIDE (DIAMOX) 250 MG tablet TAKE ONE TABLET BY MOUTH EVERY MORNING 90 tablet 0   • anastrozole (ARIMIDEX) 1 MG tablet TAKE 1 TABLET BY MOUTH DAILY. 90 tablet 0   • anastrozole (ARIMIDEX) 1 MG tablet Take 1 tablet by mouth Daily. 90 tablet 1   • aspirin (ADULT ASPIRIN EC LOW STRENGTH) 81 MG EC tablet ADULT ASPIRIN EC LOW STRENGTH 81 MG ORAL TABLET DELAYED RELEASE     • atorvastatin (LIPITOR) 40 MG tablet TAKE ONE TABLET BY MOUTH EVERY NIGHT 30 tablet 0   • BD Insulin Syringe U/F 31G X 5/16\" 1 ML misc USE TWICE A DAY WITH INSULIN INJECTIONS 200 each 4   • CALCIUM ACETATE IJ Inject  as directed.     • D3 Super Strength 50 MCG (2000 UT) capsule TAKE 1 CAPSULE BY MOUTH EVERY DAY 30 capsule 11   • furosemide (LASIX) 40 MG tablet TAKE ONE TABLET BY MOUTH EVERY DAY 30 tablet 0   • glucose blood (ONE TOUCH ULTRA TEST) test strip ONETOUCH ULTRA BLUE STRP     • ibandronate " (BONIVA) 150 MG tablet TAKE ONE TABLET BY MOUTH EVERY MONTH 3 tablet 1   • insulin NPH-insulin regular (HumuLIN 70/30) (70-30) 100 UNIT/ML injection INJECT 40 UNITS SUBCUTANEOUSLY EVERY MORNING AND INJECT 30 UNITS EVERY EVENING AT DINNER 70 mL 1   • Kombiglyze XR 2.5-1000 MG tablet sustained-release 24 hour TAKE ONE TABLET BY MOUTH TWICE DAILY 180 tablet 3   • levothyroxine (SYNTHROID, LEVOTHROID) 88 MCG tablet TAKE ONE TABLET BY MOUTH EVERY DAY 30 tablet 5   • loratadine (CLARITIN) 10 MG tablet TAKE ONE TABLET BY MOUTH EVERY DAY 90 tablet 1   • MgO 400 (241.3 Mg) MG tablet tablet TAKE ONE TABLET BY MOUTH TWICE DAILY 60 tablet 11   • montelukast (SINGULAIR) 10 MG tablet TAKE ONE TABLET BY MOUTH EVERY NIGHT AT BEDTIME 90 tablet 0   • pantoprazole (PROTONIX) 40 MG EC tablet TAKE ONE TABLET BY MOUTH EVERY DAY 90 tablet 0   • potassium chloride (K-DUR,KLOR-CON) 20 MEQ CR tablet TAKE 1 TABLET BY MOUTH TWICE DAILY 180 tablet 0   • ramipril (ALTACE) 1.25 MG capsule TAKE 1 CAPSULE BY MOUTH EVERY EVENING WITH DINNER 90 capsule 4     No facility-administered medications prior to visit.       No opioid medication identified on active medication list. I have reviewed chart for other potential  high risk medication/s and harmful drug interactions in the elderly.          Aspirin is on active medication list. Aspirin use is not indicated based on review of current medical condition/s. Risk of harm outweighs potential benefits. Patient instructed to discontinue this medication.  .      Patient Active Problem List   Diagnosis   • Allergic rhinitis, seasonal   • Asthma   • Diabetic nephropathy associated with type 2 diabetes mellitus (HCC)   • Ductal carcinoma in situ (DCIS) of breast   • Medicare annual wellness visit, subsequent   • Gastroesophageal reflux disease   • Mixed hyperlipidemia   • Essential hypertension   • Acquired hypothyroidism   • Leukocytosis   • Morbidly obese (HCC)   • Osteoporosis   • Other long term (current)  "drug therapy   • Peripheral edema   • Primary malignant neoplasm of female breast (HCC)   • Primary pulmonary hypertension (HCC)   • Type 2 diabetes mellitus with hyperglycemia, with long-term current use of insulin (HCC)   • Hepatitis     Advance Care Planning  Advance Directive is not on file.  ACP discussion was held with the patient during this visit. Patient does not have an advance directive, information provided.          Objective    Vitals:    01/13/22 1249   BP: 131/73   BP Location: Left arm   Patient Position: Sitting   Cuff Size: Large Adult   Pulse: 99   Resp: 18   Temp: 97.5 °F (36.4 °C)   TempSrc: Infrared   SpO2: 95%   Weight: 94.3 kg (208 lb)   Height: 152.4 cm (60\")     BMI Readings from Last 1 Encounters:   01/13/22 40.62 kg/m²   BMI is above normal parameters. Recommendations include: exercise counseling and nutrition counseling    Does the patient have evidence of cognitive impairment? No    Physical Exam  Lab Results   Component Value Date    TRIG 73 01/10/2022    HDL 44 01/10/2022    LDL 40 01/10/2022    VLDL 15 01/10/2022    HGBA1C 6.0 (H) 01/10/2022            HEALTH RISK ASSESSMENT    Smoking Status:  Social History     Tobacco Use   Smoking Status Never Smoker   Smokeless Tobacco Never Used     Alcohol Consumption:  Social History     Substance and Sexual Activity   Alcohol Use No     Fall Risk Screen:    TONYADI Fall Risk Assessment was completed, and patient is at LOW risk for falls.Assessment completed on:1/13/2022    Depression Screening:  PHQ-2/PHQ-9 Depression Screening 1/13/2022   Little interest or pleasure in doing things 0   Feeling down, depressed, or hopeless 0   Trouble falling or staying asleep, or sleeping too much 0   Feeling tired or having little energy 0   Poor appetite or overeating 0   Feeling bad about yourself - or that you are a failure or have let yourself or your family down 0   Trouble concentrating on things, such as reading the newspaper or watching television " 0   Moving or speaking so slowly that other people could have noticed. Or the opposite - being so fidgety or restless that you have been moving around a lot more than usual 0   Thoughts that you would be better off dead, or of hurting yourself in some way 0   Total Score 0   If you checked off any problems, how difficult have these problems made it for you to do your work, take care of things at home, or get along with other people? Not difficult at all       Health Habits and Functional and Cognitive Screening:  Functional & Cognitive Status 1/13/2022   Do you have difficulty preparing food and eating? No   Do you have difficulty bathing yourself, getting dressed or grooming yourself? No   Do you have difficulty using the toilet? No   Do you have difficulty moving around from place to place? No   Do you have trouble with steps or getting out of a bed or a chair? No   Current Diet Limited Junk Food   Dental Exam Up to date        Dental Exam Comment -   Eye Exam Up to date   Exercise (times per week) 0 times per week   Current Exercises Include No Regular Exercise   Current Exercise Activities Include -   Do you need help using the phone?  No   Are you deaf or do you have serious difficulty hearing?  No   Do you need help with transportation? No   Do you need help shopping? No   Do you need help preparing meals?  No   Do you need help with housework?  No   Do you need help with laundry? No   Do you need help taking your medications? No   Do you need help managing money? No   Do you ever drive or ride in a car without wearing a seat belt? No   Have you felt unusual stress, anger or loneliness in the last month? No   Who do you live with? Spouse   If you need help, do you have trouble finding someone available to you? No   Have you been bothered in the last four weeks by sexual problems? No   Do you have difficulty concentrating, remembering or making decisions? No       Age-appropriate Screening Schedule:  Refer to  the list below for future screening recommendations based on patient's age, sex and/or medical conditions. Orders for these recommended tests are listed in the plan section. The patient has been provided with a written plan.    Health Maintenance   Topic Date Due   • TDAP/TD VACCINES (1 - Tdap) Never done   • ZOSTER VACCINE (1 of 2) Never done   • DIABETIC FOOT EXAM  Never done   • INFLUENZA VACCINE  08/01/2021   • DIABETIC EYE EXAM  12/01/2021   • MAMMOGRAM  02/25/2022   • HEMOGLOBIN A1C  07/10/2022   • LIPID PANEL  01/10/2023   • URINE MICROALBUMIN  01/10/2023   • DXA SCAN  04/08/2023              Assessment/Plan   CMS Preventative Services Quick Reference  Risk Factors Identified During Encounter  Depression/Dysphoria  Fall Risk-High or Moderate  Inactivity/Sedentary  The above risks/problems have been discussed with the patient.  Follow up actions/plans if indicated are seen below in the Assessment/Plan Section.  Pertinent information has been shared with the patient in the After Visit Summary.    Diagnoses and all orders for this visit:    1. Medicare annual wellness visit, subsequent (Primary)  Assessment & Plan:  Updated annual wellness visit checklist.  Immunizations discussed.  Screening up-to-date.  Recommend yearly dental and eye exams. Also discussed monitoring of blood pressure and lipids.      2. Seasonal allergic rhinitis due to pollen  Assessment & Plan:  Currently inactive with mold counts.  Continue montelukast, loratadine, and nasal steroid.      3. Essential hypertension  Assessment & Plan:  Stable.  Continue diuretic.       4. Mixed hyperlipidemia  Assessment & Plan:  Blood work reviewed.  Continue atorvastatin 40 mg daily.      5. Acquired hypothyroidism  Assessment & Plan:  TSH from this week reviewed.  Looks good.  Continue endocrinology follow-up.      6. Morbidly obese (HCC)  Assessment & Plan:  Patient's (Body mass index is 40.62 kg/m².) indicates that they are morbidly obese (BMI > 40 or  > 35 with obesity - related health condition) with health conditions that include hypertension and diabetes mellitus . Weight is unchanged. BMI is is above average; BMI management plan is completed. We discussed portion control and increasing exercise.       7. Type 2 diabetes mellitus with hyperglycemia, with long-term current use of insulin ([facility])  Assessment & Plan:  A1c is 6%.  Continue endocrinology follow-up.  Recent eye exam shows improvement of retinopathy.  Daily foot exams and yearly eye exams continue.      8. Gastroesophageal reflux disease without esophagitis  Assessment & Plan:  Unable to wean off proton pump inhibitor.  Continue pantoprazole.      9. Diabetic nephropathy associated with type 2 diabetes mellitus ([facility])    10. Ductal carcinoma in situ (DCIS) of left breast  Assessment & Plan:  Managed by oncology.  Follow-up appointment in 2 weeks.      11. Localized osteoporosis without current pathological fracture  Assessment & Plan:  Significantly improved.  Last DEXA scan showed osteopenia.  Continue weightbearing exercises and repeat DEXA scan in 2 years.      12. Mild intermittent asthma without complication  Assessment & Plan:  Doing well without need of inhalers.      13. Primary pulmonary hypertension ([facility])    14. Peripheral edema  Assessment & Plan:  Diuretics and potassium as needed.        Follow Up:   No follow-ups on file.     An After Visit Summary and PPPS were made available to the patient.        \plain

## 2022-01-13 NOTE — ASSESSMENT & PLAN NOTE
Patient's (Body mass index is 40.62 kg/m².) indicates that they are morbidly obese (BMI > 40 or > 35 with obesity - related health condition) with health conditions that include hypertension and diabetes mellitus . Weight is unchanged. BMI is is above average; BMI management plan is completed. We discussed portion control and increasing exercise.

## 2022-01-13 NOTE — ASSESSMENT & PLAN NOTE
A1c is 6%.  Continue endocrinology follow-up.  Recent eye exam shows improvement of retinopathy.  Daily foot exams and yearly eye exams continue.

## 2022-01-17 ENCOUNTER — OFFICE VISIT (OUTPATIENT)
Dept: ENDOCRINOLOGY | Facility: CLINIC | Age: 72
End: 2022-01-17

## 2022-01-17 VITALS
DIASTOLIC BLOOD PRESSURE: 75 MMHG | HEART RATE: 99 BPM | TEMPERATURE: 96.4 F | SYSTOLIC BLOOD PRESSURE: 130 MMHG | OXYGEN SATURATION: 93 % | BODY MASS INDEX: 40.44 KG/M2 | WEIGHT: 206 LBS | HEIGHT: 60 IN

## 2022-01-17 DIAGNOSIS — I10 ESSENTIAL HYPERTENSION: ICD-10-CM

## 2022-01-17 DIAGNOSIS — E11.649 TYPE 2 DIABETES MELLITUS WITH HYPOGLYCEMIA WITHOUT COMA, WITH LONG-TERM CURRENT USE OF INSULIN: Primary | ICD-10-CM

## 2022-01-17 DIAGNOSIS — E78.2 MIXED HYPERLIPIDEMIA: ICD-10-CM

## 2022-01-17 DIAGNOSIS — Z79.4 TYPE 2 DIABETES MELLITUS WITH HYPOGLYCEMIA WITHOUT COMA, WITH LONG-TERM CURRENT USE OF INSULIN: Primary | ICD-10-CM

## 2022-01-17 DIAGNOSIS — E03.9 ACQUIRED HYPOTHYROIDISM: ICD-10-CM

## 2022-01-17 DIAGNOSIS — E11.21 DIABETIC NEPHROPATHY ASSOCIATED WITH TYPE 2 DIABETES MELLITUS: ICD-10-CM

## 2022-01-17 LAB — GLUCOSE BLDC GLUCOMTR-MCNC: 179 MG/DL (ref 70–105)

## 2022-01-17 PROCEDURE — 82962 GLUCOSE BLOOD TEST: CPT | Performed by: INTERNAL MEDICINE

## 2022-01-17 PROCEDURE — 99214 OFFICE O/P EST MOD 30 MIN: CPT | Performed by: INTERNAL MEDICINE

## 2022-01-17 RX ORDER — INSULIN NPH HUM/REG INSULIN HM 70-30/ML
VIAL (ML) SUBCUTANEOUS
Qty: 70 ML | Refills: 1 | Status: SHIPPED | OUTPATIENT
Start: 2022-01-17 | End: 2022-11-07

## 2022-01-17 RX ORDER — ATORVASTATIN CALCIUM 40 MG/1
TABLET, FILM COATED ORAL
Qty: 30 TABLET | Refills: 0 | Status: SHIPPED | OUTPATIENT
Start: 2022-01-17 | End: 2022-02-14

## 2022-01-17 NOTE — PROGRESS NOTES
Endocrine Progress Note Outpatient     Patient Care Team:  Lyell, Reggie Duane, MD as PCP - General (Family Medicine)  Kosta Frank MD as Consulting Physician (Endocrinology)  Guillermo Landeros MD as Consulting Physician (Pulmonary Disease)    Chief Complaint: Follow-up type 2 diabetes          HPI: This is a 71-year-old female with history of type 2 diabetes, hypertension, hyperlipidemia, hypothyroidism and diabetic nephropathy is here for follow-up.  For type 2 diabetes: Currently on Kombiglyze Exar 2.5/1000, 1 tablet twice a day and 70/30 insulin, 40 units before breakfast and 30 before supper.  She did bring in blood sugar records for review and she is having some low blood sugars including 61, 63 and so forth.  She has not required any assistance or hospitalization since last seen.    Hypertension: Well-controlled    Hyperlipidemia: Currently on atorvastatin    Hypothyroidism: Currently on levothyroxine supplementation    Diabetic nephropathy: Currently on ramipril.    Past Medical History:   Diagnosis Date   • Cataract    • DM type 2 (diabetes mellitus, type 2) ([facility])    • Ductal carcinoma in situ (DCIS) of left breast    • Fracture, femur ([facility]) 2014    fracture of left femur   • Hyperlipidemia    • Hypertension    • Hypothyroidism    • Osteopenia    • Pulmonary hypertension ([facility])        Social History     Socioeconomic History   • Marital status:    Tobacco Use   • Smoking status: Never Smoker   • Smokeless tobacco: Never Used   Vaping Use   • Vaping Use: Never used   Substance and Sexual Activity   • Alcohol use: No   • Drug use: No   • Sexual activity: Defer       Family History   Problem Relation Age of Onset   • Ovarian cancer Sister    • Cancer Other        Allergies   Allergen Reactions   • Calcium-Containing Compounds Nausea Only       ROS:   Constitutional:  Denies fatigue, tiredness.    Eyes:  Denies change in visual acuity   HENT:  Denies nasal congestion or sore throat   Respiratory: denies  cough, shortness of breath.   Cardiovascular:  denies chest pain, edema   GI:  Denies abdominal pain, nausea, vomiting.   Musculoskeletal:  Denies back pain or joint pain   Integument:  Denies dry skin and rash   Neurologic:  Denies headache, focal weakness or sensory changes   Endocrine:  Denies polyuria or polydipsia   Psychiatric:  Denies depression or anxiety      Vitals:    01/17/22 1344   BP: 130/75   Pulse: 99   Temp: 96.4 °F (35.8 °C)   SpO2: 93%     Body mass index is 40.23 kg/m².     Physical Exam:  GEN: NAD, conversant  EYES: EOMI, PERRL, no conjunctival erythema  NECK: no thyromegaly, full ROM   CV: RRR, no murmurs/rubs/gallops, no peripheral edema  LUNG: CTAB, no wheezes/rales/ronchi  SKIN: no rashes, no acanthosis  MSK: no deformities, full ROM of all extremities  NEURO: no tremors, DTR normal  PSYCH: AOX3, appropriate mood, affect normal      Results Review:     I reviewed the patient's new clinical results.    Lab Results   Component Value Date    HGBA1C 6.0 (H) 01/10/2022    HGBA1C 6.4 (H) 07/06/2021    HGBA1C 6.1 (H) 01/07/2021      Lab Results   Component Value Date    GLUCOSE 105 (H) 01/10/2022    BUN 21 01/10/2022    CREATININE 1.04 (H) 01/10/2022    EGFRIFNONA 53 (L) 01/10/2022    BCR 20.2 01/10/2022    K 4.1 01/10/2022    CO2 23.5 01/10/2022    CALCIUM 10.1 01/10/2022    ALBUMIN 4.20 01/10/2022    LABIL2 1.0 05/08/2019    AST 16 01/10/2022    ALT 15 01/10/2022    CHOL 99 01/10/2022    TRIG 73 01/10/2022    LDL 40 01/10/2022    HDL 44 01/10/2022     Lab Results   Component Value Date    TSH 1.220 01/10/2022    FREET4 1.22 01/10/2022         Medication Review: Reviewed.       Current Outpatient Medications:   •  acetaZOLAMIDE (DIAMOX) 250 MG tablet, TAKE ONE TABLET BY MOUTH EVERY MORNING, Disp: 90 tablet, Rfl: 0  •  anastrozole (ARIMIDEX) 1 MG tablet, TAKE 1 TABLET BY MOUTH DAILY., Disp: 90 tablet, Rfl: 0  •  anastrozole (ARIMIDEX) 1 MG tablet, Take 1 tablet by mouth Daily., Disp: 90 tablet,  "Rfl: 1  •  aspirin (ADULT ASPIRIN EC LOW STRENGTH) 81 MG EC tablet, ADULT ASPIRIN EC LOW STRENGTH 81 MG ORAL TABLET DELAYED RELEASE, Disp: , Rfl:   •  atorvastatin (LIPITOR) 40 MG tablet, TAKE ONE TABLET BY MOUTH EVERY NIGHT, Disp: 30 tablet, Rfl: 0  •  BD Insulin Syringe U/F 31G X 5/16\" 1 ML misc, USE TWICE A DAY WITH INSULIN INJECTIONS, Disp: 200 each, Rfl: 4  •  CALCIUM ACETATE IJ, Inject  as directed., Disp: , Rfl:   •  D3 Super Strength 50 MCG (2000 UT) capsule, TAKE 1 CAPSULE BY MOUTH EVERY DAY, Disp: 30 capsule, Rfl: 11  •  furosemide (LASIX) 40 MG tablet, TAKE ONE TABLET BY MOUTH EVERY DAY, Disp: 30 tablet, Rfl: 0  •  glucose blood (ONE TOUCH ULTRA TEST) test strip, ONETOUCH ULTRA BLUE STRP, Disp: , Rfl:   •  ibandronate (BONIVA) 150 MG tablet, TAKE ONE TABLET BY MOUTH EVERY MONTH, Disp: 3 tablet, Rfl: 1  •  insulin NPH-insulin regular (HumuLIN 70/30) (70-30) 100 UNIT/ML injection, INJECT 40 UNITS SUBCUTANEOUSLY EVERY MORNING AND INJECT 30 UNITS EVERY EVENING AT DINNER, Disp: 70 mL, Rfl: 1  •  Kombiglyze XR 2.5-1000 MG tablet sustained-release 24 hour, TAKE ONE TABLET BY MOUTH TWICE DAILY, Disp: 180 tablet, Rfl: 3  •  levothyroxine (SYNTHROID, LEVOTHROID) 88 MCG tablet, TAKE ONE TABLET BY MOUTH EVERY DAY, Disp: 30 tablet, Rfl: 5  •  loratadine (CLARITIN) 10 MG tablet, TAKE ONE TABLET BY MOUTH EVERY DAY, Disp: 90 tablet, Rfl: 1  •  MgO 400 (241.3 Mg) MG tablet tablet, TAKE ONE TABLET BY MOUTH TWICE DAILY, Disp: 60 tablet, Rfl: 11  •  montelukast (SINGULAIR) 10 MG tablet, TAKE ONE TABLET BY MOUTH EVERY NIGHT AT BEDTIME, Disp: 90 tablet, Rfl: 0  •  pantoprazole (PROTONIX) 40 MG EC tablet, TAKE ONE TABLET BY MOUTH EVERY DAY, Disp: 90 tablet, Rfl: 0  •  potassium chloride (K-DUR,KLOR-CON) 20 MEQ CR tablet, TAKE 1 TABLET BY MOUTH TWICE DAILY, Disp: 180 tablet, Rfl: 0  •  ramipril (ALTACE) 1.25 MG capsule, TAKE 1 CAPSULE BY MOUTH EVERY EVENING WITH DINNER, Disp: 90 capsule, Rfl: 4      Assessment/Plan   1.  " Diabetes mellitus type 2: She is having some low blood sugars, A1c is good at 6.0, I have asked her to decrease her 70/30 insulin to 25 units twice a day half an hour before breakfast and supper and continue Kombiglyze.  Advised to always keep glucose source in case of low blood sugars.    2.  Hypertension: Well-controlled, continue current medication    3.  Hyperlipidemia: Well-controlled, continue atorvastatin    4.  Hypothyroidism: Well-controlled, continue levothyroxine    5.  Diabetic nephropathy: On ramipril.            Kosta Frank MD FACE.

## 2022-01-17 NOTE — PATIENT INSTRUCTIONS
Change 70/30 insulin to 25 units half an hour before breakfast and 25 units half an hour before supper  Always keep glucose source in case of low blood sugar  Annual eye exam and flu vaccine  Labs before follow-up.

## 2022-01-24 RX ORDER — LEVOTHYROXINE SODIUM 88 UG/1
TABLET ORAL
Qty: 30 TABLET | Refills: 6 | Status: SHIPPED | OUTPATIENT
Start: 2022-01-24 | End: 2022-08-23

## 2022-01-25 RX ORDER — POTASSIUM CHLORIDE 20 MEQ/1
TABLET, EXTENDED RELEASE ORAL
Qty: 180 TABLET | Refills: 0 | Status: SHIPPED | OUTPATIENT
Start: 2022-01-25 | End: 2022-04-26

## 2022-01-31 RX ORDER — FUROSEMIDE 40 MG/1
TABLET ORAL
Qty: 30 TABLET | Refills: 0 | Status: SHIPPED | OUTPATIENT
Start: 2022-01-31 | End: 2022-02-28

## 2022-01-31 RX ORDER — IBANDRONATE SODIUM 150 MG/1
TABLET, FILM COATED ORAL
Qty: 3 TABLET | Refills: 0 | Status: SHIPPED | OUTPATIENT
Start: 2022-01-31 | End: 2022-05-09

## 2022-02-14 RX ORDER — ATORVASTATIN CALCIUM 40 MG/1
TABLET, FILM COATED ORAL
Qty: 30 TABLET | Refills: 0 | Status: SHIPPED | OUTPATIENT
Start: 2022-02-14 | End: 2022-03-14

## 2022-02-14 RX ORDER — ACETAZOLAMIDE 250 MG/1
TABLET ORAL
Qty: 90 TABLET | Refills: 0 | Status: SHIPPED | OUTPATIENT
Start: 2022-02-14 | End: 2022-05-16

## 2022-02-25 ENCOUNTER — TELEPHONE (OUTPATIENT)
Dept: ONCOLOGY | Facility: CLINIC | Age: 72
End: 2022-02-25

## 2022-02-25 DIAGNOSIS — C50.919 PRIMARY MALIGNANT NEOPLASM OF FEMALE BREAST: ICD-10-CM

## 2022-02-25 RX ORDER — ANASTROZOLE 1 MG/1
1 TABLET ORAL DAILY
Qty: 90 TABLET | Refills: 1 | Status: SHIPPED | OUTPATIENT
Start: 2022-02-25 | End: 2022-08-24

## 2022-02-25 NOTE — TELEPHONE ENCOUNTER
Caller: [facility] - [place], [place] - [address] - [phone] PH - [phone] FX    Relationship: Pharmacy    Best call back number: [phone]    Requested Prescriptions:   ANASTROZOLE 1 MG     Pharmacy where request should be sent:    [facility] - [place], [place] - [address] - [phone] PH - [phone] FX    Additional details provided by patient: PHARMACY STATED THEY HAVE SENT OVER A REQUEST SEVERAL TIMES WITH NO RESPONSE FROM THE OFFICE    Does the patient have less than a 3 day supply:  [x] Yes  [] No    Lakhwinder DEMPSEY Rep   02/25/22 08:53 EST

## 2022-02-28 ENCOUNTER — HOSPITAL ENCOUNTER (OUTPATIENT)
Dept: MAMMOGRAPHY | Facility: HOSPITAL | Age: 72
Discharge: HOME | End: 2022-02-28
Admitting: INTERNAL MEDICINE

## 2022-02-28 DIAGNOSIS — N64.9 DISORDER OF BREAST, UNSPECIFIED: ICD-10-CM

## 2022-02-28 DIAGNOSIS — Z12.31 SCREENING MAMMOGRAM, ENCOUNTER FOR: ICD-10-CM

## 2022-02-28 DIAGNOSIS — C50.919 PRIMARY MALIGNANT NEOPLASM OF FEMALE BREAST: ICD-10-CM

## 2022-02-28 PROCEDURE — 77066 DX MAMMO INCL CAD BI: CPT

## 2022-02-28 PROCEDURE — G0279 TOMOSYNTHESIS, MAMMO: HCPCS

## 2022-02-28 RX ORDER — FUROSEMIDE 40 MG/1
TABLET ORAL
Qty: 30 TABLET | Refills: 0 | Status: SHIPPED | OUTPATIENT
Start: 2022-02-28 | End: 2022-03-29

## 2022-03-01 ENCOUNTER — TELEPHONE (OUTPATIENT)
Dept: ONCOLOGY | Facility: CLINIC | Age: 72
End: 2022-03-01

## 2022-03-01 NOTE — TELEPHONE ENCOUNTER
Pt returned my call. I told her that there were no signs of malignancy on her mammogram and she will be screened again in one year. She verbalized understanding.

## 2022-03-01 NOTE — TELEPHONE ENCOUNTER
----- Message from TOMMY Newton sent at 2/28/2022  4:29 PM EST -----  Regarding: MMG    No evidence of malignancy.  Screen in one year    Thank  you    Hallie        ----- Message -----  From: Vince, Rad Results [place] In  Sent: 2/28/2022   3:48 PM EST  To: Venita Sarkar MD

## 2022-03-01 NOTE — TELEPHONE ENCOUNTER
----- Message from TOMMY Newton sent at 2/28/2022  4:29 PM EST -----  Regarding: MMG    No evidence of malignancy.  Screen in one year    Thank  you    Hallie        ----- Message -----  From: Vince, Rad Results [place] In  Sent: 2/28/2022   3:48 PM EST  To: Venita Sarkar MD

## 2022-03-01 NOTE — TELEPHONE ENCOUNTER
Attempted to call pt to let her know that there were no signs of malignancy on her mammogram. No answer or identifying VM. Callback number left.

## 2022-03-11 NOTE — PROGRESS NOTES
Hematology/Oncology Outpatient Follow Up    Christiane Nazario  1950    Primary Care Physician: Lyell, Reggie Duane, MD  Referring Physician: Lyell, Reggie Duane, MD  Chief complaint:     Follow up for:    pT 2 pN0 infiltrating ductal carcinoma of the left breast    History of Present Illness:     · Ms. Nazario had a mammogram in November 2018 for a palpable  abnormality.    · 11/30/18 - Diagnostic mammogram showed palpable abnormality corresponds to a 2.3 x 1.7 x 2.1 cm spicular shadowing irregular mass in the lateral middle third of the left breast at 2 to 3  o'clock position.  No mammographic evidence of malignancy in the contralateral breast.    · 12/17/18 - Core needle biopsy of breast palpable nodule showed invasive moderately differentiated ductal carcinoma.  Shakira grade 7/9.  DCIS seen.  Tumor was ER positive, CO  positive and uyr2wtl negative by ICH.    · Patient was sent to the [facility] and seen initially on 1/4/19.     · 1/4/19 - Genetic testing for BRCA 1 and BRCA 2 sequencing and deletion duplication analysis negative.    · 1/21/19 - CT scan of the chest with contrast showed 2.4 x 2.8 cm left breast mass consistent  with known carcinoma.  Coronary artery calcification consistent with atherosclerotic disease.  A  4 mm right upper lobe pulmonary nodule, stable compared to 2014, consistent with a benign  etiology.  · 2/7/19 - Patient underwent left breast lumpectomy and axillary sentinel lymph node resection. Pathology report was invasive poorly differentiated ductal carcinoma, 3.3 cm, completely excised.  Negative margins.  Three sentinel lymph nodes were extracted and three were negative.  Kipling score total of 9.  DCIS not identified.  Skin showed invasive carcinoma focally  invading the dermis without skin ulceration.  Margins negative with invasive carcinoma 0.2 cm  from the closest.  Pathologic staging pT2, smpN0.    · 3/6/19 - Oncotype DX recurrent score 25  "(distant recurrence risk at 9 years 12% per TAILORx and absolute chemotherapy benefit less than 1% per TAILORx).   · 3/22/19 - DEXA scan, normal.   · May 2019 - Patient completed 20 radiation treatments.    · 2019 patient started Arimidex treatment  · 2020 bilateral screening mammogram benign  · 2021: Patient had bilateral screening mammogram  · 2022 patient had bilateral diagnostic mammogram which was essentially unremarkable      Past Medical History:   Diagnosis Date   • Cataract    • DM type 2 (diabetes mellitus, type 2) (HCC)    • Ductal carcinoma in situ (DCIS) of left breast    • Fracture, femur (HCC)     fracture of left femur   • Hyperlipidemia    • Hypertension    • Hypothyroidism    • Osteopenia    • Pulmonary hypertension (HCC)        Past Surgical History:   Procedure Laterality Date   • BREAST BIOPSY  2018   • CATARACT EXTRACTION     •  SECTION     • FEMUR SURGERY Left 2014    @ [facility] - Dr Winston   • MASTECTOMY Left 2019    Dr Bills   • TUBAL ABDOMINAL LIGATION           Current Outpatient Medications:   •  acetaZOLAMIDE (DIAMOX) 250 MG tablet, TAKE ONE TABLET BY MOUTH EVERY MORNING, Disp: 90 tablet, Rfl: 0  •  anastrozole (ARIMIDEX) 1 MG tablet, Take 1 tablet by mouth Daily., Disp: 90 tablet, Rfl: 1  •  aspirin (ADULT ASPIRIN EC LOW STRENGTH) 81 MG EC tablet, ADULT ASPIRIN EC LOW STRENGTH 81 MG ORAL TABLET DELAYED RELEASE, Disp: , Rfl:   •  atorvastatin (LIPITOR) 40 MG tablet, TAKE ONE TABLET BY MOUTH EVERY NIGHT, Disp: 30 tablet, Rfl: 0  •  BD Insulin Syringe U/F 31G X 16\" 1 ML misc, USE TWICE A DAY WITH INSULIN INJECTIONS, Disp: 200 each, Rfl: 4  •  CALCIUM ACETATE IJ, Inject  as directed., Disp: , Rfl:   •  D3 Super Strength 50 MCG (2000 UT) capsule, TAKE 1 CAPSULE BY MOUTH EVERY DAY, Disp: 30 capsule, Rfl: 11  •  furosemide (LASIX) 40 MG tablet, TAKE ONE TABLET BY MOUTH EVERY DAY, Disp: 30 tablet, Rfl: 0  •  glucose blood (ONE TOUCH ULTRA " TEST) test strip, ONETOUCH ULTRA BLUE STRP, Disp: , Rfl:   •  ibandronate (BONIVA) 150 MG tablet, TAKE ONE TABLET BY MOUTH EVERY MONTH, Disp: 3 tablet, Rfl: 0  •  insulin NPH-insulin regular (HumuLIN 70/30) (70-30) 100 UNIT/ML injection, INJECT 25 UNITS SUBCUTANEOUSLY EVERY MORNING AND INJECT 25 UNITS EVERY EVENING AT DINNER, Disp: 70 mL, Rfl: 1  •  Kombiglyze XR 2.5-1000 MG tablet sustained-release 24 hour, TAKE ONE TABLET BY MOUTH TWICE DAILY, Disp: 180 tablet, Rfl: 3  •  levothyroxine (SYNTHROID, LEVOTHROID) 88 MCG tablet, TAKE ONE TABLET BY MOUTH EVERY DAY, Disp: 30 tablet, Rfl: 6  •  loratadine (CLARITIN) 10 MG tablet, TAKE ONE TABLET BY MOUTH EVERY DAY, Disp: 90 tablet, Rfl: 1  •  MgO 400 (241.3 Mg) MG tablet tablet, TAKE ONE TABLET BY MOUTH TWICE DAILY, Disp: 60 tablet, Rfl: 11  •  montelukast (SINGULAIR) 10 MG tablet, TAKE ONE TABLET BY MOUTH EVERY NIGHT AT BEDTIME, Disp: 90 tablet, Rfl: 0  •  pantoprazole (PROTONIX) 40 MG EC tablet, TAKE ONE TABLET BY MOUTH EVERY DAY, Disp: 90 tablet, Rfl: 0  •  potassium chloride (K-DUR,KLOR-CON) 20 MEQ CR tablet, TAKE 1 TABLET BY MOUTH TWICE DAILY, Disp: 180 tablet, Rfl: 0  •  ramipril (ALTACE) 1.25 MG capsule, TAKE 1 CAPSULE BY MOUTH EVERY EVENING WITH DINNER, Disp: 90 capsule, Rfl: 4    Allergies   Allergen Reactions   • Calcium-Containing Compounds Nausea Only   • Sulfa Antibiotics Unknown - Low Severity       Family History   Problem Relation Age of Onset   • Ovarian cancer Sister    • Cancer Other        Cancer-related family history includes Cancer in an other family member; Ovarian cancer in her sister.    Social History     Tobacco Use   • Smoking status: Never Smoker   • Smokeless tobacco: Never Used   Vaping Use   • Vaping Use: Never used   Substance Use Topics   • Alcohol use: No   • Drug use: No       I have reviewed and confirmed the accuracy of the patient's history: Chief complaint, HPI and ROS as entered by the MA/LPN/RN. Venita Sarkar MD 03/15/22        SUBJECTIVE:        Patient does not have any specific complaints today.  Remains on Arimidex and calcium.  There are no planned surgical procedures.     She continues to take Arimidex and has not had any significant issues    ROS:       Review of Systems   Constitutional: Negative for fever.   HENT: Negative for nosebleeds and trouble swallowing.    Eyes: Negative for visual disturbance.   Respiratory: Negative for cough, shortness of breath and wheezing.    Cardiovascular: Negative for chest pain.   Gastrointestinal: Negative for abdominal pain and blood in stool.   Endocrine: Negative for cold intolerance.   Genitourinary: Negative for dysuria and hematuria.   Musculoskeletal: Negative for joint swelling.   Skin: Negative for rash.   Allergic/Immunologic: Negative for environmental allergies.   Neurological: Negative for seizures.   Hematological: Does not bruise/bleed easily.   Psychiatric/Behavioral: The patient is not nervous/anxious.            Objective:       Vitals:    03/15/22 1245   BP: 123/67   Pulse: 101   Resp: 20   Temp: 97.1 °F (36.2 °C)   TempSrc: Infrared   SpO2: 95%   Weight: 92 kg (202 lb 12.8 oz)   PainSc: 0-No pain         PHYSICAL EXAM:     Physical Exam   Constitutional: She is oriented to person, place, and time. No distress.   Moderately built obese   HENT:   Head: Normocephalic and atraumatic.   Eyes: Conjunctivae are normal. Right eye exhibits no discharge. Left eye exhibits no discharge. No scleral icterus.   Neck: No thyromegaly present.   Cardiovascular: Normal rate, regular rhythm and normal heart sounds. Exam reveals no gallop and no friction rub.   Pulmonary/Chest: Effort normal. No stridor. No respiratory distress. She has no wheezes.   Decreased breath sounds both bases   Abdominal: Soft. Bowel sounds are normal. She exhibits no mass. There is no abdominal tenderness. There is no rebound and no guarding.   Musculoskeletal: Normal range of motion. No tenderness.       Comments: Trace bilateral ankle edema   Lymphadenopathy:     She has no cervical adenopathy.   Neurological: She is alert and oriented to person, place, and time. She exhibits normal muscle tone.   Skin: Skin is warm. No rash noted. She is not diaphoretic. No erythema.   Psychiatric: Her behavior is normal.   Nursing note and vitals reviewed.    Bilateral breast exam does not reveal any palpable masses, nipple discharge or skin discoloration.    I have reexamined the patient and the results are consistent with the previously documented exam. Venita Kasie Sarkar MD     RECENT LABS:       WBC   Date Value Ref Range Status   03/15/2022 13.20 (H) 3.40 - 10.80 10*3/mm3 Final     RBC   Date Value Ref Range Status   03/15/2022 4.75 3.77 - 5.28 10*6/mm3 Final     Hemoglobin   Date Value Ref Range Status   03/15/2022 12.4 12.0 - 15.9 g/dL Final     Hematocrit   Date Value Ref Range Status   03/15/2022 41.3 34.0 - 46.6 % Final     MCV   Date Value Ref Range Status   03/15/2022 86.9 79.0 - 97.0 fL Final     MCH   Date Value Ref Range Status   03/15/2022 26.1 (L) 26.6 - 33.0 pg Final     MCHC   Date Value Ref Range Status   03/15/2022 30.0 (L) 31.5 - 35.7 g/dL Final     RDW   Date Value Ref Range Status   03/15/2022 15.8 (H) 12.3 - 15.4 % Final     RDW-SD   Date Value Ref Range Status   03/15/2022 49.3 37.0 - 54.0 fl Final     MPV   Date Value Ref Range Status   03/15/2022 10.8 6.0 - 12.0 fL Final     Platelets   Date Value Ref Range Status   03/15/2022 244 140 - 450 10*3/mm3 Final     Neutrophil %   Date Value Ref Range Status   03/15/2022 81.7 (H) 42.7 - 76.0 % Final     Lymphocyte %   Date Value Ref Range Status   03/15/2022 13.1 (L) 19.6 - 45.3 % Final     Monocyte %   Date Value Ref Range Status   03/15/2022 4.2 (L) 5.0 - 12.0 % Final     Eosinophil %   Date Value Ref Range Status   03/15/2022 0.8 0.3 - 6.2 % Final     Basophil %   Date Value Ref Range Status   03/15/2022 0.2 0.0 - 1.5 % Final     Neutrophils, Absolute    Date Value Ref Range Status   03/15/2022 10.79 (H) 1.70 - 7.00 10*3/mm3 Final     Lymphocytes, Absolute   Date Value Ref Range Status   03/15/2022 1.73 0.70 - 3.10 10*3/mm3 Final     Monocytes, Absolute   Date Value Ref Range Status   03/15/2022 0.56 0.10 - 0.90 10*3/mm3 Final     Eosinophils, Absolute   Date Value Ref Range Status   03/15/2022 0.10 0.00 - 0.40 10*3/mm3 Final     Basophils, Absolute   Date Value Ref Range Status   03/15/2022 0.02 0.00 - 0.20 10*3/mm3 Final     nRBC   Date Value Ref Range Status   01/28/2019 0 0 /100[WBCs] Final       Lab Results   Component Value Date    GLUCOSE 105 (H) 01/10/2022    BUN 21 01/10/2022    CREATININE 1.04 (H) 01/10/2022    EGFRIFNONA 53 (L) 01/10/2022    BCR 20.2 01/10/2022    K 4.1 01/10/2022    CO2 23.5 01/10/2022    CALCIUM 10.1 01/10/2022    ALBUMIN 4.20 01/10/2022    LABIL2 1.0 05/08/2019    AST 16 01/10/2022    ALT 15 01/10/2022         Assessment/Plan      ASSESSMENT:     1. Left breast infiltrating ductal carcinoma, ER +100%, VT positive and HER-2/willi was negative: Status  post left lumpectomy with sentinel lymph node biopsy.  Status post adjuvant left breast radiation.  On surveillance protocol.  We will continue  2. Oncotype DX assay with recurrence score of 25.  Chemotherapy not recommended.  Hormone receptor positive and has been on Arimidex initiated in June 2019.  She will continue the same.  3. Obesity with BMI of 40  4. Osteopenia: On Boniva, calcium supplements reviewed her recent bone density  5. ECOG 1    PLANS:      1. Continue Arimidex  2. Continue Os-Quintin D 600 mg twice a day  3. Bilateral diagnostic mammogram reviewed February 2022.  Next mammogram will be due February 2023  4. DEXA scan will be due again 4/8/2023.  She will continue Boniva  5. Patient to follow-up with her primary care physician: Dr. Jordan office  6. CMP reviewed January 2022  7. Continue Boniva monthly for osteopenia.  Reviewed  8. Follow-up 4 months or earlier as  needed  9. Patient encouraged to continue with weekly breast self-exam and call for lumps nipple discharge or skin discoloration  10. All questions answered    I have reviewed labs results, imaging, vitals, and medications with the patient today.   Patient verbalized understanding and is in agreement of the above plan.          I spent 30 total minutes, face-to-face, caring for Christiane today.  90% of this time involved counseling and/or coordination of care as documented within this note.

## 2022-03-14 RX ORDER — ATORVASTATIN CALCIUM 40 MG/1
TABLET, FILM COATED ORAL
Qty: 30 TABLET | Refills: 0 | Status: SHIPPED | OUTPATIENT
Start: 2022-03-14 | End: 2022-04-19

## 2022-03-15 ENCOUNTER — APPOINTMENT (OUTPATIENT)
Dept: LAB | Facility: HOSPITAL | Age: 72
End: 2022-03-15

## 2022-03-15 ENCOUNTER — OFFICE VISIT (OUTPATIENT)
Dept: ONCOLOGY | Facility: CLINIC | Age: 72
End: 2022-03-15

## 2022-03-15 VITALS
RESPIRATION RATE: 20 BRPM | OXYGEN SATURATION: 95 % | TEMPERATURE: 97.1 F | BODY MASS INDEX: 39.61 KG/M2 | DIASTOLIC BLOOD PRESSURE: 67 MMHG | WEIGHT: 202.8 LBS | SYSTOLIC BLOOD PRESSURE: 123 MMHG | HEART RATE: 101 BPM

## 2022-03-15 DIAGNOSIS — C50.919 PRIMARY MALIGNANT NEOPLASM OF FEMALE BREAST: Primary | ICD-10-CM

## 2022-03-15 DIAGNOSIS — Z79.4 TYPE 2 DIABETES MELLITUS WITH HYPERGLYCEMIA, WITH LONG-TERM CURRENT USE OF INSULIN: Primary | ICD-10-CM

## 2022-03-15 DIAGNOSIS — E11.65 TYPE 2 DIABETES MELLITUS WITH HYPERGLYCEMIA, WITH LONG-TERM CURRENT USE OF INSULIN: Primary | ICD-10-CM

## 2022-03-15 LAB
BASOPHILS # BLD AUTO: 0.02 10*3/MM3 (ref 0–0.2)
BASOPHILS NFR BLD AUTO: 0.2 % (ref 0–1.5)
DEPRECATED RDW RBC AUTO: 49.3 FL (ref 37–54)
EOSINOPHIL # BLD AUTO: 0.1 10*3/MM3 (ref 0–0.4)
EOSINOPHIL NFR BLD AUTO: 0.8 % (ref 0.3–6.2)
ERYTHROCYTE [DISTWIDTH] IN BLOOD BY AUTOMATED COUNT: 15.8 % (ref 12.3–15.4)
HCT VFR BLD AUTO: 41.3 % (ref 34–46.6)
HGB BLD-MCNC: 12.4 G/DL (ref 12–15.9)
HOLD SPECIMEN: NORMAL
HOLD SPECIMEN: NORMAL
LYMPHOCYTES # BLD AUTO: 1.73 10*3/MM3 (ref 0.7–3.1)
LYMPHOCYTES NFR BLD AUTO: 13.1 % (ref 19.6–45.3)
MCH RBC QN AUTO: 26.1 PG (ref 26.6–33)
MCHC RBC AUTO-ENTMCNC: 30 G/DL (ref 31.5–35.7)
MCV RBC AUTO: 86.9 FL (ref 79–97)
MONOCYTES # BLD AUTO: 0.56 10*3/MM3 (ref 0.1–0.9)
MONOCYTES NFR BLD AUTO: 4.2 % (ref 5–12)
NEUTROPHILS NFR BLD AUTO: 10.79 10*3/MM3 (ref 1.7–7)
NEUTROPHILS NFR BLD AUTO: 81.7 % (ref 42.7–76)
PLATELET # BLD AUTO: 244 10*3/MM3 (ref 140–450)
PMV BLD AUTO: 10.8 FL (ref 6–12)
RBC # BLD AUTO: 4.75 10*6/MM3 (ref 3.77–5.28)
WBC NRBC COR # BLD: 13.2 10*3/MM3 (ref 3.4–10.8)

## 2022-03-15 PROCEDURE — 85025 COMPLETE CBC W/AUTO DIFF WBC: CPT | Performed by: INTERNAL MEDICINE

## 2022-03-15 PROCEDURE — 99214 OFFICE O/P EST MOD 30 MIN: CPT | Performed by: INTERNAL MEDICINE

## 2022-03-15 PROCEDURE — 36415 COLL VENOUS BLD VENIPUNCTURE: CPT | Performed by: INTERNAL MEDICINE

## 2022-03-21 RX ORDER — SAXAGLIPTIN AND METFORMIN HYDROCHLORIDE 2.5; 1 MG/1; MG/1
TABLET, FILM COATED, EXTENDED RELEASE ORAL
Qty: 180 TABLET | Refills: 4 | Status: SHIPPED | OUTPATIENT
Start: 2022-03-21 | End: 2022-11-07

## 2022-03-21 RX ORDER — MONTELUKAST SODIUM 10 MG/1
TABLET ORAL
Qty: 90 TABLET | Refills: 0 | Status: SHIPPED | OUTPATIENT
Start: 2022-03-21 | End: 2022-06-27

## 2022-03-29 RX ORDER — FUROSEMIDE 40 MG/1
TABLET ORAL
Qty: 30 TABLET | Refills: 0 | Status: SHIPPED | OUTPATIENT
Start: 2022-03-29 | End: 2022-04-26

## 2022-04-19 RX ORDER — ATORVASTATIN CALCIUM 40 MG/1
TABLET, FILM COATED ORAL
Qty: 30 TABLET | Refills: 0 | Status: SHIPPED | OUTPATIENT
Start: 2022-04-19 | End: 2022-05-23

## 2022-04-19 RX ORDER — CHOLECALCIFEROL (VITAMIN D3) 50 MCG
CAPSULE ORAL
Qty: 30 CAPSULE | Refills: 10 | Status: SHIPPED | OUTPATIENT
Start: 2022-04-19 | End: 2022-11-07

## 2022-04-26 RX ORDER — FUROSEMIDE 40 MG/1
TABLET ORAL
Qty: 30 TABLET | Refills: 0 | Status: SHIPPED | OUTPATIENT
Start: 2022-04-26 | End: 2022-06-06

## 2022-04-26 RX ORDER — POTASSIUM CHLORIDE 20 MEQ/1
TABLET, EXTENDED RELEASE ORAL
Qty: 180 TABLET | Refills: 0 | Status: SHIPPED | OUTPATIENT
Start: 2022-04-26 | End: 2022-08-18 | Stop reason: SDUPTHER

## 2022-05-03 RX ORDER — IBANDRONATE SODIUM 150 MG/1
TABLET, FILM COATED ORAL
Qty: 3 TABLET | Refills: 0 | OUTPATIENT
Start: 2022-05-03

## 2022-05-03 NOTE — TELEPHONE ENCOUNTER
Unable to reach patient to see if she has found another PCP since Dr. Evans has left. If she calls regarding RX refills and has not found another PCP she needs to have a 1 time appointment with Berta in order to get refills and then we can add her to the list to contact her when new providers join the practice.

## 2022-05-04 RX ORDER — IBANDRONATE SODIUM 150 MG/1
TABLET, FILM COATED ORAL
Qty: 3 TABLET | Refills: 0 | OUTPATIENT
Start: 2022-05-04

## 2022-05-09 ENCOUNTER — OFFICE VISIT (OUTPATIENT)
Dept: FAMILY MEDICINE CLINIC | Facility: CLINIC | Age: 72
End: 2022-05-09

## 2022-05-09 VITALS
HEIGHT: 60 IN | BODY MASS INDEX: 39.27 KG/M2 | HEART RATE: 89 BPM | SYSTOLIC BLOOD PRESSURE: 124 MMHG | OXYGEN SATURATION: 97 % | TEMPERATURE: 97.1 F | RESPIRATION RATE: 16 BRPM | WEIGHT: 200 LBS | DIASTOLIC BLOOD PRESSURE: 70 MMHG

## 2022-05-09 DIAGNOSIS — Z76.89 ENCOUNTER TO ESTABLISH CARE: Primary | ICD-10-CM

## 2022-05-09 DIAGNOSIS — M85.80 OSTEOPENIA, UNSPECIFIED LOCATION: ICD-10-CM

## 2022-05-09 PROCEDURE — 99203 OFFICE O/P NEW LOW 30 MIN: CPT | Performed by: NURSE PRACTITIONER

## 2022-05-09 NOTE — PROGRESS NOTES
Chief Complaint  Establish Care    Subjective          Christiane Nazario presents to [facility]  History of Present Illness  Is a new patient, here today to establish care      H/o allergic rhinitis, mixed hyperlipidemia, HTN, obesity, hypothyroidism, T2DM with neuropathy, gerd, hepatitis, DCIS left breast, osteoporosis, asthma, pulmonary HTN    Sees specialists:  Endocrinologist - Dr. Frank (DM, hypothyroid)  Hem/onc - Dr. Sarkar (breast ca)  Pulmonary - Dr. Landeros (sleep apnea)    cologuachaim January 2021 - she tells me it was negative, unable to locate the result  dexa scan 4/8/21  Mammogram 2/28/22    Wants refill of BONIVA  Had normal DEXA scan in 2019  In 2021 her DEXA is positive for osteopenia  At some point - several years ago per Ms. Nazario -  she was started on BONIVA per her pcp,  who has now retired/moved    Since she has been on it for several years, will discontinue it at this time  Encouraged to increase physical activity, especially walking  Resume oral calcium + d supplement    Review of Systems   Constitutional: Negative.  Negative for appetite change, fatigue, fever and unexpected weight change.   Respiratory: Negative.  Negative for cough, shortness of breath and wheezing.         Sleeps with CPAP   Cardiovascular: Positive for leg swelling. Negative for chest pain and palpitations.        Chronic intermittent edema of the right foot   Gastrointestinal: Positive for diarrhea. Negative for constipation, nausea and vomiting.        Occasional diarrhea   Genitourinary: Positive for frequency. Negative for dysuria and urgency.   Neurological: Positive for headaches. Negative for dizziness and weakness.        Occasional headache   Psychiatric/Behavioral: Negative for dysphoric mood and sleep disturbance. The patient is not nervous/anxious.        Objective   Vital Signs:  /70 (BP Location: Left arm, Patient Position: Sitting)   Pulse 89   Temp 97.1  "°F (36.2 °C) (Temporal)   Resp 16   Ht 152.4 cm (60\")   Wt 90.7 kg (200 lb)   SpO2 97%   BMI 39.06 kg/m²           Physical Exam  Vitals reviewed.   Constitutional:       Appearance: Normal appearance.   Neck:      Vascular: No carotid bruit.   Cardiovascular:      Rate and Rhythm: Normal rate and regular rhythm.      Pulses: Normal pulses.      Heart sounds: Normal heart sounds.      Comments: Trace - 1+ b/l foot and ankle edema  Pulmonary:      Effort: Pulmonary effort is normal.      Breath sounds: Normal breath sounds.   Musculoskeletal:      Cervical back: Neck supple.      Right lower le+ Edema present.      Left lower le+ Edema present.   Skin:     General: Skin is warm.   Neurological:      Mental Status: She is alert and oriented to person, place, and time.        Result Review :     CMP    CMP 7/6/21 1/10/22   Glucose 150 (A) 105 (A)   BUN 15 21   Creatinine 1.08 (A) 1.04 (A)   eGFR Non African Am 50 (A) 53 (A)   Sodium 140 140   Potassium 4.3 4.1   Chloride 102 106   Calcium 9.5 10.1   Albumin 4.20 4.20   Total Bilirubin 0.6 0.5   Alkaline Phosphatase 69 60   AST (SGOT) 17 16   ALT (SGPT) 12 15   (A) Abnormal value            CBC    CBC 7/19/21 11/15/21 3/15/22   WBC 14.13 (A) 14.59 (A) 13.20 (A)   RBC 4.49 4.75 4.75   Hemoglobin 11.9 (A) 12.5 12.4   Hematocrit 40.1 40.4 41.3   MCV 89.3 85.1 86.9   MCH 26.5 (A) 26.3 (A) 26.1 (A)   MCHC 29.7 (A) 30.9 (A) 30.0 (A)   RDW 15.1 15.9 (A) 15.8 (A)   Platelets 289 204 244   (A) Abnormal value            Lipid Panel    Lipid Panel 7/6/21 1/10/22   Total Cholesterol 107 99   Triglycerides 78 73   HDL Cholesterol 45 44   VLDL Cholesterol 16 15   LDL Cholesterol  46 40   LDL/HDL Ratio 1.03 0.92           TSH    TSH 7/6/21 1/10/22   TSH 0.506 1.220           A1C Last 3 Results    HGBA1C Last 3 Results 7/6/21 1/10/22   Hemoglobin A1C 6.4 (A) 6.0 (A)   (A) Abnormal value            Microalbumin    Microalbumin 7/6/21 1/10/22   Microalbumin, Urine 12.8 3.9 "                         Assessment and Plan    Diagnoses and all orders for this visit:    1. Encounter to establish care (Primary)    2. Osteopenia, unspecified location    resume use of po calcium chews, increase weight bearing activity/exercise         Follow Up   Return in about 5 months (around 10/9/2022) for Recheck.  Patient was given instructions and counseling regarding her condition or for health maintenance advice. Please see specific information pulled into the AVS if appropriate.

## 2022-05-16 RX ORDER — ACETAZOLAMIDE 250 MG/1
TABLET ORAL
Qty: 90 TABLET | Refills: 0 | Status: SHIPPED | OUTPATIENT
Start: 2022-05-16 | End: 2022-08-16

## 2022-05-23 RX ORDER — ATORVASTATIN CALCIUM 40 MG/1
TABLET, FILM COATED ORAL
Qty: 30 TABLET | Refills: 0 | Status: SHIPPED | OUTPATIENT
Start: 2022-05-23 | End: 2022-06-21

## 2022-05-23 RX ORDER — ATORVASTATIN CALCIUM 40 MG/1
TABLET, FILM COATED ORAL
Qty: 30 TABLET | Refills: 0 | OUTPATIENT
Start: 2022-05-23

## 2022-05-23 RX ORDER — PSYLLIUM HUSK 0.4 G
CAPSULE ORAL
Qty: 60 TABLET | Refills: 10 | Status: SHIPPED | OUTPATIENT
Start: 2022-05-23 | End: 2022-11-07

## 2022-06-06 RX ORDER — FUROSEMIDE 40 MG/1
TABLET ORAL
Qty: 30 TABLET | Refills: 0 | OUTPATIENT
Start: 2022-06-06

## 2022-06-06 RX ORDER — FUROSEMIDE 40 MG/1
TABLET ORAL
Qty: 30 TABLET | Refills: 3 | Status: SHIPPED | OUTPATIENT
Start: 2022-06-06 | End: 2022-09-27

## 2022-06-21 RX ORDER — ATORVASTATIN CALCIUM 40 MG/1
TABLET, FILM COATED ORAL
Qty: 30 TABLET | Refills: 1 | Status: SHIPPED | OUTPATIENT
Start: 2022-06-21 | End: 2022-08-23

## 2022-06-27 RX ORDER — MONTELUKAST SODIUM 10 MG/1
TABLET ORAL
Qty: 90 TABLET | Refills: 0 | OUTPATIENT
Start: 2022-06-27

## 2022-06-27 RX ORDER — MONTELUKAST SODIUM 10 MG/1
TABLET ORAL
Qty: 90 TABLET | Refills: 0 | Status: SHIPPED | OUTPATIENT
Start: 2022-06-27 | End: 2022-10-11

## 2022-07-13 ENCOUNTER — APPOINTMENT (OUTPATIENT)
Dept: LAB | Facility: HOSPITAL | Age: 72
End: 2022-07-13

## 2022-08-08 ENCOUNTER — LAB (OUTPATIENT)
Dept: LAB | Facility: HOSPITAL | Age: 72
End: 2022-08-08

## 2022-08-08 DIAGNOSIS — Z79.4 TYPE 2 DIABETES MELLITUS WITH HYPOGLYCEMIA WITHOUT COMA, WITH LONG-TERM CURRENT USE OF INSULIN: ICD-10-CM

## 2022-08-08 DIAGNOSIS — E11.649 TYPE 2 DIABETES MELLITUS WITH HYPOGLYCEMIA WITHOUT COMA, WITH LONG-TERM CURRENT USE OF INSULIN: ICD-10-CM

## 2022-08-08 LAB
ALBUMIN SERPL-MCNC: 4.2 G/DL (ref 3.5–5.2)
ALBUMIN/GLOB SERPL: 1.4 G/DL
ALP SERPL-CCNC: 77 U/L (ref 39–117)
ALT SERPL W P-5'-P-CCNC: 11 U/L (ref 1–33)
ANION GAP SERPL CALCULATED.3IONS-SCNC: 13.5 MMOL/L (ref 5–15)
AST SERPL-CCNC: 17 U/L (ref 1–32)
BILIRUB SERPL-MCNC: 0.5 MG/DL (ref 0–1.2)
BUN SERPL-MCNC: 19 MG/DL (ref 8–23)
BUN/CREAT SERPL: 16 (ref 7–25)
CALCIUM SPEC-SCNC: 10.4 MG/DL (ref 8.6–10.5)
CHLORIDE SERPL-SCNC: 103 MMOL/L (ref 98–107)
CHOLEST SERPL-MCNC: 106 MG/DL (ref 0–200)
CO2 SERPL-SCNC: 23.5 MMOL/L (ref 22–29)
CREAT SERPL-MCNC: 1.19 MG/DL (ref 0.57–1)
EGFRCR SERPLBLD CKD-EPI 2021: 49.3 ML/MIN/1.73
GLOBULIN UR ELPH-MCNC: 3 GM/DL
GLUCOSE SERPL-MCNC: 118 MG/DL (ref 65–99)
HBA1C MFR BLD: 6.2 % (ref 3.5–5.6)
HDLC SERPL-MCNC: 48 MG/DL (ref 40–60)
LDLC SERPL CALC-MCNC: 40 MG/DL (ref 0–100)
LDLC/HDLC SERPL: 0.81 {RATIO}
POTASSIUM SERPL-SCNC: 4.2 MMOL/L (ref 3.5–5.2)
PROT SERPL-MCNC: 7.2 G/DL (ref 6–8.5)
SODIUM SERPL-SCNC: 140 MMOL/L (ref 136–145)
T4 FREE SERPL-MCNC: 1.29 NG/DL (ref 0.93–1.7)
TRIGL SERPL-MCNC: 95 MG/DL (ref 0–150)
TSH SERPL DL<=0.05 MIU/L-ACNC: 0.9 UIU/ML (ref 0.27–4.2)
VLDLC SERPL-MCNC: 18 MG/DL (ref 5–40)

## 2022-08-08 PROCEDURE — 80053 COMPREHEN METABOLIC PANEL: CPT

## 2022-08-08 PROCEDURE — 80061 LIPID PANEL: CPT

## 2022-08-08 PROCEDURE — 36415 COLL VENOUS BLD VENIPUNCTURE: CPT

## 2022-08-08 PROCEDURE — 84443 ASSAY THYROID STIM HORMONE: CPT

## 2022-08-08 PROCEDURE — 84439 ASSAY OF FREE THYROXINE: CPT

## 2022-08-08 PROCEDURE — 83036 HEMOGLOBIN GLYCOSYLATED A1C: CPT

## 2022-08-09 RX ORDER — POTASSIUM CHLORIDE 20 MEQ/1
TABLET, EXTENDED RELEASE ORAL
Qty: 180 TABLET | Refills: 0 | OUTPATIENT
Start: 2022-08-09

## 2022-08-12 NOTE — PROGRESS NOTES
Hematology/Oncology Outpatient Follow Up    Christiane Nazario  1950    Primary Care Physician: Margarita Melissa, APRN  Referring Physician: Margarita Melissa, EDGARDO*  Chief complaint:     Follow up for:    pT 2 pN0 infiltrating ductal carcinoma of the left breast    History of Present Illness:     · Ms. Nazario had a mammogram in November 2018 for a palpable  abnormality.    · 11/30/18 - Diagnostic mammogram showed palpable abnormality corresponds to a 2.3 x 1.7 x 2.1 cm spicular shadowing irregular mass in the lateral middle third of the left breast at 2 to 3  o'clock position.  No mammographic evidence of malignancy in the contralateral breast.    · 12/17/18 - Core needle biopsy of breast palpable nodule showed invasive moderately differentiated ductal carcinoma.  Shakira grade 7/9.  DCIS seen.  Tumor was ER positive, IN  positive and [id number] negative by ICH.    · Patient was [facility] and seen initially on 1/4/19.     · 1/4/19 - Genetic testing for BRCA 1 and BRCA 2 sequencing and deletion duplication analysis negative.    · 1/21/19 - CT scan of the chest with contrast showed 2.4 x 2.8 cm left breast mass consistent  with known carcinoma.  Coronary artery calcification consistent with atherosclerotic disease.  A  4 mm right upper lobe pulmonary nodule, stable compared to 2014, consistent with a benign  etiology.  · 2/7/19 - Patient underwent left breast lumpectomy and axillary sentinel lymph node resection. Pathology report was invasive poorly differentiated ductal carcinoma, 3.3 cm, completely excised.  Negative margins.  Three sentinel lymph nodes were extracted and three were negative.  Shakira score total of 9.  DCIS not identified.  Skin showed invasive carcinoma focally  invading the dermis without skin ulceration.  Margins negative with invasive carcinoma 0.2 cm  from the closest.  Pathologic staging pT2, smpN0.    · 3/6/19 - Oncotype DX recurrent score 25  "(distant recurrence risk at 9 years 12% per TAILORx and absolute chemotherapy benefit less than 1% per TAILORx).   · 3/22/19 - DEXA scan, normal.   · May 2019 - Patient completed 20 radiation treatments.    · 2019 patient started Arimidex treatment  · 2020 bilateral screening mammogram benign  · 2021: Patient had bilateral screening mammogram  · 2022 patient had bilateral diagnostic mammogram which was essentially unremarkable      Past Medical History:   Diagnosis Date   • Cataract    • DM type 2 (diabetes mellitus, type 2) (HCC)    • Ductal carcinoma in situ (DCIS) of left breast    • Fracture, femur (HCC)     fracture of left femur   • Hyperlipidemia    • Hypertension    • Hypothyroidism    • Osteopenia    • Pulmonary hypertension (HCC)        Past Surgical History:   Procedure Laterality Date   • BREAST BIOPSY  2018   • CATARACT EXTRACTION     •  SECTION     • FEMUR SURGERY Left 2014    @ [facility] - Dr Winston   • MASTECTOMY Left 2019    Dr Bills   • TUBAL ABDOMINAL LIGATION           Current Outpatient Medications:   •  acetaZOLAMIDE (DIAMOX) 250 MG tablet, TAKE ONE TABLET BY MOUTH EVERY MORNING, Disp: 90 tablet, Rfl: 0  •  anastrozole (ARIMIDEX) 1 MG tablet, Take 1 tablet by mouth Daily., Disp: 90 tablet, Rfl: 1  •  aspirin (aspirin) 81 MG EC tablet, ADULT ASPIRIN EC LOW STRENGTH 81 MG ORAL TABLET DELAYED RELEASE, Disp: , Rfl:   •  atorvastatin (LIPITOR) 40 MG tablet, TAKE ONE TABLET BY MOUTH EVERY NIGHT, Disp: 30 tablet, Rfl: 1  •  BD Insulin Syringe U/F 31G X 16\" 1 ML misc, USE TWICE A DAY WITH INSULIN INJECTIONS, Disp: 200 each, Rfl: 4  •  CALCIUM ACETATE IJ, Inject  as directed., Disp: , Rfl:   •  D3 Super Strength 50 MCG ( UT) capsule, TAKE 1 CAPSULE BY MOUTH EVERY DAY, Disp: 30 capsule, Rfl: 10  •  furosemide (LASIX) 40 MG tablet, TAKE ONE TABLET BY MOUTH EVERY DAY, Disp: 30 tablet, Rfl: 3  •  glucose blood (ONE TOUCH ULTRA TEST) test strip, ONETOUCH " ULTRA BLUE STRP, Disp: , Rfl:   •  insulin NPH-insulin regular (HumuLIN 70/30) (70-30) 100 UNIT/ML injection, INJECT 25 UNITS SUBCUTANEOUSLY EVERY MORNING AND INJECT 25 UNITS EVERY EVENING AT DINNER, Disp: 70 mL, Rfl: 1  •  Kombiglyze XR 2.5-1000 MG tablet sustained-release 24 hour, TAKE ONE TABLET BY MOUTH TWICE DAILY, Disp: 180 tablet, Rfl: 4  •  levothyroxine (SYNTHROID, LEVOTHROID) 88 MCG tablet, TAKE ONE TABLET BY MOUTH EVERY DAY, Disp: 30 tablet, Rfl: 6  •  loratadine (CLARITIN) 10 MG tablet, TAKE ONE TABLET BY MOUTH EVERY DAY, Disp: 90 tablet, Rfl: 1  •  MgO 400 (240 Mg) MG tablet, TAKE ONE TABLET BY MOUTH TWICE DAILY, Disp: 60 tablet, Rfl: 10  •  montelukast (SINGULAIR) 10 MG tablet, TAKE ONE TABLET BY MOUTH EVERY NIGHT AT BEDTIME, Disp: 90 tablet, Rfl: 0  •  pantoprazole (PROTONIX) 40 MG EC tablet, TAKE ONE TABLET BY MOUTH EVERY DAY, Disp: 90 tablet, Rfl: 0  •  potassium chloride (K-DUR,KLOR-CON) 20 MEQ CR tablet, TAKE 1 TABLET BY MOUTH TWICE DAILY, Disp: 180 tablet, Rfl: 0  •  ramipril (ALTACE) 1.25 MG capsule, TAKE 1 CAPSULE BY MOUTH EVERY EVENING WITH DINNER, Disp: 90 capsule, Rfl: 4    Allergies   Allergen Reactions   • Calcium-Containing Compounds Nausea Only   • Sulfa Antibiotics Unknown - Low Severity       Family History   Problem Relation Age of Onset   • Ovarian cancer Sister    • Kidney disease Maternal Aunt    • Cancer Other        Cancer-related family history includes Cancer in an other family member; Ovarian cancer in her sister.    Social History     Tobacco Use   • Smoking status: Never Smoker   • Smokeless tobacco: Never Used   Vaping Use   • Vaping Use: Never used   Substance Use Topics   • Alcohol use: No   • Drug use: No       I have reviewed and confirmed the accuracy of the patient's history: Chief complaint, HPI and ROS as entered by the MA/LPN/RN. Venita Sarkar MD 08/16/22       SUBJECTIVE:        Patient does not have any specific complaints today.  Remains on Arimidex and  "calcium.  There are no planned surgical procedures.     She continues to take Arimidex and has not had any significant issues    She is accompanied today by her spouse for this appointment    ROS:       Review of Systems   Constitutional: Negative for fever.   HENT: Negative for nosebleeds and trouble swallowing.    Eyes: Negative for visual disturbance.   Respiratory: Negative for cough, shortness of breath and wheezing.    Cardiovascular: Negative for chest pain.   Gastrointestinal: Negative for abdominal pain and blood in stool.   Endocrine: Negative for cold intolerance.   Genitourinary: Negative for dysuria and hematuria.   Musculoskeletal: Negative for joint swelling.   Skin: Negative for rash.   Allergic/Immunologic: Negative for environmental allergies.   Neurological: Negative for seizures.   Hematological: Does not bruise/bleed easily.   Psychiatric/Behavioral: The patient is not nervous/anxious.            Objective:       Vitals:    08/16/22 1348   BP: 121/65   Pulse: 88   Resp: 20   Temp: 97.1 °F (36.2 °C)   TempSrc: Infrared   Weight: 87.5 kg (192 lb 12.8 oz)   Height: 152.4 cm (60\")   PainSc: 0-No pain         PHYSICAL EXAM:     Physical Exam   Constitutional: She is oriented to person, place, and time. No distress.   Moderately built obese   HENT:   Head: Normocephalic and atraumatic.   Eyes: Conjunctivae are normal. Right eye exhibits no discharge. Left eye exhibits no discharge. No scleral icterus.   Neck: No thyromegaly present.   Cardiovascular: Normal rate, regular rhythm and normal heart sounds. Exam reveals no gallop and no friction rub.   Pulmonary/Chest: Effort normal. No stridor. No respiratory distress. She has no wheezes.   Decreased breath sounds both bases   Abdominal: Soft. Bowel sounds are normal. She exhibits no mass. There is no abdominal tenderness. There is no rebound and no guarding.   Musculoskeletal: Normal range of motion. No tenderness.      Comments: Trace bilateral ankle " edema   Lymphadenopathy:     She has no cervical adenopathy.   Neurological: She is alert and oriented to person, place, and time. She exhibits normal muscle tone.   Skin: Skin is warm. No rash noted. She is not diaphoretic. No erythema.   Psychiatric: Her behavior is normal.   Nursing note and vitals reviewed.    Bilateral breast exam does not reveal any palpable masses, nipple discharge or skin discoloration.    I have reexamined the patient and the results are consistent with the previously documented exam. Venitasravani Sarkar MD     RECENT LABS:       WBC   Date Value Ref Range Status   08/16/2022 11.44 (H) 3.40 - 10.80 10*3/mm3 Final     RBC   Date Value Ref Range Status   08/16/2022 4.77 3.77 - 5.28 10*6/mm3 Final     Hemoglobin   Date Value Ref Range Status   08/16/2022 12.3 12.0 - 15.9 g/dL Final     Hematocrit   Date Value Ref Range Status   08/16/2022 40.9 34.0 - 46.6 % Final     MCV   Date Value Ref Range Status   08/16/2022 85.7 79.0 - 97.0 fL Final     MCH   Date Value Ref Range Status   08/16/2022 25.8 (L) 26.6 - 33.0 pg Final     MCHC   Date Value Ref Range Status   08/16/2022 30.1 (L) 31.5 - 35.7 g/dL Final     RDW   Date Value Ref Range Status   08/16/2022 16.1 (H) 12.3 - 15.4 % Final     RDW-SD   Date Value Ref Range Status   08/16/2022 49.6 37.0 - 54.0 fl Final     MPV   Date Value Ref Range Status   08/16/2022 10.4 6.0 - 12.0 fL Final     Platelets   Date Value Ref Range Status   08/16/2022 314 140 - 450 10*3/mm3 Final     Neutrophil %   Date Value Ref Range Status   08/16/2022 76.8 (H) 42.7 - 76.0 % Final     Lymphocyte %   Date Value Ref Range Status   08/16/2022 16.1 (L) 19.6 - 45.3 % Final     Monocyte %   Date Value Ref Range Status   08/16/2022 6.0 5.0 - 12.0 % Final     Eosinophil %   Date Value Ref Range Status   08/16/2022 0.9 0.3 - 6.2 % Final     Basophil %   Date Value Ref Range Status   08/16/2022 0.2 0.0 - 1.5 % Final     Neutrophils, Absolute   Date Value Ref Range Status    08/16/2022 8.79 (H) 1.70 - 7.00 10*3/mm3 Final     Lymphocytes, Absolute   Date Value Ref Range Status   08/16/2022 1.84 0.70 - 3.10 10*3/mm3 Final     Monocytes, Absolute   Date Value Ref Range Status   08/16/2022 0.69 0.10 - 0.90 10*3/mm3 Final     Eosinophils, Absolute   Date Value Ref Range Status   08/16/2022 0.10 0.00 - 0.40 10*3/mm3 Final     Basophils, Absolute   Date Value Ref Range Status   08/16/2022 0.02 0.00 - 0.20 10*3/mm3 Final     nRBC   Date Value Ref Range Status   01/28/2019 0 0 /100[WBCs] Final       Lab Results   Component Value Date    GLUCOSE 118 (H) 08/08/2022    BUN 19 08/08/2022    CREATININE 1.19 (H) 08/08/2022    EGFRIFNONA 53 (L) 01/10/2022    BCR 16.0 08/08/2022    K 4.2 08/08/2022    CO2 23.5 08/08/2022    CALCIUM 10.4 08/08/2022    ALBUMIN 4.20 08/08/2022    LABIL2 1.0 05/08/2019    AST 17 08/08/2022    ALT 11 08/08/2022         Assessment & Plan      ASSESSMENT:     1. Left breast infiltrating ductal carcinoma, ER +100%, CA positive and HER-2/willi was negative: Status  post left lumpectomy with sentinel lymph node biopsy.  Status post adjuvant left breast radiation.  Ongoing surveillance  2. Oncotype DX assay with recurrence score of 25.  Chemotherapy not recommended.  Hormone receptor positive and has been on Arimidex initiated in June 2019.  She will continue the same.  3. Obesity with BMI of 40  4. Osteopenia: On Boniva, calcium supplements reviewed her recent bone density.  I have encouraged her to continue for now  5. ECOG 1      PLANS:      1. Continue Arimidex.  Patient will complete total of 5 years of treatment in June 2024   2. Continue Os-Quintin D 600 mg twice a day  3. Bilateral diagnostic mammogram reviewed February 2022.  Next mammogram will be due February 2023.  This has been ordered today  4. DEXA scan will be due again 4/8/2023.  She will continue Boniva  5. Patient to follow-up with her primary care physician: Reviewed  6. CMP reviewed August 2022.  7. Continue  Boniva monthly for osteopenia.  Reviewed  8. Follow-up 6 months or earlier as needed  9. Patient encouraged to continue with weekly breast self-exam and call for lumps nipple discharge or skin discoloration  10. All questions answered    I have reviewed labs results, imaging, vitals, and medications with the patient today.   Patient verbalized understanding and is in agreement of the above plan.          I spent 30 total minutes, face-to-face, caring for Christiane today.  90% of this time involved counseling and/or coordination of care as documented within this note.

## 2022-08-15 ENCOUNTER — OFFICE VISIT (OUTPATIENT)
Dept: ENDOCRINOLOGY | Facility: CLINIC | Age: 72
End: 2022-08-15

## 2022-08-15 VITALS
SYSTOLIC BLOOD PRESSURE: 122 MMHG | HEART RATE: 97 BPM | OXYGEN SATURATION: 99 % | DIASTOLIC BLOOD PRESSURE: 68 MMHG | BODY MASS INDEX: 37.5 KG/M2 | HEIGHT: 60 IN | WEIGHT: 191 LBS

## 2022-08-15 DIAGNOSIS — Z79.4 TYPE 2 DIABETES MELLITUS WITH MICROALBUMINURIA, WITH LONG-TERM CURRENT USE OF INSULIN: Primary | ICD-10-CM

## 2022-08-15 DIAGNOSIS — E11.21 DIABETIC NEPHROPATHY ASSOCIATED WITH TYPE 2 DIABETES MELLITUS: ICD-10-CM

## 2022-08-15 DIAGNOSIS — I10 ESSENTIAL HYPERTENSION: ICD-10-CM

## 2022-08-15 DIAGNOSIS — E03.9 ACQUIRED HYPOTHYROIDISM: ICD-10-CM

## 2022-08-15 DIAGNOSIS — E78.2 MIXED HYPERLIPIDEMIA: ICD-10-CM

## 2022-08-15 DIAGNOSIS — R80.9 TYPE 2 DIABETES MELLITUS WITH MICROALBUMINURIA, WITH LONG-TERM CURRENT USE OF INSULIN: Primary | ICD-10-CM

## 2022-08-15 DIAGNOSIS — E11.29 TYPE 2 DIABETES MELLITUS WITH MICROALBUMINURIA, WITH LONG-TERM CURRENT USE OF INSULIN: Primary | ICD-10-CM

## 2022-08-15 LAB — GLUCOSE BLDC GLUCOMTR-MCNC: 83 MG/DL (ref 70–105)

## 2022-08-15 PROCEDURE — 82962 GLUCOSE BLOOD TEST: CPT | Performed by: INTERNAL MEDICINE

## 2022-08-15 PROCEDURE — 99214 OFFICE O/P EST MOD 30 MIN: CPT | Performed by: INTERNAL MEDICINE

## 2022-08-15 NOTE — PATIENT INSTRUCTIONS
Continue current regimen  Always keep glucose source in case of low blood sugar  Annual eye exam  Labs before follow-up.

## 2022-08-16 ENCOUNTER — LAB (OUTPATIENT)
Dept: LAB | Facility: HOSPITAL | Age: 72
End: 2022-08-16

## 2022-08-16 ENCOUNTER — OFFICE VISIT (OUTPATIENT)
Dept: ONCOLOGY | Facility: CLINIC | Age: 72
End: 2022-08-16

## 2022-08-16 VITALS
RESPIRATION RATE: 20 BRPM | HEART RATE: 88 BPM | SYSTOLIC BLOOD PRESSURE: 121 MMHG | DIASTOLIC BLOOD PRESSURE: 65 MMHG | WEIGHT: 192.8 LBS | BODY MASS INDEX: 37.85 KG/M2 | TEMPERATURE: 97.1 F | HEIGHT: 60 IN

## 2022-08-16 DIAGNOSIS — Z79.4 TYPE 2 DIABETES MELLITUS WITH HYPOGLYCEMIA WITHOUT COMA, WITH LONG-TERM CURRENT USE OF INSULIN: Primary | ICD-10-CM

## 2022-08-16 DIAGNOSIS — C50.919 PRIMARY MALIGNANT NEOPLASM OF FEMALE BREAST: ICD-10-CM

## 2022-08-16 DIAGNOSIS — C50.919 PRIMARY MALIGNANT NEOPLASM OF FEMALE BREAST: Primary | ICD-10-CM

## 2022-08-16 DIAGNOSIS — E11.649 TYPE 2 DIABETES MELLITUS WITH HYPOGLYCEMIA WITHOUT COMA, WITH LONG-TERM CURRENT USE OF INSULIN: Primary | ICD-10-CM

## 2022-08-16 DIAGNOSIS — N64.9 DISORDER OF BREAST, UNSPECIFIED: ICD-10-CM

## 2022-08-16 LAB
BASOPHILS # BLD AUTO: 0.02 10*3/MM3 (ref 0–0.2)
BASOPHILS NFR BLD AUTO: 0.2 % (ref 0–1.5)
DEPRECATED RDW RBC AUTO: 49.6 FL (ref 37–54)
EOSINOPHIL # BLD AUTO: 0.1 10*3/MM3 (ref 0–0.4)
EOSINOPHIL NFR BLD AUTO: 0.9 % (ref 0.3–6.2)
ERYTHROCYTE [DISTWIDTH] IN BLOOD BY AUTOMATED COUNT: 16.1 % (ref 12.3–15.4)
HCT VFR BLD AUTO: 40.9 % (ref 34–46.6)
HGB BLD-MCNC: 12.3 G/DL (ref 12–15.9)
HOLD SPECIMEN: NORMAL
HOLD SPECIMEN: NORMAL
LYMPHOCYTES # BLD AUTO: 1.84 10*3/MM3 (ref 0.7–3.1)
LYMPHOCYTES NFR BLD AUTO: 16.1 % (ref 19.6–45.3)
MCH RBC QN AUTO: 25.8 PG (ref 26.6–33)
MCHC RBC AUTO-ENTMCNC: 30.1 G/DL (ref 31.5–35.7)
MCV RBC AUTO: 85.7 FL (ref 79–97)
MONOCYTES # BLD AUTO: 0.69 10*3/MM3 (ref 0.1–0.9)
MONOCYTES NFR BLD AUTO: 6 % (ref 5–12)
NEUTROPHILS NFR BLD AUTO: 76.8 % (ref 42.7–76)
NEUTROPHILS NFR BLD AUTO: 8.79 10*3/MM3 (ref 1.7–7)
PLATELET # BLD AUTO: 314 10*3/MM3 (ref 140–450)
PMV BLD AUTO: 10.4 FL (ref 6–12)
RBC # BLD AUTO: 4.77 10*6/MM3 (ref 3.77–5.28)
WBC NRBC COR # BLD: 11.44 10*3/MM3 (ref 3.4–10.8)

## 2022-08-16 PROCEDURE — 99214 OFFICE O/P EST MOD 30 MIN: CPT | Performed by: INTERNAL MEDICINE

## 2022-08-16 PROCEDURE — 85025 COMPLETE CBC W/AUTO DIFF WBC: CPT

## 2022-08-16 PROCEDURE — 36415 COLL VENOUS BLD VENIPUNCTURE: CPT

## 2022-08-16 RX ORDER — ACETAZOLAMIDE 250 MG/1
TABLET ORAL
Qty: 90 TABLET | Refills: 0 | Status: SHIPPED | OUTPATIENT
Start: 2022-08-16 | End: 2022-10-20

## 2022-08-18 RX ORDER — POTASSIUM CHLORIDE 20 MEQ/1
20 TABLET, EXTENDED RELEASE ORAL 2 TIMES DAILY
Qty: 180 TABLET | Refills: 0 | Status: SHIPPED | OUTPATIENT
Start: 2022-08-18 | End: 2022-12-06 | Stop reason: HOSPADM

## 2022-08-18 NOTE — TELEPHONE ENCOUNTER
Caller: Christiane Nazario    Relationship: Self    Best call back number:[phone]    Requested Prescriptions:   Requested Prescriptions     Pending Prescriptions Disp Refills   • potassium chloride (K-DUR,KLOR-CON) 20 MEQ CR tablet 180 tablet 0     Sig: Take 1 tablet by mouth 2 (Two) Times a Day.        Pharmacy where request should be sent: [facility] - [place], [place] - [address] - [phone] [facility] [phone]      Additional details provided by patient: LESS THAN 3 DAYS OF MEDICATION REMAINING    Does the patient have less than a 3 day supply:  [x] Yes  [] No    Kassy Crane   08/18/22 14:50 EDT

## 2022-08-23 DIAGNOSIS — C50.919 PRIMARY MALIGNANT NEOPLASM OF FEMALE BREAST: ICD-10-CM

## 2022-08-23 RX ORDER — ATORVASTATIN CALCIUM 40 MG/1
TABLET, FILM COATED ORAL
Qty: 30 TABLET | Refills: 10 | Status: SHIPPED | OUTPATIENT
Start: 2022-08-23 | End: 2022-11-07

## 2022-08-23 RX ORDER — LEVOTHYROXINE SODIUM 88 UG/1
TABLET ORAL
Qty: 30 TABLET | Refills: 5 | Status: SHIPPED | OUTPATIENT
Start: 2022-08-23 | End: 2022-11-07

## 2022-08-24 RX ORDER — ANASTROZOLE 1 MG/1
1 TABLET ORAL DAILY
Qty: 90 TABLET | Refills: 1 | Status: SHIPPED | OUTPATIENT
Start: 2022-08-24 | End: 2022-10-07

## 2022-09-15 ENCOUNTER — TELEPHONE (OUTPATIENT)
Dept: FAMILY MEDICINE CLINIC | Facility: CLINIC | Age: 72
End: 2022-09-15

## 2022-09-15 RX ORDER — ALENDRONATE SODIUM 70 MG/1
70 TABLET ORAL
Qty: 4 TABLET | Refills: 6 | Status: SHIPPED | OUTPATIENT
Start: 2022-09-15 | End: 2022-10-07

## 2022-09-15 NOTE — TELEPHONE ENCOUNTER
Caller: Christiane Nazario    Relationship: Self    Best call back number: [phone]    What medication are you requesting: BONIVA NAME BRAND, NOT GENERIC    What are your current symptoms: PAIN IN HER BONES AND JOINTS    How long have you been experiencing symptoms:    Have you had these symptoms before:    [x] Yes  [] No    Have you been treated for these symptoms before:   [x] Yes  [] No    If a prescription is needed, what is your preferred pharmacy and phone number: [facility] - [place], [place] - [address] - [phone] [facility] [phone] FX     Additional notes:

## 2022-09-15 NOTE — TELEPHONE ENCOUNTER
Can not get the brand name any longer- would she like to do the prolia shos instead or generic fosamax?

## 2022-09-27 ENCOUNTER — HOSPITAL ENCOUNTER (INPATIENT)
Facility: HOSPITAL | Age: 72
LOS: 1 days | Discharge: HOME HEALTH | End: 2022-09-30
Attending: EMERGENCY MEDICINE | Admitting: INTERNAL MEDICINE

## 2022-09-27 ENCOUNTER — APPOINTMENT (OUTPATIENT)
Dept: CT IMAGING | Facility: HOSPITAL | Age: 72
End: 2022-09-27

## 2022-09-27 DIAGNOSIS — N39.0 ACUTE UTI: Primary | ICD-10-CM

## 2022-09-27 DIAGNOSIS — C78.00 MALIGNANT NEOPLASM METASTATIC TO LUNG, UNSPECIFIED LATERALITY: ICD-10-CM

## 2022-09-27 DIAGNOSIS — C79.51 BONE METASTASES: ICD-10-CM

## 2022-09-27 DIAGNOSIS — R11.2 INTRACTABLE NAUSEA AND VOMITING: ICD-10-CM

## 2022-09-27 DIAGNOSIS — C78.7 LIVER METASTASES: ICD-10-CM

## 2022-09-27 DIAGNOSIS — R19.7 DIARRHEA, UNSPECIFIED TYPE: ICD-10-CM

## 2022-09-27 DIAGNOSIS — R53.81 DEBILITY: ICD-10-CM

## 2022-09-27 DIAGNOSIS — C50.919 PRIMARY MALIGNANT NEOPLASM OF FEMALE BREAST: ICD-10-CM

## 2022-09-27 PROBLEM — C79.9 METASTATIC CANCER (HCC): Status: ACTIVE | Noted: 2022-09-27

## 2022-09-27 PROBLEM — M84.550A: Status: ACTIVE | Noted: 2022-09-27

## 2022-09-27 LAB
ALBUMIN SERPL-MCNC: 3.5 G/DL (ref 3.5–5.2)
ALBUMIN/GLOB SERPL: 0.9 G/DL
ALP SERPL-CCNC: 163 U/L (ref 39–117)
ALT SERPL W P-5'-P-CCNC: 12 U/L (ref 1–33)
ANION GAP SERPL CALCULATED.3IONS-SCNC: 15 MMOL/L (ref 5–15)
AST SERPL-CCNC: 19 U/L (ref 1–32)
BACTERIA UR QL AUTO: ABNORMAL /HPF
BASOPHILS # BLD AUTO: 0 10*3/MM3 (ref 0–0.2)
BASOPHILS NFR BLD AUTO: 0.3 % (ref 0–1.5)
BILIRUB SERPL-MCNC: 0.4 MG/DL (ref 0–1.2)
BILIRUB UR QL STRIP: NEGATIVE
BUN SERPL-MCNC: 19 MG/DL (ref 8–23)
BUN/CREAT SERPL: 16.4 (ref 7–25)
CALCIUM SPEC-SCNC: 9.4 MG/DL (ref 8.6–10.5)
CHLORIDE SERPL-SCNC: 103 MMOL/L (ref 98–107)
CLARITY UR: CLEAR
CO2 SERPL-SCNC: 23 MMOL/L (ref 22–29)
COLOR UR: YELLOW
CREAT SERPL-MCNC: 1.16 MG/DL (ref 0.57–1)
DEPRECATED RDW RBC AUTO: 49 FL (ref 37–54)
EGFRCR SERPLBLD CKD-EPI 2021: 50.8 ML/MIN/1.73
EOSINOPHIL # BLD AUTO: 0.1 10*3/MM3 (ref 0–0.4)
EOSINOPHIL NFR BLD AUTO: 0.5 % (ref 0.3–6.2)
ERYTHROCYTE [DISTWIDTH] IN BLOOD BY AUTOMATED COUNT: 17.1 % (ref 12.3–15.4)
GLOBULIN UR ELPH-MCNC: 3.7 GM/DL
GLUCOSE SERPL-MCNC: 218 MG/DL (ref 65–99)
GLUCOSE UR STRIP-MCNC: NEGATIVE MG/DL
HCT VFR BLD AUTO: 35.2 % (ref 34–46.6)
HGB BLD-MCNC: 11.2 G/DL (ref 12–15.9)
HGB UR QL STRIP.AUTO: NEGATIVE
HOLD SPECIMEN: NORMAL
HOLD SPECIMEN: NORMAL
HYALINE CASTS UR QL AUTO: ABNORMAL /LPF
KETONES UR QL STRIP: NEGATIVE
LEUKOCYTE ESTERASE UR QL STRIP.AUTO: ABNORMAL
LIPASE SERPL-CCNC: 26 U/L (ref 13–60)
LYMPHOCYTES # BLD AUTO: 0.9 10*3/MM3 (ref 0.7–3.1)
LYMPHOCYTES NFR BLD AUTO: 7.8 % (ref 19.6–45.3)
MCH RBC QN AUTO: 25.3 PG (ref 26.6–33)
MCHC RBC AUTO-ENTMCNC: 31.8 G/DL (ref 31.5–35.7)
MCV RBC AUTO: 79.6 FL (ref 79–97)
MONOCYTES # BLD AUTO: 0.6 10*3/MM3 (ref 0.1–0.9)
MONOCYTES NFR BLD AUTO: 5.2 % (ref 5–12)
NEUTROPHILS NFR BLD AUTO: 10.2 10*3/MM3 (ref 1.7–7)
NEUTROPHILS NFR BLD AUTO: 86.2 % (ref 42.7–76)
NITRITE UR QL STRIP: NEGATIVE
NRBC BLD AUTO-RTO: 0 /100 WBC (ref 0–0.2)
PH UR STRIP.AUTO: 7.5 [PH] (ref 5–8)
PLATELET # BLD AUTO: 311 10*3/MM3 (ref 140–450)
PMV BLD AUTO: 7.8 FL (ref 6–12)
POTASSIUM SERPL-SCNC: 4.5 MMOL/L (ref 3.5–5.2)
PROT SERPL-MCNC: 7.2 G/DL (ref 6–8.5)
PROT UR QL STRIP: NEGATIVE
RBC # BLD AUTO: 4.43 10*6/MM3 (ref 3.77–5.28)
RBC # UR STRIP: ABNORMAL /HPF
REF LAB TEST METHOD: ABNORMAL
SODIUM SERPL-SCNC: 141 MMOL/L (ref 136–145)
SP GR UR STRIP: 1.01 (ref 1–1.03)
SQUAMOUS #/AREA URNS HPF: ABNORMAL /HPF
TROPONIN T SERPL-MCNC: <0.01 NG/ML (ref 0–0.03)
UROBILINOGEN UR QL STRIP: ABNORMAL
WBC # UR STRIP: ABNORMAL /HPF
WBC NRBC COR # BLD: 11.8 10*3/MM3 (ref 3.4–10.8)
WHOLE BLOOD HOLD COAG: NORMAL
WHOLE BLOOD HOLD SPECIMEN: NORMAL

## 2022-09-27 PROCEDURE — 74176 CT ABD & PELVIS W/O CONTRAST: CPT

## 2022-09-27 PROCEDURE — 81001 URINALYSIS AUTO W/SCOPE: CPT | Performed by: EMERGENCY MEDICINE

## 2022-09-27 PROCEDURE — 99284 EMERGENCY DEPT VISIT MOD MDM: CPT

## 2022-09-27 PROCEDURE — 25010000002 CEFTRIAXONE PER 250 MG: Performed by: EMERGENCY MEDICINE

## 2022-09-27 PROCEDURE — 84484 ASSAY OF TROPONIN QUANT: CPT | Performed by: EMERGENCY MEDICINE

## 2022-09-27 PROCEDURE — 85025 COMPLETE CBC W/AUTO DIFF WBC: CPT | Performed by: EMERGENCY MEDICINE

## 2022-09-27 PROCEDURE — 80053 COMPREHEN METABOLIC PANEL: CPT | Performed by: EMERGENCY MEDICINE

## 2022-09-27 PROCEDURE — 83690 ASSAY OF LIPASE: CPT | Performed by: EMERGENCY MEDICINE

## 2022-09-27 PROCEDURE — 36415 COLL VENOUS BLD VENIPUNCTURE: CPT

## 2022-09-27 RX ORDER — INSULIN LISPRO 100 [IU]/ML
0-7 INJECTION, SOLUTION INTRAVENOUS; SUBCUTANEOUS
Status: DISCONTINUED | OUTPATIENT
Start: 2022-09-28 | End: 2022-09-30 | Stop reason: HOSPADM

## 2022-09-27 RX ORDER — AMOXICILLIN 250 MG
2 CAPSULE ORAL 2 TIMES DAILY
Status: DISCONTINUED | OUTPATIENT
Start: 2022-09-28 | End: 2022-09-30 | Stop reason: HOSPADM

## 2022-09-27 RX ORDER — MAGNESIUM SULFATE HEPTAHYDRATE 40 MG/ML
2 INJECTION, SOLUTION INTRAVENOUS AS NEEDED
Status: DISCONTINUED | OUTPATIENT
Start: 2022-09-27 | End: 2022-09-30 | Stop reason: HOSPADM

## 2022-09-27 RX ORDER — CHOLECALCIFEROL (VITAMIN D3) 125 MCG
5 CAPSULE ORAL NIGHTLY PRN
Status: DISCONTINUED | OUTPATIENT
Start: 2022-09-27 | End: 2022-09-30 | Stop reason: HOSPADM

## 2022-09-27 RX ORDER — ACETAMINOPHEN 160 MG/5ML
650 SOLUTION ORAL EVERY 4 HOURS PRN
Status: DISCONTINUED | OUTPATIENT
Start: 2022-09-27 | End: 2022-09-30 | Stop reason: HOSPADM

## 2022-09-27 RX ORDER — SODIUM CHLORIDE 0.9 % (FLUSH) 0.9 %
10 SYRINGE (ML) INJECTION AS NEEDED
Status: DISCONTINUED | OUTPATIENT
Start: 2022-09-27 | End: 2022-09-30 | Stop reason: HOSPADM

## 2022-09-27 RX ORDER — ACETAMINOPHEN 325 MG/1
650 TABLET ORAL EVERY 4 HOURS PRN
Status: DISCONTINUED | OUTPATIENT
Start: 2022-09-27 | End: 2022-09-30 | Stop reason: HOSPADM

## 2022-09-27 RX ORDER — OLANZAPINE 10 MG/2ML
1 INJECTION, POWDER, LYOPHILIZED, FOR SOLUTION INTRAMUSCULAR
Status: DISCONTINUED | OUTPATIENT
Start: 2022-09-27 | End: 2022-09-30 | Stop reason: HOSPADM

## 2022-09-27 RX ORDER — ONDANSETRON 2 MG/ML
4 INJECTION INTRAMUSCULAR; INTRAVENOUS EVERY 6 HOURS PRN
Status: DISCONTINUED | OUTPATIENT
Start: 2022-09-27 | End: 2022-09-30 | Stop reason: HOSPADM

## 2022-09-27 RX ORDER — SODIUM CHLORIDE 9 MG/ML
100 INJECTION, SOLUTION INTRAVENOUS CONTINUOUS
Status: DISCONTINUED | OUTPATIENT
Start: 2022-09-28 | End: 2022-09-28

## 2022-09-27 RX ORDER — POTASSIUM CHLORIDE 20 MEQ/1
40 TABLET, EXTENDED RELEASE ORAL AS NEEDED
Status: DISCONTINUED | OUTPATIENT
Start: 2022-09-27 | End: 2022-09-30 | Stop reason: HOSPADM

## 2022-09-27 RX ORDER — POTASSIUM CHLORIDE 1.5 G/1.77G
40 POWDER, FOR SOLUTION ORAL AS NEEDED
Status: DISCONTINUED | OUTPATIENT
Start: 2022-09-27 | End: 2022-09-30 | Stop reason: HOSPADM

## 2022-09-27 RX ORDER — FUROSEMIDE 40 MG/1
TABLET ORAL
Qty: 30 TABLET | Refills: 2 | Status: SHIPPED | OUTPATIENT
Start: 2022-09-27 | End: 2022-11-07

## 2022-09-27 RX ORDER — BISACODYL 10 MG
10 SUPPOSITORY, RECTAL RECTAL DAILY PRN
Status: DISCONTINUED | OUTPATIENT
Start: 2022-09-27 | End: 2022-09-30 | Stop reason: HOSPADM

## 2022-09-27 RX ORDER — SODIUM CHLORIDE 0.9 % (FLUSH) 0.9 %
10 SYRINGE (ML) INJECTION EVERY 12 HOURS SCHEDULED
Status: DISCONTINUED | OUTPATIENT
Start: 2022-09-27 | End: 2022-09-30 | Stop reason: HOSPADM

## 2022-09-27 RX ORDER — HEPARIN SODIUM 5000 [USP'U]/ML
5000 INJECTION, SOLUTION INTRAVENOUS; SUBCUTANEOUS EVERY 12 HOURS SCHEDULED
Status: DISCONTINUED | OUTPATIENT
Start: 2022-09-28 | End: 2022-09-30 | Stop reason: HOSPADM

## 2022-09-27 RX ORDER — ACETAMINOPHEN 650 MG/1
650 SUPPOSITORY RECTAL EVERY 4 HOURS PRN
Status: DISCONTINUED | OUTPATIENT
Start: 2022-09-27 | End: 2022-09-30 | Stop reason: HOSPADM

## 2022-09-27 RX ORDER — MAGNESIUM SULFATE 1 G/100ML
1 INJECTION INTRAVENOUS AS NEEDED
Status: DISCONTINUED | OUTPATIENT
Start: 2022-09-27 | End: 2022-09-30 | Stop reason: HOSPADM

## 2022-09-27 RX ORDER — NICOTINE POLACRILEX 4 MG
15 LOZENGE BUCCAL
Status: DISCONTINUED | OUTPATIENT
Start: 2022-09-27 | End: 2022-09-30 | Stop reason: HOSPADM

## 2022-09-27 RX ORDER — POLYETHYLENE GLYCOL 3350 17 G/17G
17 POWDER, FOR SOLUTION ORAL DAILY PRN
Status: DISCONTINUED | OUTPATIENT
Start: 2022-09-27 | End: 2022-09-30 | Stop reason: HOSPADM

## 2022-09-27 RX ORDER — DEXTROSE MONOHYDRATE 25 G/50ML
25 INJECTION, SOLUTION INTRAVENOUS
Status: DISCONTINUED | OUTPATIENT
Start: 2022-09-27 | End: 2022-09-30 | Stop reason: HOSPADM

## 2022-09-27 RX ORDER — ONDANSETRON 4 MG/1
4 TABLET, FILM COATED ORAL EVERY 6 HOURS PRN
Status: DISCONTINUED | OUTPATIENT
Start: 2022-09-27 | End: 2022-09-30 | Stop reason: HOSPADM

## 2022-09-27 RX ORDER — BISACODYL 5 MG/1
5 TABLET, DELAYED RELEASE ORAL DAILY PRN
Status: DISCONTINUED | OUTPATIENT
Start: 2022-09-27 | End: 2022-09-30 | Stop reason: HOSPADM

## 2022-09-27 RX ORDER — POTASSIUM CHLORIDE 7.45 MG/ML
10 INJECTION INTRAVENOUS
Status: DISCONTINUED | OUTPATIENT
Start: 2022-09-27 | End: 2022-09-30 | Stop reason: HOSPADM

## 2022-09-27 RX ADMIN — CEFTRIAXONE 1 G: 1 INJECTION, POWDER, FOR SOLUTION INTRAMUSCULAR; INTRAVENOUS at 22:23

## 2022-09-27 RX ADMIN — SODIUM CHLORIDE 500 ML: 9 INJECTION, SOLUTION INTRAVENOUS at 20:55

## 2022-09-27 RX ADMIN — Medication 10 ML: at 23:43

## 2022-09-28 ENCOUNTER — APPOINTMENT (OUTPATIENT)
Dept: CT IMAGING | Facility: HOSPITAL | Age: 72
End: 2022-09-28

## 2022-09-28 LAB
ADV 40+41 DNA STL QL NAA+NON-PROBE: NOT DETECTED
ANION GAP SERPL CALCULATED.3IONS-SCNC: 13 MMOL/L (ref 5–15)
ASTRO TYP 1-8 RNA STL QL NAA+NON-PROBE: NOT DETECTED
BUN SERPL-MCNC: 15 MG/DL (ref 8–23)
BUN/CREAT SERPL: 15.6 (ref 7–25)
C CAYETANENSIS DNA STL QL NAA+NON-PROBE: NOT DETECTED
C COLI+JEJ+UPSA DNA STL QL NAA+NON-PROBE: NOT DETECTED
C DIFF GDH + TOXINS A+B STL QL IA.RAPID: NEGATIVE
C DIFF GDH + TOXINS A+B STL QL IA.RAPID: NEGATIVE
CA-I SERPL ISE-MCNC: 1.23 MMOL/L (ref 1.2–1.3)
CALCIUM SPEC-SCNC: 9.1 MG/DL (ref 8.6–10.5)
CHLORIDE SERPL-SCNC: 109 MMOL/L (ref 98–107)
CO2 SERPL-SCNC: 20 MMOL/L (ref 22–29)
CREAT SERPL-MCNC: 0.96 MG/DL (ref 0.57–1)
CRYPTOSP DNA STL QL NAA+NON-PROBE: NOT DETECTED
E HISTOLYT DNA STL QL NAA+NON-PROBE: NOT DETECTED
EAEC PAA PLAS AGGR+AATA ST NAA+NON-PRB: NOT DETECTED
EC STX1+STX2 GENES STL QL NAA+NON-PROBE: NOT DETECTED
EGFRCR SERPLBLD CKD-EPI 2021: 63.8 ML/MIN/1.73
EPEC EAE GENE STL QL NAA+NON-PROBE: NOT DETECTED
ETEC LTA+ST1A+ST1B TOX ST NAA+NON-PROBE: NOT DETECTED
G LAMBLIA DNA STL QL NAA+NON-PROBE: NOT DETECTED
GLUCOSE BLDC GLUCOMTR-MCNC: 106 MG/DL (ref 70–105)
GLUCOSE BLDC GLUCOMTR-MCNC: 110 MG/DL (ref 70–105)
GLUCOSE BLDC GLUCOMTR-MCNC: 132 MG/DL (ref 70–105)
GLUCOSE BLDC GLUCOMTR-MCNC: 139 MG/DL (ref 70–105)
GLUCOSE BLDC GLUCOMTR-MCNC: 143 MG/DL (ref 70–105)
GLUCOSE BLDC GLUCOMTR-MCNC: 95 MG/DL (ref 70–105)
GLUCOSE SERPL-MCNC: 161 MG/DL (ref 65–99)
HOLD SPECIMEN: NORMAL
MAGNESIUM SERPL-MCNC: 2.1 MG/DL (ref 1.6–2.4)
NOROVIRUS GI+II RNA STL QL NAA+NON-PROBE: NOT DETECTED
P SHIGELLOIDES DNA STL QL NAA+NON-PROBE: NOT DETECTED
POTASSIUM SERPL-SCNC: 4.3 MMOL/L (ref 3.5–5.2)
RVA RNA STL QL NAA+NON-PROBE: NOT DETECTED
S ENT+BONG DNA STL QL NAA+NON-PROBE: NOT DETECTED
SAPO I+II+IV+V RNA STL QL NAA+NON-PROBE: NOT DETECTED
SHIGELLA SP+EIEC IPAH ST NAA+NON-PROBE: NOT DETECTED
SODIUM SERPL-SCNC: 142 MMOL/L (ref 136–145)
V CHOL+PARA+VUL DNA STL QL NAA+NON-PROBE: NOT DETECTED
V CHOLERAE DNA STL QL NAA+NON-PROBE: NOT DETECTED
Y ENTEROCOL DNA STL QL NAA+NON-PROBE: NOT DETECTED

## 2022-09-28 PROCEDURE — G0378 HOSPITAL OBSERVATION PER HR: HCPCS

## 2022-09-28 PROCEDURE — 87507 IADNA-DNA/RNA PROBE TQ 12-25: CPT

## 2022-09-28 PROCEDURE — 82962 GLUCOSE BLOOD TEST: CPT

## 2022-09-28 PROCEDURE — 80048 BASIC METABOLIC PNL TOTAL CA: CPT | Performed by: INTERNAL MEDICINE

## 2022-09-28 PROCEDURE — 87086 URINE CULTURE/COLONY COUNT: CPT | Performed by: INTERNAL MEDICINE

## 2022-09-28 PROCEDURE — 82330 ASSAY OF CALCIUM: CPT | Performed by: INTERNAL MEDICINE

## 2022-09-28 PROCEDURE — 87077 CULTURE AEROBIC IDENTIFY: CPT | Performed by: INTERNAL MEDICINE

## 2022-09-28 PROCEDURE — 87324 CLOSTRIDIUM AG IA: CPT

## 2022-09-28 PROCEDURE — 86300 IMMUNOASSAY TUMOR CA 15-3: CPT | Performed by: NURSE PRACTITIONER

## 2022-09-28 PROCEDURE — 71250 CT THORAX DX C-: CPT

## 2022-09-28 PROCEDURE — 25010000002 CEFTRIAXONE PER 250 MG

## 2022-09-28 PROCEDURE — 63710000001 INSULIN ISOPHANE & REGULAR PER 5 UNITS

## 2022-09-28 PROCEDURE — 87449 NOS EACH ORGANISM AG IA: CPT

## 2022-09-28 PROCEDURE — 99214 OFFICE O/P EST MOD 30 MIN: CPT | Performed by: INTERNAL MEDICINE

## 2022-09-28 PROCEDURE — 82378 CARCINOEMBRYONIC ANTIGEN: CPT | Performed by: NURSE PRACTITIONER

## 2022-09-28 PROCEDURE — 25010000002 HEPARIN (PORCINE) PER 1000 UNITS

## 2022-09-28 PROCEDURE — 87186 SC STD MICRODIL/AGAR DIL: CPT | Performed by: INTERNAL MEDICINE

## 2022-09-28 PROCEDURE — 83735 ASSAY OF MAGNESIUM: CPT | Performed by: INTERNAL MEDICINE

## 2022-09-28 RX ORDER — MELATONIN
2000 DAILY
Refills: 10 | Status: DISCONTINUED | OUTPATIENT
Start: 2022-09-28 | End: 2022-09-30 | Stop reason: HOSPADM

## 2022-09-28 RX ORDER — NYSTATIN 100000 [USP'U]/G
POWDER TOPICAL EVERY 12 HOURS SCHEDULED
Status: DISCONTINUED | OUTPATIENT
Start: 2022-09-28 | End: 2022-09-30 | Stop reason: HOSPADM

## 2022-09-28 RX ORDER — ASPIRIN 81 MG/1
81 TABLET ORAL DAILY
Status: DISCONTINUED | OUTPATIENT
Start: 2022-09-28 | End: 2022-09-30 | Stop reason: HOSPADM

## 2022-09-28 RX ORDER — FUROSEMIDE 40 MG/1
40 TABLET ORAL DAILY
Status: DISCONTINUED | OUTPATIENT
Start: 2022-09-28 | End: 2022-09-30 | Stop reason: HOSPADM

## 2022-09-28 RX ORDER — LISINOPRIL 5 MG/1
5 TABLET ORAL
Refills: 4 | Status: DISCONTINUED | OUTPATIENT
Start: 2022-09-28 | End: 2022-09-30 | Stop reason: HOSPADM

## 2022-09-28 RX ORDER — DIPHENOXYLATE HYDROCHLORIDE AND ATROPINE SULFATE 2.5; .025 MG/1; MG/1
1 TABLET ORAL 4 TIMES DAILY PRN
Status: DISCONTINUED | OUTPATIENT
Start: 2022-09-28 | End: 2022-09-30 | Stop reason: HOSPADM

## 2022-09-28 RX ORDER — ATORVASTATIN CALCIUM 40 MG/1
40 TABLET, FILM COATED ORAL DAILY
Status: DISCONTINUED | OUTPATIENT
Start: 2022-09-28 | End: 2022-09-30 | Stop reason: HOSPADM

## 2022-09-28 RX ORDER — ANASTROZOLE 1 MG/1
1 TABLET ORAL DAILY
Status: DISCONTINUED | OUTPATIENT
Start: 2022-09-28 | End: 2022-09-30 | Stop reason: HOSPADM

## 2022-09-28 RX ORDER — ACETAZOLAMIDE 250 MG/1
250 TABLET ORAL DAILY
Status: DISCONTINUED | OUTPATIENT
Start: 2022-09-28 | End: 2022-09-30 | Stop reason: HOSPADM

## 2022-09-28 RX ORDER — CETIRIZINE HYDROCHLORIDE 10 MG/1
10 TABLET ORAL DAILY
Refills: 1 | Status: DISCONTINUED | OUTPATIENT
Start: 2022-09-28 | End: 2022-09-30 | Stop reason: HOSPADM

## 2022-09-28 RX ORDER — MONTELUKAST SODIUM 10 MG/1
10 TABLET ORAL DAILY
Status: DISCONTINUED | OUTPATIENT
Start: 2022-09-28 | End: 2022-09-30 | Stop reason: HOSPADM

## 2022-09-28 RX ORDER — CYCLOBENZAPRINE HCL 10 MG
10 TABLET ORAL 3 TIMES DAILY PRN
Status: DISCONTINUED | OUTPATIENT
Start: 2022-09-28 | End: 2022-09-30 | Stop reason: HOSPADM

## 2022-09-28 RX ORDER — LEVOTHYROXINE SODIUM 88 UG/1
88 TABLET ORAL
Status: DISCONTINUED | OUTPATIENT
Start: 2022-09-28 | End: 2022-09-30 | Stop reason: HOSPADM

## 2022-09-28 RX ADMIN — Medication 10 ML: at 20:03

## 2022-09-28 RX ADMIN — CEFTRIAXONE 1 G: 1 INJECTION, POWDER, FOR SOLUTION INTRAMUSCULAR; INTRAVENOUS at 20:02

## 2022-09-28 RX ADMIN — Medication 400 MG: at 09:26

## 2022-09-28 RX ADMIN — LISINOPRIL 5 MG: 5 TABLET ORAL at 09:26

## 2022-09-28 RX ADMIN — MONTELUKAST 10 MG: 10 TABLET, FILM COATED ORAL at 09:26

## 2022-09-28 RX ADMIN — INSULIN HUMAN 25 UNITS: 100 INJECTION, SUSPENSION SUBCUTANEOUS at 09:25

## 2022-09-28 RX ADMIN — Medication 400 MG: at 20:02

## 2022-09-28 RX ADMIN — ANASTROZOLE 1 MG: 1 TABLET ORAL at 09:31

## 2022-09-28 RX ADMIN — SODIUM CHLORIDE 100 ML/HR: 9 INJECTION, SOLUTION INTRAVENOUS at 00:10

## 2022-09-28 RX ADMIN — Medication 2000 UNITS: at 09:26

## 2022-09-28 RX ADMIN — FUROSEMIDE 40 MG: 40 TABLET ORAL at 09:26

## 2022-09-28 RX ADMIN — NYSTATIN: 100000 POWDER TOPICAL at 09:28

## 2022-09-28 RX ADMIN — ASPIRIN 81 MG: 81 TABLET, COATED ORAL at 09:26

## 2022-09-28 RX ADMIN — NYSTATIN: 100000 POWDER TOPICAL at 01:09

## 2022-09-28 RX ADMIN — ATORVASTATIN CALCIUM 40 MG: 40 TABLET, FILM COATED ORAL at 09:26

## 2022-09-28 RX ADMIN — ACETAZOLAMIDE 250 MG: 250 TABLET ORAL at 09:26

## 2022-09-28 RX ADMIN — HEPARIN SODIUM 5000 UNITS: 5000 INJECTION INTRAVENOUS; SUBCUTANEOUS at 20:02

## 2022-09-28 RX ADMIN — HEPARIN SODIUM 5000 UNITS: 5000 INJECTION INTRAVENOUS; SUBCUTANEOUS at 09:26

## 2022-09-28 RX ADMIN — Medication 10 ML: at 09:28

## 2022-09-28 RX ADMIN — NYSTATIN: 100000 POWDER TOPICAL at 20:03

## 2022-09-28 RX ADMIN — CETIRIZINE HYDROCHLORIDE 10 MG: 10 TABLET, FILM COATED ORAL at 09:26

## 2022-09-28 RX ADMIN — LEVOTHYROXINE SODIUM 88 MCG: 0.09 TABLET ORAL at 05:17

## 2022-09-29 ENCOUNTER — APPOINTMENT (OUTPATIENT)
Dept: NUCLEAR MEDICINE | Facility: HOSPITAL | Age: 72
End: 2022-09-29

## 2022-09-29 ENCOUNTER — APPOINTMENT (OUTPATIENT)
Dept: CT IMAGING | Facility: HOSPITAL | Age: 72
End: 2022-09-29

## 2022-09-29 LAB
ANION GAP SERPL CALCULATED.3IONS-SCNC: 13 MMOL/L (ref 5–15)
APTT PPP: 27.5 SECONDS (ref 61–76.5)
BUN SERPL-MCNC: 11 MG/DL (ref 8–23)
BUN/CREAT SERPL: 13.1 (ref 7–25)
CALCIUM SPEC-SCNC: 8.9 MG/DL (ref 8.6–10.5)
CANCER AG15-3 SERPL-ACNC: 36.3 U/ML
CEA SERPL-MCNC: 5.02 NG/ML
CHLORIDE SERPL-SCNC: 109 MMOL/L (ref 98–107)
CO2 SERPL-SCNC: 20 MMOL/L (ref 22–29)
CREAT SERPL-MCNC: 0.84 MG/DL (ref 0.57–1)
EGFRCR SERPLBLD CKD-EPI 2021: 74.9 ML/MIN/1.73
GLUCOSE BLDC GLUCOMTR-MCNC: 106 MG/DL (ref 70–105)
GLUCOSE BLDC GLUCOMTR-MCNC: 107 MG/DL (ref 70–105)
GLUCOSE BLDC GLUCOMTR-MCNC: 114 MG/DL (ref 70–105)
GLUCOSE BLDC GLUCOMTR-MCNC: 130 MG/DL (ref 70–105)
GLUCOSE BLDC GLUCOMTR-MCNC: 97 MG/DL (ref 70–105)
GLUCOSE SERPL-MCNC: 103 MG/DL (ref 65–99)
INR PPP: 1.1 (ref 0.93–1.1)
POTASSIUM SERPL-SCNC: 4.1 MMOL/L (ref 3.5–5.2)
PROTHROMBIN TIME: 11.3 SECONDS (ref 9.6–11.7)
SODIUM SERPL-SCNC: 142 MMOL/L (ref 136–145)

## 2022-09-29 PROCEDURE — 88342 IMHCHEM/IMCYTCHM 1ST ANTB: CPT | Performed by: NURSE PRACTITIONER

## 2022-09-29 PROCEDURE — 80048 BASIC METABOLIC PNL TOTAL CA: CPT | Performed by: INTERNAL MEDICINE

## 2022-09-29 PROCEDURE — 25010000002 MIDAZOLAM PER 1 MG: Performed by: RADIOLOGY

## 2022-09-29 PROCEDURE — 0FB23ZX EXCISION OF LEFT LOBE LIVER, PERCUTANEOUS APPROACH, DIAGNOSTIC: ICD-10-PCS | Performed by: RADIOLOGY

## 2022-09-29 PROCEDURE — 25010000002 ONDANSETRON PER 1 MG: Performed by: RADIOLOGY

## 2022-09-29 PROCEDURE — 88342 IMHCHEM/IMCYTCHM 1ST ANTB: CPT

## 2022-09-29 PROCEDURE — 85025 COMPLETE CBC W/AUTO DIFF WBC: CPT | Performed by: INTERNAL MEDICINE

## 2022-09-29 PROCEDURE — 88341 IMHCHEM/IMCYTCHM EA ADD ANTB: CPT | Performed by: NURSE PRACTITIONER

## 2022-09-29 PROCEDURE — 88333 PATH CONSLTJ SURG CYTO XM 1: CPT | Performed by: NURSE PRACTITIONER

## 2022-09-29 PROCEDURE — 77012 CT SCAN FOR NEEDLE BIOPSY: CPT

## 2022-09-29 PROCEDURE — 88307 TISSUE EXAM BY PATHOLOGIST: CPT | Performed by: NURSE PRACTITIONER

## 2022-09-29 PROCEDURE — 63710000001 INSULIN ISOPHANE & REGULAR PER 5 UNITS

## 2022-09-29 PROCEDURE — A9503 TC99M MEDRONATE: HCPCS | Performed by: INTERNAL MEDICINE

## 2022-09-29 PROCEDURE — 99152 MOD SED SAME PHYS/QHP 5/>YRS: CPT

## 2022-09-29 PROCEDURE — 85610 PROTHROMBIN TIME: CPT | Performed by: RADIOLOGY

## 2022-09-29 PROCEDURE — 99232 SBSQ HOSP IP/OBS MODERATE 35: CPT | Performed by: INTERNAL MEDICINE

## 2022-09-29 PROCEDURE — 25010000002 HEPARIN (PORCINE) PER 1000 UNITS

## 2022-09-29 PROCEDURE — 25010000002 FENTANYL CITRATE (PF) 50 MCG/ML SOLUTION: Performed by: RADIOLOGY

## 2022-09-29 PROCEDURE — 78306 BONE IMAGING WHOLE BODY: CPT

## 2022-09-29 PROCEDURE — 82962 GLUCOSE BLOOD TEST: CPT

## 2022-09-29 PROCEDURE — 25010000002 CEFTRIAXONE PER 250 MG

## 2022-09-29 PROCEDURE — 88360 TUMOR IMMUNOHISTOCHEM/MANUAL: CPT

## 2022-09-29 PROCEDURE — 0 LIDOCAINE 1 % SOLUTION: Performed by: RADIOLOGY

## 2022-09-29 PROCEDURE — 85730 THROMBOPLASTIN TIME PARTIAL: CPT | Performed by: RADIOLOGY

## 2022-09-29 PROCEDURE — 0 TECHNETIUM MEDRONATE KIT: Performed by: INTERNAL MEDICINE

## 2022-09-29 PROCEDURE — 88341 IMHCHEM/IMCYTCHM EA ADD ANTB: CPT

## 2022-09-29 RX ORDER — ONDANSETRON 2 MG/ML
INJECTION INTRAMUSCULAR; INTRAVENOUS
Status: COMPLETED | OUTPATIENT
Start: 2022-09-29 | End: 2022-09-29

## 2022-09-29 RX ORDER — LIDOCAINE HYDROCHLORIDE 10 MG/ML
INJECTION, SOLUTION INFILTRATION; PERINEURAL
Status: COMPLETED | OUTPATIENT
Start: 2022-09-29 | End: 2022-09-29

## 2022-09-29 RX ORDER — MIDAZOLAM HYDROCHLORIDE 1 MG/ML
INJECTION INTRAMUSCULAR; INTRAVENOUS
Status: COMPLETED | OUTPATIENT
Start: 2022-09-29 | End: 2022-09-29

## 2022-09-29 RX ORDER — FENTANYL CITRATE 50 UG/ML
INJECTION, SOLUTION INTRAMUSCULAR; INTRAVENOUS
Status: COMPLETED | OUTPATIENT
Start: 2022-09-29 | End: 2022-09-29

## 2022-09-29 RX ORDER — TC 99M MEDRONATE 20 MG/10ML
27.5 INJECTION, POWDER, LYOPHILIZED, FOR SOLUTION INTRAVENOUS
Status: COMPLETED | OUTPATIENT
Start: 2022-09-29 | End: 2022-09-29

## 2022-09-29 RX ADMIN — NYSTATIN: 100000 POWDER TOPICAL at 20:52

## 2022-09-29 RX ADMIN — ASPIRIN 81 MG: 81 TABLET, COATED ORAL at 17:30

## 2022-09-29 RX ADMIN — MIDAZOLAM 1 MG: 1 INJECTION INTRAMUSCULAR; INTRAVENOUS at 14:18

## 2022-09-29 RX ADMIN — LISINOPRIL 5 MG: 5 TABLET ORAL at 10:20

## 2022-09-29 RX ADMIN — Medication 400 MG: at 20:52

## 2022-09-29 RX ADMIN — DIPHENOXYLATE HYDROCHLORIDE AND ATROPINE SULFATE 1 TABLET: 2.5; .025 TABLET ORAL at 10:21

## 2022-09-29 RX ADMIN — MONTELUKAST 10 MG: 10 TABLET, FILM COATED ORAL at 10:20

## 2022-09-29 RX ADMIN — LIDOCAINE HYDROCHLORIDE 10 ML: 10 INJECTION, SOLUTION INFILTRATION; PERINEURAL at 14:24

## 2022-09-29 RX ADMIN — TC 99M MEDRONATE 27.5 MILLICURIE: 20 INJECTION, POWDER, LYOPHILIZED, FOR SOLUTION INTRAVENOUS at 07:18

## 2022-09-29 RX ADMIN — Medication 10 ML: at 10:23

## 2022-09-29 RX ADMIN — FENTANYL CITRATE 50 MCG: 50 INJECTION, SOLUTION INTRAMUSCULAR; INTRAVENOUS at 14:24

## 2022-09-29 RX ADMIN — Medication 2000 UNITS: at 10:20

## 2022-09-29 RX ADMIN — FUROSEMIDE 40 MG: 40 TABLET ORAL at 10:20

## 2022-09-29 RX ADMIN — ANASTROZOLE 1 MG: 1 TABLET ORAL at 10:20

## 2022-09-29 RX ADMIN — GELATIN ABSORBABLE SPONGE 12-7 MM 1 EACH: 12-7 MISC at 14:33

## 2022-09-29 RX ADMIN — DIPHENOXYLATE HYDROCHLORIDE AND ATROPINE SULFATE 1 TABLET: 2.5; .025 TABLET ORAL at 17:30

## 2022-09-29 RX ADMIN — HEPARIN SODIUM 5000 UNITS: 5000 INJECTION INTRAVENOUS; SUBCUTANEOUS at 20:52

## 2022-09-29 RX ADMIN — FENTANYL CITRATE 50 MCG: 50 INJECTION, SOLUTION INTRAMUSCULAR; INTRAVENOUS at 14:18

## 2022-09-29 RX ADMIN — ONDANSETRON 4 MG: 2 INJECTION INTRAMUSCULAR; INTRAVENOUS at 14:10

## 2022-09-29 RX ADMIN — ATORVASTATIN CALCIUM 40 MG: 40 TABLET, FILM COATED ORAL at 10:20

## 2022-09-29 RX ADMIN — NYSTATIN: 100000 POWDER TOPICAL at 10:22

## 2022-09-29 RX ADMIN — ACETAZOLAMIDE 250 MG: 250 TABLET ORAL at 10:20

## 2022-09-29 RX ADMIN — Medication 400 MG: at 10:20

## 2022-09-29 RX ADMIN — LEVOTHYROXINE SODIUM 88 MCG: 0.09 TABLET ORAL at 05:16

## 2022-09-29 RX ADMIN — CEFTRIAXONE 1 G: 1 INJECTION, POWDER, FOR SOLUTION INTRAMUSCULAR; INTRAVENOUS at 20:52

## 2022-09-29 RX ADMIN — Medication 10 ML: at 20:52

## 2022-09-29 RX ADMIN — INSULIN HUMAN 25 UNITS: 100 INJECTION, SUSPENSION SUBCUTANEOUS at 17:30

## 2022-09-29 RX ADMIN — CETIRIZINE HYDROCHLORIDE 10 MG: 10 TABLET, FILM COATED ORAL at 10:20

## 2022-09-30 ENCOUNTER — READMISSION MANAGEMENT (OUTPATIENT)
Dept: CALL CENTER | Facility: HOSPITAL | Age: 72
End: 2022-09-30

## 2022-09-30 VITALS
HEIGHT: 60 IN | WEIGHT: 189.7 LBS | HEART RATE: 89 BPM | BODY MASS INDEX: 37.24 KG/M2 | DIASTOLIC BLOOD PRESSURE: 56 MMHG | OXYGEN SATURATION: 93 % | SYSTOLIC BLOOD PRESSURE: 114 MMHG | TEMPERATURE: 98.2 F | RESPIRATION RATE: 18 BRPM

## 2022-09-30 LAB
ANION GAP SERPL CALCULATED.3IONS-SCNC: 12 MMOL/L (ref 5–15)
BACTERIA SPEC AEROBE CULT: ABNORMAL
BASOPHILS # BLD AUTO: 0 10*3/MM3 (ref 0–0.2)
BASOPHILS NFR BLD AUTO: 0.3 % (ref 0–1.5)
BUN SERPL-MCNC: 16 MG/DL (ref 8–23)
BUN/CREAT SERPL: 16 (ref 7–25)
CALCIUM SPEC-SCNC: 8.6 MG/DL (ref 8.6–10.5)
CANCER AG27-29 SERPL-ACNC: 46.9 U/ML (ref 0–38.6)
CHLORIDE SERPL-SCNC: 108 MMOL/L (ref 98–107)
CO2 SERPL-SCNC: 21 MMOL/L (ref 22–29)
CREAT SERPL-MCNC: 1 MG/DL (ref 0.57–1)
DEPRECATED RDW RBC AUTO: 49.9 FL (ref 37–54)
EGFRCR SERPLBLD CKD-EPI 2021: 60.7 ML/MIN/1.73
EOSINOPHIL # BLD AUTO: 0.2 10*3/MM3 (ref 0–0.4)
EOSINOPHIL NFR BLD AUTO: 1.4 % (ref 0.3–6.2)
ERYTHROCYTE [DISTWIDTH] IN BLOOD BY AUTOMATED COUNT: 17.2 % (ref 12.3–15.4)
GLUCOSE BLDC GLUCOMTR-MCNC: 159 MG/DL (ref 70–105)
GLUCOSE BLDC GLUCOMTR-MCNC: 160 MG/DL (ref 70–105)
GLUCOSE BLDC GLUCOMTR-MCNC: 220 MG/DL (ref 70–105)
GLUCOSE BLDC GLUCOMTR-MCNC: 64 MG/DL (ref 70–105)
GLUCOSE BLDC GLUCOMTR-MCNC: 67 MG/DL (ref 70–105)
GLUCOSE SERPL-MCNC: 107 MG/DL (ref 65–99)
HCT VFR BLD AUTO: 33.5 % (ref 34–46.6)
HGB BLD-MCNC: 10.4 G/DL (ref 12–15.9)
LYMPHOCYTES # BLD AUTO: 1.5 10*3/MM3 (ref 0.7–3.1)
LYMPHOCYTES NFR BLD AUTO: 13.5 % (ref 19.6–45.3)
MCH RBC QN AUTO: 25.1 PG (ref 26.6–33)
MCHC RBC AUTO-ENTMCNC: 30.9 G/DL (ref 31.5–35.7)
MCV RBC AUTO: 81.4 FL (ref 79–97)
MONOCYTES # BLD AUTO: 0.6 10*3/MM3 (ref 0.1–0.9)
MONOCYTES NFR BLD AUTO: 5.2 % (ref 5–12)
NEUTROPHILS NFR BLD AUTO: 79.6 % (ref 42.7–76)
NEUTROPHILS NFR BLD AUTO: 8.7 10*3/MM3 (ref 1.7–7)
NRBC BLD AUTO-RTO: 0.1 /100 WBC (ref 0–0.2)
PLATELET # BLD AUTO: 313 10*3/MM3 (ref 140–450)
PMV BLD AUTO: 8.2 FL (ref 6–12)
POTASSIUM SERPL-SCNC: 4 MMOL/L (ref 3.5–5.2)
RBC # BLD AUTO: 4.12 10*6/MM3 (ref 3.77–5.28)
SODIUM SERPL-SCNC: 141 MMOL/L (ref 136–145)
WBC NRBC COR # BLD: 11 10*3/MM3 (ref 3.4–10.8)

## 2022-09-30 PROCEDURE — 25010000002 HEPARIN (PORCINE) PER 1000 UNITS

## 2022-09-30 PROCEDURE — 99232 SBSQ HOSP IP/OBS MODERATE 35: CPT | Performed by: INTERNAL MEDICINE

## 2022-09-30 PROCEDURE — 97162 PT EVAL MOD COMPLEX 30 MIN: CPT

## 2022-09-30 PROCEDURE — 82962 GLUCOSE BLOOD TEST: CPT

## 2022-09-30 RX ORDER — DIPHENOXYLATE HYDROCHLORIDE AND ATROPINE SULFATE 2.5; .025 MG/1; MG/1
1 TABLET ORAL 4 TIMES DAILY PRN
Qty: 40 TABLET | Refills: 0 | Status: SHIPPED | OUTPATIENT
Start: 2022-09-30 | End: 2022-10-15

## 2022-09-30 RX ORDER — CYCLOBENZAPRINE HCL 10 MG
5 TABLET ORAL 3 TIMES DAILY PRN
Qty: 30 TABLET | Refills: 0 | Status: SHIPPED | OUTPATIENT
Start: 2022-09-30 | End: 2022-10-10

## 2022-09-30 RX ORDER — CEPHALEXIN 500 MG/1
500 CAPSULE ORAL 3 TIMES DAILY
Qty: 15 CAPSULE | Refills: 0 | Status: SHIPPED | OUTPATIENT
Start: 2022-09-30 | End: 2022-10-05

## 2022-09-30 RX ADMIN — LEVOTHYROXINE SODIUM 88 MCG: 0.09 TABLET ORAL at 06:13

## 2022-09-30 RX ADMIN — DEXTROSE MONOHYDRATE 25 G: 25 INJECTION, SOLUTION INTRAVENOUS at 07:53

## 2022-09-30 RX ADMIN — Medication 400 MG: at 09:19

## 2022-09-30 RX ADMIN — ANASTROZOLE 1 MG: 1 TABLET ORAL at 09:19

## 2022-09-30 RX ADMIN — NYSTATIN: 100000 POWDER TOPICAL at 09:21

## 2022-09-30 RX ADMIN — ASPIRIN 81 MG: 81 TABLET, COATED ORAL at 09:19

## 2022-09-30 RX ADMIN — ACETAZOLAMIDE 250 MG: 250 TABLET ORAL at 09:19

## 2022-09-30 RX ADMIN — MONTELUKAST 10 MG: 10 TABLET, FILM COATED ORAL at 09:19

## 2022-09-30 RX ADMIN — LISINOPRIL 5 MG: 5 TABLET ORAL at 09:19

## 2022-09-30 RX ADMIN — Medication 10 ML: at 09:21

## 2022-09-30 RX ADMIN — ATORVASTATIN CALCIUM 40 MG: 40 TABLET, FILM COATED ORAL at 09:19

## 2022-09-30 RX ADMIN — FUROSEMIDE 40 MG: 40 TABLET ORAL at 09:19

## 2022-09-30 RX ADMIN — HEPARIN SODIUM 5000 UNITS: 5000 INJECTION INTRAVENOUS; SUBCUTANEOUS at 09:19

## 2022-09-30 RX ADMIN — CETIRIZINE HYDROCHLORIDE 10 MG: 10 TABLET, FILM COATED ORAL at 09:19

## 2022-09-30 RX ADMIN — Medication 2000 UNITS: at 09:19

## 2022-09-30 NOTE — OUTREACH NOTE
Prep Survey    Flowsheet Row Responses   [facility] facility patient discharged from? [facility]   Is LACE score < 7 ? No   Emergency Room discharge w/ pulse ox? No   Eligibility TCM   Hospital [facility]   Date of Admission 09/27/22   Date of Discharge 09/30/22   Discharge Disposition Home-Health Care [facility]   Discharge diagnosis Acute UTI, metastatic CA - lesions of bones & liver, parhologic fracture of right iliac wing, T2DM   Does the patient have one of the following disease processes/diagnoses(primary or secondary)? Other   Does the patient have Home health ordered? Yes   What [facility] agency?  [facility]    Is there a DME ordered? No   Prep survey completed? Yes          ANDRE MENENDEZ - Registered Nurse

## 2022-10-03 ENCOUNTER — TRANSITIONAL CARE MANAGEMENT TELEPHONE ENCOUNTER (OUTPATIENT)
Dept: CALL CENTER | Facility: HOSPITAL | Age: 72
End: 2022-10-03

## 2022-10-03 NOTE — CASE MANAGEMENT/SOCIAL WORK
Case Management Discharge Note      Final Note: Home with [facility]    Provided Post Acute Provider List?: Yes  Post Acute Provider List: Home Health  N/A Provider List Comment: Anticipate routine home  Delivered To: Patient  Method of Delivery: In person    Selected Continued Care - Discharged on 9/30/2022 Admission date: 9/27/2022 - Discharge disposition: Home-Health Care [facility]        Home Medical Care Coordination complete.    Service Provider Selected Services Address Phone Fax Patient Preferred    [facility] [address] [place] [address] [place] [id number] [phone] -- --                  Transportation Services  Private: Car    Final Discharge Disposition Code: 06 - home with home health care

## 2022-10-03 NOTE — OUTREACH NOTE
Call Center TCM Note    Flowsheet Row Responses   [facility] patient discharged from? [facility]   Does the patient have one of the following disease processes/diagnoses(primary or secondary)? Other   TCM attempt successful? Yes   Call start time 1350   Call end time 1356   Discharge diagnosis Acute UTI, metastatic CA - lesions of bones & liver, parhologic fracture of right iliac wing, T2DM   Is patient permission given to speak with other caregiver? Yes   List who call center can speak with TRENTON VALLES Spouse    Person spoke with today (if not patient) and relationship TRENTON VALLES Spouse    Meds reviewed with patient/caregiver? Yes  [New: cephalexin, cyclobenzaprine, lomotil ]   Does the patient have all medications ordered at discharge? Yes   Is the patient taking all medications as directed (includes completed medication regime)? Yes   Comments TOMMY Nava PCP. Hospital follow up appt scheduled for Friday 10/7  130pm with PCP.    What is the Home health agency?  [facility]    Has home health visited the patient within 72 hours of discharge? Call prior to 72 hours   Psychosocial issues? No   Did the patient receive a copy of their discharge instructions? Yes   Nursing interventions Reviewed instructions with patient   What is the patient's perception of their health status since discharge? Improving   Is the patient/caregiver able to teach back signs and symptoms related to disease process for when to call PCP? Yes   Is the patient/caregiver able to teach back signs and symptoms related to disease process for when to call 911? Yes   Is the patient/caregiver able to teach back the hierarchy of who to call/visit for symptoms/problems? PCP, Specialist, Home health nurse, Urgent Care, ED, 911 Yes   If the patient is a current smoker, are they able to teach back resources for cessation? Not a smoker   TCM call completed? Yes          Shanelle Edwards, LILIA    10/3/2022, 13:57 EDT

## 2022-10-05 ENCOUNTER — TELEPHONE (OUTPATIENT)
Dept: ONCOLOGY | Facility: CLINIC | Age: 72
End: 2022-10-05

## 2022-10-06 ENCOUNTER — TELEPHONE (OUTPATIENT)
Dept: FAMILY MEDICINE CLINIC | Facility: CLINIC | Age: 72
End: 2022-10-06

## 2022-10-06 NOTE — TELEPHONE ENCOUNTER
DELETE AFTER REVIEWING: Telephone encounter to be sent to the clinical pool     Caller: [facility]     Relationship: [facility]     Best call back number: [phone]    What orders are you requesting (i.e. lab or imaging): PHYSICAL THERAPY     In what timeframe would the patient need to come in: AS SOON AS POSSIBLE     Where will you receive your lab/imaging services:     Additional notes: WOULD LIKE ORDERS FOR PHYSICAL THERAPY 1 TIMES A WEEK FOR 9 WEEKS

## 2022-10-07 ENCOUNTER — OFFICE VISIT (OUTPATIENT)
Dept: ONCOLOGY | Facility: CLINIC | Age: 72
End: 2022-10-07

## 2022-10-07 ENCOUNTER — LAB (OUTPATIENT)
Dept: LAB | Facility: HOSPITAL | Age: 72
End: 2022-10-07

## 2022-10-07 ENCOUNTER — HOSPITAL ENCOUNTER (OUTPATIENT)
Dept: ONCOLOGY | Facility: HOSPITAL | Age: 72
Setting detail: INFUSION SERIES
Discharge: HOME | End: 2022-10-07

## 2022-10-07 VITALS
HEIGHT: 60 IN | TEMPERATURE: 96.6 F | BODY MASS INDEX: 37.11 KG/M2 | WEIGHT: 189 LBS | RESPIRATION RATE: 18 BRPM | SYSTOLIC BLOOD PRESSURE: 108 MMHG | DIASTOLIC BLOOD PRESSURE: 69 MMHG | HEART RATE: 96 BPM

## 2022-10-07 DIAGNOSIS — C50.919 PRIMARY MALIGNANT NEOPLASM OF FEMALE BREAST: Primary | ICD-10-CM

## 2022-10-07 DIAGNOSIS — E11.649 TYPE 2 DIABETES MELLITUS WITH HYPOGLYCEMIA WITHOUT COMA, WITH LONG-TERM CURRENT USE OF INSULIN: Primary | ICD-10-CM

## 2022-10-07 DIAGNOSIS — C50.919 PRIMARY MALIGNANT NEOPLASM OF FEMALE BREAST: ICD-10-CM

## 2022-10-07 DIAGNOSIS — R19.7 NAUSEA VOMITING AND DIARRHEA: ICD-10-CM

## 2022-10-07 DIAGNOSIS — E86.0 DEHYDRATION: ICD-10-CM

## 2022-10-07 DIAGNOSIS — R19.7 NAUSEA VOMITING AND DIARRHEA: Primary | ICD-10-CM

## 2022-10-07 DIAGNOSIS — Z79.4 TYPE 2 DIABETES MELLITUS WITH HYPOGLYCEMIA WITHOUT COMA, WITH LONG-TERM CURRENT USE OF INSULIN: Primary | ICD-10-CM

## 2022-10-07 DIAGNOSIS — R11.2 NAUSEA VOMITING AND DIARRHEA: Primary | ICD-10-CM

## 2022-10-07 DIAGNOSIS — R11.2 NAUSEA VOMITING AND DIARRHEA: ICD-10-CM

## 2022-10-07 LAB
ALBUMIN SERPL-MCNC: 3.8 G/DL (ref 3.5–5.2)
ALBUMIN/GLOB SERPL: 1.1 G/DL
ALP SERPL-CCNC: 166 U/L (ref 39–117)
ALT SERPL W P-5'-P-CCNC: 9 U/L (ref 1–33)
ANION GAP SERPL CALCULATED.3IONS-SCNC: 16 MMOL/L (ref 5–15)
AST SERPL-CCNC: 23 U/L (ref 1–32)
BASOPHILS # BLD AUTO: 0.03 10*3/MM3 (ref 0–0.2)
BASOPHILS NFR BLD AUTO: 0.3 % (ref 0–1.5)
BILIRUB SERPL-MCNC: 0.4 MG/DL (ref 0–1.2)
BUN SERPL-MCNC: 29 MG/DL (ref 8–23)
BUN/CREAT SERPL: 22 (ref 7–25)
CALCIUM SPEC-SCNC: 10.1 MG/DL (ref 8.6–10.5)
CHLORIDE SERPL-SCNC: 102 MMOL/L (ref 98–107)
CO2 SERPL-SCNC: 20 MMOL/L (ref 22–29)
CREAT SERPL-MCNC: 1.32 MG/DL (ref 0.57–1)
DEPRECATED RDW RBC AUTO: 53.6 FL (ref 37–54)
EGFRCR SERPLBLD CKD-EPI 2021: 43.5 ML/MIN/1.73
EOSINOPHIL # BLD AUTO: 0.06 10*3/MM3 (ref 0–0.4)
EOSINOPHIL NFR BLD AUTO: 0.6 % (ref 0.3–6.2)
ERYTHROCYTE [DISTWIDTH] IN BLOOD BY AUTOMATED COUNT: 17.9 % (ref 12.3–15.4)
GLOBULIN UR ELPH-MCNC: 3.4 GM/DL
GLUCOSE SERPL-MCNC: 101 MG/DL (ref 65–99)
HCT VFR BLD AUTO: 37.4 % (ref 34–46.6)
HGB BLD-MCNC: 11.4 G/DL (ref 12–15.9)
HOLD SPECIMEN: NORMAL
HOLD SPECIMEN: NORMAL
LYMPHOCYTES # BLD AUTO: 1.05 10*3/MM3 (ref 0.7–3.1)
LYMPHOCYTES NFR BLD AUTO: 11.3 % (ref 19.6–45.3)
MAGNESIUM SERPL-MCNC: 2.5 MG/DL (ref 1.6–2.4)
MCH RBC QN AUTO: 26.1 PG (ref 26.6–33)
MCHC RBC AUTO-ENTMCNC: 30.5 G/DL (ref 31.5–35.7)
MCV RBC AUTO: 85.8 FL (ref 79–97)
MONOCYTES # BLD AUTO: 0.49 10*3/MM3 (ref 0.1–0.9)
MONOCYTES NFR BLD AUTO: 5.3 % (ref 5–12)
NEUTROPHILS NFR BLD AUTO: 7.63 10*3/MM3 (ref 1.7–7)
NEUTROPHILS NFR BLD AUTO: 82.5 % (ref 42.7–76)
PLATELET # BLD AUTO: 345 10*3/MM3 (ref 140–450)
PMV BLD AUTO: 9.3 FL (ref 6–12)
POTASSIUM SERPL-SCNC: 4.9 MMOL/L (ref 3.5–5.2)
PROT SERPL-MCNC: 7.2 G/DL (ref 6–8.5)
RBC # BLD AUTO: 4.36 10*6/MM3 (ref 3.77–5.28)
SODIUM SERPL-SCNC: 138 MMOL/L (ref 136–145)
WBC NRBC COR # BLD: 9.26 10*3/MM3 (ref 3.4–10.8)

## 2022-10-07 PROCEDURE — 96374 THER/PROPH/DIAG INJ IV PUSH: CPT

## 2022-10-07 PROCEDURE — 83735 ASSAY OF MAGNESIUM: CPT

## 2022-10-07 PROCEDURE — 36415 COLL VENOUS BLD VENIPUNCTURE: CPT

## 2022-10-07 PROCEDURE — 96361 HYDRATE IV INFUSION ADD-ON: CPT

## 2022-10-07 PROCEDURE — 80053 COMPREHEN METABOLIC PANEL: CPT

## 2022-10-07 PROCEDURE — 99215 OFFICE O/P EST HI 40 MIN: CPT | Performed by: INTERNAL MEDICINE

## 2022-10-07 PROCEDURE — 25010000002 DEXAMETHASONE PER 1 MG: Performed by: INTERNAL MEDICINE

## 2022-10-07 PROCEDURE — 96360 HYDRATION IV INFUSION INIT: CPT

## 2022-10-07 PROCEDURE — 96375 TX/PRO/DX INJ NEW DRUG ADDON: CPT

## 2022-10-07 PROCEDURE — 85025 COMPLETE CBC W/AUTO DIFF WBC: CPT

## 2022-10-07 RX ORDER — DEXAMETHASONE SODIUM PHOSPHATE 4 MG/ML
6 INJECTION, SOLUTION INTRA-ARTICULAR; INTRALESIONAL; INTRAMUSCULAR; INTRAVENOUS; SOFT TISSUE ONCE
Status: CANCELLED
Start: 2022-10-07 | End: 2022-10-07

## 2022-10-07 RX ORDER — DEXAMETHASONE SODIUM PHOSPHATE 4 MG/ML
6 INJECTION, SOLUTION INTRA-ARTICULAR; INTRALESIONAL; INTRAMUSCULAR; INTRAVENOUS; SOFT TISSUE ONCE
Status: COMPLETED | OUTPATIENT
Start: 2022-10-07 | End: 2022-10-07

## 2022-10-07 RX ORDER — ONDANSETRON HYDROCHLORIDE 8 MG/1
8 TABLET, FILM COATED ORAL EVERY 8 HOURS PRN
Qty: 30 TABLET | Refills: 2 | Status: SHIPPED | OUTPATIENT
Start: 2022-10-07 | End: 2022-11-01 | Stop reason: SDUPTHER

## 2022-10-07 RX ADMIN — SODIUM CHLORIDE 1500 ML: 9 INJECTION, SOLUTION INTRAVENOUS at 13:54

## 2022-10-07 RX ADMIN — DEXAMETHASONE SODIUM PHOSPHATE 6 MG: 4 INJECTION INTRA-ARTICULAR; INTRALESIONAL; INTRAMUSCULAR; INTRAVENOUS; SOFT TISSUE at 14:25

## 2022-10-07 NOTE — PROGRESS NOTES
Hematology/Oncology Outpatient Follow Up    Christiane Nazario  1950    Primary Care Physician: Margarita Melissa, APRN  Referring Physician: Margarita Melissa, EDGARDO*  Chief complaint:     Follow up for: Metastatic breast cancer    pT 2 pN0 infiltrating ductal carcinoma of the left breast    History of Present Illness:     · Ms. Nazario had a mammogram in November 2018 for a palpable  abnormality.    · 11/30/18 - Diagnostic mammogram showed palpable abnormality corresponds to a 2.3 x 1.7 x 2.1 cm spicular shadowing irregular mass in the lateral middle third of the left breast at 2 to 3  o'clock position.  No mammographic evidence of malignancy in the contralateral breast.    · 12/17/18 - Core needle biopsy of breast palpable nodule showed invasive moderately differentiated ductal carcinoma.  Shakira grade 7/9.  DCIS seen.  Tumor was ER positive, SC  positive and [id number] negative by ICH.    · Patient was sent to the [facility] and seen initially on 1/4/19.     · 1/4/19 - Genetic testing for BRCA 1 and BRCA 2 sequencing and deletion duplication analysis negative.    · 1/21/19 - CT scan of the chest with contrast showed 2.4 x 2.8 cm left breast mass consistent  with known carcinoma.  Coronary artery calcification consistent with atherosclerotic disease.  A  4 mm right upper lobe pulmonary nodule, stable compared to 2014, consistent with a benign  etiology.  · 2/7/19 - Patient underwent left breast lumpectomy and axillary sentinel lymph node resection. Pathology report was invasive poorly differentiated ductal carcinoma, 3.3 cm, completely excised.  Negative margins.  Three sentinel lymph nodes were extracted and three were negative.  [place] score total of 9.  DCIS not identified.  Skin showed invasive carcinoma focally  invading the dermis without skin ulceration.  Margins negative with invasive carcinoma 0.2 cm  from the closest.  Pathologic staging pT2, smpN0.    · 3/6/19 -  Oncotype DX recurrent score 25 (distant recurrence risk at 9 years 12% per TAILORx and absolute chemotherapy benefit less than 1% per TAILORx).   · 3/22/19 - DEXA scan, normal.   · May 2019 - Patient completed 20 radiation treatments.    · 2019 patient started Arimidex treatment  · 2020 bilateral screening mammogram benign  · 2021: Patient had bilateral screening mammogram  · 2022 patient had bilateral diagnostic mammogram which was essentially unremarkable  · 2022 patient was admitted to the hospital for abdominal discomfort diarrhea.  She had imaging studies which shows extensive disease involving the liver, pleural cavity, multiple wounds.  She has since then had biopsy of one of the liver lesions and pathology was consistent with metastatic breast cancer.  His malignancy is ER positive FL positive and HER2/willi was ordered and still pending      Past Medical History:   Diagnosis Date   • Cataract    • DM type 2 (diabetes mellitus, type 2) (HCC)    • Ductal carcinoma in situ (DCIS) of left breast    • Fracture, femur (HCC) 2014    fracture of left femur   • Hyperlipidemia    • Hypertension    • Hypothyroidism    • Osteopenia    • Pulmonary hypertension (HCC)        Past Surgical History:   Procedure Laterality Date   • BREAST BIOPSY  2018   • CATARACT EXTRACTION     •  SECTION     • FEMUR SURGERY Left 2014    @ [facility] - Dr Winston   • MASTECTOMY Left 2019    Dr Bills   • TUBAL ABDOMINAL LIGATION           Current Outpatient Medications:   •  acetaZOLAMIDE (DIAMOX) 250 MG tablet, TAKE ONE TABLET BY MOUTH EVERY MORNING, Disp: 90 tablet, Rfl: 0  •  alendronate (Fosamax) 70 MG tablet, Take 1 tablet by mouth Every 7 (Seven) Days., Disp: 4 tablet, Rfl: 6  •  anastrozole (ARIMIDEX) 1 MG tablet, TAKE 1 TABLET BY MOUTH DAILY., Disp: 90 tablet, Rfl: 1  •  aspirin (aspirin) 81 MG EC tablet, ADULT ASPIRIN EC LOW STRENGTH 81 MG ORAL TABLET DELAYED RELEASE, Disp: , Rfl:  "  •  atorvastatin (LIPITOR) 40 MG tablet, TAKE ONE TABLET BY MOUTH EVERY NIGHT, Disp: 30 tablet, Rfl: 10  •  BD Insulin Syringe U/F 31G X 5/16\" 1 ML misc, USE TWICE A DAY WITH INSULIN INJECTIONS, Disp: 200 each, Rfl: 4  •  CALCIUM ACETATE IJ, Inject  as directed., Disp: , Rfl:   •  cyclobenzaprine (FLEXERIL) 10 MG tablet, Take 0.5 tablets by mouth 3 (Three) Times a Day As Needed for Muscle Spasms for up to 10 days., Disp: 30 tablet, Rfl: 0  •  D3 Super Strength 50 MCG (2000 UT) capsule, TAKE 1 CAPSULE BY MOUTH EVERY DAY, Disp: 30 capsule, Rfl: 10  •  diphenoxylate-atropine (LOMOTIL) 2.5-0.025 MG per tablet, Take 1 tablet by mouth 4 (Four) Times a Day As Needed for Diarrhea for up to 15 days., Disp: 40 tablet, Rfl: 0  •  furosemide (LASIX) 40 MG tablet, TAKE ONE TABLET BY MOUTH EVERY DAY, Disp: 30 tablet, Rfl: 2  •  glucose blood (ONE TOUCH ULTRA TEST) test strip, ONETOUCH ULTRA BLUE STRP, Disp: , Rfl:   •  insulin NPH-insulin regular (HumuLIN 70/30) (70-30) 100 UNIT/ML injection, INJECT 25 UNITS SUBCUTANEOUSLY EVERY MORNING AND INJECT 25 UNITS EVERY EVENING AT DINNER, Disp: 70 mL, Rfl: 1  •  Kombiglyze XR 2.5-1000 MG tablet sustained-release 24 hour, TAKE ONE TABLET BY MOUTH TWICE DAILY, Disp: 180 tablet, Rfl: 4  •  levothyroxine (SYNTHROID, LEVOTHROID) 88 MCG tablet, TAKE ONE TABLET BY MOUTH EVERY DAY, Disp: 30 tablet, Rfl: 5  •  loratadine (CLARITIN) 10 MG tablet, TAKE ONE TABLET BY MOUTH EVERY DAY, Disp: 90 tablet, Rfl: 1  •  MgO 400 (240 Mg) MG tablet, TAKE ONE TABLET BY MOUTH TWICE DAILY, Disp: 60 tablet, Rfl: 10  •  montelukast (SINGULAIR) 10 MG tablet, TAKE ONE TABLET BY MOUTH EVERY NIGHT AT BEDTIME, Disp: 90 tablet, Rfl: 0  •  potassium chloride (K-DUR,KLOR-CON) 20 MEQ CR tablet, Take 1 tablet by mouth 2 (Two) Times a Day., Disp: 180 tablet, Rfl: 0  •  ramipril (ALTACE) 1.25 MG capsule, TAKE 1 CAPSULE BY MOUTH EVERY EVENING WITH DINNER, Disp: 90 capsule, Rfl: 4    Allergies   Allergen Reactions   • " "Calcium-Containing Compounds Nausea Only   • Sulfa Antibiotics Unknown - Low Severity       Family History   Problem Relation Age of Onset   • Ovarian cancer Sister    • Kidney disease Maternal Aunt    • Cancer Other        Cancer-related family history includes Cancer in an other family member; Ovarian cancer in her sister.    Social History     Tobacco Use   • Smoking status: Never Smoker   • Smokeless tobacco: Never Used   Vaping Use   • Vaping Use: Never used   Substance Use Topics   • Alcohol use: No   • Drug use: No       I have reviewed and confirmed the accuracy of the patient's history: Chief complaint, HPI, ROS and Subjective as entered by the MA/LPN/RN. Venita Sarkar MD 10/07/22       SUBJECTIVE:        Patient is here today accompanied by her family member as spouse and son for this appointment he still has diarrhea    ROS:       Review of Systems   Constitutional: Positive for fatigue. Negative for fever.   HENT: Negative for nosebleeds and trouble swallowing.    Eyes: Negative for visual disturbance.   Respiratory: Negative for cough, shortness of breath and wheezing.    Cardiovascular: Negative for chest pain.   Gastrointestinal: Positive for diarrhea and nausea. Negative for abdominal pain and blood in stool.   Endocrine: Negative for cold intolerance.   Genitourinary: Negative for dysuria and hematuria.   Musculoskeletal: Negative for joint swelling.   Skin: Negative for rash.   Allergic/Immunologic: Negative for environmental allergies.   Neurological: Positive for weakness. Negative for seizures.   Hematological: Does not bruise/bleed easily.   Psychiatric/Behavioral: The patient is not nervous/anxious.            Objective:       Vitals:    10/07/22 1229   BP: 108/69   Pulse: 96   Resp: 18   Temp: 96.6 °F (35.9 °C)   TempSrc: Infrared   Weight: 85.7 kg (189 lb)   Height: 152.4 cm (60\")   PainSc:   7   PainLoc: Comment: back ache         PHYSICAL EXAM:     Physical Exam   Constitutional: " She is oriented to person, place, and time. No distress.   Moderately built obese, patient appears weak   HENT:   Head: Normocephalic and atraumatic.   Eyes: Conjunctivae are normal. Right eye exhibits no discharge. Left eye exhibits no discharge. No scleral icterus.   Neck: No thyromegaly present.   Cardiovascular: Normal rate, regular rhythm and normal heart sounds. Exam reveals no gallop and no friction rub.   Pulmonary/Chest: Effort normal. No stridor. No respiratory distress. She has no wheezes.   Decreased breath sounds both bases   Abdominal: Soft. Bowel sounds are normal. She exhibits no mass. There is no abdominal tenderness. There is no rebound and no guarding.   Musculoskeletal: Normal range of motion. No tenderness.      Comments: Trace bilateral ankle edema   Lymphadenopathy:     She has no cervical adenopathy.   Neurological: She is alert and oriented to person, place, and time. She exhibits normal muscle tone.   Skin: Skin is warm. No rash noted. She is not diaphoretic. No erythema.   Psychiatric: Her behavior is normal.   Nursing note and vitals reviewed.    I have reexamined the patient and the results are consistent with the previously documented exam. Venita Sarkar MD          RECENT LABS:       WBC   Date Value Ref Range Status   10/07/2022 9.26 3.40 - 10.80 10*3/mm3 Final     RBC   Date Value Ref Range Status   10/07/2022 4.36 3.77 - 5.28 10*6/mm3 Final     Hemoglobin   Date Value Ref Range Status   10/07/2022 11.4 (L) 12.0 - 15.9 g/dL Final     Hematocrit   Date Value Ref Range Status   10/07/2022 37.4 34.0 - 46.6 % Final     MCV   Date Value Ref Range Status   10/07/2022 85.8 79.0 - 97.0 fL Final     MCH   Date Value Ref Range Status   10/07/2022 26.1 (L) 26.6 - 33.0 pg Final     MCHC   Date Value Ref Range Status   10/07/2022 30.5 (L) 31.5 - 35.7 g/dL Final     RDW   Date Value Ref Range Status   10/07/2022 17.9 (H) 12.3 - 15.4 % Final     RDW-SD   Date Value Ref Range Status    10/07/2022 53.6 37.0 - 54.0 fl Final     MPV   Date Value Ref Range Status   10/07/2022 9.3 6.0 - 12.0 fL Final     Platelets   Date Value Ref Range Status   10/07/2022 345 140 - 450 10*3/mm3 Final     Neutrophil %   Date Value Ref Range Status   10/07/2022 82.5 (H) 42.7 - 76.0 % Final     Lymphocyte %   Date Value Ref Range Status   10/07/2022 11.3 (L) 19.6 - 45.3 % Final     Monocyte %   Date Value Ref Range Status   10/07/2022 5.3 5.0 - 12.0 % Final     Eosinophil %   Date Value Ref Range Status   10/07/2022 0.6 0.3 - 6.2 % Final     Basophil %   Date Value Ref Range Status   10/07/2022 0.3 0.0 - 1.5 % Final     Neutrophils, Absolute   Date Value Ref Range Status   10/07/2022 7.63 (H) 1.70 - 7.00 10*3/mm3 Final     Lymphocytes, Absolute   Date Value Ref Range Status   10/07/2022 1.05 0.70 - 3.10 10*3/mm3 Final     Monocytes, Absolute   Date Value Ref Range Status   10/07/2022 0.49 0.10 - 0.90 10*3/mm3 Final     Eosinophils, Absolute   Date Value Ref Range Status   10/07/2022 0.06 0.00 - 0.40 10*3/mm3 Final     Basophils, Absolute   Date Value Ref Range Status   10/07/2022 0.03 0.00 - 0.20 10*3/mm3 Final     nRBC   Date Value Ref Range Status   09/29/2022 0.1 0.0 - 0.2 /100 WBC Final       Lab Results   Component Value Date    GLUCOSE 107 (H) 09/29/2022    BUN 16 09/29/2022    CREATININE 1.00 09/29/2022    EGFRIFNONA 53 (L) 01/10/2022    BCR 16.0 09/29/2022    K 4.0 09/29/2022    CO2 21.0 (L) 09/29/2022    CALCIUM 8.6 09/29/2022    ALBUMIN 3.50 09/27/2022    LABIL2 1.0 05/08/2019    AST 19 09/27/2022    ALT 12 09/27/2022         Assessment & Plan      ASSESSMENT:     1. Metastatic breast carcinoma to the lungs, liver, bones ER positive, ME positive HER2/willi is pending.  This was diagnosed recently September 2022 biopsy-proven on liver specimen  2. Multiple bone metastasis pathologic fracture right iliac wing: Will benefit from biphosphonate therapy  3. Diarrhea with recent admission to the hospital, urinary  tract infection September 2022  4. Dehydration  5. Nausea likely secondary to hepatic metastasis  6. History of left breast infiltrating ductal carcinoma, ER +100%, MA positive and HER-2/willi was negative: Status  post left lumpectomy with sentinel lymph node biopsy.  Status post adjuvant left breast radiation.  Was on endocrine therapy with Arimidex for 3 days.  7. Oncotype DX assay with recurrence score of 25.  Chemotherapy not recommended.  Hormone receptor positive and has been on Arimidex initiated in June 2019.  She will continue the same.  8. Obesity with BMI of 40  9. Osteopenia: On Boniva, calcium supplements reviewed her recent bone density.  I have encouraged her to continue for now  10. ECOG 1    Discussion    Patient has now been diagnosed with metastatic breast cancer with multiple areas of involvement including the liver, lungs and bones.  Given the extent of disease would like to begin with palliative chemotherapy.  Would like to give a combination treatment with Taxol and Gemzar for week response, control of systemic disease and organ preservation.  Notes that this cancer is estrogen and progesterone positive therefore hormonal treatment/endocrine therapy was to be an option along with a CDK 4 inhibitor.  Would like to control this disease as quickly as possible and then transition her to oral agents.    We discussed the side effects of chemotherapy to include but not limited to:  Chemotherapy side effects include, but not limited to, nausea, vomiting, bone marrow suppression, which can result in blood, platelet transfusion. There is also risk of permanent bone marrow destruction, which can cause myelodysplastic syndrome or leukemia years down the line. There is risk of infection which can result in hospitalization and even death. There is also risk of fatigue, asthenia, alopecia which could become permanent. Chemo will help to reduce risk of relapse of cancer, but does not eliminate risk  completely.    Discussed that Taxol chemotherapy can lead to hypersensitivity reaction, peripheral neuropathy, skin toxicity, permanent alopecia    Gemzar can also lead to significant thrombocytopenia, flulike symptoms, skin rash.    We will plan to give her several cycles of chemotherapy and obtain interim CT scans.    Patient will also benefit from biphosphonate therapy with Zometa.  I reviewed the side effects the benefits of Zometa in preventing skeletal events.  Side effects of zometa was discussed with him including, bone aches and pains, electrolyte abnormalities including hypophosphatemia, hypocalcemia, low magnesium.  There is also a risk of renal insufficiency, osteonecrosis of the jaw has been observed in clinical studies.  There is risk of peripheral edema, hypertension, dermatitis, nausea, vomiting, and mild hematologic problems including anemia, thrombocytopenia.  Discussed the need for him to have dental evaluation prior to initiating zometa.  Discussed also the need to notify us of any future dental procedures planned.  Also discussed the need to notify us for jaw pain at any point in time.            PLANS:      1. Discontinue Arimidex  2. Begin palliative chemotherapy combination of Taxol and Gemzar.  3. Please PICC line early next week for chemotherapy  4. Discontinue Fosamax  5. Begin Zometa 4 mg IV monthly  6. Give normal saline IV fluids today with IV dexamethasone 6 mg  7. Begin Zofran 8 mg p.o. every 8 hours for nausea  8. Give patient information on Taxol Gemzar  9. CMP and mag level  10. Continue Os-Quintin D 600 mg twice a day  11. Schedule chemotherapy education with nurse practitioner early next week  12. Home health consultation for care.  13. Follow-up 2 weeks post chemo  14. Patient encouraged to continue with weekly breast self-exam and call for lumps nipple discharge or skin discoloration  15. Discussed with patient and spouse who is present today  16. All questions answered    I have  reviewed labs results, imaging, vitals, and medications with the patient today.   Patient verbalized understanding and is in agreement of the above plan.            I spent 45 total minutes, face-to-face, caring for Christiane today.  90% of this time involved counseling and/or coordination of care as documented within this note.

## 2022-10-07 NOTE — PROGRESS NOTES
Pt received IVF's and IVP dexamethasone.  Pt tolerated treatment.  IV removed.  Pt given AVS w/ next appointment and v/u.  I was asked by Jolene Solorzano MA to let pt know to be at [facility] Monday 10/10/22 at 3:00 pm for PICC line placement.  Also informed pt that per Dr. Sarkar she is to decrease her Magnesium to one tablet daily.  Pt and family v/u of the above.  Pt discharged from clinic w/ family.

## 2022-10-10 ENCOUNTER — HOSPITAL ENCOUNTER (OUTPATIENT)
Dept: OTHER | Facility: HOSPITAL | Age: 72
Discharge: HOME | End: 2022-10-10
Admitting: INTERNAL MEDICINE

## 2022-10-10 ENCOUNTER — OFFICE VISIT (OUTPATIENT)
Dept: ONCOLOGY | Facility: CLINIC | Age: 72
End: 2022-10-10

## 2022-10-10 ENCOUNTER — APPOINTMENT (OUTPATIENT)
Dept: LAB | Facility: HOSPITAL | Age: 72
End: 2022-10-10

## 2022-10-10 ENCOUNTER — CLINICAL SUPPORT (OUTPATIENT)
Dept: ONCOLOGY | Facility: CLINIC | Age: 72
End: 2022-10-10

## 2022-10-10 ENCOUNTER — READMISSION MANAGEMENT (OUTPATIENT)
Dept: CALL CENTER | Facility: HOSPITAL | Age: 72
End: 2022-10-10

## 2022-10-10 VITALS
OXYGEN SATURATION: 94 % | HEART RATE: 85 BPM | WEIGHT: 189 LBS | TEMPERATURE: 96.4 F | SYSTOLIC BLOOD PRESSURE: 108 MMHG | DIASTOLIC BLOOD PRESSURE: 69 MMHG | BODY MASS INDEX: 37.11 KG/M2 | HEIGHT: 60 IN

## 2022-10-10 DIAGNOSIS — G89.3 CANCER RELATED PAIN: Primary | ICD-10-CM

## 2022-10-10 DIAGNOSIS — C79.51 MALIGNANT NEOPLASM METASTATIC TO BONE: Primary | ICD-10-CM

## 2022-10-10 LAB
LAB AP CASE REPORT: NORMAL
LAB AP DIAGNOSIS COMMENT: NORMAL
LAB AP IHC HER2/NEU REPORT,ADDENDUM: NORMAL
Lab: NORMAL
PATH REPORT.FINAL DX SPEC: NORMAL
PATH REPORT.GROSS SPEC: NORMAL

## 2022-10-10 PROCEDURE — 99215 OFFICE O/P EST HI 40 MIN: CPT | Performed by: NURSE PRACTITIONER

## 2022-10-10 PROCEDURE — C1751 CATH, INF, PER/CENT/MIDLINE: HCPCS

## 2022-10-10 RX ORDER — HYDROCODONE BITARTRATE AND ACETAMINOPHEN 5; 325 MG/1; MG/1
1 TABLET ORAL EVERY 8 HOURS PRN
Qty: 90 TABLET | Refills: 0 | Status: SHIPPED | OUTPATIENT
Start: 2022-10-10 | End: 2022-11-07

## 2022-10-10 NOTE — CONSULTS
Double lumen power picc placed in right upper arm under u/s guidance. Tip placement confirmed by sapiens ecg. Pt tolerated well. Patient and son given DC instructions.

## 2022-10-10 NOTE — OUTREACH NOTE
Medical Week 2 Survey    Flowsheet Row Responses   [facility] [place] patient discharged from? [place]   Does the patient have one of the following disease processes/diagnoses(primary or secondary)? Other   Week 2 attempt successful? Yes   Call start time 1117   Call end time 1122   Meds reviewed with patient/caregiver? Yes   Is the patient having any side effects they believe may be caused by any medication additions or changes? No   Does the patient have all medications ordered at discharge? Yes   Is the patient taking all medications as directed (includes completed medication regime)? Yes   Medication comments Starts chemo tomorrow.   Comments regarding appointments PCP appt 10/10/22.  has seen her oncologist. [facility] will have PICC line placed this afternoon, starts chemo tomorrow.   Does the patient have a primary care provider?  Yes   Does the patient have an appointment with their PCP within 7 days of discharge? Greater than 7 days   What is preventing the patient from scheduling follow up appointments within 7 days of discharge? Earlier appointment not available   Nursing Interventions Verified appointment date/time/provider   Has the patient kept scheduled appointments due by today? Yes   Has home health visited the patient within 72 hours of discharge? Yes   Psychosocial issues? No   Did the patient receive a copy of their discharge instructions? Yes   Nursing interventions Reviewed instructions with patient   What is the patient's perception of their health status since discharge? Improving   Is the patient/caregiver able to teach back signs and symptoms related to disease process for when to call PCP? Yes   Is the patient/caregiver able to teach back signs and symptoms related to disease process for when to call 911? Yes   Is the patient/caregiver able to teach back the hierarchy of who to call/visit for symptoms/problems? PCP, Specialist, Home health nurse, Urgent Care, ED, 911 Yes   If the patient is  a current smoker, are they able to teach back resources for cessation? Not a smoker   Week 2 Call Completed? Yes   Wrap up additional comments States is improving related to her UTI. States will start chemo tomorrow. Reports appetite decreased, and will start using Glucerna. Reports continues to be weak, tired. Denies any needs today.          GONZALO KULKARNI - Registered Nurse

## 2022-10-10 NOTE — PROGRESS NOTES
Education for Administration of Chemotherapy and/or Biotherapy     NAME: Christiane Nazario  : 1950  MRN: [id number]  DATE OF SERVICE: 10/10/2022  REASON FOR VISIT: PATIENT EDUCATION    Mrs. Christiane Nazario is here today for education on her upcoming chemotherapy and/or biotherapy recommended for treatment of her disease. The patient was accompanied by her nephew.     I reviewed treatment options, obtained signed consent, and answered any questions she had regarding the administration of paclitaxel and gemcitabine.     Christiane Nazario has already consulted with Venita Sarkar MD  for the treatment of metastatic breast cancer.  The patient's oncologist has discussed the same treatment options with the patient and answered her questions prior to today's visit on 10/7/2022.    TREATMENT GOALS:  The goal of the treatment is to:    [] Curative intent - intent is cure; cure implies patient survival will not be restricted by current cancer diagnosis   [] Control  - intent is to extend survival but not long enough to meet definition of cure for patient with that diagnosis   [x] Palliative - means treatment given in a non-curative setting to optimize symptom control, improve quality of life, and improve survival    This treatment has been explained to Christiane Nazario. Alternative methods of treatment, if any, have been explained to Christiane Nazario, as have the benefits and risks of each. Based on the physician's explanation of the benefits and risks of this treatment and any alternatives available, the patient agrees the potential benefits outweighs the potential risks involved. I have explained to the patient the most likely complications which might occur from this treatment. The patient understands along with the treatment additional medications may be necessary to lessen the side effects.     SIDE EFFECTS:  Possible side effects may include but are not limited to, any of the following, or a  combination of the following:    []  Abdominal pain  [x]  Hypersensitivity reaction [x]  Rash   [x]  Allergic Reaction []  Hypertension []  Secondary malignancies   [x]  Anemia []  Hypertensive crisis  []  Sexual side effects    []  Anxiety []  Hypertriglyceridemia [x]  Shortness of breath   [x]  Back pain []  Hypoalbuminemia [x]  Skin changes   [x]  Body pain []  Hyponatremia  []  Somnolence   []  Blood clots (DVT/PE) []  Immune-mediated reaction []  Sore throat   [x]  Bleeding [x]  Infection  []  Swelling   [x]  Bone pain [x]  Infusion reaction  []  Taste changes   [x]  Bruising []  Injection site reaction  []  Temperature sensitivity   [x]  Cardiovascular events  []  Injection site ulceration [x]  Thrombocytopenia   []  Central neurotoxicity []  Insomnia []  Thyroid changes   []  Chest pain [x]  Itching []  Tinnitus   []  Chills [x]  Joint pain []  Upper respiratory tract infection    []  Confusion []  Kidney damage []  Visual changes   []  Congestive heart failure []  Leukopenia []  Vitlligo   [x]  Constipation [x]  LFT imbalances [x]  Vomiting   []  Cough [x]  Liver damage []  Watery eyes   []  Depression [x]  Loss of appetite [x]  Weakness   [x]  Diarrhea [x]  Low blood pressure []  Weight gain   []  Dizziness []  Lung damage []  Weight loss   [x]  Dry skin []  Menopausal symptoms []  Wound healing complication   []  Ecchymosis []  Menstrual irregularities []  Other   [x]  Electrolyte imbalances []  Metallic taste  []  Other   []  Elevated LDH []  Mood changes []  Other   []  Eye irritation [x]  Mouth sores []  Other   [x]  Fatigue [x]  Muscle aches  []  Other   []  Fertility effects  []  Nephrotic syndrome []  Other   [x]  Fevers [x]  Nail changes []  Other   []  Fistula formation [x]  Nausea  []  Other   [x]  Flu-like symptoms []  Neck pain  []  Other   []  Fluid retention [x]  Neutropenia []  Other   []  Forgetfulness []  Nosebleeds []  Other   []  Gastrointestinal perforation [x]  Pain in arms/legs []   Other   []  Hand foot syndrome []  Pericardial effusion  []  Other   [x]  Hair loss/discoloration [x]  Peripheral neuropathy []  Other   []  Headaches []  Petechiae []  Other   []  Hearing loss/change []  Pharyngitis  []  Other   []  Heart damage []  Photosensitivity  []  Other   []  Hematuria []  Pleural effusion  []  Other   []  Hemorrhage []  Proteinuria  []  Other     VASCULAR ACCESS:  The patient was educated on the possible need for vascular access/port placement.  The patient was advised although uncommon, leakage of an infused medication from the vein or venous access device (port) may lead to skin breakdown and/or other tissue damage.  The patient was advised she may have pain, bleeding, and/or bruising from the insertion of a needle in their vein or venous access device (port).  The patient was further advised despite proper technique, infection with redness and irritation may rarely occur at the site where the needle was inserted.  The patient was advised if complications occur, additional medical treatment is available.    BLOOD COUNT MONITORING:  While receiving treatment, it has been explained to the patient blood counts will be monitored.  This may include but is not limited to a complete blood count (CBC). The patient may develop neutropenia, anemia, or thrombocytopenia. This has been explained and a handout was provided to the patient.     NUTRITION:   It was explained to the patient about nutrition and its importance while undergoing chemotherapy and/or biotherapy. Certain medications will be prescribed during the treatment which may change the way foods taste or smell. These changes may cause poor or no appetite. The patient was advised food is fuel for the body, and if it does not get the fuel it needs, she may become malnourished, which can lead to severe fatigue. It was discussed with the patient about calories and how to add high-calorie foods to her diet.  Protein was also mentioned in  regards to how it will help make new cells for the body. Information was given to Christiane Nazario regarding good protein sources.   It was also discussed with the patient the importance of  eating and drinking every 2-3 hours while awake. We discussed fluid intake of at least 6 to 8 ounce glasses of liquids per day to stay hydrated. Examples are listed below:   Water  Juice (fruit or vegetable)  Soda Sport Drinks Soup   Milk  Ensure, Boost, Glucerna Ice Cream Popsicles Jello   Milkshakes Pudding  Gatorade Sherbert Yogurt     REPRODUCTION:  Reproductive risks were discussed, including appropriate use of birth control, and protection during sexual relations. The risks of becoming pregnant while receiving chemotherapy and/or biotherapy were reviewed for females.  Males were instructed to use appropriate birth control to prevent conception during treatment.  We also discussed the importance of using reliable barrier methods while participating in intimate activities as this may expose their partners to a potentially harmful drug. The importance of pregnancy prevention was emphasized due to risks of increased chance of birth defects and miscarriages.     Christiane Nazario was provided handouts on:   1. Home instructions  2. Complete blood counts and terminology  3. Nutrition during cancer therapy   4. Fluids and dehydration  5. Mouth care  6. Cancer-related fatigue  7. Management of constipation    8. Management of diarrhea   9. Handouts from [employer] on specific drugs   10. Handouts from [employer] on specific drugs if applicable   11. A signed copy of chemotherapy/biotherapy consent     TOPICS EDUCATION PROVIDED EDUCATION REINFORCED COMMENTS   ANEMIA:  role of RBC, cause, s/s, ways to manage, role of transfusion [x] [x]    THROMBOCYTOPENIA:  role of platelet, cause, s/s, ways to prevent bleeding, things to avoid, when to seek help [x] [x]    NEUTROPENIA:  role of WBC, cause, infection precautions, s/s of  infection, when to call MD [x] [x]    NUTRITION & APPETITE CHANGES:  importance of maintaining healthy diet & weight, ways to manage to improve intake, dietary consult, exercise regimen [x] [x]    DIARRHEA:  causes, s/s of dehydration, ways to manage, dietary changes, when to call MD [x] [x]    CONSTIPATION:  causes, ways to manage, dietary changes, when to call MD [x] [x]    NAUSEA & VOMITING:  causes, use of antiemetics, dietary changes, when to call MD [x] [x]    MOUTH SORES:  causes, oral care, ways to manage [x] [x]    ALOPECIA:  causes, ways to manage, resources [x] [x]    INFERTILITY & SEXUALITY:  causes, fertility preservation options, sexuality changes, ways to manage, importance of birth control [x] [x]    NERVOUS SYSTEM CHANGES:  causes, s/s, neuropathies, cognitive changes, ways to manage [x] [x]    PAIN:  causes, ways to manage [x] [x]    SKIN & NAIL CHANGES:  cause, s/s, ways to manage [x] [x]    ORGAN TOXICITIES:  cause, s/s, need for diagnostic tests, labs, when to notify MD [x] [x]    SURVIVORSHIP:  distress, distress assessment, secondary malignancies, early/late effects, follow-up, social issues, social support [x] [x]    HOME CARE:  use of spill kits, storing of PO chemo, how to manage bodily fluids [x] [x]    MISCELLANEOUS:  drug interactions, administration, vesicants  [x] [x]      PAST MEDICAL HISTORY:  Past Medical History:   Diagnosis Date   • Cataract    • DM type 2 (diabetes mellitus, type 2) (HCC)    • Ductal carcinoma in situ (DCIS) of left breast    • Fracture, femur (HCC)     fracture of left femur   • Hyperlipidemia    • Hypertension    • Hypothyroidism    • Osteopenia    • Pulmonary hypertension (HCC)        PAST SURGICAL HISTORY:  Past Surgical History:   Procedure Laterality Date   • BREAST BIOPSY  2018   • CATARACT EXTRACTION     •  SECTION     • FEMUR SURGERY Left 2014    @ [facility] - Dr Winston   • MASTECTOMY Left 2019    Dr Bills   • TUBAL ABDOMINAL  "LIGATION         CURRENT MEDICATIONS:    Current Outpatient Medications:   •  acetaZOLAMIDE (DIAMOX) 250 MG tablet, TAKE ONE TABLET BY MOUTH EVERY MORNING, Disp: 90 tablet, Rfl: 0  •  aspirin (aspirin) 81 MG EC tablet, ADULT ASPIRIN EC LOW STRENGTH 81 MG ORAL TABLET DELAYED RELEASE, Disp: , Rfl:   •  atorvastatin (LIPITOR) 40 MG tablet, TAKE ONE TABLET BY MOUTH EVERY NIGHT, Disp: 30 tablet, Rfl: 10  •  BD Insulin Syringe U/F 31G X 5/16\" 1 ML misc, USE TWICE A DAY WITH INSULIN INJECTIONS, Disp: 200 each, Rfl: 4  •  CALCIUM ACETATE IJ, Inject  as directed., Disp: , Rfl:   •  cyclobenzaprine (FLEXERIL) 10 MG tablet, Take 0.5 tablets by mouth 3 (Three) Times a Day As Needed for Muscle Spasms for up to 10 days., Disp: 30 tablet, Rfl: 0  •  D3 Super Strength 50 MCG (2000 UT) capsule, TAKE 1 CAPSULE BY MOUTH EVERY DAY, Disp: 30 capsule, Rfl: 10  •  diphenoxylate-atropine (LOMOTIL) 2.5-0.025 MG per tablet, Take 1 tablet by mouth 4 (Four) Times a Day As Needed for Diarrhea for up to 15 days., Disp: 40 tablet, Rfl: 0  •  furosemide (LASIX) 40 MG tablet, TAKE ONE TABLET BY MOUTH EVERY DAY, Disp: 30 tablet, Rfl: 2  •  glucose blood (ONE TOUCH ULTRA TEST) test strip, ONETOUCH ULTRA BLUE STRP, Disp: , Rfl:   •  insulin NPH-insulin regular (HumuLIN 70/30) (70-30) 100 UNIT/ML injection, INJECT 25 UNITS SUBCUTANEOUSLY EVERY MORNING AND INJECT 25 UNITS EVERY EVENING AT DINNER, Disp: 70 mL, Rfl: 1  •  Kombiglyze XR 2.5-1000 MG tablet sustained-release 24 hour, TAKE ONE TABLET BY MOUTH TWICE DAILY, Disp: 180 tablet, Rfl: 4  •  levothyroxine (SYNTHROID, LEVOTHROID) 88 MCG tablet, TAKE ONE TABLET BY MOUTH EVERY DAY, Disp: 30 tablet, Rfl: 5  •  loratadine (CLARITIN) 10 MG tablet, TAKE ONE TABLET BY MOUTH EVERY DAY, Disp: 90 tablet, Rfl: 1  •  MgO 400 (240 Mg) MG tablet, TAKE ONE TABLET BY MOUTH TWICE DAILY, Disp: 60 tablet, Rfl: 10  •  montelukast (SINGULAIR) 10 MG tablet, TAKE ONE TABLET BY MOUTH EVERY NIGHT AT BEDTIME, Disp: 90 tablet, " Rfl: 0  •  ondansetron (Zofran) 8 MG tablet, Take 1 tablet by mouth Every 8 (Eight) Hours As Needed for Nausea or Vomiting., Disp: 30 tablet, Rfl: 2  •  potassium chloride (K-DUR,KLOR-CON) 20 MEQ CR tablet, Take 1 tablet by mouth 2 (Two) Times a Day., Disp: 180 tablet, Rfl: 0  •  ramipril (ALTACE) 1.25 MG capsule, TAKE 1 CAPSULE BY MOUTH EVERY EVENING WITH DINNER, Disp: 90 capsule, Rfl: 4    ALLERGIES:  Allergies   Allergen Reactions   • Calcium-Containing Compounds Nausea Only   • Sulfa Antibiotics Unknown - Low Severity       FAMILY HISTORY:  Family History   Problem Relation Age of Onset   • Ovarian cancer Sister    • Kidney disease Maternal Aunt    • Cancer Other        ONCOLOGIC FAMILY HISTORY:  Cancer-related family history includes Cancer in an other family member; Ovarian cancer in her sister.    SOCIAL HISTORY:  Social History     Tobacco Use   • Smoking status: Never   • Smokeless tobacco: Never   Vaping Use   • Vaping Use: Never used   Substance Use Topics   • Alcohol use: No   • Drug use: No       HPI, ROS and PFSH have been reviewed and confirmed on 10/10/2022.     REVIEW OF SYSTEMS:  Review of Systems   Constitutional: Positive for fatigue. Negative for chills and fever.   Respiratory: Negative for shortness of breath.    Cardiovascular: Negative for chest pain.   Gastrointestinal: Positive for diarrhea, nausea and vomiting.   Musculoskeletal:        Rib pain   Neurological: Positive for weakness.   Hematological: Does not bruise/bleed easily.       PHYSICAL EXAMINATION:  Physical Exam  Constitutional:       General: She is not in acute distress.  HENT:      Head: Normocephalic and atraumatic.   Eyes:      Extraocular Movements: Extraocular movements intact.   Pulmonary:      Effort: Pulmonary effort is normal.   Musculoskeletal:      Cervical back: Normal range of motion.      Comments: In wheelchair   Neurological:      Mental Status: She is alert and oriented to person, place, and time.    Psychiatric:         Mood and Affect: Mood normal.         LABS:  WBC   Date Value Ref Range Status   10/07/2022 9.26 3.40 - 10.80 10*3/mm3 Final     RBC   Date Value Ref Range Status   10/07/2022 4.36 3.77 - 5.28 10*6/mm3 Final     Hemoglobin   Date Value Ref Range Status   10/07/2022 11.4 (L) 12.0 - 15.9 g/dL Final     Hematocrit   Date Value Ref Range Status   10/07/2022 37.4 34.0 - 46.6 % Final     MCV   Date Value Ref Range Status   10/07/2022 85.8 79.0 - 97.0 fL Final     MCH   Date Value Ref Range Status   10/07/2022 26.1 (L) 26.6 - 33.0 pg Final     MCHC   Date Value Ref Range Status   10/07/2022 30.5 (L) 31.5 - 35.7 g/dL Final     RDW   Date Value Ref Range Status   10/07/2022 17.9 (H) 12.3 - 15.4 % Final     RDW-SD   Date Value Ref Range Status   10/07/2022 53.6 37.0 - 54.0 fl Final     MPV   Date Value Ref Range Status   10/07/2022 9.3 6.0 - 12.0 fL Final     Platelets   Date Value Ref Range Status   10/07/2022 345 140 - 450 10*3/mm3 Final     Neutrophil %   Date Value Ref Range Status   10/07/2022 82.5 (H) 42.7 - 76.0 % Final     Lymphocyte %   Date Value Ref Range Status   10/07/2022 11.3 (L) 19.6 - 45.3 % Final     Monocyte %   Date Value Ref Range Status   10/07/2022 5.3 5.0 - 12.0 % Final     Eosinophil %   Date Value Ref Range Status   10/07/2022 0.6 0.3 - 6.2 % Final     Basophil %   Date Value Ref Range Status   10/07/2022 0.3 0.0 - 1.5 % Final     Neutrophils, Absolute   Date Value Ref Range Status   10/07/2022 7.63 (H) 1.70 - 7.00 10*3/mm3 Final     Lymphocytes, Absolute   Date Value Ref Range Status   10/07/2022 1.05 0.70 - 3.10 10*3/mm3 Final     Monocytes, Absolute   Date Value Ref Range Status   10/07/2022 0.49 0.10 - 0.90 10*3/mm3 Final     Eosinophils, Absolute   Date Value Ref Range Status   10/07/2022 0.06 0.00 - 0.40 10*3/mm3 Final     Basophils, Absolute   Date Value Ref Range Status   10/07/2022 0.03 0.00 - 0.20 10*3/mm3 Final     nRBC   Date Value Ref Range Status   09/29/2022 0.1  0.0 - 0.2 /100 WBC Final     Lab Results   Component Value Date    GLUCOSE 101 (H) 10/07/2022    BUN 29 (H) 10/07/2022    CREATININE 1.32 (H) 10/07/2022    EGFRIFNONA 53 (L) 01/10/2022    BCR 22.0 10/07/2022    K 4.9 10/07/2022    CO2 20.0 (L) 10/07/2022    CALCIUM 10.1 10/07/2022    ALBUMIN 3.80 10/07/2022    LABIL2 1.0 05/08/2019    AST 23 10/07/2022    ALT 9 10/07/2022       Assessment & Plan   There are no diagnoses linked to this encounter.  ASSESSMENT   1. Encounter for medication management and education of chemotherapy/biotherapy    2. Metastatic breast carcinoma to the lung, liver, bones  3. Multiple bone metastasis  4. Nausea, vomiting, diarrhea likely secondary to hepatic metastasis  5. Cancer related bone pain.  Patient has been using ibuprofen and a muscle relaxer as needed without much relief.  Pain is mostly located in the ribs and she rates it a 6 currently.  We will send in hydrocodone as needed.  Counseled not to take with muscle relaxer.  Counseled that if her platelet counts are low while on treatment we would not want her to take any ibuprofen.    PLAN  • Start cycle 1 day 1 of paclitaxel and gemcitabine on 10/11/2022  • Start monthly Zometa for bone metastasis, begin calcium 600 mg/vitamin d 440 IU supplement  • PICC line placement today  • Continue ondansetron and Lomotil as needed  • Hydrocodone 5/325 every 8 hours as needed pain  • Currently patient has diarrhea but educated that a bowel regimen may need to be put in place if this resolves while on opioid pain medication.  • Notified , financial counselor, and dietician patient starting new treatment   • RTC in 2 weeks with Venita Sarkar MD     I have reviewed labs results, imaging, vitals, and medications with the patient today.    A total of 60 minutes were spent with the patient, with greater then 50% of time spent in education and counseling.    Electronically signed by TOMMY Arteaga, 10/10/22, 7:41 AM EDT.

## 2022-10-10 NOTE — PROGRESS NOTES
OSW met w/ patient/son after APRN infusion teaching.     Patient presented as A&O x 4, sitting in her wheelchair, in NAD. Patient reports living at home with her  and son, all basic needs are met at this time. Patient also, recently, started with HH after her recent hospitalization.     OSW discussed [place] documentation, specifically [place] statues[place]gu[place]. Patient asked what she would need to do if 'I want him (son) making my decisions and left with everything.' OSW discussed HCR form versus a DPOA, that is completed by an , and which would be more appropriate for patient at this juncture. Also discussed where she could obtain services. OSW provided patient with ACP booklet and HCR and Living Will form - requested completion and bring in, to place on file.     OSW discussed various services available here including: OSW, financial counselor, dietician, massage therapy and volunteer chaplaincy program. Also provided a packet of resources including a welcome letter, community, transportation and oncology resources.     OSW provided information and emotional support to patient and son, when discussing what to expect the first day of treatment - starting tomorrow, Tuesday, 10/11.     No other needs/concerns known at this time - OSW will remain available.     Erna Segura, ROXANAW, CSW, MSW  Oncology MSW  [facility]

## 2022-10-11 ENCOUNTER — HOSPITAL ENCOUNTER (OUTPATIENT)
Dept: ONCOLOGY | Facility: HOSPITAL | Age: 72
Setting detail: INFUSION SERIES
Discharge: HOME | End: 2022-10-11

## 2022-10-11 VITALS
SYSTOLIC BLOOD PRESSURE: 110 MMHG | BODY MASS INDEX: 36.11 KG/M2 | HEART RATE: 97 BPM | HEIGHT: 60 IN | OXYGEN SATURATION: 94 % | DIASTOLIC BLOOD PRESSURE: 67 MMHG | RESPIRATION RATE: 22 BRPM | WEIGHT: 183.9 LBS

## 2022-10-11 DIAGNOSIS — C79.52 MALIGNANT NEOPLASM METASTATIC TO BONE MARROW: Primary | ICD-10-CM

## 2022-10-11 DIAGNOSIS — C79.51 MALIGNANT NEOPLASM METASTATIC TO BONE: ICD-10-CM

## 2022-10-11 LAB
ALBUMIN SERPL-MCNC: 3.6 G/DL (ref 3.5–5.2)
ALBUMIN/GLOB SERPL: 1.2 G/DL
ALP SERPL-CCNC: 169 U/L (ref 39–117)
ALT SERPL W P-5'-P-CCNC: 10 U/L (ref 1–33)
ANION GAP SERPL CALCULATED.3IONS-SCNC: 12 MMOL/L (ref 5–15)
AST SERPL-CCNC: 19 U/L (ref 1–32)
BASOPHILS # BLD AUTO: 0.01 10*3/MM3 (ref 0–0.2)
BASOPHILS NFR BLD AUTO: 0.1 % (ref 0–1.5)
BILIRUB SERPL-MCNC: 0.4 MG/DL (ref 0–1.2)
BUN SERPL-MCNC: 27 MG/DL (ref 8–23)
BUN/CREAT SERPL: 20.6 (ref 7–25)
CALCIUM SPEC-SCNC: 9.3 MG/DL (ref 8.6–10.5)
CHLORIDE SERPL-SCNC: 104 MMOL/L (ref 98–107)
CO2 SERPL-SCNC: 22 MMOL/L (ref 22–29)
CREAT BLDA-MCNC: 1.5 MG/DL (ref 0.6–1.3)
CREAT SERPL-MCNC: 1.31 MG/DL (ref 0.57–1)
DEPRECATED RDW RBC AUTO: 56 FL (ref 37–54)
EGFRCR SERPLBLD CKD-EPI 2021: 37.3 ML/MIN/1.73
EGFRCR SERPLBLD CKD-EPI 2021: 43.9 ML/MIN/1.73
EOSINOPHIL # BLD AUTO: 0.03 10*3/MM3 (ref 0–0.4)
EOSINOPHIL NFR BLD AUTO: 0.3 % (ref 0.3–6.2)
ERYTHROCYTE [DISTWIDTH] IN BLOOD BY AUTOMATED COUNT: 18.6 % (ref 12.3–15.4)
GLOBULIN UR ELPH-MCNC: 2.9 GM/DL
GLUCOSE SERPL-MCNC: 167 MG/DL (ref 65–99)
HCT VFR BLD AUTO: 34.9 % (ref 34–46.6)
HGB BLD-MCNC: 10.6 G/DL (ref 12–15.9)
LYMPHOCYTES # BLD AUTO: 0.69 10*3/MM3 (ref 0.7–3.1)
LYMPHOCYTES NFR BLD AUTO: 7.5 % (ref 19.6–45.3)
MAGNESIUM SERPL-MCNC: 2.2 MG/DL (ref 1.6–2.4)
MCH RBC QN AUTO: 26.4 PG (ref 26.6–33)
MCHC RBC AUTO-ENTMCNC: 30.4 G/DL (ref 31.5–35.7)
MCV RBC AUTO: 86.8 FL (ref 79–97)
MONOCYTES # BLD AUTO: 0.57 10*3/MM3 (ref 0.1–0.9)
MONOCYTES NFR BLD AUTO: 6.2 % (ref 5–12)
NEUTROPHILS NFR BLD AUTO: 7.94 10*3/MM3 (ref 1.7–7)
NEUTROPHILS NFR BLD AUTO: 85.9 % (ref 42.7–76)
PHOSPHATE SERPL-MCNC: 3.7 MG/DL (ref 2.5–4.5)
PLATELET # BLD AUTO: 256 10*3/MM3 (ref 140–450)
PMV BLD AUTO: 10 FL (ref 6–12)
POTASSIUM SERPL-SCNC: 4 MMOL/L (ref 3.5–5.2)
PROT SERPL-MCNC: 6.5 G/DL (ref 6–8.5)
RBC # BLD AUTO: 4.02 10*6/MM3 (ref 3.77–5.28)
SODIUM SERPL-SCNC: 138 MMOL/L (ref 136–145)
WBC NRBC COR # BLD: 9.24 10*3/MM3 (ref 3.4–10.8)

## 2022-10-11 PROCEDURE — 96413 CHEMO IV INFUSION 1 HR: CPT

## 2022-10-11 PROCEDURE — 25010000002 ZOLEDRONIC ACID PER 1 MG: Performed by: INTERNAL MEDICINE

## 2022-10-11 PROCEDURE — 25010000002 DEXAMETHASONE SODIUM PHOSPHATE 120 MG/30ML SOLUTION: Performed by: INTERNAL MEDICINE

## 2022-10-11 PROCEDURE — 83735 ASSAY OF MAGNESIUM: CPT | Performed by: INTERNAL MEDICINE

## 2022-10-11 PROCEDURE — 25010000002 GEMCITABINE 200 MG/5.26ML SOLUTION 5.26 ML VIAL: Performed by: INTERNAL MEDICINE

## 2022-10-11 PROCEDURE — 25010000002 DIPHENHYDRAMINE PER 50 MG: Performed by: INTERNAL MEDICINE

## 2022-10-11 PROCEDURE — 96417 CHEMO IV INFUS EACH ADDL SEQ: CPT

## 2022-10-11 PROCEDURE — 96367 TX/PROPH/DG ADDL SEQ IV INF: CPT

## 2022-10-11 PROCEDURE — 80053 COMPREHEN METABOLIC PANEL: CPT | Performed by: INTERNAL MEDICINE

## 2022-10-11 PROCEDURE — 84100 ASSAY OF PHOSPHORUS: CPT | Performed by: INTERNAL MEDICINE

## 2022-10-11 PROCEDURE — 25010000002 GEMCITABINE 1 GM/26.3ML SOLUTION 26.3 ML VIAL: Performed by: INTERNAL MEDICINE

## 2022-10-11 PROCEDURE — 96375 TX/PRO/DX INJ NEW DRUG ADDON: CPT

## 2022-10-11 PROCEDURE — 25010000002 PACLITAXEL PER 1 MG: Performed by: INTERNAL MEDICINE

## 2022-10-11 PROCEDURE — 85025 COMPLETE CBC W/AUTO DIFF WBC: CPT | Performed by: INTERNAL MEDICINE

## 2022-10-11 PROCEDURE — 82565 ASSAY OF CREATININE: CPT

## 2022-10-11 RX ORDER — FAMOTIDINE 10 MG/ML
20 INJECTION, SOLUTION INTRAVENOUS AS NEEDED
Status: CANCELLED | OUTPATIENT
Start: 2022-10-11

## 2022-10-11 RX ORDER — FAMOTIDINE 10 MG/ML
20 INJECTION, SOLUTION INTRAVENOUS ONCE
Status: COMPLETED | OUTPATIENT
Start: 2022-10-11 | End: 2022-10-11

## 2022-10-11 RX ORDER — SODIUM CHLORIDE 0.9 % (FLUSH) 0.9 %
20 SYRINGE (ML) INJECTION AS NEEDED
Status: CANCELLED | OUTPATIENT
Start: 2022-10-11

## 2022-10-11 RX ORDER — SODIUM CHLORIDE 9 MG/ML
250 INJECTION, SOLUTION INTRAVENOUS ONCE
Status: CANCELLED | OUTPATIENT
Start: 2022-10-11

## 2022-10-11 RX ORDER — MONTELUKAST SODIUM 10 MG/1
TABLET ORAL
Qty: 90 TABLET | Refills: 0 | Status: SHIPPED | OUTPATIENT
Start: 2022-10-11 | End: 2022-11-07

## 2022-10-11 RX ORDER — DIPHENHYDRAMINE HYDROCHLORIDE 50 MG/ML
50 INJECTION INTRAMUSCULAR; INTRAVENOUS AS NEEDED
Status: CANCELLED | OUTPATIENT
Start: 2022-10-11

## 2022-10-11 RX ORDER — SODIUM CHLORIDE 9 MG/ML
250 INJECTION, SOLUTION INTRAVENOUS ONCE
Status: COMPLETED | OUTPATIENT
Start: 2022-10-11 | End: 2022-10-11

## 2022-10-11 RX ORDER — SODIUM CHLORIDE 0.9 % (FLUSH) 0.9 %
20 SYRINGE (ML) INJECTION AS NEEDED
Status: DISCONTINUED | OUTPATIENT
Start: 2022-10-11 | End: 2022-10-12 | Stop reason: HOSPADM

## 2022-10-11 RX ORDER — SODIUM CHLORIDE 9 MG/ML
250 INJECTION, SOLUTION INTRAVENOUS ONCE
Status: DISCONTINUED | OUTPATIENT
Start: 2022-10-11 | End: 2022-10-12 | Stop reason: HOSPADM

## 2022-10-11 RX ORDER — FAMOTIDINE 10 MG/ML
20 INJECTION, SOLUTION INTRAVENOUS ONCE
Status: CANCELLED | OUTPATIENT
Start: 2022-10-11

## 2022-10-11 RX ADMIN — PACLITAXEL 145 MG: 300 INJECTION, SOLUTION INTRAVENOUS at 10:40

## 2022-10-11 RX ADMIN — FAMOTIDINE 20 MG: 10 INJECTION INTRAVENOUS at 10:36

## 2022-10-11 RX ADMIN — DEXAMETHASONE SODIUM PHOSPHATE 20 MG: 4 INJECTION, SOLUTION INTRA-ARTICULAR; INTRALESIONAL; INTRAMUSCULAR; INTRAVENOUS; SOFT TISSUE at 10:00

## 2022-10-11 RX ADMIN — GEMCITABINE HYDROCHLORIDE 1450 MG: 1 INJECTION, SOLUTION INTRAVENOUS at 11:47

## 2022-10-11 RX ADMIN — Medication 20 ML: at 12:29

## 2022-10-11 RX ADMIN — DIPHENHYDRAMINE HYDROCHLORIDE 50 MG: 50 INJECTION, SOLUTION INTRAMUSCULAR; INTRAVENOUS at 10:18

## 2022-10-11 RX ADMIN — ZOLEDRONIC ACID 3.3 MG: 4 INJECTION, SOLUTION, CONCENTRATE INTRAVENOUS at 09:03

## 2022-10-11 RX ADMIN — SODIUM CHLORIDE 250 ML: 9 INJECTION, SOLUTION INTRAVENOUS at 09:03

## 2022-10-11 NOTE — PROGRESS NOTES
Pt here for Taxol, Gemzar, and Zometa. I changed pt's picc dressing with sterile technique along with two end caps. I educated pt's son and  on how to flush each line daily along with using the curo cap only once. They both demonstrated flushing the line. I advised them to call office if any further questions. All verbalized understanding.

## 2022-10-12 ENCOUNTER — OFFICE VISIT (OUTPATIENT)
Dept: FAMILY MEDICINE CLINIC | Facility: CLINIC | Age: 72
End: 2022-10-12

## 2022-10-12 VITALS
BODY MASS INDEX: 35.74 KG/M2 | WEIGHT: 183 LBS | DIASTOLIC BLOOD PRESSURE: 60 MMHG | HEART RATE: 94 BPM | RESPIRATION RATE: 16 BRPM | OXYGEN SATURATION: 98 % | SYSTOLIC BLOOD PRESSURE: 118 MMHG

## 2022-10-12 DIAGNOSIS — Z09 HOSPITAL DISCHARGE FOLLOW-UP: Primary | ICD-10-CM

## 2022-10-12 DIAGNOSIS — R11.2 NAUSEA VOMITING AND DIARRHEA: ICD-10-CM

## 2022-10-12 DIAGNOSIS — R19.7 NAUSEA VOMITING AND DIARRHEA: ICD-10-CM

## 2022-10-12 DIAGNOSIS — N39.0 ACUTE UTI: ICD-10-CM

## 2022-10-12 PROCEDURE — 1111F DSCHRG MED/CURRENT MED MERGE: CPT | Performed by: NURSE PRACTITIONER

## 2022-10-12 PROCEDURE — 99495 TRANSJ CARE MGMT MOD F2F 14D: CPT | Performed by: NURSE PRACTITIONER

## 2022-10-12 NOTE — PROGRESS NOTES
Transitional Care Follow Up Visit  Subjective     Christiane Nazario is a 72 y.o. female who presents for a transitional care management visit.    Within 48 business hours after discharge our office contacted her via telephone to coordinate her care and needs.      I reviewed and discussed the details of that call along with the discharge summary, hospital problems, inpatient lab results, inpatient diagnostic studies, and consultation reports with Christiane.     Current outpatient and discharge medications have been reconciled for the patient.  Reviewed by: TOMMY Nava      Date of TCM Phone Call 9/30/2022   [facility]   Date of Admission 9/27/2022   Date of Discharge 9/30/2022   Discharge Disposition Home-Health Care [facility]     Risk for Readmission (LACE) Score: 12 (9/30/2022  6:00 AM)    History of Present Illness   Course During Hospital Stay:  9/27-9/30/22  Presented to the ER with c/o n/v/d, treated for UTI, n/v/d    She was discharged home, stable    She tells me that she is feeling better, she does continue to have some soft stools  She has started chemotherapy for metastatic breast cancer    Has caretenders coming to the house, she is getting PT with them  She tells me that she is not getting up and moving much     The following portions of the patient's history were reviewed and updated as appropriate: allergies, current medications, past family history, past medical history, past social history, past surgical history and problem list.    Review of Systems   Constitutional: Positive for appetite change.        Appetite is poor, family is encouraging her to eat   Respiratory: Negative.    Cardiovascular: Negative.    Gastrointestinal: Negative for abdominal pain.        Endorses occasional nausea and soft stools which can be a little loose   Genitourinary: Negative.    Neurological: Negative.    Psychiatric/Behavioral: Negative.  Negative for sleep disturbance.     Objective   Physical Exam  Vitals  reviewed.   Constitutional:       Appearance: Normal appearance.   Neck:      Vascular: No carotid bruit.   Cardiovascular:      Rate and Rhythm: Normal rate and regular rhythm.      Pulses: Normal pulses.      Heart sounds: Normal heart sounds.   Pulmonary:      Effort: Pulmonary effort is normal.      Breath sounds: Normal breath sounds.   Abdominal:      General: Bowel sounds are normal.      Palpations: Abdomen is soft.      Tenderness: There is no abdominal tenderness.   Musculoskeletal:      Cervical back: Neck supple.      Right lower leg: Edema present.      Left lower leg: Edema present.   Skin:     General: Skin is warm.   Neurological:      Mental Status: She is alert and oriented to person, place, and time.         Assessment & Plan   Diagnoses and all orders for this visit:    1. Hospital discharge follow-up (Primary)    2. Acute UTI    3. Nausea vomiting and diarrhea    continue current supportive care, continue with chemotherapy appointments as planned

## 2022-10-13 LAB
LAB AP CASE REPORT: NORMAL
PATH REPORT.FINAL DX SPEC: NORMAL
PATH REPORT.GROSS SPEC: NORMAL

## 2022-10-17 NOTE — PROGRESS NOTES
Hematology/Oncology Outpatient Follow Up    Christiane Nazario  1950    Primary Care Physician: Margarita Melissa, APRN  Referring Physician: Margarita Melissa, EDGARDO*  Chief complaint:     Follow up for: Metastatic breast cancer    pT 2 pN0 infiltrating ductal carcinoma of the left breast    History of Present Illness:     · Ms. Nazario had a mammogram in November 2018 for a palpable  abnormality.    · 11/30/18 - Diagnostic mammogram showed palpable abnormality corresponds to a 2.3 x 1.7 x 2.1 cm spicular shadowing irregular mass in the lateral middle third of the left breast at 2 to 3  o'clock position.  No mammographic evidence of malignancy in the contralateral breast.    · 12/17/18 - Core needle biopsy of breast palpable nodule showed invasive moderately differentiated ductal carcinoma.  Shakira grade 7/9.  DCIS seen.  Tumor was ER positive, [place]  positive and [id number] negative by ICH.    · Patient was sent to the [facility] and seen initially on 1/4/19.     · 1/4/19 - Genetic testing for BRCA 1 and BRCA 2 sequencing and deletion duplication analysis negative.    · 1/21/19 - CT scan of the chest with contrast showed 2.4 x 2.8 cm left breast mass consistent  with known carcinoma.  Coronary artery calcification consistent with atherosclerotic disease.  A  4 mm right upper lobe pulmonary nodule, stable compared to 2014, consistent with a benign  etiology.  · 2/7/19 - Patient underwent left breast lumpectomy and axillary sentinel lymph node resection. Pathology report was invasive poorly differentiated ductal carcinoma, 3.3 cm, completely excised.  Negative margins.  Three sentinel lymph nodes were extracted and three were negative.  [place] score total of 9.  DCIS not identified.  Skin showed invasive carcinoma focally  invading the dermis without skin ulceration.  Margins negative with invasive carcinoma 0.2 cm  from the closest.  Pathologic staging pT2, smpN0.    · 3/6/19 -  Oncotype DX recurrent score 25 (distant recurrence risk at 9 years 12% per TAILORx and absolute chemotherapy benefit less than 1% per TAILORx).   · 3/22/19 - DEXA scan, normal.   · May 2019 - Patient completed 20 radiation treatments.    · 2019 patient started Arimidex treatment  · 2020 bilateral screening mammogram benign  · 2021: Patient had bilateral screening mammogram  · 2022 patient had bilateral diagnostic mammogram which was essentially unremarkable  · 2022 patient was admitted to the hospital for abdominal discomfort diarrhea.  She had imaging studies which shows extensive disease involving the liver, pleural cavity, multiple wounds.  She has since then had biopsy of one of the liver lesions and pathology was consistent with metastatic breast cancer.  His malignancy is ER positive CA positive and HER2/willi was ordered and still pending  · 10/11/2022: Cycle 1 day 1 of Taxol, Gemzar and Zometa received.      Past Medical History:   Diagnosis Date   • Cataract    • DM type 2 (diabetes mellitus, type 2) (HCC)    • Ductal carcinoma in situ (DCIS) of left breast    • Fracture, femur (HCC)     fracture of left femur   • Hyperlipidemia    • Hypertension    • Hypothyroidism    • Osteopenia    • Pulmonary hypertension (HCC)        Past Surgical History:   Procedure Laterality Date   • BREAST BIOPSY  2018   • CATARACT EXTRACTION     •  SECTION     • FEMUR SURGERY Left 2014    @ [facility] - Dr Winston   • MASTECTOMY Left 2019    Dr Bills   • TUBAL ABDOMINAL LIGATION           Current Outpatient Medications:   •  acetaZOLAMIDE (DIAMOX) 250 MG tablet, TAKE ONE TABLET BY MOUTH EVERY MORNING, Disp: 90 tablet, Rfl: 0  •  aspirin (aspirin) 81 MG EC tablet, ADULT ASPIRIN EC LOW STRENGTH 81 MG ORAL TABLET DELAYED RELEASE, Disp: , Rfl:   •  atorvastatin (LIPITOR) 40 MG tablet, TAKE ONE TABLET BY MOUTH EVERY NIGHT, Disp: 30 tablet, Rfl: 10  •  BD Insulin Syringe U/F 31G X  "5/16\" 1 ML misc, USE TWICE A DAY WITH INSULIN INJECTIONS, Disp: 200 each, Rfl: 4  •  CALCIUM ACETATE IJ, Inject  as directed., Disp: , Rfl:   •  D3 Super Strength 50 MCG (2000 UT) capsule, TAKE 1 CAPSULE BY MOUTH EVERY DAY, Disp: 30 capsule, Rfl: 10  •  diphenoxylate-atropine (LOMOTIL) 2.5-0.025 MG per tablet, Take 1 tablet by mouth 4 (Four) Times a Day As Needed for Diarrhea., Disp: 20 tablet, Rfl: 2  •  furosemide (LASIX) 40 MG tablet, TAKE ONE TABLET BY MOUTH EVERY DAY, Disp: 30 tablet, Rfl: 2  •  glucose blood (ONE TOUCH ULTRA TEST) test strip, ONETOUCH ULTRA BLUE STRP, Disp: , Rfl:   •  HYDROcodone-acetaminophen (NORCO) 5-325 MG per tablet, Take 1 tablet by mouth Every 8 (Eight) Hours As Needed for Moderate Pain., Disp: 90 tablet, Rfl: 0  •  insulin NPH-insulin regular (HumuLIN 70/30) (70-30) 100 UNIT/ML injection, INJECT 25 UNITS SUBCUTANEOUSLY EVERY MORNING AND INJECT 25 UNITS EVERY EVENING AT DINNER, Disp: 70 mL, Rfl: 1  •  Kombiglyze XR 2.5-1000 MG tablet sustained-release 24 hour, TAKE ONE TABLET BY MOUTH TWICE DAILY, Disp: 180 tablet, Rfl: 4  •  levothyroxine (SYNTHROID, LEVOTHROID) 88 MCG tablet, TAKE ONE TABLET BY MOUTH EVERY DAY, Disp: 30 tablet, Rfl: 5  •  loratadine (CLARITIN) 10 MG tablet, TAKE ONE TABLET BY MOUTH EVERY DAY, Disp: 90 tablet, Rfl: 1  •  MgO 400 (240 Mg) MG tablet, TAKE ONE TABLET BY MOUTH TWICE DAILY, Disp: 60 tablet, Rfl: 10  •  montelukast (SINGULAIR) 10 MG tablet, TAKE ONE TABLET BY MOUTH EVERY NIGHT AT BEDTIME, Disp: 90 tablet, Rfl: 0  •  ondansetron (Zofran) 8 MG tablet, Take 1 tablet by mouth Every 8 (Eight) Hours As Needed for Nausea or Vomiting., Disp: 30 tablet, Rfl: 2  •  potassium chloride (K-DUR,KLOR-CON) 20 MEQ CR tablet, Take 1 tablet by mouth 2 (Two) Times a Day., Disp: 180 tablet, Rfl: 0  •  ramipril (ALTACE) 1.25 MG capsule, TAKE 1 CAPSULE BY MOUTH EVERY EVENING WITH DINNER, Disp: 90 capsule, Rfl: 4  No current facility-administered medications for this " visit.    Facility-Administered Medications Ordered in Other Visits:   •  diphenhydrAMINE (BENADRYL) IVPB 50 mg, 50 mg, Intravenous, Once, Venita Sarkar MD  •  gemcitabine (GEMZAR) 1,450 mg in sodium chloride 0.9 % 288.1 mL chemo IVPB, 800 mg/m2 (Treatment Plan Recorded), Intravenous, Once, Venita Sarkar MD  •  PACLitaxel (TAXOL) 145 mg in sodium chloride 0.9 % 274.2 mL chemo IVPB, 80 mg/m2 (Treatment Plan Recorded), Intravenous, Once, Venita Sarkar MD    Allergies   Allergen Reactions   • Calcium-Containing Compounds Nausea Only   • Sulfa Antibiotics Unknown - Low Severity       Family History   Problem Relation Age of Onset   • Ovarian cancer Sister    • Kidney disease Maternal Aunt    • Cancer Other        Cancer-related family history includes Cancer in an other family member; Ovarian cancer in her sister.    Social History     Tobacco Use   • Smoking status: Never   • Smokeless tobacco: Never   Vaping Use   • Vaping Use: Never used   Substance Use Topics   • Alcohol use: No   • Drug use: No       I have reviewed and confirmed the accuracy of the patient's history: Chief complaint, HPI, ROS and Subjective as entered by the MA/LPN/RN. Shanna Марина Escobar, APRN 10/18/22       SUBJECTIVE:    Patient here today for chemotherapy accompanied by her son.  Reports tolerated cycle 1 of treatment well with only fatigue and nausea both of which she had at baseline.  She also reports diarrhea but this is also a baseline finding for.  She reports that she is using half of a hydrocodone as needed for pain and that that works well but does cause some drowsiness.      ROS:       Review of Systems   Constitutional: Positive for fatigue. Negative for fever.   HENT: Negative for nosebleeds and trouble swallowing.    Eyes: Negative for visual disturbance.   Respiratory: Negative for cough, shortness of breath and wheezing.    Cardiovascular: Negative for chest pain.   Gastrointestinal: Positive for  "diarrhea and nausea. Negative for abdominal pain and blood in stool.   Endocrine: Negative for cold intolerance.   Genitourinary: Negative for dysuria and hematuria.   Musculoskeletal: Negative for joint swelling.   Skin: Negative for rash.   Allergic/Immunologic: Negative for environmental allergies.   Neurological: Positive for weakness. Negative for seizures.   Hematological: Does not bruise/bleed easily.   Psychiatric/Behavioral: The patient is not nervous/anxious.            Objective:       Vitals:    10/18/22 0803   BP: 117/70   Pulse: 95   Resp: 18   Temp: 96.6 °F (35.9 °C)   SpO2: 97%   Weight: 83 kg (183 lb)   Height: 152.4 cm (60\")   PainSc: 0-No pain         PHYSICAL EXAM:     Physical Exam   Constitutional: She is oriented to person, place, and time. No distress.   Moderately built obese, patient appears weak   HENT:   Head: Normocephalic and atraumatic.   Eyes: Conjunctivae are normal. Right eye exhibits no discharge. Left eye exhibits no discharge. No scleral icterus.   Neck: No thyromegaly present.   Cardiovascular: Normal rate, regular rhythm and normal heart sounds. Exam reveals no gallop and no friction rub.   Pulmonary/Chest: Effort normal. No stridor. No respiratory distress. She has no wheezes.   Decreased breath sounds both bases   Abdominal: Soft. Bowel sounds are normal. She exhibits no mass. There is no abdominal tenderness. There is no rebound and no guarding.   Musculoskeletal: Normal range of motion. No tenderness.      Comments: Trace bilateral ankle edema   Lymphadenopathy:     She has no cervical adenopathy.   Neurological: She is alert and oriented to person, place, and time. She exhibits normal muscle tone.   Skin: Skin is warm. No rash noted. She is not diaphoretic. No erythema.   PICC line to right upper arm   Psychiatric: Her behavior is normal.   Nursing note and vitals reviewed.    I have reexamined the patient and the results are consistent with the previously documented exam. " Shanna Escobar, TOMMY          RECENT LABS:       WBC   Date Value Ref Range Status   10/18/2022 4.46 3.40 - 10.80 10*3/mm3 Final     RBC   Date Value Ref Range Status   10/18/2022 3.69 (L) 3.77 - 5.28 10*6/mm3 Final     Hemoglobin   Date Value Ref Range Status   10/18/2022 10.0 (L) 12.0 - 15.9 g/dL Final     Hematocrit   Date Value Ref Range Status   10/18/2022 31.9 (L) 34.0 - 46.6 % Final     MCV   Date Value Ref Range Status   10/18/2022 86.4 79.0 - 97.0 fL Final     MCH   Date Value Ref Range Status   10/18/2022 27.1 26.6 - 33.0 pg Final     MCHC   Date Value Ref Range Status   10/18/2022 31.3 (L) 31.5 - 35.7 g/dL Final     RDW   Date Value Ref Range Status   10/18/2022 17.7 (H) 12.3 - 15.4 % Final     RDW-SD   Date Value Ref Range Status   10/18/2022 55.2 (H) 37.0 - 54.0 fl Final     MPV   Date Value Ref Range Status   10/18/2022 10.0 6.0 - 12.0 fL Final     Platelets   Date Value Ref Range Status   10/18/2022 157 140 - 450 10*3/mm3 Final     Neutrophil %   Date Value Ref Range Status   10/18/2022 78.9 (H) 42.7 - 76.0 % Final     Lymphocyte %   Date Value Ref Range Status   10/18/2022 15.5 (L) 19.6 - 45.3 % Final     Monocyte %   Date Value Ref Range Status   10/18/2022 5.2 5.0 - 12.0 % Final     Eosinophil %   Date Value Ref Range Status   10/18/2022 0.2 (L) 0.3 - 6.2 % Final     Basophil %   Date Value Ref Range Status   10/18/2022 0.2 0.0 - 1.5 % Final     Neutrophils, Absolute   Date Value Ref Range Status   10/18/2022 3.52 1.70 - 7.00 10*3/mm3 Final     Lymphocytes, Absolute   Date Value Ref Range Status   10/18/2022 0.69 (L) 0.70 - 3.10 10*3/mm3 Final     Monocytes, Absolute   Date Value Ref Range Status   10/18/2022 0.23 0.10 - 0.90 10*3/mm3 Final     Eosinophils, Absolute   Date Value Ref Range Status   10/18/2022 0.01 0.00 - 0.40 10*3/mm3 Final     Basophils, Absolute   Date Value Ref Range Status   10/18/2022 0.01 0.00 - 0.20 10*3/mm3 Final     nRBC   Date Value Ref Range Status   09/29/2022 0.1  0.0 - 0.2 /100 WBC Final       Lab Results   Component Value Date    GLUCOSE 167 (H) 10/11/2022    BUN 27 (H) 10/11/2022    CREATININE 1.50 (H) 10/11/2022    EGFRIFNONA 53 (L) 01/10/2022    BCR 20.6 10/11/2022    K 4.0 10/11/2022    CO2 22.0 10/11/2022    CALCIUM 9.3 10/11/2022    ALBUMIN 3.60 10/11/2022    LABIL2 1.0 05/08/2019    AST 19 10/11/2022    ALT 10 10/11/2022         Assessment & Plan      ASSESSMENT:     1. Metastatic breast carcinoma to the lungs, liver, bones ER positive, NM positive HER2/willi is pending.  This was diagnosed recently September 2022 biopsy-proven on liver specimen  2. Multiple bone metastasis pathologic fracture right iliac wing: Will benefit from biphosphonate therapy  3. Diarrhea with recent admission to the hospital, urinary tract infection September 2022  4. Dehydration  5. Nausea likely secondary to hepatic metastasis  6. History of left breast infiltrating ductal carcinoma, ER +100%, NM positive and HER-2/willi was negative: Status  post left lumpectomy with sentinel lymph node biopsy.  Status post adjuvant left breast radiation.  Was on endocrine therapy with Arimidex for 3 days.  7. Oncotype DX assay with recurrence score of 25.  Chemotherapy not recommended.  Hormone receptor positive and has been on Arimidex initiated in June 2019.  Arimidex discontinued 10/20/2022 due to disease progression.  8. Obesity with BMI of 40  9. Osteopenia: On Boniva, calcium supplements reviewed her recent bone density.  Discontinue Fosamax.  Now on Zometa due to bone metastases.  10. ECOG 3  11. Cancer-related pain.  Pain mostly located in the ribs.    Discussion    Patient has now been diagnosed with metastatic breast cancer with multiple areas of involvement including the liver, lungs and bones.  Given the extent of disease would like to begin with palliative chemotherapy.  Would like to give a combination treatment with Taxol and Gemzar for week response, control of systemic disease and organ  preservation.  Notes that this cancer is estrogen and progesterone positive therefore hormonal treatment/endocrine therapy was to be an option along with a CDK 4 inhibitor.  Would like to control this disease as quickly as possible and then transition her to oral agents.    We discussed the side effects of chemotherapy to include but not limited to:  Chemotherapy side effects include, but not limited to, nausea, vomiting, bone marrow suppression, which can result in blood, platelet transfusion. There is also risk of permanent bone marrow destruction, which can cause myelodysplastic syndrome or leukemia years down the line. There is risk of infection which can result in hospitalization and even death. There is also risk of fatigue, asthenia, alopecia which could become permanent. Chemo will help to reduce risk of relapse of cancer, but does not eliminate risk completely.    Discussed that Taxol chemotherapy can lead to hypersensitivity reaction, peripheral neuropathy, skin toxicity, permanent alopecia    Gemzar can also lead to significant thrombocytopenia, flulike symptoms, skin rash.    We will plan to give her several cycles of chemotherapy and obtain interim CT scans.    Patient will also benefit from biphosphonate therapy with Zometa.  I reviewed the side effects the benefits of Zometa in preventing skeletal events.  Side effects of zometa was discussed with him including, bone aches and pains, electrolyte abnormalities including hypophosphatemia, hypocalcemia, low magnesium.  There is also a risk of renal insufficiency, osteonecrosis of the jaw has been observed in clinical studies.  There is risk of peripheral edema, hypertension, dermatitis, nausea, vomiting, and mild hematologic problems including anemia, thrombocytopenia.  Discussed the need for him to have dental evaluation prior to initiating zometa.  Discussed also the need to notify us of any future dental procedures planned.  Also discussed the need to  notify us for jaw pain at any point in time.            PLANS:        1. Continue palliative chemotherapy combination of Taxol and Gemzar.  2. Continue Zometa 4 mg IV monthly  3. Continue Zofran 8 mg p.o. every 8 hours for nausea  4. Refill Lomotil for diarrhea  5. Continue hydrocodone as needed for pain related to bone metastases  6. Continue Os-Quintin D 600 mg twice a day  7. Home health consultation for care.  8. Follow-up with Dr. Sarkar on 10/25/2022 as previously scheduled and sooner as needed  9. Patient encouraged to continue with weekly breast self-exam and call for lumps nipple discharge or skin discoloration  10. Discussed with patient and spouse who is present today  11. All questions answered    I have reviewed labs results, imaging, vitals, and medications with the patient today.   Patient verbalized understanding and is in agreement of the above plan.          I spent 30 total minutes, face-to-face, caring for Christiane today.  90% of this time involved counseling and/or coordination of care as documented within this note.

## 2022-10-18 ENCOUNTER — OFFICE VISIT (OUTPATIENT)
Dept: ONCOLOGY | Facility: CLINIC | Age: 72
End: 2022-10-18

## 2022-10-18 ENCOUNTER — HOSPITAL ENCOUNTER (OUTPATIENT)
Dept: ONCOLOGY | Facility: HOSPITAL | Age: 72
Setting detail: INFUSION SERIES
Discharge: HOME | End: 2022-10-18

## 2022-10-18 VITALS
TEMPERATURE: 96.6 F | WEIGHT: 183 LBS | HEART RATE: 95 BPM | RESPIRATION RATE: 18 BRPM | SYSTOLIC BLOOD PRESSURE: 117 MMHG | OXYGEN SATURATION: 97 % | DIASTOLIC BLOOD PRESSURE: 70 MMHG | BODY MASS INDEX: 35.74 KG/M2

## 2022-10-18 VITALS
HEIGHT: 60 IN | SYSTOLIC BLOOD PRESSURE: 117 MMHG | TEMPERATURE: 96.6 F | DIASTOLIC BLOOD PRESSURE: 70 MMHG | WEIGHT: 183 LBS | BODY MASS INDEX: 35.93 KG/M2 | RESPIRATION RATE: 18 BRPM | OXYGEN SATURATION: 97 % | HEART RATE: 95 BPM

## 2022-10-18 DIAGNOSIS — C79.51 MALIGNANT NEOPLASM METASTATIC TO BONE: ICD-10-CM

## 2022-10-18 DIAGNOSIS — C79.52 MALIGNANT NEOPLASM METASTATIC TO BONE MARROW: Primary | ICD-10-CM

## 2022-10-18 DIAGNOSIS — R19.7 DIARRHEA, UNSPECIFIED TYPE: ICD-10-CM

## 2022-10-18 LAB
BASOPHILS # BLD AUTO: 0.01 10*3/MM3 (ref 0–0.2)
BASOPHILS NFR BLD AUTO: 0.2 % (ref 0–1.5)
DEPRECATED RDW RBC AUTO: 55.2 FL (ref 37–54)
EOSINOPHIL # BLD AUTO: 0.01 10*3/MM3 (ref 0–0.4)
EOSINOPHIL NFR BLD AUTO: 0.2 % (ref 0.3–6.2)
ERYTHROCYTE [DISTWIDTH] IN BLOOD BY AUTOMATED COUNT: 17.7 % (ref 12.3–15.4)
HCT VFR BLD AUTO: 31.9 % (ref 34–46.6)
HGB BLD-MCNC: 10 G/DL (ref 12–15.9)
LYMPHOCYTES # BLD AUTO: 0.69 10*3/MM3 (ref 0.7–3.1)
LYMPHOCYTES NFR BLD AUTO: 15.5 % (ref 19.6–45.3)
MCH RBC QN AUTO: 27.1 PG (ref 26.6–33)
MCHC RBC AUTO-ENTMCNC: 31.3 G/DL (ref 31.5–35.7)
MCV RBC AUTO: 86.4 FL (ref 79–97)
MONOCYTES # BLD AUTO: 0.23 10*3/MM3 (ref 0.1–0.9)
MONOCYTES NFR BLD AUTO: 5.2 % (ref 5–12)
NEUTROPHILS NFR BLD AUTO: 3.52 10*3/MM3 (ref 1.7–7)
NEUTROPHILS NFR BLD AUTO: 78.9 % (ref 42.7–76)
PLATELET # BLD AUTO: 157 10*3/MM3 (ref 140–450)
PMV BLD AUTO: 10 FL (ref 6–12)
RBC # BLD AUTO: 3.69 10*6/MM3 (ref 3.77–5.28)
WBC NRBC COR # BLD: 4.46 10*3/MM3 (ref 3.4–10.8)

## 2022-10-18 PROCEDURE — 25010000002 GEMCITABINE 1 GM/26.3ML SOLUTION 26.3 ML VIAL: Performed by: INTERNAL MEDICINE

## 2022-10-18 PROCEDURE — 99214 OFFICE O/P EST MOD 30 MIN: CPT | Performed by: NURSE PRACTITIONER

## 2022-10-18 PROCEDURE — 25010000002 DEXAMETHASONE SODIUM PHOSPHATE 120 MG/30ML SOLUTION: Performed by: INTERNAL MEDICINE

## 2022-10-18 PROCEDURE — 96417 CHEMO IV INFUS EACH ADDL SEQ: CPT

## 2022-10-18 PROCEDURE — 96413 CHEMO IV INFUSION 1 HR: CPT

## 2022-10-18 PROCEDURE — 25010000002 PACLITAXEL PER 1 MG: Performed by: INTERNAL MEDICINE

## 2022-10-18 PROCEDURE — 85025 COMPLETE CBC W/AUTO DIFF WBC: CPT | Performed by: INTERNAL MEDICINE

## 2022-10-18 PROCEDURE — 25010000002 GEMCITABINE 200 MG/5.26ML SOLUTION 5.26 ML VIAL: Performed by: INTERNAL MEDICINE

## 2022-10-18 PROCEDURE — 96367 TX/PROPH/DG ADDL SEQ IV INF: CPT

## 2022-10-18 PROCEDURE — 25010000002 DIPHENHYDRAMINE PER 50 MG: Performed by: INTERNAL MEDICINE

## 2022-10-18 PROCEDURE — 96375 TX/PRO/DX INJ NEW DRUG ADDON: CPT

## 2022-10-18 RX ORDER — SODIUM CHLORIDE 9 MG/ML
250 INJECTION, SOLUTION INTRAVENOUS ONCE
Status: COMPLETED | OUTPATIENT
Start: 2022-10-18 | End: 2022-10-18

## 2022-10-18 RX ORDER — DIPHENOXYLATE HYDROCHLORIDE AND ATROPINE SULFATE 2.5; .025 MG/1; MG/1
1 TABLET ORAL 4 TIMES DAILY PRN
Qty: 20 TABLET | Refills: 2 | Status: SHIPPED | OUTPATIENT
Start: 2022-10-18 | End: 2022-11-01 | Stop reason: SDUPTHER

## 2022-10-18 RX ORDER — FAMOTIDINE 10 MG/ML
20 INJECTION, SOLUTION INTRAVENOUS ONCE
Status: CANCELLED | OUTPATIENT
Start: 2022-10-25

## 2022-10-18 RX ORDER — SODIUM CHLORIDE 0.9 % (FLUSH) 0.9 %
20 SYRINGE (ML) INJECTION AS NEEDED
Status: DISCONTINUED | OUTPATIENT
Start: 2022-10-18 | End: 2022-10-19 | Stop reason: HOSPADM

## 2022-10-18 RX ORDER — SODIUM CHLORIDE 0.9 % (FLUSH) 0.9 %
20 SYRINGE (ML) INJECTION AS NEEDED
Status: CANCELLED | OUTPATIENT
Start: 2022-10-18

## 2022-10-18 RX ORDER — SODIUM CHLORIDE 9 MG/ML
250 INJECTION, SOLUTION INTRAVENOUS ONCE
Status: CANCELLED | OUTPATIENT
Start: 2022-10-25

## 2022-10-18 RX ORDER — FAMOTIDINE 10 MG/ML
20 INJECTION, SOLUTION INTRAVENOUS ONCE
Status: COMPLETED | OUTPATIENT
Start: 2022-10-18 | End: 2022-10-18

## 2022-10-18 RX ADMIN — FAMOTIDINE 20 MG: 10 INJECTION INTRAVENOUS at 08:41

## 2022-10-18 RX ADMIN — PACLITAXEL 145 MG: 300 INJECTION, SOLUTION INTRAVENOUS at 09:35

## 2022-10-18 RX ADMIN — DIPHENHYDRAMINE HYDROCHLORIDE 50 MG: 50 INJECTION, SOLUTION INTRAMUSCULAR; INTRAVENOUS at 09:11

## 2022-10-18 RX ADMIN — SODIUM CHLORIDE 250 ML: 9 INJECTION, SOLUTION INTRAVENOUS at 08:37

## 2022-10-18 RX ADMIN — GEMCITABINE HYDROCHLORIDE 1450 MG: 1 INJECTION, SOLUTION INTRAVENOUS at 10:44

## 2022-10-18 RX ADMIN — Medication 20 ML: at 11:24

## 2022-10-18 RX ADMIN — DEXAMETHASONE SODIUM PHOSPHATE 20 MG: 4 INJECTION, SOLUTION INTRA-ARTICULAR; INTRALESIONAL; INTRAMUSCULAR; INTRAVENOUS; SOFT TISSUE at 08:43

## 2022-10-18 NOTE — PROGRESS NOTES
Pt. Was here at clinic for C1D8 Taxol, Gemzar,   Pt. Was seen by Shanna SANDERS for scheduled follow up appointment.   Pt. Is doing well today, and all lab results within parameters for treatment. Sterile dressing change to DAVID picc line, both lumens flush easy with blood return noted, insertion site clear, no signs/symptoms of infection, end caps changed on both lumens and curro caps changed.   Treatment given as ordered and pt. Tolerated well.   Pt. Discharged from clinic with no complaints and AVS was given.

## 2022-10-20 RX ORDER — ACETAZOLAMIDE 250 MG/1
TABLET ORAL
Qty: 90 TABLET | Refills: 0 | Status: SHIPPED | OUTPATIENT
Start: 2022-10-20 | End: 2022-11-07

## 2022-10-25 ENCOUNTER — OFFICE VISIT (OUTPATIENT)
Dept: ONCOLOGY | Facility: CLINIC | Age: 72
End: 2022-10-25

## 2022-10-25 ENCOUNTER — HOSPITAL ENCOUNTER (OUTPATIENT)
Dept: ONCOLOGY | Facility: HOSPITAL | Age: 72
Setting detail: INFUSION SERIES
Discharge: HOME | End: 2022-10-25

## 2022-10-25 VITALS
SYSTOLIC BLOOD PRESSURE: 131 MMHG | DIASTOLIC BLOOD PRESSURE: 74 MMHG | OXYGEN SATURATION: 95 % | HEART RATE: 102 BPM | WEIGHT: 181.2 LBS | TEMPERATURE: 97.5 F | BODY MASS INDEX: 35.39 KG/M2

## 2022-10-25 VITALS
OXYGEN SATURATION: 95 % | HEIGHT: 60 IN | SYSTOLIC BLOOD PRESSURE: 131 MMHG | HEART RATE: 102 BPM | TEMPERATURE: 97.5 F | WEIGHT: 181.22 LBS | BODY MASS INDEX: 35.58 KG/M2 | DIASTOLIC BLOOD PRESSURE: 74 MMHG

## 2022-10-25 DIAGNOSIS — C79.52 MALIGNANT NEOPLASM METASTATIC TO BONE MARROW: Primary | ICD-10-CM

## 2022-10-25 DIAGNOSIS — R19.7 DIARRHEA, UNSPECIFIED TYPE: ICD-10-CM

## 2022-10-25 LAB
ALBUMIN SERPL-MCNC: 2.9 G/DL (ref 3.5–5.2)
ALBUMIN/GLOB SERPL: 0.9 G/DL
ALP SERPL-CCNC: 146 U/L (ref 39–117)
ALT SERPL W P-5'-P-CCNC: 38 U/L (ref 1–33)
ANION GAP SERPL CALCULATED.3IONS-SCNC: 16 MMOL/L (ref 5–15)
AST SERPL-CCNC: 40 U/L (ref 1–32)
BASOPHILS # BLD AUTO: 0 10*3/MM3 (ref 0–0.2)
BASOPHILS NFR BLD AUTO: 0 % (ref 0–1.5)
BILIRUB SERPL-MCNC: 0.4 MG/DL (ref 0–1.2)
BUN SERPL-MCNC: 23 MG/DL (ref 8–23)
BUN/CREAT SERPL: 21.5 (ref 7–25)
CALCIUM SPEC-SCNC: 8.7 MG/DL (ref 8.6–10.5)
CHLORIDE SERPL-SCNC: 107 MMOL/L (ref 98–107)
CO2 SERPL-SCNC: 17 MMOL/L (ref 22–29)
CREAT SERPL-MCNC: 1.07 MG/DL (ref 0.57–1)
DEPRECATED RDW RBC AUTO: 51.3 FL (ref 37–54)
EGFRCR SERPLBLD CKD-EPI 2021: 56 ML/MIN/1.73
EOSINOPHIL # BLD AUTO: 0.01 10*3/MM3 (ref 0–0.4)
EOSINOPHIL NFR BLD AUTO: 0.5 % (ref 0.3–6.2)
ERYTHROCYTE [DISTWIDTH] IN BLOOD BY AUTOMATED COUNT: 17.4 % (ref 12.3–15.4)
GLOBULIN UR ELPH-MCNC: 3.1 GM/DL
GLUCOSE SERPL-MCNC: 167 MG/DL (ref 65–99)
HCT VFR BLD AUTO: 27.4 % (ref 34–46.6)
HGB BLD-MCNC: 8.5 G/DL (ref 12–15.9)
LYMPHOCYTES # BLD AUTO: 0.47 10*3/MM3 (ref 0.7–3.1)
LYMPHOCYTES NFR BLD AUTO: 25.3 % (ref 19.6–45.3)
MAGNESIUM SERPL-MCNC: 2.1 MG/DL (ref 1.6–2.4)
MCH RBC QN AUTO: 26.4 PG (ref 26.6–33)
MCHC RBC AUTO-ENTMCNC: 31 G/DL (ref 31.5–35.7)
MCV RBC AUTO: 85.1 FL (ref 79–97)
MONOCYTES # BLD AUTO: 0.12 10*3/MM3 (ref 0.1–0.9)
MONOCYTES NFR BLD AUTO: 6.5 % (ref 5–12)
NEUTROPHILS NFR BLD AUTO: 1.26 10*3/MM3 (ref 1.7–7)
NEUTROPHILS NFR BLD AUTO: 67.7 % (ref 42.7–76)
PLATELET # BLD AUTO: 91 10*3/MM3 (ref 140–450)
PMV BLD AUTO: 10.1 FL (ref 6–12)
POTASSIUM SERPL-SCNC: 4.1 MMOL/L (ref 3.5–5.2)
PROT SERPL-MCNC: 6 G/DL (ref 6–8.5)
RBC # BLD AUTO: 3.22 10*6/MM3 (ref 3.77–5.28)
SODIUM SERPL-SCNC: 140 MMOL/L (ref 136–145)
WBC NRBC COR # BLD: 1.86 10*3/MM3 (ref 3.4–10.8)

## 2022-10-25 PROCEDURE — 85025 COMPLETE CBC W/AUTO DIFF WBC: CPT | Performed by: INTERNAL MEDICINE

## 2022-10-25 PROCEDURE — 25010000002 DEXAMETHASONE SODIUM PHOSPHATE 120 MG/30ML SOLUTION: Performed by: INTERNAL MEDICINE

## 2022-10-25 PROCEDURE — 96367 TX/PROPH/DG ADDL SEQ IV INF: CPT

## 2022-10-25 PROCEDURE — 99215 OFFICE O/P EST HI 40 MIN: CPT | Performed by: INTERNAL MEDICINE

## 2022-10-25 PROCEDURE — 25010000002 PACLITAXEL PER 1 MG: Performed by: INTERNAL MEDICINE

## 2022-10-25 PROCEDURE — 96413 CHEMO IV INFUSION 1 HR: CPT

## 2022-10-25 PROCEDURE — 25010000002 DIPHENHYDRAMINE PER 50 MG: Performed by: INTERNAL MEDICINE

## 2022-10-25 PROCEDURE — 96375 TX/PRO/DX INJ NEW DRUG ADDON: CPT

## 2022-10-25 PROCEDURE — 96360 HYDRATION IV INFUSION INIT: CPT

## 2022-10-25 PROCEDURE — 96361 HYDRATE IV INFUSION ADD-ON: CPT

## 2022-10-25 PROCEDURE — 80053 COMPREHEN METABOLIC PANEL: CPT | Performed by: INTERNAL MEDICINE

## 2022-10-25 PROCEDURE — 83735 ASSAY OF MAGNESIUM: CPT | Performed by: INTERNAL MEDICINE

## 2022-10-25 RX ORDER — FAMOTIDINE 10 MG/ML
20 INJECTION, SOLUTION INTRAVENOUS ONCE
Status: COMPLETED | OUTPATIENT
Start: 2022-10-25 | End: 2022-10-25

## 2022-10-25 RX ORDER — SODIUM CHLORIDE 9 MG/ML
250 INJECTION, SOLUTION INTRAVENOUS ONCE
Status: COMPLETED | OUTPATIENT
Start: 2022-10-25 | End: 2022-10-25

## 2022-10-25 RX ADMIN — FAMOTIDINE 20 MG: 10 INJECTION INTRAVENOUS at 11:30

## 2022-10-25 RX ADMIN — SODIUM CHLORIDE 1000 ML: 9 INJECTION, SOLUTION INTRAVENOUS at 09:26

## 2022-10-25 RX ADMIN — PACLITAXEL 145 MG: 300 INJECTION, SOLUTION INTRAVENOUS at 12:24

## 2022-10-25 RX ADMIN — SODIUM CHLORIDE 500 ML: 9 INJECTION, SOLUTION INTRAVENOUS at 13:33

## 2022-10-25 RX ADMIN — SODIUM CHLORIDE 250 ML: 9 INJECTION, SOLUTION INTRAVENOUS at 11:29

## 2022-10-25 RX ADMIN — DIPHENHYDRAMINE HYDROCHLORIDE 50 MG: 50 INJECTION, SOLUTION INTRAMUSCULAR; INTRAVENOUS at 12:01

## 2022-10-25 RX ADMIN — DEXAMETHASONE SODIUM PHOSPHATE 20 MG: 4 INJECTION, SOLUTION INTRA-ARTICULAR; INTRALESIONAL; INTRAMUSCULAR; INTRAVENOUS; SOFT TISSUE at 11:30

## 2022-10-25 NOTE — PROGRESS NOTES
Hematology/Oncology Outpatient Follow Up    Christiane Nazario  1950    Primary Care Physician: Margarita Melissa, APRN  Referring Physician: Margarita Melissa, EDGARDO*  Chief complaint:     Follow up for: Metastatic breast cancer    pT 2 pN0 infiltrating ductal carcinoma of the left breast    History of Present Illness:     · Ms. Nazario had a mammogram in November 2018 for a palpable  abnormality.    · 11/30/18 - Diagnostic mammogram showed palpable abnormality corresponds to a 2.3 x 1.7 x 2.1 cm spicular shadowing irregular mass in the lateral middle third of the left breast at 2 to 3  o'clock position.  No mammographic evidence of malignancy in the contralateral breast.    · 12/17/18 - Core needle biopsy of breast palpable nodule showed invasive moderately differentiated ductal carcinoma.  Shakira grade 7/9.  DCIS seen.  Tumor was ER positive, NM  positive and [id number] negative by ICH.    · Patient was sent to the [facility] and seen initially on 1/4/19.     · 1/4/19 - Genetic testing for BRCA 1 and BRCA 2 sequencing and deletion duplication analysis negative.    · 1/21/19 - CT scan of the chest with contrast showed 2.4 x 2.8 cm left breast mass consistent  with known carcinoma.  Coronary artery calcification consistent with atherosclerotic disease.  A  4 mm right upper lobe pulmonary nodule, stable compared to 2014, consistent with a benign  etiology.  · 2/7/19 - Patient underwent left breast lumpectomy and axillary sentinel lymph node resection. Pathology report was invasive poorly differentiated ductal carcinoma, 3.3 cm, completely excised.  Negative margins.  Three sentinel lymph nodes were extracted and three were negative.  Yuma score total of 9.  DCIS not identified.  Skin showed invasive carcinoma focally  invading the dermis without skin ulceration.  Margins negative with invasive carcinoma 0.2 cm  from the closest.  Pathologic staging pT2, smpN0.    · 3/6/19 -  Oncotype DX recurrent score 25 (distant recurrence risk at 9 years 12% per TAILORx and absolute chemotherapy benefit less than 1% per TAILORx).   · 3/22/19 - DEXA scan, normal.   · May 2019 - Patient completed 20 radiation treatments.    · 2019 patient started Arimidex treatment  · 2020 bilateral screening mammogram benign  · 2021: Patient had bilateral screening mammogram  · 2022 patient had bilateral diagnostic mammogram which was essentially unremarkable  · 2022 patient was admitted to the hospital for abdominal discomfort diarrhea.  She had imaging studies which shows extensive disease involving the liver, pleural cavity, multiple wounds.  She has since then had biopsy of one of the liver lesions and pathology was consistent with metastatic breast cancer.  His malignancy is ER positive MS positive and HER2/willi was ordered and still pending  · 10/11/2022: Patient was initiated on combination chemotherapy with Gemzar paclitaxel  · 10/25/2022: Patient to receive cycle 1 day 15 of Gemzar paclitaxel      Past Medical History:   Diagnosis Date   • Cataract    • DM type 2 (diabetes mellitus, type 2) (HCC)    • Ductal carcinoma in situ (DCIS) of left breast    • Fracture, femur (HCC) 2014    fracture of left femur   • Hyperlipidemia    • Hypertension    • Hypothyroidism    • Osteopenia    • Pulmonary hypertension (HCC)        Past Surgical History:   Procedure Laterality Date   • BREAST BIOPSY  2018   • CATARACT EXTRACTION     •  SECTION     • FEMUR SURGERY Left 2014    @ [facility] - Dr Winston   • MASTECTOMY Left 2019    Dr Bills   • TUBAL ABDOMINAL LIGATION           Current Outpatient Medications:   •  acetaZOLAMIDE (DIAMOX) 250 MG tablet, TAKE ONE TABLET BY MOUTH EVERY MORNING, Disp: 90 tablet, Rfl: 0  •  aspirin (aspirin) 81 MG EC tablet, ADULT ASPIRIN EC LOW STRENGTH 81 MG ORAL TABLET DELAYED RELEASE, Disp: , Rfl:   •  atorvastatin (LIPITOR) 40 MG tablet, TAKE  "ONE TABLET BY MOUTH EVERY NIGHT, Disp: 30 tablet, Rfl: 10  •  BD Insulin Syringe U/F 31G X 5/16\" 1 ML misc, USE TWICE A DAY WITH INSULIN INJECTIONS, Disp: 200 each, Rfl: 4  •  CALCIUM ACETATE IJ, Inject  as directed., Disp: , Rfl:   •  D3 Super Strength 50 MCG (2000 UT) capsule, TAKE 1 CAPSULE BY MOUTH EVERY DAY, Disp: 30 capsule, Rfl: 10  •  diphenoxylate-atropine (LOMOTIL) 2.5-0.025 MG per tablet, Take 1 tablet by mouth 4 (Four) Times a Day As Needed for Diarrhea., Disp: 20 tablet, Rfl: 2  •  furosemide (LASIX) 40 MG tablet, TAKE ONE TABLET BY MOUTH EVERY DAY, Disp: 30 tablet, Rfl: 2  •  glucose blood (ONE TOUCH ULTRA TEST) test strip, ONETOUCH ULTRA BLUE STRP, Disp: , Rfl:   •  HYDROcodone-acetaminophen (NORCO) 5-325 MG per tablet, Take 1 tablet by mouth Every 8 (Eight) Hours As Needed for Moderate Pain., Disp: 90 tablet, Rfl: 0  •  insulin NPH-insulin regular (HumuLIN 70/30) (70-30) 100 UNIT/ML injection, INJECT 25 UNITS SUBCUTANEOUSLY EVERY MORNING AND INJECT 25 UNITS EVERY EVENING AT DINNER, Disp: 70 mL, Rfl: 1  •  Kombiglyze XR 2.5-1000 MG tablet sustained-release 24 hour, TAKE ONE TABLET BY MOUTH TWICE DAILY, Disp: 180 tablet, Rfl: 4  •  levothyroxine (SYNTHROID, LEVOTHROID) 88 MCG tablet, TAKE ONE TABLET BY MOUTH EVERY DAY, Disp: 30 tablet, Rfl: 5  •  loratadine (CLARITIN) 10 MG tablet, TAKE ONE TABLET BY MOUTH EVERY DAY, Disp: 90 tablet, Rfl: 1  •  MgO 400 (240 Mg) MG tablet, TAKE ONE TABLET BY MOUTH TWICE DAILY, Disp: 60 tablet, Rfl: 10  •  montelukast (SINGULAIR) 10 MG tablet, TAKE ONE TABLET BY MOUTH EVERY NIGHT AT BEDTIME, Disp: 90 tablet, Rfl: 0  •  ondansetron (Zofran) 8 MG tablet, Take 1 tablet by mouth Every 8 (Eight) Hours As Needed for Nausea or Vomiting., Disp: 30 tablet, Rfl: 2  •  potassium chloride (K-DUR,KLOR-CON) 20 MEQ CR tablet, Take 1 tablet by mouth 2 (Two) Times a Day., Disp: 180 tablet, Rfl: 0  •  ramipril (ALTACE) 1.25 MG capsule, TAKE 1 CAPSULE BY MOUTH EVERY EVENING WITH DINNER, " Disp: 90 capsule, Rfl: 4  No current facility-administered medications for this visit.    Facility-Administered Medications Ordered in Other Visits:   •  sodium chloride 0.9 % bolus 500 mL, 500 mL, Intravenous, Once, Venita Sarkar MD, Last Rate: 500 mL/hr at 10/25/22 1333, 500 mL at 10/25/22 1333    Allergies   Allergen Reactions   • Calcium-Containing Compounds Nausea Only   • Sulfa Antibiotics Unknown - Low Severity       Family History   Problem Relation Age of Onset   • Ovarian cancer Sister    • Kidney disease Maternal Aunt    • Cancer Other        Cancer-related family history includes Cancer in an other family member; Ovarian cancer in her sister.    Social History     Tobacco Use   • Smoking status: Never   • Smokeless tobacco: Never   Vaping Use   • Vaping Use: Never used   Substance Use Topics   • Alcohol use: No   • Drug use: No       I have reviewed and confirmed the accuracy of the patient's history: Chief complaint, HPI, ROS and Subjective as entered by the MA/LPN/RN. Venita Sarkar MD 10/25/22       SUBJECTIVE:        Patient complains of diarrhea which is going on for few days With decreased p.o. intake decreased appetite.  She denies fevers or chills.  There is some nausea but no vomiting    ROS:       Review of Systems   Constitutional: Positive for fatigue. Negative for fever.   HENT: Negative for nosebleeds and trouble swallowing.    Eyes: Negative for visual disturbance.   Respiratory: Negative for cough, shortness of breath and wheezing.    Cardiovascular: Negative for chest pain.   Gastrointestinal: Positive for diarrhea and nausea. Negative for abdominal pain and blood in stool.   Endocrine: Negative for cold intolerance.   Genitourinary: Negative for dysuria and hematuria.   Musculoskeletal: Negative for joint swelling.   Skin: Negative for rash.   Allergic/Immunologic: Negative for environmental allergies.   Neurological: Positive for weakness. Negative for seizures.  "  Hematological: Does not bruise/bleed easily.   Psychiatric/Behavioral: The patient is not nervous/anxious.            Objective:       Vitals:    10/25/22 1001   BP: 131/74   Pulse: 102   Temp: 97.5 °F (36.4 °C)   TempSrc: Oral   SpO2: 95%   Weight: 82.2 kg (181 lb 3.5 oz)   Height: 152.4 cm (60\")   PainSc: 0-No pain         PHYSICAL EXAM:     Physical Exam   Constitutional: She is oriented to person, place, and time. No distress.   Moderately built obese, patient appears weak   HENT:   Head: Normocephalic and atraumatic.   Eyes: Conjunctivae are normal. Right eye exhibits no discharge. Left eye exhibits no discharge. No scleral icterus.   Neck: No thyromegaly present.   Cardiovascular: Normal rate, regular rhythm and normal heart sounds. Exam reveals no gallop and no friction rub.   Pulmonary/Chest: Effort normal. No stridor. No respiratory distress. She has no wheezes.   Decreased breath sounds both bases   Abdominal: Soft. Bowel sounds are normal. She exhibits no mass. There is no abdominal tenderness. There is no rebound and no guarding.   Musculoskeletal: Normal range of motion. No tenderness.      Comments: Trace bilateral ankle edema   Lymphadenopathy:     She has no cervical adenopathy.   Neurological: She is alert and oriented to person, place, and time. She exhibits normal muscle tone.   Skin: Skin is warm. No rash noted. She is not diaphoretic. No erythema.   Psychiatric: Her behavior is normal.   Nursing note and vitals reviewed.        I have reexamined the patient and the results are consistent with the previously documented exam. Venita Sarkar MD          RECENT LABS:       WBC   Date Value Ref Range Status   10/25/2022 1.86 (L) 3.40 - 10.80 10*3/mm3 Final     RBC   Date Value Ref Range Status   10/25/2022 3.22 (L) 3.77 - 5.28 10*6/mm3 Final     Hemoglobin   Date Value Ref Range Status   10/25/2022 8.5 (L) 12.0 - 15.9 g/dL Final     Hematocrit   Date Value Ref Range Status   10/25/2022 27.4 " (L) 34.0 - 46.6 % Final     MCV   Date Value Ref Range Status   10/25/2022 85.1 79.0 - 97.0 fL Final     MCH   Date Value Ref Range Status   10/25/2022 26.4 (L) 26.6 - 33.0 pg Final     MCHC   Date Value Ref Range Status   10/25/2022 31.0 (L) 31.5 - 35.7 g/dL Final     RDW   Date Value Ref Range Status   10/25/2022 17.4 (H) 12.3 - 15.4 % Final     RDW-SD   Date Value Ref Range Status   10/25/2022 51.3 37.0 - 54.0 fl Final     MPV   Date Value Ref Range Status   10/25/2022 10.1 6.0 - 12.0 fL Final     Platelets   Date Value Ref Range Status   10/25/2022 91 (L) 140 - 450 10*3/mm3 Final     Neutrophil %   Date Value Ref Range Status   10/25/2022 67.7 42.7 - 76.0 % Final     Lymphocyte %   Date Value Ref Range Status   10/25/2022 25.3 19.6 - 45.3 % Final     Monocyte %   Date Value Ref Range Status   10/25/2022 6.5 5.0 - 12.0 % Final     Eosinophil %   Date Value Ref Range Status   10/25/2022 0.5 0.3 - 6.2 % Final     Basophil %   Date Value Ref Range Status   10/25/2022 0.0 0.0 - 1.5 % Final     Neutrophils, Absolute   Date Value Ref Range Status   10/25/2022 1.26 (L) 1.70 - 7.00 10*3/mm3 Final     Lymphocytes, Absolute   Date Value Ref Range Status   10/25/2022 0.47 (L) 0.70 - 3.10 10*3/mm3 Final     Monocytes, Absolute   Date Value Ref Range Status   10/25/2022 0.12 0.10 - 0.90 10*3/mm3 Final     Eosinophils, Absolute   Date Value Ref Range Status   10/25/2022 0.01 0.00 - 0.40 10*3/mm3 Final     Basophils, Absolute   Date Value Ref Range Status   10/25/2022 0.00 0.00 - 0.20 10*3/mm3 Final     nRBC   Date Value Ref Range Status   09/29/2022 0.1 0.0 - 0.2 /100 WBC Final       Lab Results   Component Value Date    GLUCOSE 167 (H) 10/25/2022    BUN 23 10/25/2022    CREATININE 1.07 (H) 10/25/2022    EGFRIFNONA 53 (L) 01/10/2022    BCR 21.5 10/25/2022    K 4.1 10/25/2022    CO2 17.0 (L) 10/25/2022    CALCIUM 8.7 10/25/2022    ALBUMIN 2.90 (L) 10/25/2022    LABIL2 1.0 05/08/2019    AST 40 (H) 10/25/2022    ALT 38 (H)  10/25/2022         Assessment & Plan      ASSESSMENT:     1. Metastatic breast carcinoma to the lungs, liver, bones ER positive, CO positive HER2/willi is pending.  This was diagnosed recently September 2022 biopsy-proven on liver specimen  2. Multiple bone metastasis pathologic fracture right iliac wing: Will benefit from biphosphonate therapy  3. Diarrhea with recent admission to the hospital, urinary tract infection September 2022.  Check stool studies.  If negative use Lomotil and possibly Imodium  4. Dehydration: IV fluids  5. Nausea likely secondary to hepatic metastasis and chemotherapy: Continue antiemetics  6. History of left breast infiltrating ductal carcinoma, ER +100%, CO positive and HER-2/willi was negative: Status  post left lumpectomy with sentinel lymph node biopsy.  Status post adjuvant left breast radiation.  Was on endocrine therapy with Arimidex for 3 days.  7. Oncotype DX assay with recurrence score of 25.  Chemotherapy not recommended.  Hormone receptor positive and has been on Arimidex initiated in June 2019.  She will continue the same.  8. Obesity with BMI of 40  9. Osteopenia: On Boniva, calcium supplements reviewed her recent bone density.  I have encouraged her to continue for now  10. ECOG 1    Discussion    Patient has now been diagnosed with metastatic breast cancer with multiple areas of involvement including the liver, lungs and bones.  Given the extent of disease would like to begin with palliative chemotherapy.  Would like to give a combination treatment with Taxol and Gemzar for week response, control of systemic disease and organ preservation.  Notes that this cancer is estrogen and progesterone positive therefore hormonal treatment/endocrine therapy was to be an option along with a CDK 4 inhibitor.  Would like to control this disease as quickly as possible and then transition her to oral agents.    We discussed the side effects of chemotherapy to include but not limited  to:  Chemotherapy side effects include, but not limited to, nausea, vomiting, bone marrow suppression, which can result in blood, platelet transfusion. There is also risk of permanent bone marrow destruction, which can cause myelodysplastic syndrome or leukemia years down the line. There is risk of infection which can result in hospitalization and even death. There is also risk of fatigue, asthenia, alopecia which could become permanent. Chemo will help to reduce risk of relapse of cancer, but does not eliminate risk completely.    Discussed that Taxol chemotherapy can lead to hypersensitivity reaction, peripheral neuropathy, skin toxicity, permanent alopecia    Gemzar can also lead to significant thrombocytopenia, flulike symptoms, skin rash.    We will plan to give her several cycles of chemotherapy and obtain interim CT scans.    Patient will also benefit from biphosphonate therapy with Zometa.  I reviewed the side effects the benefits of Zometa in preventing skeletal events.  Side effects of zometa was discussed with him including, bone aches and pains, electrolyte abnormalities including hypophosphatemia, hypocalcemia, low magnesium.  There is also a risk of renal insufficiency, osteonecrosis of the jaw has been observed in clinical studies.  There is risk of peripheral edema, hypertension, dermatitis, nausea, vomiting, and mild hematologic problems including anemia, thrombocytopenia.  Discussed the need for him to have dental evaluation prior to initiating zometa.  Discussed also the need to notify us of any future dental procedures planned.  Also discussed the need to notify us for jaw pain at any point in time.            PLANS:      1. Stool studies today  2. Additional IV fluids today  3. Flagyl 500 mg p.o. daily 8 hours for 10 days  4. CMP and mag level today  5. Dietary referral  6. Continue palliative chemotherapy combination of Taxol and Gemzar.  7. Please PICC line early next week for  chemotherapy  8. Discontinue Fosamax  9. Continue Zometa 4 mg IV monthly  10. Continue Zofran 8 mg p.o. every 8 hours for nausea  11. Continue Os-Quintin D 600 mg twice a day  12. Schedule chemotherapy education with nurse practitioner early next week  13. Home health consultation for care.  14. Follow-up in 48 hours with nurse practitioner  15. Patient encouraged to continue with weekly breast self-exam and call for lumps nipple discharge or skin discoloration  16. Discussed with patient and spouse who is present today  17. All questions answered    I have reviewed labs results, imaging, vitals, and medications with the patient today.   Patient verbalized understanding and is in agreement of the above plan.          I spent 40 total minutes, face-to-face, caring for Christiane today.  90% of this time involved counseling and/or coordination of care as documented within this note.

## 2022-10-25 NOTE — PROGRESS NOTES
Patient here today for chemo and had c/o diarrhea.  Patient seen by MD and extra IVF ordered and given. Orders entered for stool however patient was unable to provide while here..  Sent home with supplies.

## 2022-10-26 ENCOUNTER — LAB (OUTPATIENT)
Dept: LAB | Facility: HOSPITAL | Age: 72
End: 2022-10-26

## 2022-10-26 DIAGNOSIS — Z51.11 ENCOUNTER FOR ANTINEOPLASTIC CHEMOTHERAPY: ICD-10-CM

## 2022-10-26 DIAGNOSIS — R19.7 DIARRHEA, UNSPECIFIED TYPE: ICD-10-CM

## 2022-10-26 DIAGNOSIS — R19.7 DIARRHEA, UNSPECIFIED TYPE: Primary | ICD-10-CM

## 2022-10-26 LAB

## 2022-10-26 PROCEDURE — 87324 CLOSTRIDIUM AG IA: CPT

## 2022-10-26 PROCEDURE — 87449 NOS EACH ORGANISM AG IA: CPT

## 2022-10-26 NOTE — ADDENDUM NOTE
Encounter addended by: Teena Juarez RN on: 10/26/2022 9:23 AM   Actions taken: Visit diagnoses modified, Order list changed, Diagnosis association updated

## 2022-10-26 NOTE — PROGRESS NOTES
Hematology/Oncology Outpatient Follow Up    Christiane Nazario  1950    Primary Care Physician: Margarita Melissa, APRN  Referring Physician: Margarita Melissa, EDGARDO*  Chief complaint:     Follow up for: Metastatic breast cancer    pT 2 pN0 infiltrating ductal carcinoma of the left breast    History of Present Illness:     · Ms. Nazario had a mammogram in November 2018 for a palpable  abnormality.    · 11/30/18 - Diagnostic mammogram showed palpable abnormality corresponds to a 2.3 x 1.7 x 2.1 cm spicular shadowing irregular mass in the lateral middle third of the left breast at 2 to 3  o'clock position.  No mammographic evidence of malignancy in the contralateral breast.    · 12/17/18 - Core needle biopsy of breast palpable nodule showed invasive moderately differentiated ductal carcinoma.  Shakira grade 7/9.  DCIS seen.  Tumor was ER positive, UT  positive and [id number] negative by [facility].    · Patient was sent to the [facility] and seen initially on 1/4/19.     · 1/4/19 - Genetic testing for BRCA 1 and BRCA 2 sequencing and deletion duplication analysis negative.    · 1/21/19 - CT scan of the chest with contrast showed 2.4 x 2.8 cm left breast mass consistent  with known carcinoma.  Coronary artery calcification consistent with atherosclerotic disease.  A  4 mm right upper lobe pulmonary nodule, stable compared to 2014, consistent with a benign  etiology.  · 2/7/19 - Patient underwent left breast lumpectomy and axillary sentinel lymph node resection. Pathology report was invasive poorly differentiated ductal carcinoma, 3.3 cm, completely excised.  Negative margins.  Three sentinel lymph nodes were extracted and three were negative.  [place] score total of 9.  DCIS not identified.  Skin showed invasive carcinoma focally  invading the dermis without skin ulceration.  Margins negative with invasive carcinoma 0.2 cm  from the closest.  Pathologic staging pT2, smpN0.    · 3/6/19 -  Oncotype DX recurrent score 25 (distant recurrence risk at 9 years 12% per TAILORx and absolute chemotherapy benefit less than 1% per TAILORx).   · 3/22/19 - DEXA scan, normal.   · May 2019 - Patient completed 20 radiation treatments.    · 2019 patient started Arimidex treatment  · 2020 bilateral screening mammogram benign  · 2021: Patient had bilateral screening mammogram  · 2022 patient had bilateral diagnostic mammogram which was essentially unremarkable  · 2022 patient was admitted to the hospital for abdominal discomfort diarrhea.  She had imaging studies which shows extensive disease involving the liver, pleural cavity, multiple wounds.  She has since then had biopsy of one of the liver lesions and pathology was consistent with metastatic breast cancer.  His malignancy is ER positive ME positive and HER2/willi was ordered and still pending  · 10/11/2022: Patient was initiated on combination chemotherapy with Gemzar paclitaxel  · 10/25/2022: Patient to receive cycle 1 day 15 of Gemzar paclitaxel      Past Medical History:   Diagnosis Date   • Cataract    • DM type 2 (diabetes mellitus, type 2) (HCC)    • Ductal carcinoma in situ (DCIS) of left breast    • Fracture, femur (HCC) 2014    fracture of left femur   • Hyperlipidemia    • Hypertension    • Hypothyroidism    • Osteopenia    • Pulmonary hypertension (HCC)        Past Surgical History:   Procedure Laterality Date   • BREAST BIOPSY  2018   • CATARACT EXTRACTION     •  SECTION     • FEMUR SURGERY Left 2014    @ [facility] system - Dr Winston   • MASTECTOMY Left 2019    Dr Bills   • TUBAL ABDOMINAL LIGATION           Current Outpatient Medications:   •  acetaZOLAMIDE (DIAMOX) 250 MG tablet, TAKE ONE TABLET BY MOUTH EVERY MORNING, Disp: 90 tablet, Rfl: 0  •  aspirin (aspirin) 81 MG EC tablet, ADULT ASPIRIN EC LOW STRENGTH 81 MG ORAL TABLET DELAYED RELEASE, Disp: , Rfl:   •  atorvastatin (LIPITOR) 40 MG tablet, TAKE  "ONE TABLET BY MOUTH EVERY NIGHT, Disp: 30 tablet, Rfl: 10  •  BD Insulin Syringe U/F 31G X 5/16\" 1 ML misc, USE TWICE A DAY WITH INSULIN INJECTIONS, Disp: 200 each, Rfl: 4  •  CALCIUM ACETATE IJ, Inject  as directed., Disp: , Rfl:   •  D3 Super Strength 50 MCG (2000 UT) capsule, TAKE 1 CAPSULE BY MOUTH EVERY DAY, Disp: 30 capsule, Rfl: 10  •  diphenoxylate-atropine (LOMOTIL) 2.5-0.025 MG per tablet, Take 1 tablet by mouth 4 (Four) Times a Day As Needed for Diarrhea., Disp: 20 tablet, Rfl: 2  •  furosemide (LASIX) 40 MG tablet, TAKE ONE TABLET BY MOUTH EVERY DAY, Disp: 30 tablet, Rfl: 2  •  glucose blood (ONE TOUCH ULTRA TEST) test strip, ONETOUCH ULTRA BLUE STRP, Disp: , Rfl:   •  HYDROcodone-acetaminophen (NORCO) 5-325 MG per tablet, Take 1 tablet by mouth Every 8 (Eight) Hours As Needed for Moderate Pain., Disp: 90 tablet, Rfl: 0  •  insulin NPH-insulin regular (HumuLIN 70/30) (70-30) 100 UNIT/ML injection, INJECT 25 UNITS SUBCUTANEOUSLY EVERY MORNING AND INJECT 25 UNITS EVERY EVENING AT DINNER, Disp: 70 mL, Rfl: 1  •  Kombiglyze XR 2.5-1000 MG tablet sustained-release 24 hour, TAKE ONE TABLET BY MOUTH TWICE DAILY, Disp: 180 tablet, Rfl: 4  •  levothyroxine (SYNTHROID, LEVOTHROID) 88 MCG tablet, TAKE ONE TABLET BY MOUTH EVERY DAY, Disp: 30 tablet, Rfl: 5  •  loratadine (CLARITIN) 10 MG tablet, TAKE ONE TABLET BY MOUTH EVERY DAY, Disp: 90 tablet, Rfl: 1  •  MgO 400 (240 Mg) MG tablet, TAKE ONE TABLET BY MOUTH TWICE DAILY, Disp: 60 tablet, Rfl: 10  •  montelukast (SINGULAIR) 10 MG tablet, TAKE ONE TABLET BY MOUTH EVERY NIGHT AT BEDTIME, Disp: 90 tablet, Rfl: 0  •  ondansetron (Zofran) 8 MG tablet, Take 1 tablet by mouth Every 8 (Eight) Hours As Needed for Nausea or Vomiting., Disp: 30 tablet, Rfl: 2  •  potassium chloride (K-DUR,KLOR-CON) 20 MEQ CR tablet, Take 1 tablet by mouth 2 (Two) Times a Day., Disp: 180 tablet, Rfl: 0  •  ramipril (ALTACE) 1.25 MG capsule, TAKE 1 CAPSULE BY MOUTH EVERY EVENING WITH DINNER, " Disp: 90 capsule, Rfl: 4  No current facility-administered medications for this visit.    Facility-Administered Medications Ordered in Other Visits:   •  sodium chloride 0.9 % bolus 1,000 mL, 1,000 mL, Intravenous, Once, Shanna Escobar APRN, Last Rate: 500 mL/hr at 10/27/22 1040, 1,000 mL at 10/27/22 1040  •  sodium chloride 0.9 % flush 20 mL, 20 mL, Intravenous, PRN, Venita Sarkar MD    Allergies   Allergen Reactions   • Calcium-Containing Compounds Nausea Only   • Sulfa Antibiotics Unknown - Low Severity       Family History   Problem Relation Age of Onset   • Ovarian cancer Sister    • Kidney disease Maternal Aunt    • Cancer Other        Cancer-related family history includes Cancer in an other family member; Ovarian cancer in her sister.    Social History     Tobacco Use   • Smoking status: Never   • Smokeless tobacco: Never   Vaping Use   • Vaping Use: Never used   Substance Use Topics   • Alcohol use: No   • Drug use: No       I have reviewed and confirmed the accuracy of the patient's history: Chief complaint, HPI, ROS and Subjective as entered by the MA/LPN/RN. TOMMY Arteaga 10/27/22       SUBJECTIVE:    Patient here today for reeval and additional IV fluids if needed.  The patient reports that she still is having diarrhea.  Her stool studies were all negative and so she has been taking Lomotil but she is still having breakthrough diarrhea with that.  She had an episode last night, 1 episode this morning and an additional episode once arriving at the center.  She states that the diarrhea is liquidy, mucousy, and does sometimes have food particles in it.  No blood.  She does report a decrease in oral intake as she worries that it would cause more diarrhea.  She does have some nausea but states it resolves easily with the medication she has at home and that is not the reason for her decreased oral intake.  She feels fatigued tomorrow from having diarrhea.    ROS:       Review of  "Systems   Constitutional: Positive for fatigue. Negative for fever.   HENT: Negative for nosebleeds and trouble swallowing.    Eyes: Negative for visual disturbance.   Respiratory: Negative for cough, shortness of breath and wheezing.    Cardiovascular: Negative for chest pain.   Gastrointestinal: Positive for diarrhea and nausea. Negative for abdominal pain and blood in stool.   Endocrine: Negative for cold intolerance.   Genitourinary: Negative for dysuria and hematuria.   Musculoskeletal: Negative for joint swelling.   Skin: Negative for rash.   Allergic/Immunologic: Negative for environmental allergies.   Neurological: Positive for weakness. Negative for seizures.   Hematological: Does not bruise/bleed easily.   Psychiatric/Behavioral: The patient is not nervous/anxious.            Objective:       Vitals:    10/27/22 1111   BP: 108/62   Pulse: 99   Resp: 18   Temp: 97.6 °F (36.4 °C)   TempSrc: Oral   Weight: 82.1 kg (181 lb)   Height: 152.4 cm (60\")         PHYSICAL EXAM:     Physical Exam   Constitutional: She is oriented to person, place, and time. No distress.   Moderately built obese, patient appears weak   HENT:   Head: Normocephalic and atraumatic.   Eyes: Conjunctivae are normal. Right eye exhibits no discharge. Left eye exhibits no discharge. No scleral icterus.   Neck: No thyromegaly present.   Cardiovascular: Normal rate, regular rhythm and normal heart sounds. Exam reveals no gallop and no friction rub.   Pulmonary/Chest: Effort normal. No stridor. No respiratory distress. She has no wheezes.   Decreased breath sounds both bases   Abdominal: Soft. Bowel sounds are normal. She exhibits no mass. There is no abdominal tenderness. There is no rebound and no guarding.   Musculoskeletal: Normal range of motion. No tenderness.      Comments: 2+ bilateral pitting pedal and ankle edema   Lymphadenopathy:     She has no cervical adenopathy.   Neurological: She is alert and oriented to person, place, and time. " She exhibits normal muscle tone.   Skin: Skin is warm. No rash noted. She is not diaphoretic. No erythema.   Psychiatric: Her behavior is normal.   Nursing note and vitals reviewed.        I have reexamined the patient and the results are consistent with the previously documented exam. TOMMY Arteaga          RECENT LABS:       WBC   Date Value Ref Range Status   10/25/2022 1.86 (L) 3.40 - 10.80 10*3/mm3 Final     RBC   Date Value Ref Range Status   10/25/2022 3.22 (L) 3.77 - 5.28 10*6/mm3 Final     Hemoglobin   Date Value Ref Range Status   10/25/2022 8.5 (L) 12.0 - 15.9 g/dL Final     Hematocrit   Date Value Ref Range Status   10/25/2022 27.4 (L) 34.0 - 46.6 % Final     MCV   Date Value Ref Range Status   10/25/2022 85.1 79.0 - 97.0 fL Final     MCH   Date Value Ref Range Status   10/25/2022 26.4 (L) 26.6 - 33.0 pg Final     MCHC   Date Value Ref Range Status   10/25/2022 31.0 (L) 31.5 - 35.7 g/dL Final     RDW   Date Value Ref Range Status   10/25/2022 17.4 (H) 12.3 - 15.4 % Final     RDW-SD   Date Value Ref Range Status   10/25/2022 51.3 37.0 - 54.0 fl Final     MPV   Date Value Ref Range Status   10/25/2022 10.1 6.0 - 12.0 fL Final     Platelets   Date Value Ref Range Status   10/25/2022 91 (L) 140 - 450 10*3/mm3 Final     Neutrophil %   Date Value Ref Range Status   10/25/2022 67.7 42.7 - 76.0 % Final     Lymphocyte %   Date Value Ref Range Status   10/25/2022 25.3 19.6 - 45.3 % Final     Monocyte %   Date Value Ref Range Status   10/25/2022 6.5 5.0 - 12.0 % Final     Eosinophil %   Date Value Ref Range Status   10/25/2022 0.5 0.3 - 6.2 % Final     Basophil %   Date Value Ref Range Status   10/25/2022 0.0 0.0 - 1.5 % Final     Neutrophils, Absolute   Date Value Ref Range Status   10/25/2022 1.26 (L) 1.70 - 7.00 10*3/mm3 Final     Lymphocytes, Absolute   Date Value Ref Range Status   10/25/2022 0.47 (L) 0.70 - 3.10 10*3/mm3 Final     Monocytes, Absolute   Date Value Ref Range Status   10/25/2022 0.12  0.10 - 0.90 10*3/mm3 Final     Eosinophils, Absolute   Date Value Ref Range Status   10/25/2022 0.01 0.00 - 0.40 10*3/mm3 Final     Basophils, Absolute   Date Value Ref Range Status   10/25/2022 0.00 0.00 - 0.20 10*3/mm3 Final     nRBC   Date Value Ref Range Status   09/29/2022 0.1 0.0 - 0.2 /100 WBC Final       Lab Results   Component Value Date    GLUCOSE 108 (H) 10/27/2022    BUN 26 (H) 10/27/2022    CREATININE 1.25 (H) 10/27/2022    EGFRIFNONA 53 (L) 01/10/2022    BCR 20.8 10/27/2022    K 4.6 10/27/2022    CO2 16.0 (L) 10/27/2022    CALCIUM 8.3 (L) 10/27/2022    ALBUMIN 3.10 (L) 10/27/2022    LABIL2 1.0 05/08/2019    AST 38 (H) 10/27/2022    ALT 36 (H) 10/27/2022         Assessment & Plan      ASSESSMENT:     1. Metastatic breast carcinoma to the lungs, liver, bones ER positive, AK positive HER2/willi is pending.  This was diagnosed recently September 2022 biopsy-proven on liver specimen  2. Multiple bone metastasis pathologic fracture right iliac wing: Will benefit from biphosphonate therapy  3. Diarrhea with recent admission to the hospital, urinary tract infection September 2022.  Check stool studies.  If negative use Lomotil and possibly Imodium  4. Dehydration: IV fluids  5. Nausea likely secondary to hepatic metastasis and chemotherapy: Continue antiemetics  6. History of left breast infiltrating ductal carcinoma, ER +100%, AK positive and HER-2/willi was negative: Status  post left lumpectomy with sentinel lymph node biopsy.  Status post adjuvant left breast radiation.  Was on endocrine therapy with Arimidex for 3 days.  7. Oncotype DX assay with recurrence score of 25.  Chemotherapy not recommended.  Hormone receptor positive and has been on Arimidex initiated in June 2019.  She will continue the same.  8. Obesity with BMI of 40  9. Osteopenia: On Boniva, calcium supplements reviewed her recent bone density.  I have encouraged her to continue for now  10. ECOG 1  11. Peripheral edema: Bilateral lower  extremities.  Worsening from baseline.  Does not go past the knee.  No increased shortness of air or abdominal swelling.  On Diamox routinely.  Instructed to elevate while at rest and will continue to monitor.    Discussion    Patient has now been diagnosed with metastatic breast cancer with multiple areas of involvement including the liver, lungs and bones.  Given the extent of disease would like to begin with palliative chemotherapy.  Would like to give a combination treatment with Taxol and Gemzar for week response, control of systemic disease and organ preservation.  Notes that this cancer is estrogen and progesterone positive therefore hormonal treatment/endocrine therapy was to be an option along with a CDK 4 inhibitor.  Would like to control this disease as quickly as possible and then transition her to oral agents.    We discussed the side effects of chemotherapy to include but not limited to:  Chemotherapy side effects include, but not limited to, nausea, vomiting, bone marrow suppression, which can result in blood, platelet transfusion. There is also risk of permanent bone marrow destruction, which can cause myelodysplastic syndrome or leukemia years down the line. There is risk of infection which can result in hospitalization and even death. There is also risk of fatigue, asthenia, alopecia which could become permanent. Chemo will help to reduce risk of relapse of cancer, but does not eliminate risk completely.    Discussed that Taxol chemotherapy can lead to hypersensitivity reaction, peripheral neuropathy, skin toxicity, permanent alopecia    Gemzar can also lead to significant thrombocytopenia, flulike symptoms, skin rash.    We will plan to give her several cycles of chemotherapy and obtain interim CT scans.    Patient will also benefit from biphosphonate therapy with Zometa.  I reviewed the side effects the benefits of Zometa in preventing skeletal events.  Side effects of zometa was discussed with  him including, bone aches and pains, electrolyte abnormalities including hypophosphatemia, hypocalcemia, low magnesium.  There is also a risk of renal insufficiency, osteonecrosis of the jaw has been observed in clinical studies.  There is risk of peripheral edema, hypertension, dermatitis, nausea, vomiting, and mild hematologic problems including anemia, thrombocytopenia.  Discussed the need for him to have dental evaluation prior to initiating zometa.  Discussed also the need to notify us of any future dental procedures planned.  Also discussed the need to notify us for jaw pain at any point in time.            PLANS:      1. Stool studies reviewed and negative, C. difficile also negative  2. Continue Lomotil and add Imodium.  Instructed patient to alternate so that she is able to take something every 3 hours while having diarrhea. Patient to contact office if this does not control diarrhea.   3. Additional IV fluids today  4. CMP and mag level today, Stat and replace as needed  5. Dietary referral  6. Continue palliative chemotherapy combination of Taxol and Gemzar.  7. PICC line in place for chemotherapy  8. Discontinue Fosamax  9. Continue Zometa 4 mg IV monthly  10. Continue Zofran 8 mg p.o. every 8 hours for nausea  11. Continue Os-Quintin D 600 mg twice a day  12. Home health consultation for care.  13. Patient encouraged to continue with weekly breast self-exam and call for lumps nipple discharge or skin discoloration  14. Discussed with patient and son who is present today  15. All questions answered    I have reviewed labs results, imaging, vitals, and medications with the patient today.   Patient verbalized understanding and is in agreement of the above plan.          I spent 40 total minutes, face-to-face, caring for Christiane today.  90% of this time involved counseling and/or coordination of care as documented within this note.

## 2022-10-27 ENCOUNTER — HOSPITAL ENCOUNTER (OUTPATIENT)
Dept: ONCOLOGY | Facility: HOSPITAL | Age: 72
Setting detail: INFUSION SERIES
Discharge: HOME | End: 2022-10-27

## 2022-10-27 ENCOUNTER — OFFICE VISIT (OUTPATIENT)
Dept: ONCOLOGY | Facility: CLINIC | Age: 72
End: 2022-10-27

## 2022-10-27 VITALS
HEART RATE: 71 BPM | SYSTOLIC BLOOD PRESSURE: 115 MMHG | RESPIRATION RATE: 18 BRPM | DIASTOLIC BLOOD PRESSURE: 74 MMHG | BODY MASS INDEX: 35.53 KG/M2 | HEIGHT: 60 IN | TEMPERATURE: 97.6 F | WEIGHT: 181 LBS

## 2022-10-27 VITALS
WEIGHT: 181 LBS | HEART RATE: 99 BPM | DIASTOLIC BLOOD PRESSURE: 62 MMHG | BODY MASS INDEX: 35.53 KG/M2 | SYSTOLIC BLOOD PRESSURE: 108 MMHG | TEMPERATURE: 97.6 F | HEIGHT: 60 IN | RESPIRATION RATE: 18 BRPM

## 2022-10-27 DIAGNOSIS — C50.919 PRIMARY MALIGNANT NEOPLASM OF FEMALE BREAST: ICD-10-CM

## 2022-10-27 DIAGNOSIS — C79.51 MALIGNANT NEOPLASM METASTATIC TO BONE: ICD-10-CM

## 2022-10-27 DIAGNOSIS — R19.7 DIARRHEA, UNSPECIFIED TYPE: ICD-10-CM

## 2022-10-27 DIAGNOSIS — E86.0 DEHYDRATION: Primary | ICD-10-CM

## 2022-10-27 DIAGNOSIS — R19.7 NAUSEA VOMITING AND DIARRHEA: ICD-10-CM

## 2022-10-27 DIAGNOSIS — R11.2 NAUSEA VOMITING AND DIARRHEA: ICD-10-CM

## 2022-10-27 DIAGNOSIS — C50.919 PRIMARY MALIGNANT NEOPLASM OF FEMALE BREAST: Primary | ICD-10-CM

## 2022-10-27 DIAGNOSIS — E86.0 DEHYDRATION: ICD-10-CM

## 2022-10-27 LAB
ALBUMIN SERPL-MCNC: 3.1 G/DL (ref 3.5–5.2)
ALBUMIN/GLOB SERPL: 1.1 G/DL
ALP SERPL-CCNC: 149 U/L (ref 39–117)
ALT SERPL W P-5'-P-CCNC: 36 U/L (ref 1–33)
ANION GAP SERPL CALCULATED.3IONS-SCNC: 16 MMOL/L (ref 5–15)
AST SERPL-CCNC: 38 U/L (ref 1–32)
BILIRUB SERPL-MCNC: 0.5 MG/DL (ref 0–1.2)
BUN SERPL-MCNC: 26 MG/DL (ref 8–23)
BUN/CREAT SERPL: 20.8 (ref 7–25)
C DIFF GDH + TOXINS A+B STL QL IA.RAPID: NEGATIVE
C DIFF GDH + TOXINS A+B STL QL IA.RAPID: NEGATIVE
CALCIUM SPEC-SCNC: 8.3 MG/DL (ref 8.6–10.5)
CHLORIDE SERPL-SCNC: 106 MMOL/L (ref 98–107)
CO2 SERPL-SCNC: 16 MMOL/L (ref 22–29)
CREAT SERPL-MCNC: 1.25 MG/DL (ref 0.57–1)
EGFRCR SERPLBLD CKD-EPI 2021: 46.5 ML/MIN/1.73
GLOBULIN UR ELPH-MCNC: 2.9 GM/DL
GLUCOSE BLDC GLUCOMTR-MCNC: 112 MG/DL (ref 70–105)
GLUCOSE SERPL-MCNC: 108 MG/DL (ref 65–99)
MAGNESIUM SERPL-MCNC: 1.9 MG/DL (ref 1.6–2.4)
POTASSIUM SERPL-SCNC: 4.6 MMOL/L (ref 3.5–5.2)
PROT SERPL-MCNC: 6 G/DL (ref 6–8.5)
SODIUM SERPL-SCNC: 138 MMOL/L (ref 136–145)

## 2022-10-27 PROCEDURE — 96360 HYDRATION IV INFUSION INIT: CPT

## 2022-10-27 PROCEDURE — 80053 COMPREHEN METABOLIC PANEL: CPT | Performed by: NURSE PRACTITIONER

## 2022-10-27 PROCEDURE — 99215 OFFICE O/P EST HI 40 MIN: CPT | Performed by: NURSE PRACTITIONER

## 2022-10-27 PROCEDURE — 83735 ASSAY OF MAGNESIUM: CPT | Performed by: NURSE PRACTITIONER

## 2022-10-27 PROCEDURE — 82962 GLUCOSE BLOOD TEST: CPT

## 2022-10-27 PROCEDURE — 96361 HYDRATE IV INFUSION ADD-ON: CPT

## 2022-10-27 RX ORDER — SODIUM CHLORIDE 0.9 % (FLUSH) 0.9 %
20 SYRINGE (ML) INJECTION AS NEEDED
Status: DISCONTINUED | OUTPATIENT
Start: 2022-10-27 | End: 2022-10-28 | Stop reason: HOSPADM

## 2022-10-27 RX ORDER — LOPERAMIDE HYDROCHLORIDE 2 MG/1
4 CAPSULE ORAL ONCE
Status: COMPLETED | OUTPATIENT
Start: 2022-10-27 | End: 2022-10-27

## 2022-10-27 RX ORDER — SODIUM CHLORIDE 0.9 % (FLUSH) 0.9 %
20 SYRINGE (ML) INJECTION AS NEEDED
Status: CANCELLED | OUTPATIENT
Start: 2022-10-27

## 2022-10-27 RX ADMIN — Medication 20 ML: at 12:46

## 2022-10-27 RX ADMIN — LOPERAMIDE HYDROCHLORIDE 4 MG: 2 CAPSULE, LIQUID FILLED ORAL at 11:18

## 2022-10-27 RX ADMIN — SODIUM CHLORIDE 1000 ML: 900 INJECTION, SOLUTION INTRAVENOUS at 10:40

## 2022-10-27 NOTE — PROGRESS NOTES
Pt here for IV fluids and to see Shanna CALIX. She reports having 1 episode of diarrhea last evening and 1 diarrhea this morning. While here, pt had diarrhea. Informed Shanna CALIX and order received for imodium 4 mg now. Pt tolerated IV fluids. AVS given at discharge.

## 2022-10-31 DIAGNOSIS — C79.52 MALIGNANT NEOPLASM METASTATIC TO BONE MARROW: Primary | ICD-10-CM

## 2022-10-31 RX ORDER — FAMOTIDINE 10 MG/ML
20 INJECTION, SOLUTION INTRAVENOUS AS NEEDED
Status: CANCELLED | OUTPATIENT
Start: 2022-11-01

## 2022-10-31 RX ORDER — SODIUM CHLORIDE 9 MG/ML
250 INJECTION, SOLUTION INTRAVENOUS ONCE
Status: CANCELLED | OUTPATIENT
Start: 2022-11-01

## 2022-10-31 RX ORDER — FAMOTIDINE 10 MG/ML
20 INJECTION, SOLUTION INTRAVENOUS ONCE
Status: CANCELLED | OUTPATIENT
Start: 2022-11-01

## 2022-10-31 RX ORDER — DIPHENHYDRAMINE HYDROCHLORIDE 50 MG/ML
50 INJECTION INTRAMUSCULAR; INTRAVENOUS AS NEEDED
Status: CANCELLED | OUTPATIENT
Start: 2022-11-01

## 2022-11-01 ENCOUNTER — OFFICE VISIT (OUTPATIENT)
Dept: ONCOLOGY | Facility: CLINIC | Age: 72
End: 2022-11-01

## 2022-11-01 ENCOUNTER — HOSPITAL ENCOUNTER (OUTPATIENT)
Dept: ONCOLOGY | Facility: HOSPITAL | Age: 72
Setting detail: INFUSION SERIES
Discharge: HOME | End: 2022-11-01

## 2022-11-01 ENCOUNTER — APPOINTMENT (OUTPATIENT)
Dept: OTHER | Facility: HOSPITAL | Age: 72
End: 2022-11-01

## 2022-11-01 ENCOUNTER — HOSPITAL ENCOUNTER (OUTPATIENT)
Dept: OTHER | Facility: HOSPITAL | Age: 72
Discharge: HOME | End: 2022-11-01
Admitting: INTERNAL MEDICINE

## 2022-11-01 VITALS
RESPIRATION RATE: 18 BRPM | WEIGHT: 181 LBS | HEART RATE: 97 BPM | DIASTOLIC BLOOD PRESSURE: 69 MMHG | BODY MASS INDEX: 35.35 KG/M2 | SYSTOLIC BLOOD PRESSURE: 112 MMHG | OXYGEN SATURATION: 98 % | TEMPERATURE: 97.6 F

## 2022-11-01 VITALS
BODY MASS INDEX: 35.53 KG/M2 | SYSTOLIC BLOOD PRESSURE: 112 MMHG | HEIGHT: 60 IN | RESPIRATION RATE: 18 BRPM | HEART RATE: 97 BPM | TEMPERATURE: 97.6 F | DIASTOLIC BLOOD PRESSURE: 69 MMHG | OXYGEN SATURATION: 98 % | WEIGHT: 181 LBS

## 2022-11-01 DIAGNOSIS — R19.7 DIARRHEA, UNSPECIFIED TYPE: Primary | ICD-10-CM

## 2022-11-01 DIAGNOSIS — D64.81 ANEMIA DUE TO ANTINEOPLASTIC CHEMOTHERAPY (CODE): ICD-10-CM

## 2022-11-01 DIAGNOSIS — Z45.2 ENCOUNTER FOR ASSESSMENT OF PERIPHERALLY INSERTED CENTRAL CATHETER (PICC): Primary | ICD-10-CM

## 2022-11-01 DIAGNOSIS — C79.52 MALIGNANT NEOPLASM METASTATIC TO BONE MARROW: ICD-10-CM

## 2022-11-01 PROBLEM — T82.898A OCCLUDED PICC LINE: Status: ACTIVE | Noted: 2022-11-01

## 2022-11-01 LAB
ALBUMIN SERPL-MCNC: 3 G/DL (ref 3.5–5.2)
ALBUMIN/GLOB SERPL: 1.1 G/DL
ALP SERPL-CCNC: 142 U/L (ref 39–117)
ALT SERPL W P-5'-P-CCNC: 18 U/L (ref 1–33)
ANION GAP SERPL CALCULATED.3IONS-SCNC: 14 MMOL/L (ref 5–15)
AST SERPL-CCNC: 20 U/L (ref 1–32)
BASOPHILS # BLD AUTO: 0.01 10*3/MM3 (ref 0–0.2)
BASOPHILS NFR BLD AUTO: 0.1 % (ref 0–1.5)
BILIRUB SERPL-MCNC: 0.3 MG/DL (ref 0–1.2)
BUN SERPL-MCNC: 19 MG/DL (ref 8–23)
BUN/CREAT SERPL: 12.9 (ref 7–25)
CALCIUM SPEC-SCNC: 8 MG/DL (ref 8.6–10.5)
CHLORIDE SERPL-SCNC: 106 MMOL/L (ref 98–107)
CO2 SERPL-SCNC: 19 MMOL/L (ref 22–29)
CREAT SERPL-MCNC: 1.47 MG/DL (ref 0.57–1)
DEPRECATED RDW RBC AUTO: 54.7 FL (ref 37–54)
EGFRCR SERPLBLD CKD-EPI 2021: 38.2 ML/MIN/1.73
EOSINOPHIL # BLD AUTO: 0.02 10*3/MM3 (ref 0–0.4)
EOSINOPHIL NFR BLD AUTO: 0.3 % (ref 0.3–6.2)
ERYTHROCYTE [DISTWIDTH] IN BLOOD BY AUTOMATED COUNT: 19.6 % (ref 12.3–15.4)
GLOBULIN UR ELPH-MCNC: 2.7 GM/DL
GLUCOSE SERPL-MCNC: 90 MG/DL (ref 65–99)
HCT VFR BLD AUTO: 28.4 % (ref 34–46.6)
HGB BLD-MCNC: 8.8 G/DL (ref 12–15.9)
LYMPHOCYTES # BLD AUTO: 0.59 10*3/MM3 (ref 0.7–3.1)
LYMPHOCYTES NFR BLD AUTO: 7.9 % (ref 19.6–45.3)
MAGNESIUM SERPL-MCNC: 1.8 MG/DL (ref 1.6–2.4)
MCH RBC QN AUTO: 27 PG (ref 26.6–33)
MCHC RBC AUTO-ENTMCNC: 31 G/DL (ref 31.5–35.7)
MCV RBC AUTO: 87.1 FL (ref 79–97)
MONOCYTES # BLD AUTO: 0.58 10*3/MM3 (ref 0.1–0.9)
MONOCYTES NFR BLD AUTO: 7.8 % (ref 5–12)
NEUTROPHILS NFR BLD AUTO: 6.28 10*3/MM3 (ref 1.7–7)
NEUTROPHILS NFR BLD AUTO: 83.9 % (ref 42.7–76)
PLATELET # BLD AUTO: 694 10*3/MM3 (ref 140–450)
PMV BLD AUTO: 9.3 FL (ref 6–12)
POTASSIUM SERPL-SCNC: 3.7 MMOL/L (ref 3.5–5.2)
PROT SERPL-MCNC: 5.7 G/DL (ref 6–8.5)
RBC # BLD AUTO: 3.26 10*6/MM3 (ref 3.77–5.28)
SODIUM SERPL-SCNC: 139 MMOL/L (ref 136–145)
WBC NRBC COR # BLD: 7.48 10*3/MM3 (ref 3.4–10.8)

## 2022-11-01 PROCEDURE — 83735 ASSAY OF MAGNESIUM: CPT | Performed by: NURSE PRACTITIONER

## 2022-11-01 PROCEDURE — 85025 COMPLETE CBC W/AUTO DIFF WBC: CPT | Performed by: INTERNAL MEDICINE

## 2022-11-01 PROCEDURE — 25010000002 GEMCITABINE 200 MG/5.26ML SOLUTION 5.26 ML VIAL: Performed by: INTERNAL MEDICINE

## 2022-11-01 PROCEDURE — 25010000002 DEXAMETHASONE SODIUM PHOSPHATE 120 MG/30ML SOLUTION: Performed by: INTERNAL MEDICINE

## 2022-11-01 PROCEDURE — 25010000002 DIPHENHYDRAMINE PER 50 MG: Performed by: INTERNAL MEDICINE

## 2022-11-01 PROCEDURE — 99214 OFFICE O/P EST MOD 30 MIN: CPT | Performed by: NURSE PRACTITIONER

## 2022-11-01 PROCEDURE — 96375 TX/PRO/DX INJ NEW DRUG ADDON: CPT

## 2022-11-01 PROCEDURE — 96361 HYDRATE IV INFUSION ADD-ON: CPT

## 2022-11-01 PROCEDURE — 25010000002 GEMCITABINE 1 GM/26.3ML SOLUTION 26.3 ML VIAL: Performed by: INTERNAL MEDICINE

## 2022-11-01 PROCEDURE — 96417 CHEMO IV INFUS EACH ADDL SEQ: CPT

## 2022-11-01 PROCEDURE — 96360 HYDRATION IV INFUSION INIT: CPT

## 2022-11-01 PROCEDURE — 96367 TX/PROPH/DG ADDL SEQ IV INF: CPT

## 2022-11-01 PROCEDURE — 25010000002 PACLITAXEL PER 1 MG: Performed by: INTERNAL MEDICINE

## 2022-11-01 PROCEDURE — 96413 CHEMO IV INFUSION 1 HR: CPT

## 2022-11-01 PROCEDURE — 80053 COMPREHEN METABOLIC PANEL: CPT | Performed by: INTERNAL MEDICINE

## 2022-11-01 RX ORDER — DIPHENOXYLATE HYDROCHLORIDE AND ATROPINE SULFATE 2.5; .025 MG/1; MG/1
1 TABLET ORAL 4 TIMES DAILY PRN
Qty: 20 TABLET | Refills: 2 | Status: SHIPPED | OUTPATIENT
Start: 2022-11-01 | End: 2022-12-06 | Stop reason: HOSPADM

## 2022-11-01 RX ORDER — COLESEVELAM 180 1/1
1875 TABLET ORAL 2 TIMES DAILY WITH MEALS
Qty: 60 TABLET | Refills: 2 | Status: SHIPPED | OUTPATIENT
Start: 2022-11-01 | End: 2022-11-02

## 2022-11-01 RX ORDER — ONDANSETRON HYDROCHLORIDE 8 MG/1
8 TABLET, FILM COATED ORAL EVERY 8 HOURS PRN
Qty: 30 TABLET | Refills: 2 | Status: SHIPPED | OUTPATIENT
Start: 2022-11-01 | End: 2022-12-06 | Stop reason: HOSPADM

## 2022-11-01 RX ORDER — FAMOTIDINE 10 MG/ML
20 INJECTION, SOLUTION INTRAVENOUS ONCE
Status: COMPLETED | OUTPATIENT
Start: 2022-11-01 | End: 2022-11-01

## 2022-11-01 RX ORDER — SODIUM CHLORIDE 9 MG/ML
1000 INJECTION, SOLUTION INTRAVENOUS ONCE
Status: COMPLETED | OUTPATIENT
Start: 2022-11-01 | End: 2022-11-01

## 2022-11-01 RX ORDER — SODIUM CHLORIDE 9 MG/ML
250 INJECTION, SOLUTION INTRAVENOUS ONCE
Status: COMPLETED | OUTPATIENT
Start: 2022-11-01 | End: 2022-11-01

## 2022-11-01 RX ADMIN — GEMCITABINE HYDROCHLORIDE 1450 MG: 1 INJECTION, SOLUTION INTRAVENOUS at 13:00

## 2022-11-01 RX ADMIN — PACLITAXEL 145 MG: 300 INJECTION, SOLUTION INTRAVENOUS at 11:54

## 2022-11-01 RX ADMIN — SODIUM CHLORIDE 250 ML: 9 INJECTION, SOLUTION INTRAVENOUS at 10:59

## 2022-11-01 RX ADMIN — FAMOTIDINE 20 MG: 10 INJECTION INTRAVENOUS at 10:59

## 2022-11-01 RX ADMIN — SODIUM CHLORIDE 1000 ML: 9 INJECTION, SOLUTION INTRAVENOUS at 09:45

## 2022-11-01 RX ADMIN — DEXAMETHASONE SODIUM PHOSPHATE 20 MG: 4 INJECTION, SOLUTION INTRA-ARTICULAR; INTRALESIONAL; INTRAMUSCULAR; INTRAVENOUS; SOFT TISSUE at 11:05

## 2022-11-01 RX ADMIN — DIPHENHYDRAMINE HYDROCHLORIDE 50 MG: 50 INJECTION, SOLUTION INTRAMUSCULAR; INTRAVENOUS at 11:30

## 2022-11-01 NOTE — PROGRESS NOTES
Hematology/Oncology Outpatient Follow Up    Christiane Nazario  1950    Primary Care Physician: Margarita Melissa, APRN  Referring Physician: Margarita Melissa, EDGARDO*  Chief complaint:     Follow up for: Metastatic breast cancer    pT 2 pN0 infiltrating ductal carcinoma of the left breast    History of Present Illness:     · Ms. Nazario had a mammogram in November 2018 for a palpable  abnormality.    · 11/30/18 - Diagnostic mammogram showed palpable abnormality corresponds to a 2.3 x 1.7 x 2.1 cm spicular shadowing irregular mass in the lateral middle third of the left breast at 2 to 3  o'clock position.  No mammographic evidence of malignancy in the contralateral breast.    · 12/17/18 - Core needle biopsy of breast palpable nodule showed invasive moderately differentiated ductal carcinoma.  Shakira grade 7/9.  DCIS seen.  Tumor was ER positive, OR  positive and [id number] negative by ICH.    · Patient was sent to the [facility] and seen initially on 1/4/19.     · 1/4/19 - Genetic testing for BRCA 1 and BRCA 2 sequencing and deletion duplication analysis negative.    · 1/21/19 - CT scan of the chest with contrast showed 2.4 x 2.8 cm left breast mass consistent  with known carcinoma.  Coronary artery calcification consistent with atherosclerotic disease.  A  4 mm right upper lobe pulmonary nodule, stable compared to 2014, consistent with a benign  etiology.  · 2/7/19 - Patient underwent left breast lumpectomy and axillary sentinel lymph node resection. Pathology report was invasive poorly differentiated ductal carcinoma, 3.3 cm, completely excised.  Negative margins.  Three sentinel lymph nodes were extracted and three were negative.  Gadsden score total of 9.  DCIS not identified.  Skin showed invasive carcinoma focally  invading the dermis without skin ulceration.  Margins negative with invasive carcinoma 0.2 cm  from the closest.  Pathologic staging pT2, smpN0.    · 3/6/19 -  Oncotype DX recurrent score 25 (distant recurrence risk at 9 years 12% per TAILORx and absolute chemotherapy benefit less than 1% per TAILORx).   · 3/22/19 - DEXA scan, normal.   · May 2019 - Patient completed 20 radiation treatments.    · 2019 patient started Arimidex treatment  · 2020 bilateral screening mammogram benign  · 2021: Patient had bilateral screening mammogram  · 2022 patient had bilateral diagnostic mammogram which was essentially unremarkable  · 2022 patient was admitted to the hospital for abdominal discomfort diarrhea.  She had imaging studies which shows extensive disease involving the liver, pleural cavity, multiple wounds.  She has since then had biopsy of one of the liver lesions and pathology was consistent with metastatic breast cancer.  His malignancy is ER positive NC positive and HER2/willi was ordered and still pending  · 10/11/2022: Patient was initiated on combination chemotherapy with Gemzar paclitaxel  · 10/25/2022: Patient to receive cycle 1 day 15 of Gemzar paclitaxel      Past Medical History:   Diagnosis Date   • Cataract    • DM type 2 (diabetes mellitus, type 2) (HCC)    • Ductal carcinoma in situ (DCIS) of left breast    • Fracture, femur (HCC)     fracture of left femur   • Hyperlipidemia    • Hypertension    • Hypothyroidism    • Osteopenia    • Pulmonary hypertension (HCC)        Past Surgical History:   Procedure Laterality Date   • BREAST BIOPSY  2018   • CATARACT EXTRACTION     •  SECTION     • FEMUR SURGERY Left 2014    @ [facility] - Dr Winston   • MASTECTOMY Left 2019    Dr Bills   • TUBAL ABDOMINAL LIGATION           Current Outpatient Medications:   •  diphenoxylate-atropine (LOMOTIL) 2.5-0.025 MG per tablet, Take 1 tablet by mouth 4 (Four) Times a Day As Needed for Diarrhea., Disp: 20 tablet, Rfl: 2  •  ondansetron (Zofran) 8 MG tablet, Take 1 tablet by mouth Every 8 (Eight) Hours As Needed for Nausea or  "Vomiting., Disp: 30 tablet, Rfl: 2  •  acetaZOLAMIDE (DIAMOX) 250 MG tablet, TAKE ONE TABLET BY MOUTH EVERY MORNING, Disp: 90 tablet, Rfl: 0  •  aspirin (aspirin) 81 MG EC tablet, ADULT ASPIRIN EC LOW STRENGTH 81 MG ORAL TABLET DELAYED RELEASE, Disp: , Rfl:   •  atorvastatin (LIPITOR) 40 MG tablet, TAKE ONE TABLET BY MOUTH EVERY NIGHT, Disp: 30 tablet, Rfl: 10  •  BD Insulin Syringe U/F 31G X 5/16\" 1 ML misc, USE TWICE A DAY WITH INSULIN INJECTIONS, Disp: 200 each, Rfl: 4  •  CALCIUM ACETATE IJ, Inject  as directed., Disp: , Rfl:   •  colesevelam (Welchol) 625 MG tablet, Take 3 tablets by mouth 2 (Two) Times a Day With Meals., Disp: 60 tablet, Rfl: 2  •  D3 Super Strength 50 MCG (2000 UT) capsule, TAKE 1 CAPSULE BY MOUTH EVERY DAY, Disp: 30 capsule, Rfl: 10  •  furosemide (LASIX) 40 MG tablet, TAKE ONE TABLET BY MOUTH EVERY DAY, Disp: 30 tablet, Rfl: 2  •  glucose blood (ONE TOUCH ULTRA TEST) test strip, ONETOUCH ULTRA BLUE STRP, Disp: , Rfl:   •  HYDROcodone-acetaminophen (NORCO) 5-325 MG per tablet, Take 1 tablet by mouth Every 8 (Eight) Hours As Needed for Moderate Pain., Disp: 90 tablet, Rfl: 0  •  insulin NPH-insulin regular (HumuLIN 70/30) (70-30) 100 UNIT/ML injection, INJECT 25 UNITS SUBCUTANEOUSLY EVERY MORNING AND INJECT 25 UNITS EVERY EVENING AT DINNER, Disp: 70 mL, Rfl: 1  •  Kombiglyze XR 2.5-1000 MG tablet sustained-release 24 hour, TAKE ONE TABLET BY MOUTH TWICE DAILY, Disp: 180 tablet, Rfl: 4  •  levothyroxine (SYNTHROID, LEVOTHROID) 88 MCG tablet, TAKE ONE TABLET BY MOUTH EVERY DAY, Disp: 30 tablet, Rfl: 5  •  loratadine (CLARITIN) 10 MG tablet, TAKE ONE TABLET BY MOUTH EVERY DAY, Disp: 90 tablet, Rfl: 1  •  MgO 400 (240 Mg) MG tablet, TAKE ONE TABLET BY MOUTH TWICE DAILY, Disp: 60 tablet, Rfl: 10  •  montelukast (SINGULAIR) 10 MG tablet, TAKE ONE TABLET BY MOUTH EVERY NIGHT AT BEDTIME, Disp: 90 tablet, Rfl: 0  •  potassium chloride (K-DUR,KLOR-CON) 20 MEQ CR tablet, Take 1 tablet by mouth 2 (Two) " "Times a Day., Disp: 180 tablet, Rfl: 0  •  ramipril (ALTACE) 1.25 MG capsule, TAKE 1 CAPSULE BY MOUTH EVERY EVENING WITH DINNER, Disp: 90 capsule, Rfl: 4  No current facility-administered medications for this visit.    Facility-Administered Medications Ordered in Other Visits:   •  gemcitabine (GEMZAR) 1,450 mg in sodium chloride 0.9 % 288.1 mL chemo IVPB, 800 mg/m2 (Treatment Plan Recorded), Intravenous, Once, Venita Sarkar MD, Last Rate: 580 mL/hr at 11/01/22 1300, 1,450 mg at 11/01/22 1300    Allergies   Allergen Reactions   • Calcium-Containing Compounds Nausea Only   • Sulfa Antibiotics Unknown - Low Severity       Family History   Problem Relation Age of Onset   • Ovarian cancer Sister    • Kidney disease Maternal Aunt    • Cancer Other        Cancer-related family history includes Cancer in an other family member; Ovarian cancer in her sister.    Social History     Tobacco Use   • Smoking status: Never   • Smokeless tobacco: Never   Vaping Use   • Vaping Use: Never used   Substance Use Topics   • Alcohol use: No   • Drug use: No       I have reviewed and confirmed the accuracy of the patient's history: Chief complaint, HPI, ROS and Subjective as entered by the MA/LPN/RN. Shanna Escobar, APRN 11/01/22       SUBJECTIVE:    Patient here today for cycle 2-day 1 of paclitaxel and Gemzar.  She reports that after alternating the Lomotil and Imodium she had relief of her diarrhea for 3 days.  Yesterday it returned and is not well controlled even though she is still alternating the Lomotil and Imodium.  She also does have some nausea and she states she uses her Zofran when it gets \"really bad\".    ROS:       Review of Systems   Constitutional: Positive for fatigue. Negative for fever.   HENT: Negative for nosebleeds and trouble swallowing.    Eyes: Negative for visual disturbance.   Respiratory: Negative for cough, shortness of breath and wheezing.    Cardiovascular: Negative for chest pain. " "  Gastrointestinal: Positive for diarrhea and nausea. Negative for abdominal pain and blood in stool.   Endocrine: Negative for cold intolerance.   Genitourinary: Negative for dysuria and hematuria.   Musculoskeletal: Negative for joint swelling.   Skin: Negative for rash.   Allergic/Immunologic: Negative for environmental allergies.   Neurological: Positive for weakness. Negative for seizures.   Hematological: Does not bruise/bleed easily.   Psychiatric/Behavioral: The patient is not nervous/anxious.            Objective:       Vitals:    11/01/22 1008   BP: 112/69   Pulse: 97   Resp: 18   Temp: 97.6 °F (36.4 °C)   TempSrc: Oral   SpO2: 98%   Weight: 82.1 kg (181 lb)   Height: 152.4 cm (60\")   PainSc: 0-No pain         PHYSICAL EXAM:     Physical Exam   Constitutional: She is oriented to person, place, and time. No distress.   Moderately built obese, patient appears weak   HENT:   Head: Normocephalic and atraumatic.   Eyes: Conjunctivae are normal. Right eye exhibits no discharge. Left eye exhibits no discharge. No scleral icterus.   Neck: No thyromegaly present.   Cardiovascular: Normal rate, regular rhythm and normal heart sounds. Exam reveals no gallop and no friction rub.   Pulmonary/Chest: Effort normal. No stridor. No respiratory distress. She has no wheezes.   Decreased breath sounds both bases   Abdominal: Soft. Bowel sounds are normal. She exhibits no mass. There is no abdominal tenderness. There is no rebound and no guarding.   Musculoskeletal: Normal range of motion. No tenderness.      Comments: 2+ bilateral pitting pedal and ankle edema   Lymphadenopathy:     She has no cervical adenopathy.   Neurological: She is alert and oriented to person, place, and time. She exhibits normal muscle tone.   Skin: Skin is warm. No rash noted. She is not diaphoretic. No erythema.   Psychiatric: Her behavior is normal.   Nursing note and vitals reviewed.        I have reexamined the patient and the results are " consistent with the previously documented exam. TOMMY Arteaga          RECENT LABS:       WBC   Date Value Ref Range Status   11/01/2022 7.48 3.40 - 10.80 10*3/mm3 Final     RBC   Date Value Ref Range Status   11/01/2022 3.26 (L) 3.77 - 5.28 10*6/mm3 Final     Hemoglobin   Date Value Ref Range Status   11/01/2022 8.8 (L) 12.0 - 15.9 g/dL Final     Hematocrit   Date Value Ref Range Status   11/01/2022 28.4 (L) 34.0 - 46.6 % Final     MCV   Date Value Ref Range Status   11/01/2022 87.1 79.0 - 97.0 fL Final     MCH   Date Value Ref Range Status   11/01/2022 27.0 26.6 - 33.0 pg Final     MCHC   Date Value Ref Range Status   11/01/2022 31.0 (L) 31.5 - 35.7 g/dL Final     RDW   Date Value Ref Range Status   11/01/2022 19.6 (H) 12.3 - 15.4 % Final     RDW-SD   Date Value Ref Range Status   11/01/2022 54.7 (H) 37.0 - 54.0 fl Final     MPV   Date Value Ref Range Status   11/01/2022 9.3 6.0 - 12.0 fL Final     Platelets   Date Value Ref Range Status   11/01/2022 694 (H) 140 - 450 10*3/mm3 Final     Neutrophil %   Date Value Ref Range Status   11/01/2022 83.9 (H) 42.7 - 76.0 % Final     Lymphocyte %   Date Value Ref Range Status   11/01/2022 7.9 (L) 19.6 - 45.3 % Final     Monocyte %   Date Value Ref Range Status   11/01/2022 7.8 5.0 - 12.0 % Final     Eosinophil %   Date Value Ref Range Status   11/01/2022 0.3 0.3 - 6.2 % Final     Basophil %   Date Value Ref Range Status   11/01/2022 0.1 0.0 - 1.5 % Final     Neutrophils, Absolute   Date Value Ref Range Status   11/01/2022 6.28 1.70 - 7.00 10*3/mm3 Final     Lymphocytes, Absolute   Date Value Ref Range Status   11/01/2022 0.59 (L) 0.70 - 3.10 10*3/mm3 Final     Monocytes, Absolute   Date Value Ref Range Status   11/01/2022 0.58 0.10 - 0.90 10*3/mm3 Final     Eosinophils, Absolute   Date Value Ref Range Status   11/01/2022 0.02 0.00 - 0.40 10*3/mm3 Final     Basophils, Absolute   Date Value Ref Range Status   11/01/2022 0.01 0.00 - 0.20 10*3/mm3 Final     nRBC    Date Value Ref Range Status   09/29/2022 0.1 0.0 - 0.2 /100 WBC Final       Lab Results   Component Value Date    GLUCOSE 90 11/01/2022    BUN 19 11/01/2022    CREATININE 1.47 (H) 11/01/2022    EGFRIFNONA 53 (L) 01/10/2022    BCR 12.9 11/01/2022    K 3.7 11/01/2022    CO2 19.0 (L) 11/01/2022    CALCIUM 8.0 (L) 11/01/2022    ALBUMIN 3.00 (L) 11/01/2022    LABIL2 1.0 05/08/2019    AST 20 11/01/2022    ALT 18 11/01/2022         Assessment & Plan      ASSESSMENT:     1. Metastatic breast carcinoma to the lungs, liver, bones ER positive, WI positive HER2/willi is pending.  This was diagnosed recently September 2022 biopsy-proven on liver specimen  2. Multiple bone metastasis pathologic fracture right iliac wing: Will benefit from biphosphonate therapy  3. Diarrhea with recent admission to the hospital, urinary tract infection September 2022.  Check stool studies.  If negative use Lomotil and possibly Imodium  4. Dehydration: IV fluids  5. Nausea likely secondary to hepatic metastasis and chemotherapy: Continue antiemetics  6. History of left breast infiltrating ductal carcinoma, ER +100%, WI positive and HER-2/willi was negative: Status  post left lumpectomy with sentinel lymph node biopsy.  Status post adjuvant left breast radiation.  Was on endocrine therapy with Arimidex for 3 days.  7. Oncotype DX assay with recurrence score of 25.  Chemotherapy not recommended.  Hormone receptor positive and has been on Arimidex initiated in June 2019.  She will continue the same.  8. Obesity with BMI of 40  9. Osteopenia: On Boniva, calcium supplements reviewed her recent bone density.  I have encouraged her to continue for now  10. ECOG 1  11. Peripheral edema: Bilateral lower extremities.  Worsening from baseline.  Does not go past the knee.  No increased shortness of air or abdominal swelling.  On Diamox routinely.  Instructed to elevate while at rest and will continue to monitor.    Discussion    Patient has now been diagnosed  with metastatic breast cancer with multiple areas of involvement including the liver, lungs and bones.  Given the extent of disease would like to begin with palliative chemotherapy.  Would like to give a combination treatment with Taxol and Gemzar for week response, control of systemic disease and organ preservation.  Notes that this cancer is estrogen and progesterone positive therefore hormonal treatment/endocrine therapy was to be an option along with a CDK 4 inhibitor.  Would like to control this disease as quickly as possible and then transition her to oral agents.    We discussed the side effects of chemotherapy to include but not limited to:  Chemotherapy side effects include, but not limited to, nausea, vomiting, bone marrow suppression, which can result in blood, platelet transfusion. There is also risk of permanent bone marrow destruction, which can cause myelodysplastic syndrome or leukemia years down the line. There is risk of infection which can result in hospitalization and even death. There is also risk of fatigue, asthenia, alopecia which could become permanent. Chemo will help to reduce risk of relapse of cancer, but does not eliminate risk completely.    Discussed that Taxol chemotherapy can lead to hypersensitivity reaction, peripheral neuropathy, skin toxicity, permanent alopecia    Gemzar can also lead to significant thrombocytopenia, flulike symptoms, skin rash.    We will plan to give her several cycles of chemotherapy and obtain interim CT scans.    Patient will also benefit from biphosphonate therapy with Zometa.  I reviewed the side effects the benefits of Zometa in preventing skeletal events.  Side effects of zometa was discussed with him including, bone aches and pains, electrolyte abnormalities including hypophosphatemia, hypocalcemia, low magnesium.  There is also a risk of renal insufficiency, osteonecrosis of the jaw has been observed in clinical studies.  There is risk of peripheral  edema, hypertension, dermatitis, nausea, vomiting, and mild hematologic problems including anemia, thrombocytopenia.  Discussed the need for him to have dental evaluation prior to initiating zometa.  Discussed also the need to notify us of any future dental procedures planned.  Also discussed the need to notify us for jaw pain at any point in time.            PLANS:      1. Repeat stool studies  2. Suspect IBS-D.  Patient has had chronic history of diarrhea.  Will refer to GI  3. Since she is not having full relief with antidiarrheal agents we will add a bile acid sequestrant.  4. Continue Lomotil and add Imodium.  Instructed patient to alternate so that she is able to take something every 3 hours while having diarrhea. Patient to contact office if this does not control diarrhea.   5. Normal saline 1000 mL IV over 2 hours today and repeat on Friday  6. CMP and mag level today, Stat and replace as needed  7. Refill Lomotil and ondansetron today  8. Dietary referral  9. Continue palliative chemotherapy combination of Taxol and Gemzar.  10. PICC line in place for chemotherapy  11. Discontinue Fosamax  12. Continue Zometa 4 mg IV monthly  13. Continue Zofran 8 mg p.o. every 8 hours for nausea  14. Continue Os-Quintin D 600 mg twice a day  15. Home health consultation for care.  16. Patient encouraged to continue with weekly breast self-exam and call for lumps nipple discharge or skin discoloration  17. Discussed with patient and son who is present today  18. All questions answered    I have reviewed labs results, imaging, vitals, and medications with the patient today.   Patient verbalized understanding and is in agreement of the above plan.          I spent 30 total minutes, face-to-face, caring for Christiane today.  90% of this time involved counseling and/or coordination of care as documented within this note.

## 2022-11-01 NOTE — PROGRESS NOTES
Message below sent to MD/NP  Dr. Sarkar/Shanna, pt. is here for C2D1 Taxol, Luciana, pt. states she is still having diarrhea symptoms. She states symptoms seemed to improve over the weekend but, yesterday she started back with loose stools more >6 episodes per day. Pt. states she has already been several times this morning. She is alternating Lomotil, Immodium every 3 hours. She states she has not been eating well due to her diarrhea symptoms and intermittent nausea. Pt's CBC results WBC 7.48, HGB 8.8, Plts 694 ANC 6280, (Plts are elevated). Pt. has a DAVID picc line. One of the lines flushes with a little resistance but, I was able to get blood return (red lumen, lab specimens drawn and sent to lab for processing). The other lumen (white lumen) I wasn't able to flush at all and no blood return noted. Tiffanie has spoke with the picc team. Please advise?  Sterile picc dressing change performed, insertion site clear, surrounding skin clear, no sign/symptoms of infection, end caps changed on both lumens, still unable to fush white lumen, and red lumen was more sluggish to flush after dressing change.  Pt. Was seen by Shanna SANDERS for further assessment.   Orders given for 1L NS over 2 hours per Shanna SANDERS  OK to treat per Shanna Moreno RN spoke with the Picc nurse Erna at the hospital and it was recommended that pt. Come over after treatment today to evaluate the picc line.   Erna needs orders entered for possible TPA 2mg IV for both lumens, Stat CXR to check placement, and orders to replace picc line if needed, all orders entered and signed per Shanna SANDERS  Prior to hanging IVF's I was not able to flush either lumens, Peripheral IV access obtained, and treatment given as ordered.   Pt. Tolerated treatment well.   Pt. Was instructed to go to the main registration at the hospital for picc evaluation. Pt. And pt's son verbalized understanding of all orders/instructions.   Pt. Discharged from clinic  and AVS was given.

## 2022-11-01 NOTE — CONSULTS
Dual lumen DAVID picc line assessed by Alli Renteria, RN. Able to flush both lumens, but both lumens are sluggish to flush with sluggish blood return. Dressing and caps changed and family educated on flushing techniques. [facility] on [facility]4 for fluids and then come to [facility] for cathflo administration.

## 2022-11-02 ENCOUNTER — TELEPHONE (OUTPATIENT)
Dept: ONCOLOGY | Facility: CLINIC | Age: 72
End: 2022-11-02

## 2022-11-02 RX ORDER — CHOLESTYRAMINE LIGHT 4 G/5.7G
4 POWDER, FOR SUSPENSION ORAL DAILY
Qty: 30 PACKET | Refills: 0 | Status: SHIPPED | OUTPATIENT
Start: 2022-11-02 | End: 2022-12-06 | Stop reason: HOSPADM

## 2022-11-02 NOTE — TELEPHONE ENCOUNTER
Hub is instructed to read the documentation below to patient  ATTEMPTED TO CONTACT [facility] IV TEAM REGARDING PICC ORDER.  NO ANSWER.  LMV ASKING THEM TO CALL BACK.

## 2022-11-03 DIAGNOSIS — E86.0 DEHYDRATION: Primary | ICD-10-CM

## 2022-11-04 ENCOUNTER — HOSPITAL ENCOUNTER (OUTPATIENT)
Dept: INFUSION THERAPY | Facility: HOSPITAL | Age: 72
Setting detail: INFUSION SERIES
Discharge: HOME | End: 2022-11-04

## 2022-11-04 ENCOUNTER — HOSPITAL ENCOUNTER (OUTPATIENT)
Dept: ONCOLOGY | Facility: HOSPITAL | Age: 72
Setting detail: INFUSION SERIES
Discharge: HOME | End: 2022-11-04

## 2022-11-04 ENCOUNTER — DOCUMENTATION (OUTPATIENT)
Dept: ONCOLOGY | Facility: CLINIC | Age: 72
End: 2022-11-04

## 2022-11-04 VITALS
TEMPERATURE: 97.8 F | HEART RATE: 102 BPM | SYSTOLIC BLOOD PRESSURE: 111 MMHG | WEIGHT: 181 LBS | DIASTOLIC BLOOD PRESSURE: 51 MMHG | BODY MASS INDEX: 35.53 KG/M2 | HEIGHT: 60 IN | RESPIRATION RATE: 18 BRPM

## 2022-11-04 DIAGNOSIS — C79.51 MALIGNANT NEOPLASM METASTATIC TO BONE: ICD-10-CM

## 2022-11-04 DIAGNOSIS — T82.898D OCCLUSION OF PERIPHERALLY INSERTED CENTRAL CATHETER (PICC) LINE, SUBSEQUENT ENCOUNTER: Primary | ICD-10-CM

## 2022-11-04 DIAGNOSIS — E86.0 DEHYDRATION: Primary | ICD-10-CM

## 2022-11-04 LAB
ANION GAP SERPL CALCULATED.3IONS-SCNC: 12 MMOL/L (ref 5–15)
BUN SERPL-MCNC: 20 MG/DL (ref 8–23)
BUN/CREAT SERPL: 16.8 (ref 7–25)
CALCIUM SPEC-SCNC: 6.9 MG/DL (ref 8.6–10.5)
CHLORIDE SERPL-SCNC: 108 MMOL/L (ref 98–107)
CO2 SERPL-SCNC: 19 MMOL/L (ref 22–29)
CREAT SERPL-MCNC: 1.19 MG/DL (ref 0.57–1)
EGFRCR SERPLBLD CKD-EPI 2021: 49.3 ML/MIN/1.73
GLUCOSE BLDC GLUCOMTR-MCNC: 133 MG/DL (ref 70–105)
GLUCOSE SERPL-MCNC: 137 MG/DL (ref 65–99)
POTASSIUM SERPL-SCNC: 4.2 MMOL/L (ref 3.5–5.2)
SODIUM SERPL-SCNC: 139 MMOL/L (ref 136–145)

## 2022-11-04 PROCEDURE — 96360 HYDRATION IV INFUSION INIT: CPT

## 2022-11-04 PROCEDURE — 25010000002 ALTEPLASE 2 MG RECONSTITUTED SOLUTION: Performed by: NURSE PRACTITIONER

## 2022-11-04 PROCEDURE — 82962 GLUCOSE BLOOD TEST: CPT

## 2022-11-04 PROCEDURE — 36593 DECLOT VASCULAR DEVICE: CPT

## 2022-11-04 PROCEDURE — 80048 BASIC METABOLIC PNL TOTAL CA: CPT | Performed by: NURSE PRACTITIONER

## 2022-11-04 PROCEDURE — 96361 HYDRATE IV INFUSION ADD-ON: CPT

## 2022-11-04 PROCEDURE — 96374 THER/PROPH/DIAG INJ IV PUSH: CPT

## 2022-11-04 RX ORDER — SODIUM CHLORIDE 0.9 % (FLUSH) 0.9 %
20 SYRINGE (ML) INJECTION AS NEEDED
Status: CANCELLED | OUTPATIENT
Start: 2022-11-04

## 2022-11-04 RX ORDER — SODIUM CHLORIDE 0.9 % (FLUSH) 0.9 %
20 SYRINGE (ML) INJECTION AS NEEDED
Status: DISCONTINUED | OUTPATIENT
Start: 2022-11-04 | End: 2022-11-05 | Stop reason: HOSPADM

## 2022-11-04 RX ADMIN — Medication 20 ML: at 11:54

## 2022-11-04 RX ADMIN — ALTEPLASE 2 MG: 2.2 INJECTION, POWDER, LYOPHILIZED, FOR SOLUTION INTRAVENOUS at 13:23

## 2022-11-04 RX ADMIN — SODIUM CHLORIDE 1000 ML: 9 INJECTION, SOLUTION INTRAVENOUS at 09:56

## 2022-11-04 RX ADMIN — ALTEPLASE 2 MG: 2.2 INJECTION, POWDER, LYOPHILIZED, FOR SOLUTION INTRAVENOUS at 13:22

## 2022-11-04 NOTE — PROGRESS NOTES
Pt here for IVF.  Right PICC in place.  Red lumen with positive blood return and IVF infused per MAR.  Purple lumen unable to flush.  Pt to go to [facility] after IVF infused for PICC nurse to eval.  Pt reports diarrhea has improved but still with poor po intake and having trouble with her blood glucose.  London notified and will come to evaluate pt nutrition needs.  Pt states she has zofran at home and using to help with nausea. London at chairside.  Pt d/c with AVS given.  Will got directly to [facility] for PICC eval.

## 2022-11-04 NOTE — PROGRESS NOTES
"                 Nutrition Assessment  Christiane Nazario    Anthropometrics  Height: 5'0\"  Weight: 181#  BMI: 35.3  Weight Hx:   Wt Readings from Last 20 Encounters:   11/04/22 82.1 kg (181 lb)   11/01/22 82.1 kg (181 lb)   11/01/22 82.1 kg (181 lb)   10/27/22 82.1 kg (181 lb)   10/27/22 82.1 kg (181 lb)   10/25/22 82.2 kg (181 lb 3.2 oz)   10/25/22 82.2 kg (181 lb 3.5 oz)   10/18/22 83 kg (183 lb)   10/18/22 83 kg (183 lb)   10/12/22 83 kg (183 lb)   10/11/22 83.4 kg (183 lb 14.4 oz)   10/10/22 85.7 kg (189 lb)   10/07/22 85.7 kg (189 lb)   09/27/22 86 kg (189 lb 11.2 oz)   08/16/22 87.5 kg (192 lb 12.8 oz)   08/15/22 86.6 kg (191 lb)   05/09/22 90.7 kg (200 lb)   03/15/22 92 kg (202 lb 12.8 oz)   01/17/22 93.4 kg (206 lb)   01/13/22 94.3 kg (208 lb)     IBW: 100# (181%)  UBW: 236# (76.6%)    Nutrition Questionnaire    Food Allergies: Pt denied allergies at this time    Chewing/Swallowing Problems: Pt is missing most of her teeth and does not wear dentures, she relies on soft foods. Pt denies problems swallowing.    Who does the cooking & shopping: Pt's  and son, pt's son lives with them    Nausea/Vomiting/Diarrhea: Pt complaining of nausea and diarrhea. Pt stated she started using her Zofran this week and said it seems to be helping to minimize her nausea. Pt is also managing diarrhea with Imodium and Lomotil    Appetite: (poor) 1 2 3 4 5 6 7 8 9 10 (excellent): 2    24-Hour Diet Recall:   Breakfast - peach fruit cup, peanut butter crackers, oj  **Per pt and her son, the pt's fasting BG was 60 mg/dL. She ate enough food to bring her BG up to 112 mg/dL    Lunch - Chic-thiago-A: 2 grilled nuggets, water  Dinner - Chic-thiago-A: 2 grilled nuggets, water  Snacks - cookie, banana  Water < 64 oz/day    Discussion: I met pt and her son to discuss ways to manage her diet around her chemotherapy side effects. Pt is a type two diabetic and has been struggling to eat regularly due side effects of her treatment, " contributing to her weight loss and low blood sugar readings. I discussed the importance of eating regular meals and snacks regardless of hunger and appetite, pt verbalized understanding. We discussed various types of foods she can eat to prevent low blood sugar, as well as soft/high-protein foods she can easily tolerate with her poor dentation. We also discussed the importance of continuing to take her Zofran regularly to prevent nausea so that she is able to eat comfortably, pt verbalized understanding.    All questions and concerns were addressed and answered. I provided my contact information and encouraged her to call or schedule an appointment as needed.    London Concepcion, MS,RD,LD-[place],CD-[place]  Registered Dietitian

## 2022-11-04 NOTE — CODE DOCUMENTATION
1 hour after cathflo put in both lumens. The lumens had blood return. Wasted 8 cc out of both lumens and flushed each with 20 cc NS

## 2022-11-07 ENCOUNTER — INPATIENT HOSPITAL (OUTPATIENT)
Dept: URBAN - METROPOLITAN AREA HOSPITAL 84 | Facility: HOSPITAL | Age: 72
End: 2022-11-07
Payer: COMMERCIAL

## 2022-11-07 ENCOUNTER — HOSPITAL ENCOUNTER (INPATIENT)
Facility: HOSPITAL | Age: 72
LOS: 29 days | Discharge: NURSING, REHAB OR LONG-TERM CARE | End: 2022-12-06
Attending: HOSPITALIST

## 2022-11-07 ENCOUNTER — APPOINTMENT (OUTPATIENT)
Dept: GENERAL RADIOLOGY | Facility: HOSPITAL | Age: 72
End: 2022-11-07

## 2022-11-07 ENCOUNTER — APPOINTMENT (OUTPATIENT)
Dept: MRI IMAGING | Facility: HOSPITAL | Age: 72
End: 2022-11-07

## 2022-11-07 ENCOUNTER — APPOINTMENT (OUTPATIENT)
Dept: CT IMAGING | Facility: HOSPITAL | Age: 72
End: 2022-11-07

## 2022-11-07 DIAGNOSIS — N39.0 URINARY TRACT INFECTION WITHOUT HEMATURIA, SITE UNSPECIFIED: ICD-10-CM

## 2022-11-07 DIAGNOSIS — D64.9 ANEMIA, UNSPECIFIED TYPE: ICD-10-CM

## 2022-11-07 DIAGNOSIS — U07.1 COVID: Primary | ICD-10-CM

## 2022-11-07 DIAGNOSIS — R19.7 DIARRHEA, UNSPECIFIED TYPE: ICD-10-CM

## 2022-11-07 DIAGNOSIS — K92.1 MELENA: ICD-10-CM

## 2022-11-07 DIAGNOSIS — J96.01 ACUTE RESPIRATORY FAILURE WITH HYPOXIA: ICD-10-CM

## 2022-11-07 DIAGNOSIS — R10.84 GENERALIZED ABDOMINAL PAIN: ICD-10-CM

## 2022-11-07 DIAGNOSIS — R93.3 ABNORMAL FINDINGS ON DIAGNOSTIC IMAGING OF OTHER PARTS OF DI: ICD-10-CM

## 2022-11-07 DIAGNOSIS — T17.500A MUCUS PLUGGING OF BRONCHI: ICD-10-CM

## 2022-11-07 DIAGNOSIS — K80.50 CHOLEDOCHOLITHIASIS: ICD-10-CM

## 2022-11-07 DIAGNOSIS — R74.8 ABNORMAL LEVELS OF OTHER SERUM ENZYMES: ICD-10-CM

## 2022-11-07 DIAGNOSIS — K80.50 CALCULUS OF BILE DUCT WITHOUT CHOLANGITIS OR CHOLECYSTITIS W: ICD-10-CM

## 2022-11-07 DIAGNOSIS — R19.7 DIARRHEA, UNSPECIFIED: ICD-10-CM

## 2022-11-07 LAB
ADV 40+41 DNA STL QL NAA+NON-PROBE: NOT DETECTED
ALBUMIN SERPL-MCNC: 2.6 G/DL (ref 3.5–5.2)
ALBUMIN/GLOB SERPL: 1 G/DL
ALP SERPL-CCNC: 125 U/L (ref 39–117)
ALT SERPL W P-5'-P-CCNC: 16 U/L (ref 1–33)
ANION GAP SERPL CALCULATED.3IONS-SCNC: 12 MMOL/L (ref 5–15)
ANISOCYTOSIS BLD QL: ABNORMAL
AST SERPL-CCNC: 24 U/L (ref 1–32)
ASTRO TYP 1-8 RNA STL QL NAA+NON-PROBE: NOT DETECTED
B PARAPERT DNA SPEC QL NAA+PROBE: NOT DETECTED
B PERT DNA SPEC QL NAA+PROBE: NOT DETECTED
BACTERIA UR QL AUTO: ABNORMAL /HPF
BILIRUB SERPL-MCNC: 0.4 MG/DL (ref 0–1.2)
BILIRUB UR QL STRIP: NEGATIVE
BUN SERPL-MCNC: 14 MG/DL (ref 8–23)
BUN/CREAT SERPL: 14.1 (ref 7–25)
BURR CELLS BLD QL SMEAR: ABNORMAL
C CAYETANENSIS DNA STL QL NAA+NON-PROBE: NOT DETECTED
C COLI+JEJ+UPSA DNA STL QL NAA+NON-PROBE: NOT DETECTED
C DIFF GDH + TOXINS A+B STL QL IA.RAPID: NEGATIVE
C DIFF GDH + TOXINS A+B STL QL IA.RAPID: NEGATIVE
C PNEUM DNA NPH QL NAA+NON-PROBE: NOT DETECTED
C3 FRG RBC-MCNC: ABNORMAL
CA-I SERPL ISE-MCNC: 0.78 MMOL/L (ref 1.2–1.3)
CALCIUM SPEC-SCNC: 7 MG/DL (ref 8.6–10.5)
CHLORIDE SERPL-SCNC: 109 MMOL/L (ref 98–107)
CLARITY UR: CLEAR
CO2 SERPL-SCNC: 21 MMOL/L (ref 22–29)
COLOR UR: YELLOW
CREAT SERPL-MCNC: 0.99 MG/DL (ref 0.57–1)
CRP SERPL-MCNC: 10.25 MG/DL (ref 0–0.5)
CRYPTOSP DNA STL QL NAA+NON-PROBE: NOT DETECTED
D-LACTATE SERPL-SCNC: 0.7 MMOL/L (ref 0.5–2)
D-LACTATE SERPL-SCNC: 0.9 MMOL/L (ref 0.5–2)
DEPRECATED RDW RBC AUTO: 56.9 FL (ref 37–54)
E HISTOLYT DNA STL QL NAA+NON-PROBE: NOT DETECTED
EAEC PAA PLAS AGGR+AATA ST NAA+NON-PRB: NOT DETECTED
EC STX1+STX2 GENES STL QL NAA+NON-PROBE: NOT DETECTED
EGFRCR SERPLBLD CKD-EPI 2021: 61.5 ML/MIN/1.73
EOSINOPHIL # BLD MANUAL: 0.03 10*3/MM3 (ref 0–0.4)
EOSINOPHIL NFR BLD MANUAL: 1 % (ref 0.3–6.2)
EPEC EAE GENE STL QL NAA+NON-PROBE: NOT DETECTED
ERYTHROCYTE [DISTWIDTH] IN BLOOD BY AUTOMATED COUNT: 19.3 % (ref 12.3–15.4)
ETEC LTA+ST1A+ST1B TOX ST NAA+NON-PROBE: NOT DETECTED
FERRITIN SERPL-MCNC: 575.9 NG/ML (ref 13–150)
FIBRINOGEN PPP-MCNC: 436 MG/DL (ref 210–450)
FLUAV SUBTYP SPEC NAA+PROBE: NOT DETECTED
FLUBV RNA ISLT QL NAA+PROBE: NOT DETECTED
FOLATE SERPL-MCNC: 3.33 NG/ML (ref 4.78–24.2)
G LAMBLIA DNA STL QL NAA+NON-PROBE: NOT DETECTED
GLOBULIN UR ELPH-MCNC: 2.7 GM/DL
GLUCOSE BLDC GLUCOMTR-MCNC: 112 MG/DL (ref 70–105)
GLUCOSE BLDC GLUCOMTR-MCNC: 152 MG/DL (ref 70–105)
GLUCOSE SERPL-MCNC: 173 MG/DL (ref 65–99)
GLUCOSE UR STRIP-MCNC: NEGATIVE MG/DL
HADV DNA SPEC NAA+PROBE: NOT DETECTED
HCOV 229E RNA SPEC QL NAA+PROBE: NOT DETECTED
HCOV HKU1 RNA SPEC QL NAA+PROBE: NOT DETECTED
HCOV NL63 RNA SPEC QL NAA+PROBE: NOT DETECTED
HCOV OC43 RNA SPEC QL NAA+PROBE: NOT DETECTED
HCT VFR BLD AUTO: 21.6 % (ref 34–46.6)
HCT VFR BLD AUTO: 22.1 % (ref 34–46.6)
HGB BLD-MCNC: 7 G/DL (ref 12–15.9)
HGB BLD-MCNC: 7.3 G/DL (ref 12–15.9)
HGB UR QL STRIP.AUTO: NEGATIVE
HMPV RNA NPH QL NAA+NON-PROBE: NOT DETECTED
HPIV1 RNA ISLT QL NAA+PROBE: NOT DETECTED
HPIV2 RNA SPEC QL NAA+PROBE: NOT DETECTED
HPIV3 RNA NPH QL NAA+PROBE: NOT DETECTED
HPIV4 P GENE NPH QL NAA+PROBE: NOT DETECTED
HYALINE CASTS UR QL AUTO: ABNORMAL /LPF
IRON 24H UR-MRATE: 18 MCG/DL (ref 37–145)
IRON SATN MFR SERPL: 18 % (ref 20–50)
KETONES UR QL STRIP: ABNORMAL
LDH SERPL-CCNC: 225 U/L (ref 135–214)
LEUKOCYTE ESTERASE UR QL STRIP.AUTO: ABNORMAL
LIPASE SERPL-CCNC: 8 U/L (ref 13–60)
LYMPHOCYTES # BLD MANUAL: 0.38 10*3/MM3 (ref 0.7–3.1)
LYMPHOCYTES NFR BLD MANUAL: 4 % (ref 5–12)
M PNEUMO IGG SER IA-ACNC: NOT DETECTED
MCH RBC QN AUTO: 26.6 PG (ref 26.6–33)
MCHC RBC AUTO-ENTMCNC: 33 G/DL (ref 31.5–35.7)
MCV RBC AUTO: 80.6 FL (ref 79–97)
METAMYELOCYTES NFR BLD MANUAL: 8 % (ref 0–0)
MONOCYTES # BLD: 0.12 10*3/MM3 (ref 0.1–0.9)
NEUTROPHILS # BLD AUTO: 2.15 10*3/MM3 (ref 1.7–7)
NEUTROPHILS NFR BLD MANUAL: 61 % (ref 42.7–76)
NEUTS BAND NFR BLD MANUAL: 13 % (ref 0–5)
NITRITE UR QL STRIP: POSITIVE
NOROVIRUS GI+II RNA STL QL NAA+NON-PROBE: NOT DETECTED
NRBC SPEC MANUAL: 3 /100 WBC (ref 0–0.2)
NT-PROBNP SERPL-MCNC: 872.5 PG/ML (ref 0–900)
P SHIGELLOIDES DNA STL QL NAA+NON-PROBE: NOT DETECTED
PH UR STRIP.AUTO: 5.5 [PH] (ref 5–8)
PLAT MORPH BLD: NORMAL
PLATELET # BLD AUTO: 317 10*3/MM3 (ref 140–450)
PMV BLD AUTO: 7.6 FL (ref 6–12)
POIKILOCYTOSIS BLD QL SMEAR: ABNORMAL
POTASSIUM SERPL-SCNC: 4.3 MMOL/L (ref 3.5–5.2)
PROCALCITONIN SERPL-MCNC: 0.28 NG/ML (ref 0–0.25)
PROT SERPL-MCNC: 5.3 G/DL (ref 6–8.5)
PROT UR QL STRIP: ABNORMAL
RBC # BLD AUTO: 2.74 10*6/MM3 (ref 3.77–5.28)
RBC # UR STRIP: ABNORMAL /HPF
REF LAB TEST METHOD: ABNORMAL
RHINOVIRUS RNA SPEC NAA+PROBE: NOT DETECTED
RSV RNA NPH QL NAA+NON-PROBE: NOT DETECTED
RVA RNA STL QL NAA+NON-PROBE: NOT DETECTED
S ENT+BONG DNA STL QL NAA+NON-PROBE: NOT DETECTED
SAPO I+II+IV+V RNA STL QL NAA+NON-PROBE: NOT DETECTED
SARS-COV-2 RNA NPH QL NAA+NON-PROBE: DETECTED
SCAN SLIDE: NORMAL
SHIGELLA SP+EIEC IPAH ST NAA+NON-PROBE: NOT DETECTED
SODIUM SERPL-SCNC: 142 MMOL/L (ref 136–145)
SP GR UR STRIP: 1.02 (ref 1–1.03)
SQUAMOUS #/AREA URNS HPF: ABNORMAL /HPF
TIBC SERPL-MCNC: 98 MCG/DL (ref 298–536)
TRANSFERRIN SERPL-MCNC: 66 MG/DL (ref 200–360)
UROBILINOGEN UR QL STRIP: ABNORMAL
V CHOL+PARA+VUL DNA STL QL NAA+NON-PROBE: NOT DETECTED
V CHOLERAE DNA STL QL NAA+NON-PROBE: NOT DETECTED
VARIANT LYMPHS NFR BLD MANUAL: 13 % (ref 19.6–45.3)
VIT B12 BLD-MCNC: 225 PG/ML (ref 211–946)
WBC # UR STRIP: ABNORMAL /HPF
WBC MORPH BLD: NORMAL
WBC NRBC COR # BLD: 2.9 10*3/MM3 (ref 3.4–10.8)
Y ENTEROCOL DNA STL QL NAA+NON-PROBE: NOT DETECTED

## 2022-11-07 PROCEDURE — 87449 NOS EACH ORGANISM AG IA: CPT | Performed by: NURSE PRACTITIONER

## 2022-11-07 PROCEDURE — 93005 ELECTROCARDIOGRAM TRACING: CPT | Performed by: HOSPITALIST

## 2022-11-07 PROCEDURE — P9612 CATHETERIZE FOR URINE SPEC: HCPCS

## 2022-11-07 PROCEDURE — 25010000002 ONDANSETRON PER 1 MG: Performed by: NURSE PRACTITIONER

## 2022-11-07 PROCEDURE — 82746 ASSAY OF FOLIC ACID SERUM: CPT | Performed by: HOSPITALIST

## 2022-11-07 PROCEDURE — 83690 ASSAY OF LIPASE: CPT | Performed by: NURSE PRACTITIONER

## 2022-11-07 PROCEDURE — 87086 URINE CULTURE/COLONY COUNT: CPT | Performed by: NURSE PRACTITIONER

## 2022-11-07 PROCEDURE — 83605 ASSAY OF LACTIC ACID: CPT

## 2022-11-07 PROCEDURE — 74181 MRI ABDOMEN W/O CONTRAST: CPT

## 2022-11-07 PROCEDURE — 83540 ASSAY OF IRON: CPT | Performed by: HOSPITALIST

## 2022-11-07 PROCEDURE — 80053 COMPREHEN METABOLIC PANEL: CPT | Performed by: NURSE PRACTITIONER

## 2022-11-07 PROCEDURE — 85007 BL SMEAR W/DIFF WBC COUNT: CPT | Performed by: NURSE PRACTITIONER

## 2022-11-07 PROCEDURE — 82962 GLUCOSE BLOOD TEST: CPT

## 2022-11-07 PROCEDURE — 82728 ASSAY OF FERRITIN: CPT | Performed by: PHYSICIAN ASSISTANT

## 2022-11-07 PROCEDURE — 25010000002 PIPERACILLIN SOD-TAZOBACTAM PER 1 G: Performed by: HOSPITALIST

## 2022-11-07 PROCEDURE — 74176 CT ABD & PELVIS W/O CONTRAST: CPT

## 2022-11-07 PROCEDURE — 0202U NFCT DS 22 TRGT SARS-COV-2: CPT | Performed by: NURSE PRACTITIONER

## 2022-11-07 PROCEDURE — 83880 ASSAY OF NATRIURETIC PEPTIDE: CPT | Performed by: HOSPITALIST

## 2022-11-07 PROCEDURE — 84466 ASSAY OF TRANSFERRIN: CPT | Performed by: HOSPITALIST

## 2022-11-07 PROCEDURE — 87040 BLOOD CULTURE FOR BACTERIA: CPT | Performed by: NURSE PRACTITIONER

## 2022-11-07 PROCEDURE — 85014 HEMATOCRIT: CPT | Performed by: HOSPITALIST

## 2022-11-07 PROCEDURE — 81001 URINALYSIS AUTO W/SCOPE: CPT | Performed by: NURSE PRACTITIONER

## 2022-11-07 PROCEDURE — 86140 C-REACTIVE PROTEIN: CPT | Performed by: PHYSICIAN ASSISTANT

## 2022-11-07 PROCEDURE — 82607 VITAMIN B-12: CPT | Performed by: HOSPITALIST

## 2022-11-07 PROCEDURE — 87186 SC STD MICRODIL/AGAR DIL: CPT | Performed by: NURSE PRACTITIONER

## 2022-11-07 PROCEDURE — 85018 HEMOGLOBIN: CPT | Performed by: HOSPITALIST

## 2022-11-07 PROCEDURE — 99285 EMERGENCY DEPT VISIT HI MDM: CPT

## 2022-11-07 PROCEDURE — 87077 CULTURE AEROBIC IDENTIFY: CPT | Performed by: NURSE PRACTITIONER

## 2022-11-07 PROCEDURE — 83605 ASSAY OF LACTIC ACID: CPT | Performed by: HOSPITALIST

## 2022-11-07 PROCEDURE — 85025 COMPLETE CBC W/AUTO DIFF WBC: CPT | Performed by: NURSE PRACTITIONER

## 2022-11-07 PROCEDURE — 25010000002 MORPHINE PER 10 MG: Performed by: NURSE PRACTITIONER

## 2022-11-07 PROCEDURE — 84145 PROCALCITONIN (PCT): CPT | Performed by: PHYSICIAN ASSISTANT

## 2022-11-07 PROCEDURE — 83615 LACTATE (LD) (LDH) ENZYME: CPT | Performed by: PHYSICIAN ASSISTANT

## 2022-11-07 PROCEDURE — 87507 IADNA-DNA/RNA PROBE TQ 12-25: CPT | Performed by: NURSE PRACTITIONER

## 2022-11-07 PROCEDURE — 87324 CLOSTRIDIUM AG IA: CPT | Performed by: NURSE PRACTITIONER

## 2022-11-07 PROCEDURE — 93010 ELECTROCARDIOGRAM REPORT: CPT | Performed by: INTERNAL MEDICINE

## 2022-11-07 PROCEDURE — 71045 X-RAY EXAM CHEST 1 VIEW: CPT

## 2022-11-07 PROCEDURE — 82330 ASSAY OF CALCIUM: CPT | Performed by: HOSPITALIST

## 2022-11-07 PROCEDURE — 99223 1ST HOSP IP/OBS HIGH 75: CPT | Mod: FS | Performed by: NURSE PRACTITIONER

## 2022-11-07 PROCEDURE — 85384 FIBRINOGEN ACTIVITY: CPT | Performed by: PHYSICIAN ASSISTANT

## 2022-11-07 PROCEDURE — 25010000002 CEFTRIAXONE PER 250 MG: Performed by: NURSE PRACTITIONER

## 2022-11-07 RX ORDER — POTASSIUM CHLORIDE 1.5 G/1.77G
40 POWDER, FOR SOLUTION ORAL AS NEEDED
Status: DISCONTINUED | OUTPATIENT
Start: 2022-11-07 | End: 2022-11-18 | Stop reason: SDUPTHER

## 2022-11-07 RX ORDER — OLANZAPINE 10 MG/2ML
1 INJECTION, POWDER, LYOPHILIZED, FOR SOLUTION INTRAMUSCULAR
Status: DISCONTINUED | OUTPATIENT
Start: 2022-11-07 | End: 2022-12-06 | Stop reason: HOSPADM

## 2022-11-07 RX ORDER — ALBUTEROL SULFATE 90 UG/1
2 AEROSOL, METERED RESPIRATORY (INHALATION) EVERY 6 HOURS PRN
Status: DISCONTINUED | OUTPATIENT
Start: 2022-11-07 | End: 2022-11-26

## 2022-11-07 RX ORDER — NITROGLYCERIN 0.4 MG/1
0.4 TABLET SUBLINGUAL
Status: DISCONTINUED | OUTPATIENT
Start: 2022-11-07 | End: 2022-12-06 | Stop reason: HOSPADM

## 2022-11-07 RX ORDER — CHOLECALCIFEROL (VITAMIN D3) 50 MCG
1 TABLET ORAL DAILY
COMMUNITY
End: 2022-12-06 | Stop reason: HOSPADM

## 2022-11-07 RX ORDER — ACETAZOLAMIDE 250 MG/1
250 TABLET ORAL DAILY
COMMUNITY
End: 2022-12-06 | Stop reason: HOSPADM

## 2022-11-07 RX ORDER — NICOTINE POLACRILEX 4 MG
15 LOZENGE BUCCAL
Status: DISCONTINUED | OUTPATIENT
Start: 2022-11-07 | End: 2022-12-06 | Stop reason: HOSPADM

## 2022-11-07 RX ORDER — DEXAMETHASONE 4 MG/1
6 TABLET ORAL
Status: DISCONTINUED | OUTPATIENT
Start: 2022-11-08 | End: 2022-11-07

## 2022-11-07 RX ORDER — DIPHENOXYLATE HYDROCHLORIDE AND ATROPINE SULFATE 2.5; .025 MG/1; MG/1
2 TABLET ORAL EVERY 6 HOURS
Status: DISCONTINUED | OUTPATIENT
Start: 2022-11-07 | End: 2022-11-17

## 2022-11-07 RX ORDER — RAMIPRIL 1.25 MG/1
1.25 CAPSULE ORAL
COMMUNITY
End: 2022-12-06 | Stop reason: HOSPADM

## 2022-11-07 RX ORDER — ONDANSETRON 2 MG/ML
4 INJECTION INTRAMUSCULAR; INTRAVENOUS EVERY 6 HOURS PRN
Status: DISCONTINUED | OUTPATIENT
Start: 2022-11-07 | End: 2022-11-23

## 2022-11-07 RX ORDER — CHOLESTYRAMINE LIGHT 4 G/5.7G
1 POWDER, FOR SUSPENSION ORAL EVERY 12 HOURS SCHEDULED
Status: DISCONTINUED | OUTPATIENT
Start: 2022-11-07 | End: 2022-11-10

## 2022-11-07 RX ORDER — LORATADINE 10 MG/1
10 TABLET ORAL DAILY
COMMUNITY
End: 2022-12-06 | Stop reason: HOSPADM

## 2022-11-07 RX ORDER — ATORVASTATIN CALCIUM 40 MG/1
40 TABLET, FILM COATED ORAL NIGHTLY
COMMUNITY
End: 2022-12-06 | Stop reason: HOSPADM

## 2022-11-07 RX ORDER — ALUMINA, MAGNESIA, AND SIMETHICONE 2400; 2400; 240 MG/30ML; MG/30ML; MG/30ML
15 SUSPENSION ORAL EVERY 6 HOURS PRN
Status: DISCONTINUED | OUTPATIENT
Start: 2022-11-07 | End: 2022-12-06 | Stop reason: HOSPADM

## 2022-11-07 RX ORDER — ONDANSETRON 4 MG/1
4 TABLET, FILM COATED ORAL EVERY 6 HOURS PRN
Status: DISCONTINUED | OUTPATIENT
Start: 2022-11-07 | End: 2022-11-23

## 2022-11-07 RX ORDER — LOPERAMIDE HYDROCHLORIDE 2 MG/1
4 CAPSULE ORAL 4 TIMES DAILY PRN
COMMUNITY
End: 2022-12-06 | Stop reason: HOSPADM

## 2022-11-07 RX ORDER — DEXTROSE MONOHYDRATE 25 G/50ML
25 INJECTION, SOLUTION INTRAVENOUS
Status: DISCONTINUED | OUTPATIENT
Start: 2022-11-07 | End: 2022-12-06 | Stop reason: HOSPADM

## 2022-11-07 RX ORDER — INSULIN LISPRO 100 [IU]/ML
2-7 INJECTION, SOLUTION INTRAVENOUS; SUBCUTANEOUS
Status: DISCONTINUED | OUTPATIENT
Start: 2022-11-07 | End: 2022-11-20

## 2022-11-07 RX ORDER — ACETAMINOPHEN 325 MG/1
650 TABLET ORAL EVERY 4 HOURS PRN
Status: DISCONTINUED | OUTPATIENT
Start: 2022-11-07 | End: 2022-11-29

## 2022-11-07 RX ORDER — MAGNESIUM SULFATE 1 G/100ML
1 INJECTION INTRAVENOUS AS NEEDED
Status: DISCONTINUED | OUTPATIENT
Start: 2022-11-07 | End: 2022-11-24

## 2022-11-07 RX ORDER — BISACODYL 5 MG/1
5 TABLET, DELAYED RELEASE ORAL DAILY PRN
Status: DISCONTINUED | OUTPATIENT
Start: 2022-11-07 | End: 2022-11-07

## 2022-11-07 RX ORDER — ASPIRIN 81 MG/1
81 TABLET ORAL DAILY
COMMUNITY
End: 2022-12-06 | Stop reason: HOSPADM

## 2022-11-07 RX ORDER — SODIUM CHLORIDE 0.9 % (FLUSH) 0.9 %
10 SYRINGE (ML) INJECTION AS NEEDED
Status: DISCONTINUED | OUTPATIENT
Start: 2022-11-07 | End: 2022-12-06 | Stop reason: HOSPADM

## 2022-11-07 RX ORDER — BISACODYL 10 MG
10 SUPPOSITORY, RECTAL RECTAL DAILY PRN
Status: DISCONTINUED | OUTPATIENT
Start: 2022-11-07 | End: 2022-11-07

## 2022-11-07 RX ORDER — FUROSEMIDE 40 MG/1
40 TABLET ORAL 2 TIMES DAILY
COMMUNITY
End: 2022-12-06 | Stop reason: HOSPADM

## 2022-11-07 RX ORDER — LANOLIN ALCOHOL/MO/W.PET/CERES
400 CREAM (GRAM) TOPICAL 2 TIMES DAILY
COMMUNITY
End: 2022-12-06 | Stop reason: HOSPADM

## 2022-11-07 RX ORDER — POLYETHYLENE GLYCOL 3350 17 G/17G
17 POWDER, FOR SOLUTION ORAL DAILY PRN
Status: DISCONTINUED | OUTPATIENT
Start: 2022-11-07 | End: 2022-11-07

## 2022-11-07 RX ORDER — SODIUM CHLORIDE 0.9 % (FLUSH) 0.9 %
10 SYRINGE (ML) INJECTION EVERY 12 HOURS SCHEDULED
Status: DISCONTINUED | OUTPATIENT
Start: 2022-11-07 | End: 2022-12-06 | Stop reason: HOSPADM

## 2022-11-07 RX ORDER — ENOXAPARIN SODIUM 100 MG/ML
40 INJECTION SUBCUTANEOUS DAILY
Status: DISCONTINUED | OUTPATIENT
Start: 2022-11-07 | End: 2022-11-07

## 2022-11-07 RX ORDER — MAGNESIUM SULFATE HEPTAHYDRATE 40 MG/ML
2 INJECTION, SOLUTION INTRAVENOUS AS NEEDED
Status: DISCONTINUED | OUTPATIENT
Start: 2022-11-07 | End: 2022-11-24

## 2022-11-07 RX ORDER — AMOXICILLIN 250 MG
2 CAPSULE ORAL 2 TIMES DAILY
Status: DISCONTINUED | OUTPATIENT
Start: 2022-11-07 | End: 2022-11-07

## 2022-11-07 RX ORDER — ENOXAPARIN SODIUM 100 MG/ML
40 INJECTION SUBCUTANEOUS DAILY
Status: DISCONTINUED | OUTPATIENT
Start: 2022-11-08 | End: 2022-11-20

## 2022-11-07 RX ORDER — ACETAMINOPHEN 160 MG/5ML
650 SOLUTION ORAL EVERY 4 HOURS PRN
Status: DISCONTINUED | OUTPATIENT
Start: 2022-11-07 | End: 2022-11-29

## 2022-11-07 RX ORDER — HYDROCODONE BITARTRATE AND ACETAMINOPHEN 5; 325 MG/1; MG/1
0.5 TABLET ORAL EVERY 6 HOURS PRN
COMMUNITY
End: 2022-12-06 | Stop reason: HOSPADM

## 2022-11-07 RX ORDER — ONDANSETRON 2 MG/ML
4 INJECTION INTRAMUSCULAR; INTRAVENOUS ONCE
Status: COMPLETED | OUTPATIENT
Start: 2022-11-07 | End: 2022-11-07

## 2022-11-07 RX ORDER — ACETAMINOPHEN 650 MG/1
650 SUPPOSITORY RECTAL EVERY 4 HOURS PRN
Status: DISCONTINUED | OUTPATIENT
Start: 2022-11-07 | End: 2022-11-29

## 2022-11-07 RX ORDER — POTASSIUM CHLORIDE 20 MEQ/1
40 TABLET, EXTENDED RELEASE ORAL AS NEEDED
Status: DISCONTINUED | OUTPATIENT
Start: 2022-11-07 | End: 2022-11-18

## 2022-11-07 RX ORDER — SODIUM CHLORIDE 9 MG/ML
50 INJECTION, SOLUTION INTRAVENOUS CONTINUOUS
Status: DISCONTINUED | OUTPATIENT
Start: 2022-11-07 | End: 2022-11-12

## 2022-11-07 RX ORDER — MONTELUKAST SODIUM 10 MG/1
10 TABLET ORAL NIGHTLY
COMMUNITY
End: 2022-12-06 | Stop reason: HOSPADM

## 2022-11-07 RX ORDER — LEVOTHYROXINE SODIUM 88 UG/1
88 TABLET ORAL
COMMUNITY
End: 2022-12-06 | Stop reason: HOSPADM

## 2022-11-07 RX ORDER — CHOLECALCIFEROL (VITAMIN D3) 125 MCG
5 CAPSULE ORAL NIGHTLY PRN
Status: DISCONTINUED | OUTPATIENT
Start: 2022-11-07 | End: 2022-11-29

## 2022-11-07 RX ORDER — PANTOPRAZOLE SODIUM 40 MG/10ML
40 INJECTION, POWDER, LYOPHILIZED, FOR SOLUTION INTRAVENOUS
Status: DISCONTINUED | OUTPATIENT
Start: 2022-11-07 | End: 2022-11-13

## 2022-11-07 RX ORDER — LOPERAMIDE HYDROCHLORIDE 2 MG/1
4 CAPSULE ORAL EVERY 6 HOURS
Status: DISCONTINUED | OUTPATIENT
Start: 2022-11-07 | End: 2022-11-17

## 2022-11-07 RX ADMIN — DIPHENOXYLATE HYDROCHLORIDE AND ATROPINE SULFATE 2 TABLET: 2.5; .025 TABLET ORAL at 12:01

## 2022-11-07 RX ADMIN — DIPHENOXYLATE HYDROCHLORIDE AND ATROPINE SULFATE 2 TABLET: 2.5; .025 TABLET ORAL at 18:01

## 2022-11-07 RX ADMIN — ONDANSETRON 4 MG: 2 INJECTION INTRAMUSCULAR; INTRAVENOUS at 08:36

## 2022-11-07 RX ADMIN — DOXYCYCLINE 100 MG: 100 INJECTION, POWDER, LYOPHILIZED, FOR SOLUTION INTRAVENOUS at 16:48

## 2022-11-07 RX ADMIN — SODIUM CHLORIDE 1000 ML: 0.9 INJECTION, SOLUTION INTRAVENOUS at 15:43

## 2022-11-07 RX ADMIN — SODIUM CHLORIDE, POTASSIUM CHLORIDE, SODIUM LACTATE AND CALCIUM CHLORIDE 500 ML: 600; 310; 30; 20 INJECTION, SOLUTION INTRAVENOUS at 08:36

## 2022-11-07 RX ADMIN — Medication 10 ML: at 17:39

## 2022-11-07 RX ADMIN — PANTOPRAZOLE SODIUM 40 MG: 40 INJECTION, POWDER, FOR SOLUTION INTRAVENOUS at 18:01

## 2022-11-07 RX ADMIN — LOPERAMIDE HYDROCHLORIDE 4 MG: 2 CAPSULE ORAL at 15:08

## 2022-11-07 RX ADMIN — CHOLESTYRAMINE 4 G: 4 POWDER, FOR SUSPENSION ORAL at 12:04

## 2022-11-07 RX ADMIN — MORPHINE SULFATE 4 MG: 4 INJECTION INTRAVENOUS at 08:37

## 2022-11-07 RX ADMIN — CEFTRIAXONE 2 G: 2 INJECTION, POWDER, FOR SOLUTION INTRAMUSCULAR; INTRAVENOUS at 09:01

## 2022-11-07 RX ADMIN — PIPERACILLIN AND TAZOBACTAM 3.38 G: 3; .375 INJECTION, POWDER, FOR SOLUTION INTRAVENOUS at 16:10

## 2022-11-07 RX ADMIN — SODIUM CHLORIDE 125 ML/HR: 9 INJECTION, SOLUTION INTRAVENOUS at 17:37

## 2022-11-07 RX ADMIN — SODIUM CHLORIDE 500 ML: 9 INJECTION, SOLUTION INTRAVENOUS at 15:15

## 2022-11-08 ENCOUNTER — ANESTHESIA EVENT (OUTPATIENT)
Dept: GASTROENTEROLOGY | Facility: HOSPITAL | Age: 72
End: 2022-11-08

## 2022-11-08 ENCOUNTER — ANESTHESIA (OUTPATIENT)
Dept: GASTROENTEROLOGY | Facility: HOSPITAL | Age: 72
End: 2022-11-08

## 2022-11-08 ENCOUNTER — APPOINTMENT (OUTPATIENT)
Dept: GENERAL RADIOLOGY | Facility: HOSPITAL | Age: 72
End: 2022-11-08

## 2022-11-08 ENCOUNTER — HOSPITAL ENCOUNTER (OUTPATIENT)
Dept: ONCOLOGY | Facility: HOSPITAL | Age: 72
Setting detail: INFUSION SERIES
End: 2022-11-08

## 2022-11-08 ENCOUNTER — APPOINTMENT (OUTPATIENT)
Dept: CARDIOLOGY | Facility: HOSPITAL | Age: 72
End: 2022-11-08

## 2022-11-08 ENCOUNTER — INPATIENT HOSPITAL (OUTPATIENT)
Dept: URBAN - METROPOLITAN AREA HOSPITAL 84 | Facility: HOSPITAL | Age: 72
End: 2022-11-08
Payer: COMMERCIAL

## 2022-11-08 DIAGNOSIS — Q40.0 CONGENITAL HYPERTROPHIC PYLORIC STENOSIS: ICD-10-CM

## 2022-11-08 DIAGNOSIS — K80.50 CALCULUS OF BILE DUCT WITHOUT CHOLANGITIS OR CHOLECYSTITIS W: ICD-10-CM

## 2022-11-08 DIAGNOSIS — K22.2 ESOPHAGEAL OBSTRUCTION: ICD-10-CM

## 2022-11-08 DIAGNOSIS — U07.1 COVID-19: ICD-10-CM

## 2022-11-08 DIAGNOSIS — K57.10 DIVERTICULOSIS OF SMALL INTESTINE WITHOUT PERFORATION OR ABS: ICD-10-CM

## 2022-11-08 DIAGNOSIS — K44.9 DIAPHRAGMATIC HERNIA WITHOUT OBSTRUCTION OR GANGRENE: ICD-10-CM

## 2022-11-08 DIAGNOSIS — K29.90 GASTRODUODENITIS, UNSPECIFIED, WITHOUT BLEEDING: ICD-10-CM

## 2022-11-08 LAB
ABO GROUP BLD: NORMAL
ACANTHOCYTES BLD QL SMEAR: ABNORMAL
ACANTHOCYTES BLD QL SMEAR: ABNORMAL
ALBUMIN SERPL-MCNC: 2.3 G/DL (ref 3.5–5.2)
ALBUMIN/GLOB SERPL: 1 G/DL
ALP SERPL-CCNC: 108 U/L (ref 39–117)
ALT SERPL W P-5'-P-CCNC: 12 U/L (ref 1–33)
ANION GAP SERPL CALCULATED.3IONS-SCNC: 11 MMOL/L (ref 5–15)
ANISOCYTOSIS BLD QL: ABNORMAL
ANISOCYTOSIS BLD QL: ABNORMAL
AST SERPL-CCNC: 15 U/L (ref 1–32)
BH CV ECHO MEAS - AI P1/2T: 552.1 MSEC
BH CV LOWER VASCULAR LEFT COMMON FEMORAL AUGMENT: NORMAL
BH CV LOWER VASCULAR LEFT COMMON FEMORAL COMPETENT: NORMAL
BH CV LOWER VASCULAR LEFT COMMON FEMORAL COMPRESS: NORMAL
BH CV LOWER VASCULAR LEFT COMMON FEMORAL PHASIC: NORMAL
BH CV LOWER VASCULAR LEFT COMMON FEMORAL SPONT: NORMAL
BH CV LOWER VASCULAR LEFT DISTAL FEMORAL COMPRESS: NORMAL
BH CV LOWER VASCULAR LEFT GREATER SAPH AK COMPRESS: NORMAL
BH CV LOWER VASCULAR LEFT MID FEMORAL AUGMENT: NORMAL
BH CV LOWER VASCULAR LEFT MID FEMORAL COMPETENT: NORMAL
BH CV LOWER VASCULAR LEFT MID FEMORAL COMPRESS: NORMAL
BH CV LOWER VASCULAR LEFT MID FEMORAL PHASIC: NORMAL
BH CV LOWER VASCULAR LEFT MID FEMORAL SPONT: NORMAL
BH CV LOWER VASCULAR LEFT POPLITEAL AUGMENT: NORMAL
BH CV LOWER VASCULAR LEFT POPLITEAL COMPETENT: NORMAL
BH CV LOWER VASCULAR LEFT POPLITEAL COMPRESS: NORMAL
BH CV LOWER VASCULAR LEFT POPLITEAL PHASIC: NORMAL
BH CV LOWER VASCULAR LEFT POPLITEAL SPONT: NORMAL
BH CV LOWER VASCULAR LEFT POSTERIOR TIBIAL COMPRESS: NORMAL
BH CV LOWER VASCULAR LEFT PROXIMAL FEMORAL COMPRESS: NORMAL
BH CV LOWER VASCULAR LEFT SAPHENOFEMORAL JUNCTION COMPRESS: NORMAL
BH CV LOWER VASCULAR RIGHT COMMON FEMORAL AUGMENT: NORMAL
BH CV LOWER VASCULAR RIGHT COMMON FEMORAL COMPETENT: NORMAL
BH CV LOWER VASCULAR RIGHT COMMON FEMORAL COMPRESS: NORMAL
BH CV LOWER VASCULAR RIGHT COMMON FEMORAL PHASIC: NORMAL
BH CV LOWER VASCULAR RIGHT COMMON FEMORAL SPONT: NORMAL
BH CV LOWER VASCULAR RIGHT DISTAL FEMORAL COMPRESS: NORMAL
BH CV LOWER VASCULAR RIGHT GASTRONEMIUS COMPRESS: NORMAL
BH CV LOWER VASCULAR RIGHT GREATER SAPH AK COMPRESS: NORMAL
BH CV LOWER VASCULAR RIGHT LESSER SAPH COMPRESS: NORMAL
BH CV LOWER VASCULAR RIGHT MID FEMORAL AUGMENT: NORMAL
BH CV LOWER VASCULAR RIGHT MID FEMORAL COMPETENT: NORMAL
BH CV LOWER VASCULAR RIGHT MID FEMORAL COMPRESS: NORMAL
BH CV LOWER VASCULAR RIGHT MID FEMORAL PHASIC: NORMAL
BH CV LOWER VASCULAR RIGHT MID FEMORAL SPONT: NORMAL
BH CV LOWER VASCULAR RIGHT PERONEAL COMPRESS: NORMAL
BH CV LOWER VASCULAR RIGHT POPLITEAL AUGMENT: NORMAL
BH CV LOWER VASCULAR RIGHT POPLITEAL COMPETENT: NORMAL
BH CV LOWER VASCULAR RIGHT POPLITEAL COMPRESS: NORMAL
BH CV LOWER VASCULAR RIGHT POPLITEAL PHASIC: NORMAL
BH CV LOWER VASCULAR RIGHT POPLITEAL SPONT: NORMAL
BH CV LOWER VASCULAR RIGHT POSTERIOR TIBIAL COMPRESS: NORMAL
BH CV LOWER VASCULAR RIGHT PROXIMAL FEMORAL COMPRESS: NORMAL
BH CV LOWER VASCULAR RIGHT SAPHENOFEMORAL JUNCTION COMPRESS: NORMAL
BILIRUB SERPL-MCNC: 0.3 MG/DL (ref 0–1.2)
BLD GP AB SCN SERPL QL: NEGATIVE
BUN SERPL-MCNC: 10 MG/DL (ref 8–23)
BUN/CREAT SERPL: 11.9 (ref 7–25)
BURR CELLS BLD QL SMEAR: ABNORMAL
BURR CELLS BLD QL SMEAR: ABNORMAL
C3 FRG RBC-MCNC: ABNORMAL
C3 FRG RBC-MCNC: ABNORMAL
CALCIUM SPEC-SCNC: 6.4 MG/DL (ref 8.6–10.5)
CHLORIDE SERPL-SCNC: 110 MMOL/L (ref 98–107)
CO2 SERPL-SCNC: 20 MMOL/L (ref 22–29)
CREAT SERPL-MCNC: 0.84 MG/DL (ref 0.57–1)
DEPRECATED RDW RBC AUTO: 55.6 FL (ref 37–54)
DEPRECATED RDW RBC AUTO: 56.4 FL (ref 37–54)
EGFRCR SERPLBLD CKD-EPI 2021: 74.9 ML/MIN/1.73
ERYTHROCYTE [DISTWIDTH] IN BLOOD BY AUTOMATED COUNT: 19.7 % (ref 12.3–15.4)
ERYTHROCYTE [DISTWIDTH] IN BLOOD BY AUTOMATED COUNT: 19.8 % (ref 12.3–15.4)
GIANT PLATELETS: ABNORMAL
GLOBULIN UR ELPH-MCNC: 2.2 GM/DL
GLUCOSE BLDC GLUCOMTR-MCNC: 108 MG/DL (ref 70–105)
GLUCOSE BLDC GLUCOMTR-MCNC: 142 MG/DL (ref 70–105)
GLUCOSE SERPL-MCNC: 120 MG/DL (ref 65–99)
HCT VFR BLD AUTO: 20.5 % (ref 34–46.6)
HCT VFR BLD AUTO: 21.6 % (ref 34–46.6)
HCT VFR BLD AUTO: 27.7 % (ref 34–46.6)
HGB BLD-MCNC: 6.4 G/DL (ref 12–15.9)
HGB BLD-MCNC: 6.8 G/DL (ref 12–15.9)
HGB BLD-MCNC: 8.6 G/DL (ref 12–15.9)
INR PPP: 1.64 (ref 0.93–1.1)
LARGE PLATELETS: ABNORMAL
LARGE PLATELETS: ABNORMAL
LYMPHOCYTES # BLD MANUAL: 0.28 10*3/MM3 (ref 0.7–3.1)
LYMPHOCYTES # BLD MANUAL: 0.61 10*3/MM3 (ref 0.7–3.1)
LYMPHOCYTES NFR BLD MANUAL: 2 % (ref 5–12)
LYMPHOCYTES NFR BLD MANUAL: 4 % (ref 5–12)
MAGNESIUM SERPL-MCNC: 1.7 MG/DL (ref 1.6–2.4)
MAXIMAL PREDICTED HEART RATE: 150 BPM
MAXIMAL PREDICTED HEART RATE: 150 BPM
MCH RBC QN AUTO: 25.9 PG (ref 26.6–33)
MCH RBC QN AUTO: 25.9 PG (ref 26.6–33)
MCHC RBC AUTO-ENTMCNC: 31.2 G/DL (ref 31.5–35.7)
MCHC RBC AUTO-ENTMCNC: 31.6 G/DL (ref 31.5–35.7)
MCV RBC AUTO: 82.1 FL (ref 79–97)
MCV RBC AUTO: 82.8 FL (ref 79–97)
METAMYELOCYTES NFR BLD MANUAL: 10 % (ref 0–0)
METAMYELOCYTES NFR BLD MANUAL: 8 % (ref 0–0)
MICROCYTES BLD QL: ABNORMAL
MICROCYTES BLD QL: ABNORMAL
MONOCYTES # BLD: 0.07 10*3/MM3 (ref 0.1–0.9)
MONOCYTES # BLD: 0.14 10*3/MM3 (ref 0.1–0.9)
NEUTROPHILS # BLD AUTO: 2.45 10*3/MM3 (ref 1.7–7)
NEUTROPHILS # BLD AUTO: 2.73 10*3/MM3 (ref 1.7–7)
NEUTROPHILS NFR BLD MANUAL: 68 % (ref 42.7–76)
NEUTROPHILS NFR BLD MANUAL: 76 % (ref 42.7–76)
NEUTS BAND NFR BLD MANUAL: 2 % (ref 0–5)
NEUTS BAND NFR BLD MANUAL: 4 % (ref 0–5)
OVALOCYTES BLD QL SMEAR: ABNORMAL
PLATELET # BLD AUTO: 203 10*3/MM3 (ref 140–450)
PLATELET # BLD AUTO: 240 10*3/MM3 (ref 140–450)
PMV BLD AUTO: 7.5 FL (ref 6–12)
PMV BLD AUTO: 7.5 FL (ref 6–12)
POIKILOCYTOSIS BLD QL SMEAR: ABNORMAL
POIKILOCYTOSIS BLD QL SMEAR: ABNORMAL
POLYCHROMASIA BLD QL SMEAR: ABNORMAL
POTASSIUM SERPL-SCNC: 3.9 MMOL/L (ref 3.5–5.2)
PROT SERPL-MCNC: 4.5 G/DL (ref 6–8.5)
PROTHROMBIN TIME: 16.4 SECONDS (ref 9.6–11.7)
RBC # BLD AUTO: 2.48 10*6/MM3 (ref 3.77–5.28)
RBC # BLD AUTO: 2.63 10*6/MM3 (ref 3.77–5.28)
RH BLD: POSITIVE
SCAN SLIDE: NORMAL
SCAN SLIDE: NORMAL
SMALL PLATELETS BLD QL SMEAR: ADEQUATE
SMALL PLATELETS BLD QL SMEAR: ADEQUATE
SODIUM SERPL-SCNC: 141 MMOL/L (ref 136–145)
STRESS TARGET HR: 128 BPM
STRESS TARGET HR: 128 BPM
T&S EXPIRATION DATE: NORMAL
VARIANT LYMPHS NFR BLD MANUAL: 14 % (ref 19.6–45.3)
VARIANT LYMPHS NFR BLD MANUAL: 4 % (ref 0–5)
VARIANT LYMPHS NFR BLD MANUAL: 8 % (ref 19.6–45.3)
WBC MORPH BLD: NORMAL
WBC MORPH BLD: NORMAL
WBC NRBC COR # BLD: 3.4 10*3/MM3 (ref 3.4–10.8)
WBC NRBC COR # BLD: 3.5 10*3/MM3 (ref 3.4–10.8)

## 2022-11-08 PROCEDURE — 85025 COMPLETE CBC W/AUTO DIFF WBC: CPT | Performed by: HOSPITALIST

## 2022-11-08 PROCEDURE — 74328 X-RAY BILE DUCT ENDOSCOPY: CPT

## 2022-11-08 PROCEDURE — 36430 TRANSFUSION BLD/BLD COMPNT: CPT

## 2022-11-08 PROCEDURE — 82962 GLUCOSE BLOOD TEST: CPT

## 2022-11-08 PROCEDURE — 25010000002 ONDANSETRON PER 1 MG: Performed by: ANESTHESIOLOGY

## 2022-11-08 PROCEDURE — 86900 BLOOD TYPING SEROLOGIC ABO: CPT

## 2022-11-08 PROCEDURE — 85610 PROTHROMBIN TIME: CPT | Performed by: NURSE PRACTITIONER

## 2022-11-08 PROCEDURE — 86850 RBC ANTIBODY SCREEN: CPT | Performed by: INTERNAL MEDICINE

## 2022-11-08 PROCEDURE — 43262 ENDO CHOLANGIOPANCREATOGRAPH: CPT | Mod: 59 | Performed by: INTERNAL MEDICINE

## 2022-11-08 PROCEDURE — 85007 BL SMEAR W/DIFF WBC COUNT: CPT | Performed by: NURSE PRACTITIONER

## 2022-11-08 PROCEDURE — 25010000002 CALCIUM GLUCONATE-NACL 1-0.675 GM/50ML-% SOLUTION: Performed by: NURSE PRACTITIONER

## 2022-11-08 PROCEDURE — 36415 COLL VENOUS BLD VENIPUNCTURE: CPT | Performed by: INTERNAL MEDICINE

## 2022-11-08 PROCEDURE — 83735 ASSAY OF MAGNESIUM: CPT | Performed by: PHYSICIAN ASSISTANT

## 2022-11-08 PROCEDURE — 93308 TTE F-UP OR LMTD: CPT

## 2022-11-08 PROCEDURE — 85018 HEMOGLOBIN: CPT | Performed by: HOSPITALIST

## 2022-11-08 PROCEDURE — 99222 1ST HOSP IP/OBS MODERATE 55: CPT | Performed by: INTERNAL MEDICINE

## 2022-11-08 PROCEDURE — 25010000002 PIPERACILLIN SOD-TAZOBACTAM PER 1 G: Performed by: HOSPITALIST

## 2022-11-08 PROCEDURE — 85025 COMPLETE CBC W/AUTO DIFF WBC: CPT | Performed by: NURSE PRACTITIONER

## 2022-11-08 PROCEDURE — P9016 RBC LEUKOCYTES REDUCED: HCPCS

## 2022-11-08 PROCEDURE — 86923 COMPATIBILITY TEST ELECTRIC: CPT

## 2022-11-08 PROCEDURE — 25010000002 MAGNESIUM SULFATE IN D5W 1G/100ML (PREMIX) 1-5 GM/100ML-% SOLUTION: Performed by: PHYSICIAN ASSISTANT

## 2022-11-08 PROCEDURE — 25010000002 ENOXAPARIN PER 10 MG: Performed by: NURSE PRACTITIONER

## 2022-11-08 PROCEDURE — 83921 ORGANIC ACID SINGLE QUANT: CPT | Performed by: NURSE PRACTITIONER

## 2022-11-08 PROCEDURE — 80053 COMPREHEN METABOLIC PANEL: CPT | Performed by: NURSE PRACTITIONER

## 2022-11-08 PROCEDURE — 25010000002 IOPAMIDOL 61 % SOLUTION 30 ML VIAL: Performed by: INTERNAL MEDICINE

## 2022-11-08 PROCEDURE — 85007 BL SMEAR W/DIFF WBC COUNT: CPT | Performed by: HOSPITALIST

## 2022-11-08 PROCEDURE — C1769 GUIDE WIRE: HCPCS | Performed by: INTERNAL MEDICINE

## 2022-11-08 PROCEDURE — 86901 BLOOD TYPING SEROLOGIC RH(D): CPT | Performed by: INTERNAL MEDICINE

## 2022-11-08 PROCEDURE — 25010000002 FENTANYL CITRATE (PF) 100 MCG/2ML SOLUTION: Performed by: ANESTHESIOLOGY

## 2022-11-08 PROCEDURE — 43264 ERCP REMOVE DUCT CALCULI: CPT | Performed by: INTERNAL MEDICINE

## 2022-11-08 PROCEDURE — 85014 HEMATOCRIT: CPT | Performed by: HOSPITALIST

## 2022-11-08 PROCEDURE — 0FC98ZZ EXTIRPATION OF MATTER FROM COMMON BILE DUCT, VIA NATURAL OR ARTIFICIAL OPENING ENDOSCOPIC: ICD-10-PCS | Performed by: INTERNAL MEDICINE

## 2022-11-08 PROCEDURE — 86901 BLOOD TYPING SEROLOGIC RH(D): CPT

## 2022-11-08 PROCEDURE — 25010000002 PROPOFOL 10 MG/ML EMULSION: Performed by: ANESTHESIOLOGY

## 2022-11-08 PROCEDURE — 93970 EXTREMITY STUDY: CPT

## 2022-11-08 PROCEDURE — 86900 BLOOD TYPING SEROLOGIC ABO: CPT | Performed by: INTERNAL MEDICINE

## 2022-11-08 PROCEDURE — 93308 TTE F-UP OR LMTD: CPT | Performed by: INTERNAL MEDICINE

## 2022-11-08 RX ORDER — GLYCOPYRROLATE 0.2 MG/ML
INJECTION INTRAMUSCULAR; INTRAVENOUS AS NEEDED
Status: DISCONTINUED | OUTPATIENT
Start: 2022-11-08 | End: 2022-11-08 | Stop reason: SURG

## 2022-11-08 RX ORDER — PROPOFOL 10 MG/ML
VIAL (ML) INTRAVENOUS AS NEEDED
Status: DISCONTINUED | OUTPATIENT
Start: 2022-11-08 | End: 2022-11-08 | Stop reason: SURG

## 2022-11-08 RX ORDER — NEOSTIGMINE METHYLSULFATE 5 MG/5 ML
SYRINGE (ML) INTRAVENOUS AS NEEDED
Status: DISCONTINUED | OUTPATIENT
Start: 2022-11-08 | End: 2022-11-08 | Stop reason: SURG

## 2022-11-08 RX ORDER — ROCURONIUM BROMIDE 10 MG/ML
INJECTION, SOLUTION INTRAVENOUS AS NEEDED
Status: DISCONTINUED | OUTPATIENT
Start: 2022-11-08 | End: 2022-11-08 | Stop reason: SURG

## 2022-11-08 RX ORDER — SODIUM CHLORIDE 9 MG/ML
30 INJECTION, SOLUTION INTRAVENOUS CONTINUOUS PRN
Status: CANCELLED | OUTPATIENT
Start: 2022-11-08

## 2022-11-08 RX ORDER — FOLIC ACID 1 MG/1
1 TABLET ORAL DAILY
Status: DISCONTINUED | OUTPATIENT
Start: 2022-11-08 | End: 2022-11-23

## 2022-11-08 RX ORDER — CALCIUM GLUCONATE 20 MG/ML
2 INJECTION, SOLUTION INTRAVENOUS AS NEEDED
Status: DISCONTINUED | OUTPATIENT
Start: 2022-11-08 | End: 2022-11-17

## 2022-11-08 RX ORDER — PHENYLEPHRINE HCL IN 0.9% NACL 1 MG/10 ML
SYRINGE (ML) INTRAVENOUS AS NEEDED
Status: DISCONTINUED | OUTPATIENT
Start: 2022-11-08 | End: 2022-11-08 | Stop reason: SURG

## 2022-11-08 RX ORDER — ONDANSETRON 2 MG/ML
INJECTION INTRAMUSCULAR; INTRAVENOUS AS NEEDED
Status: DISCONTINUED | OUTPATIENT
Start: 2022-11-08 | End: 2022-11-08 | Stop reason: SURG

## 2022-11-08 RX ORDER — CALCIUM GLUCONATE 20 MG/ML
1 INJECTION, SOLUTION INTRAVENOUS AS NEEDED
Status: DISCONTINUED | OUTPATIENT
Start: 2022-11-08 | End: 2022-11-17

## 2022-11-08 RX ORDER — FENTANYL CITRATE 50 UG/ML
INJECTION, SOLUTION INTRAMUSCULAR; INTRAVENOUS AS NEEDED
Status: DISCONTINUED | OUTPATIENT
Start: 2022-11-08 | End: 2022-11-08 | Stop reason: SURG

## 2022-11-08 RX ADMIN — FOLIC ACID 1 MG: 1 TABLET ORAL at 09:02

## 2022-11-08 RX ADMIN — SUGAMMADEX 200 MG: 100 INJECTION, SOLUTION INTRAVENOUS at 14:19

## 2022-11-08 RX ADMIN — ENOXAPARIN SODIUM 40 MG: 100 INJECTION SUBCUTANEOUS at 17:05

## 2022-11-08 RX ADMIN — DIPHENOXYLATE HYDROCHLORIDE AND ATROPINE SULFATE 2 TABLET: 2.5; .025 TABLET ORAL at 00:40

## 2022-11-08 RX ADMIN — PIPERACILLIN AND TAZOBACTAM 3.38 G: 3; .375 INJECTION, POWDER, FOR SOLUTION INTRAVENOUS at 17:04

## 2022-11-08 RX ADMIN — Medication 200 MCG: at 13:23

## 2022-11-08 RX ADMIN — PIPERACILLIN AND TAZOBACTAM 3.38 G: 3; .375 INJECTION, POWDER, FOR SOLUTION INTRAVENOUS at 23:27

## 2022-11-08 RX ADMIN — LOPERAMIDE HYDROCHLORIDE 4 MG: 2 CAPSULE ORAL at 09:02

## 2022-11-08 RX ADMIN — LOPERAMIDE HYDROCHLORIDE 4 MG: 2 CAPSULE ORAL at 17:05

## 2022-11-08 RX ADMIN — DOXYCYCLINE 100 MG: 100 INJECTION, POWDER, LYOPHILIZED, FOR SOLUTION INTRAVENOUS at 05:06

## 2022-11-08 RX ADMIN — PANTOPRAZOLE SODIUM 40 MG: 40 INJECTION, POWDER, FOR SOLUTION INTRAVENOUS at 09:02

## 2022-11-08 RX ADMIN — DOXYCYCLINE 100 MG: 100 INJECTION, POWDER, LYOPHILIZED, FOR SOLUTION INTRAVENOUS at 18:27

## 2022-11-08 RX ADMIN — FENTANYL CITRATE 25 MCG: 50 INJECTION INTRAMUSCULAR; INTRAVENOUS at 13:42

## 2022-11-08 RX ADMIN — DIPHENOXYLATE HYDROCHLORIDE AND ATROPINE SULFATE 2 TABLET: 2.5; .025 TABLET ORAL at 06:22

## 2022-11-08 RX ADMIN — CALCIUM GLUCONATE 1 G: 20 INJECTION, SOLUTION INTRAVENOUS at 02:03

## 2022-11-08 RX ADMIN — PROPOFOL 100 MG: 10 INJECTION, EMULSION INTRAVENOUS at 13:14

## 2022-11-08 RX ADMIN — LOPERAMIDE HYDROCHLORIDE 4 MG: 2 CAPSULE ORAL at 03:45

## 2022-11-08 RX ADMIN — ONDANSETRON 4 MG: 2 INJECTION INTRAMUSCULAR; INTRAVENOUS at 13:56

## 2022-11-08 RX ADMIN — Medication 4 MG: at 14:09

## 2022-11-08 RX ADMIN — Medication 10 ML: at 09:02

## 2022-11-08 RX ADMIN — GLYCOPYRROLATE 0.8 MCG: 0.2 INJECTION INTRAMUSCULAR; INTRAVENOUS at 14:09

## 2022-11-08 RX ADMIN — DIPHENOXYLATE HYDROCHLORIDE AND ATROPINE SULFATE 2 TABLET: 2.5; .025 TABLET ORAL at 18:29

## 2022-11-08 RX ADMIN — Medication 10 ML: at 20:53

## 2022-11-08 RX ADMIN — PIPERACILLIN AND TAZOBACTAM 3.38 G: 3; .375 INJECTION, POWDER, FOR SOLUTION INTRAVENOUS at 06:22

## 2022-11-08 RX ADMIN — PANTOPRAZOLE SODIUM 40 MG: 40 INJECTION, POWDER, FOR SOLUTION INTRAVENOUS at 17:05

## 2022-11-08 RX ADMIN — DIPHENOXYLATE HYDROCHLORIDE AND ATROPINE SULFATE 2 TABLET: 2.5; .025 TABLET ORAL at 23:27

## 2022-11-08 RX ADMIN — MAGNESIUM SULFATE IN DEXTROSE 1 G: 10 INJECTION, SOLUTION INTRAVENOUS at 03:07

## 2022-11-08 RX ADMIN — LOPERAMIDE HYDROCHLORIDE 4 MG: 2 CAPSULE ORAL at 20:53

## 2022-11-08 RX ADMIN — ROCURONIUM BROMIDE 40 MG: 50 INJECTION, SOLUTION INTRAVENOUS at 13:14

## 2022-11-08 RX ADMIN — FENTANYL CITRATE 50 MCG: 50 INJECTION INTRAMUSCULAR; INTRAVENOUS at 13:14

## 2022-11-08 RX ADMIN — CHOLESTYRAMINE 4 G: 4 POWDER, FOR SUSPENSION ORAL at 20:53

## 2022-11-08 NOTE — ANESTHESIA PROCEDURE NOTES
Airway  Urgency: elective    Date/Time: 11/8/2022 1:16 PM    General Information and Staff    Patient location during procedure: OR  Anesthesiologist: Dante James MD    Indications and Patient Condition    Preoxygenated: yes  MILS not maintained throughout  Mask difficulty assessment: 1 - vent by mask    Final Airway Details  Final airway type: endotracheal airway      Successful airway: ETT  Cuffed: yes   Facilitating devices/methods: intubating stylet  Endotracheal tube insertion site: oral  Blade: Monty  Blade size: 3  ETT size (mm): 7.0  Cormack-Lehane Classification: grade IIa - partial view of glottis  Placement verified by: capnometry   Measured from: gums  ETT/EBT to gums (cm): 20  Number of attempts at approach: 1  Assessment: lips, teeth, and gum same as pre-op and atraumatic intubation    Additional Comments  Very poor dentition, rotting bottom incisors.

## 2022-11-08 NOTE — ANESTHESIA PREPROCEDURE EVALUATION
Anesthesia Evaluation     Patient summary reviewed and Nursing notes reviewed   NPO Solid Status: > 8 hours  NPO Liquid Status: > 8 hours           Airway   Mallampati: I  TM distance: >3 FB  Neck ROM: full  No difficulty expected  Dental - normal exam   (+) upper dentures and poor dentition    Pulmonary - normal exam   (+) asthma,  Cardiovascular - normal exam    (+) hypertension well controlled, hyperlipidemia,       Neuro/Psych  GI/Hepatic/Renal/Endo    (+) morbid obesity, GERD well controlled,  hepatitis, liver disease, renal disease, diabetes mellitus using insulin, thyroid problem hypothyroidism    Musculoskeletal     Abdominal  - normal exam    Bowel sounds: normal.   Substance History      OB/GYN          Other      history of cancer active    ROS/Med Hx Other: Christiane Nazario is a 72 y.o. female with a past medical history to include DM2 with peripheral neuropathy, breast/liver/bone ca currently on chemotherapy, hyperlipidemia, hypertension and hypothyroidism who presented to [facility] on 11/7/2022 complaining of diarrhea for 6 weeks along with recent mild cough/congestion and shortness of air.       COVID-19 detected.      CT abdomen pelvis without contrast there is subtle wall thickening of the transverse colon, descending colon and sigmoid colon suggesting mild infectious or inflammatory colitis.  There is no bowel obstruction.  There is a stone that appears to be lodged in the distal CBD near the level of ampulla with mild extra hepatic biliary ductal dilatation.  Hepatic metastatic disease is again noted.  Couple of lesions are slightly smaller, which may reflect positive interval response to therapy in the appropriate clinical context.  Features of pleural-based metastatic disease on the left lower thorax are seen to better advantage on prior CT imaging. extensive osseous metastatic disease is not thought to be significantly changed. However, there is a new mild compression fracture of  the superior endplate of the L5 vertebral body without retropulsion  or subluxation. A previously seen T12 compression fracture has had slight progressive loss of vertebral body height in the interval. Stable small to moderate left basilar pleural effusion with passive left basilar atelectasis.                 Anesthesia Plan    ASA 4     general     intravenous induction     Anesthetic plan, risks, benefits, and alternatives have been provided, discussed and informed consent has been obtained with: patient.        CODE STATUS:    Level Of Support Discussed With: Patient  Code Status (Patient has no pulse and is not breathing): CPR (Attempt to Resuscitate)  Medical Interventions (Patient has pulse or is breathing): Full Support

## 2022-11-09 ENCOUNTER — INPATIENT HOSPITAL (OUTPATIENT)
Dept: URBAN - METROPOLITAN AREA HOSPITAL 84 | Facility: HOSPITAL | Age: 72
End: 2022-11-09
Payer: COMMERCIAL

## 2022-11-09 DIAGNOSIS — R10.84 GENERALIZED ABDOMINAL PAIN: ICD-10-CM

## 2022-11-09 DIAGNOSIS — R74.01 ELEVATION OF LEVELS OF LIVER TRANSAMINASE LEVELS: ICD-10-CM

## 2022-11-09 DIAGNOSIS — R19.7 DIARRHEA, UNSPECIFIED: ICD-10-CM

## 2022-11-09 DIAGNOSIS — D50.9 IRON DEFICIENCY ANEMIA, UNSPECIFIED: ICD-10-CM

## 2022-11-09 DIAGNOSIS — D72.825 BANDEMIA: ICD-10-CM

## 2022-11-09 DIAGNOSIS — R93.3 ABNORMAL FINDINGS ON DIAGNOSTIC IMAGING OF OTHER PARTS OF DI: ICD-10-CM

## 2022-11-09 DIAGNOSIS — U07.1 COVID-19: ICD-10-CM

## 2022-11-09 LAB
ALBUMIN SERPL-MCNC: 2.3 G/DL (ref 3.5–5.2)
ALBUMIN/GLOB SERPL: 1 G/DL
ALP SERPL-CCNC: 104 U/L (ref 39–117)
ALT SERPL W P-5'-P-CCNC: 15 U/L (ref 1–33)
ANION GAP SERPL CALCULATED.3IONS-SCNC: 11 MMOL/L (ref 5–15)
ANISOCYTOSIS BLD QL: ABNORMAL
AST SERPL-CCNC: 17 U/L (ref 1–32)
BACTERIA SPEC AEROBE CULT: ABNORMAL
BASOPHILS # BLD MANUAL: 0.05 10*3/MM3 (ref 0–0.2)
BASOPHILS NFR BLD MANUAL: 1 % (ref 0–1.5)
BH BB BLOOD EXPIRATION DATE: NORMAL
BH BB BLOOD TYPE BARCODE: 6200
BH BB DISPENSE STATUS: NORMAL
BH BB PRODUCT CODE: NORMAL
BH BB UNIT NUMBER: NORMAL
BILIRUB SERPL-MCNC: 0.4 MG/DL (ref 0–1.2)
BUN SERPL-MCNC: 11 MG/DL (ref 8–23)
BUN/CREAT SERPL: 9.9 (ref 7–25)
BURR CELLS BLD QL SMEAR: ABNORMAL
C3 FRG RBC-MCNC: ABNORMAL
CALCIUM SPEC-SCNC: 6.8 MG/DL (ref 8.6–10.5)
CHLORIDE SERPL-SCNC: 111 MMOL/L (ref 98–107)
CO2 SERPL-SCNC: 20 MMOL/L (ref 22–29)
CREAT SERPL-MCNC: 1.11 MG/DL (ref 0.57–1)
CROSSMATCH INTERPRETATION: NORMAL
DEPRECATED RDW RBC AUTO: 56.9 FL (ref 37–54)
EGFRCR SERPLBLD CKD-EPI 2021: 53.6 ML/MIN/1.73
ERYTHROCYTE [DISTWIDTH] IN BLOOD BY AUTOMATED COUNT: 18.8 % (ref 12.3–15.4)
GLOBULIN UR ELPH-MCNC: 2.2 GM/DL
GLUCOSE BLDC GLUCOMTR-MCNC: 107 MG/DL (ref 70–105)
GLUCOSE BLDC GLUCOMTR-MCNC: 142 MG/DL (ref 70–105)
GLUCOSE BLDC GLUCOMTR-MCNC: 156 MG/DL (ref 70–105)
GLUCOSE SERPL-MCNC: 160 MG/DL (ref 65–99)
HCT VFR BLD AUTO: 25.1 % (ref 34–46.6)
HCT VFR BLD AUTO: 25.8 % (ref 34–46.6)
HCT VFR BLD AUTO: 25.9 % (ref 34–46.6)
HGB BLD-MCNC: 8 G/DL (ref 12–15.9)
HGB BLD-MCNC: 8.3 G/DL (ref 12–15.9)
HGB BLD-MCNC: 8.3 G/DL (ref 12–15.9)
LIPASE SERPL-CCNC: 22 U/L (ref 13–60)
LYMPHOCYTES # BLD MANUAL: 0.69 10*3/MM3 (ref 0.7–3.1)
LYMPHOCYTES NFR BLD MANUAL: 4 % (ref 5–12)
MAGNESIUM SERPL-MCNC: 1.9 MG/DL (ref 1.6–2.4)
MCH RBC QN AUTO: 26.8 PG (ref 26.6–33)
MCHC RBC AUTO-ENTMCNC: 32.3 G/DL (ref 31.5–35.7)
MCV RBC AUTO: 83 FL (ref 79–97)
METAMYELOCYTES NFR BLD MANUAL: 2 % (ref 0–0)
MONOCYTES # BLD: 0.21 10*3/MM3 (ref 0.1–0.9)
MRSA DNA SPEC QL NAA+PROBE: NORMAL
MYELOCYTES NFR BLD MANUAL: 3 % (ref 0–0)
NEUTROPHILS # BLD AUTO: 4.08 10*3/MM3 (ref 1.7–7)
NEUTROPHILS NFR BLD MANUAL: 73 % (ref 42.7–76)
NEUTS BAND NFR BLD MANUAL: 4 % (ref 0–5)
PLAT MORPH BLD: NORMAL
PLATELET # BLD AUTO: 184 10*3/MM3 (ref 140–450)
PMV BLD AUTO: 7.9 FL (ref 6–12)
POTASSIUM SERPL-SCNC: 4.1 MMOL/L (ref 3.5–5.2)
PROT SERPL-MCNC: 4.5 G/DL (ref 6–8.5)
QT INTERVAL: 336 MS
RBC # BLD AUTO: 3.11 10*6/MM3 (ref 3.77–5.28)
SCAN SLIDE: NORMAL
SODIUM SERPL-SCNC: 142 MMOL/L (ref 136–145)
UNIT  ABO: NORMAL
UNIT  RH: NORMAL
VARIANT LYMPHS NFR BLD MANUAL: 13 % (ref 19.6–45.3)
WBC MORPH BLD: NORMAL
WBC NRBC COR # BLD: 5.3 10*3/MM3 (ref 3.4–10.8)

## 2022-11-09 PROCEDURE — 87641 MR-STAPH DNA AMP PROBE: CPT | Performed by: HOSPITALIST

## 2022-11-09 PROCEDURE — 85007 BL SMEAR W/DIFF WBC COUNT: CPT | Performed by: NURSE PRACTITIONER

## 2022-11-09 PROCEDURE — 83690 ASSAY OF LIPASE: CPT | Performed by: NURSE PRACTITIONER

## 2022-11-09 PROCEDURE — 25010000002 ENOXAPARIN PER 10 MG: Performed by: NURSE PRACTITIONER

## 2022-11-09 PROCEDURE — 80053 COMPREHEN METABOLIC PANEL: CPT | Performed by: NURSE PRACTITIONER

## 2022-11-09 PROCEDURE — 97162 PT EVAL MOD COMPLEX 30 MIN: CPT

## 2022-11-09 PROCEDURE — 63710000001 INSULIN LISPRO (HUMAN) PER 5 UNITS: Performed by: PHYSICIAN ASSISTANT

## 2022-11-09 PROCEDURE — 25010000002 PIPERACILLIN SOD-TAZOBACTAM PER 1 G: Performed by: HOSPITALIST

## 2022-11-09 PROCEDURE — 83735 ASSAY OF MAGNESIUM: CPT | Performed by: PHYSICIAN ASSISTANT

## 2022-11-09 PROCEDURE — 85014 HEMATOCRIT: CPT | Performed by: HOSPITALIST

## 2022-11-09 PROCEDURE — 99232 SBSQ HOSP IP/OBS MODERATE 35: CPT | Performed by: INTERNAL MEDICINE

## 2022-11-09 PROCEDURE — 99232 SBSQ HOSP IP/OBS MODERATE 35: CPT | Performed by: NURSE PRACTITIONER

## 2022-11-09 PROCEDURE — 85018 HEMOGLOBIN: CPT | Performed by: HOSPITALIST

## 2022-11-09 PROCEDURE — 82962 GLUCOSE BLOOD TEST: CPT

## 2022-11-09 PROCEDURE — 85025 COMPLETE CBC W/AUTO DIFF WBC: CPT | Performed by: NURSE PRACTITIONER

## 2022-11-09 RX ORDER — DEXAMETHASONE 6 MG/1
6 TABLET ORAL
Status: DISCONTINUED | OUTPATIENT
Start: 2022-11-09 | End: 2022-11-12

## 2022-11-09 RX ADMIN — Medication 10 ML: at 08:46

## 2022-11-09 RX ADMIN — DIPHENOXYLATE HYDROCHLORIDE AND ATROPINE SULFATE 2 TABLET: 2.5; .025 TABLET ORAL at 12:43

## 2022-11-09 RX ADMIN — LOPERAMIDE HYDROCHLORIDE 4 MG: 2 CAPSULE ORAL at 02:17

## 2022-11-09 RX ADMIN — LOPERAMIDE HYDROCHLORIDE 4 MG: 2 CAPSULE ORAL at 08:33

## 2022-11-09 RX ADMIN — CHOLESTYRAMINE 4 G: 4 POWDER, FOR SUSPENSION ORAL at 22:13

## 2022-11-09 RX ADMIN — LOPERAMIDE HYDROCHLORIDE 4 MG: 2 CAPSULE ORAL at 22:13

## 2022-11-09 RX ADMIN — ENOXAPARIN SODIUM 40 MG: 100 INJECTION SUBCUTANEOUS at 16:35

## 2022-11-09 RX ADMIN — INSULIN LISPRO 2 UNITS: 100 INJECTION, SOLUTION INTRAVENOUS; SUBCUTANEOUS at 12:42

## 2022-11-09 RX ADMIN — FOLIC ACID 1 MG: 1 TABLET ORAL at 08:33

## 2022-11-09 RX ADMIN — CHOLESTYRAMINE 4 G: 4 POWDER, FOR SUSPENSION ORAL at 08:33

## 2022-11-09 RX ADMIN — DIPHENOXYLATE HYDROCHLORIDE AND ATROPINE SULFATE 2 TABLET: 2.5; .025 TABLET ORAL at 05:39

## 2022-11-09 RX ADMIN — SODIUM CHLORIDE 125 ML/HR: 9 INJECTION, SOLUTION INTRAVENOUS at 18:22

## 2022-11-09 RX ADMIN — PANTOPRAZOLE SODIUM 40 MG: 40 INJECTION, POWDER, FOR SOLUTION INTRAVENOUS at 08:33

## 2022-11-09 RX ADMIN — DEXAMETHASONE 6 MG: 6 TABLET ORAL at 12:43

## 2022-11-09 RX ADMIN — PIPERACILLIN AND TAZOBACTAM 3.38 G: 3; .375 INJECTION, POWDER, FOR SOLUTION INTRAVENOUS at 14:23

## 2022-11-09 RX ADMIN — SODIUM CHLORIDE 1000 ML: 9 INJECTION, SOLUTION INTRAVENOUS at 16:51

## 2022-11-09 RX ADMIN — SODIUM CHLORIDE 125 ML/HR: 9 INJECTION, SOLUTION INTRAVENOUS at 08:48

## 2022-11-09 RX ADMIN — LOPERAMIDE HYDROCHLORIDE 4 MG: 2 CAPSULE ORAL at 14:23

## 2022-11-09 RX ADMIN — DIPHENOXYLATE HYDROCHLORIDE AND ATROPINE SULFATE 2 TABLET: 2.5; .025 TABLET ORAL at 18:22

## 2022-11-09 RX ADMIN — PIPERACILLIN AND TAZOBACTAM 3.38 G: 3; .375 INJECTION, POWDER, FOR SOLUTION INTRAVENOUS at 06:22

## 2022-11-09 RX ADMIN — ANTI-FUNGAL POWDER MICONAZOLE NITRATE TALC FREE: 1.42 POWDER TOPICAL at 12:42

## 2022-11-09 RX ADMIN — ANTI-FUNGAL POWDER MICONAZOLE NITRATE TALC FREE: 1.42 POWDER TOPICAL at 22:14

## 2022-11-09 RX ADMIN — DOXYCYCLINE 100 MG: 100 INJECTION, POWDER, LYOPHILIZED, FOR SOLUTION INTRAVENOUS at 16:34

## 2022-11-09 RX ADMIN — PIPERACILLIN AND TAZOBACTAM 3.38 G: 3; .375 INJECTION, POWDER, FOR SOLUTION INTRAVENOUS at 22:13

## 2022-11-09 RX ADMIN — DOXYCYCLINE 100 MG: 100 INJECTION, POWDER, LYOPHILIZED, FOR SOLUTION INTRAVENOUS at 05:39

## 2022-11-09 RX ADMIN — Medication 10 ML: at 22:14

## 2022-11-09 RX ADMIN — PANTOPRAZOLE SODIUM 40 MG: 40 INJECTION, POWDER, FOR SOLUTION INTRAVENOUS at 16:34

## 2022-11-09 NOTE — ANESTHESIA POSTPROCEDURE EVALUATION
Patient: Christiane Nazario    Procedure Summary     Date: 11/08/22 Room / Location: [facility] ENDOSCOPY 2 / [facility] ENDOSCOPY    Anesthesia Start: 1307 Anesthesia Stop: 1426    Procedure: ENDOSCOPIC RETROGRADE CHOLANGIOPANCREATOGRAPHY, shincterotomy and balloon clearace of bile duct with 9-12mm balloon (up to 12mm) Diagnosis:       Choledocholithiasis      (Choledocholithiasis [K80.50])    Surgeons: Fortino Valencia MD Provider: Dante James MD    Anesthesia Type: MAC ASA Status: 4          Anesthesia Type: MAC    Vitals  Vitals Value Taken Time   /49 11/09/22 1147   Temp 97.2 °F (36.2 °C) 11/09/22 0723   Pulse 74 11/09/22 1147   Resp 18 11/09/22 1147   SpO2 98 % 11/09/22 1147           Post Anesthesia Care and Evaluation    Patient location during evaluation: PACU  Patient participation: complete - patient participated  Level of consciousness: awake  Pain scale: See nurse's notes for pain score.  Pain management: adequate    Airway patency: patent  Anesthetic complications: No anesthetic complications  PONV Status: none  Cardiovascular status: acceptable  Respiratory status: acceptable  Hydration status: acceptable    Comments: Patient seen and examined postoperatively; vital signs stable; SpO2 greater than or equal to 90%; cardiopulmonary status stable; nausea/vomiting adequately controlled; pain adequately controlled; no apparent anesthesia complications; patient discharged from anesthesia care when discharge criteria were met       Pt arrives with c/o fever and generalized weakness. Pt was discharged yesterday with same sx.  Recent change to rejection meds, kidney transplant 10 years ago

## 2022-11-10 ENCOUNTER — INPATIENT HOSPITAL (OUTPATIENT)
Dept: URBAN - METROPOLITAN AREA HOSPITAL 84 | Facility: HOSPITAL | Age: 72
End: 2022-11-10
Payer: COMMERCIAL

## 2022-11-10 DIAGNOSIS — U07.1 COVID-19: ICD-10-CM

## 2022-11-10 DIAGNOSIS — R10.84 GENERALIZED ABDOMINAL PAIN: ICD-10-CM

## 2022-11-10 DIAGNOSIS — R93.3 ABNORMAL FINDINGS ON DIAGNOSTIC IMAGING OF OTHER PARTS OF DI: ICD-10-CM

## 2022-11-10 DIAGNOSIS — R74.01 ELEVATION OF LEVELS OF LIVER TRANSAMINASE LEVELS: ICD-10-CM

## 2022-11-10 DIAGNOSIS — R19.7 DIARRHEA, UNSPECIFIED: ICD-10-CM

## 2022-11-10 DIAGNOSIS — D50.9 IRON DEFICIENCY ANEMIA, UNSPECIFIED: ICD-10-CM

## 2022-11-10 DIAGNOSIS — D72.825 BANDEMIA: ICD-10-CM

## 2022-11-10 LAB
ALBUMIN SERPL-MCNC: 2.3 G/DL (ref 3.5–5.2)
ALBUMIN/GLOB SERPL: 1.1 G/DL
ALP SERPL-CCNC: 102 U/L (ref 39–117)
ALT SERPL W P-5'-P-CCNC: 13 U/L (ref 1–33)
AMORPH URATE CRY URNS QL MICRO: ABNORMAL /HPF
ANION GAP SERPL CALCULATED.3IONS-SCNC: 13 MMOL/L (ref 5–15)
ANISOCYTOSIS BLD QL: ABNORMAL
AST SERPL-CCNC: 17 U/L (ref 1–32)
BACTERIA UR QL AUTO: ABNORMAL /HPF
BILIRUB SERPL-MCNC: 0.3 MG/DL (ref 0–1.2)
BILIRUB UR QL STRIP: NEGATIVE
BUN SERPL-MCNC: 12 MG/DL (ref 8–23)
BUN/CREAT SERPL: 9.6 (ref 7–25)
BURR CELLS BLD QL SMEAR: ABNORMAL
C3 FRG RBC-MCNC: ABNORMAL
CALCIUM SPEC-SCNC: 6.4 MG/DL (ref 8.6–10.5)
CHLORIDE SERPL-SCNC: 112 MMOL/L (ref 98–107)
CLARITY UR: ABNORMAL
CO2 SERPL-SCNC: 17 MMOL/L (ref 22–29)
COLOR UR: YELLOW
CREAT SERPL-MCNC: 1.25 MG/DL (ref 0.57–1)
DEPRECATED RDW RBC AUTO: 58.2 FL (ref 37–54)
EGFRCR SERPLBLD CKD-EPI 2021: 46.5 ML/MIN/1.73
EOSINOPHIL # BLD MANUAL: 0.07 10*3/MM3 (ref 0–0.4)
EOSINOPHIL NFR BLD MANUAL: 1 % (ref 0.3–6.2)
EOSINOPHIL SPEC QL MICRO: 0 % EOS/100 CELLS (ref 0–0)
ERYTHROCYTE [DISTWIDTH] IN BLOOD BY AUTOMATED COUNT: 19.1 % (ref 12.3–15.4)
GLOBULIN UR ELPH-MCNC: 2.1 GM/DL
GLUCOSE BLDC GLUCOMTR-MCNC: 131 MG/DL (ref 70–105)
GLUCOSE BLDC GLUCOMTR-MCNC: 153 MG/DL (ref 70–105)
GLUCOSE BLDC GLUCOMTR-MCNC: 164 MG/DL (ref 70–105)
GLUCOSE SERPL-MCNC: 171 MG/DL (ref 65–99)
GLUCOSE UR STRIP-MCNC: NEGATIVE MG/DL
GRAN CASTS URNS QL MICRO: ABNORMAL /LPF
HCT VFR BLD AUTO: 23.9 % (ref 34–46.6)
HCT VFR BLD AUTO: 26.3 % (ref 34–46.6)
HCT VFR BLD AUTO: 26.8 % (ref 34–46.6)
HGB BLD-MCNC: 7.7 G/DL (ref 12–15.9)
HGB BLD-MCNC: 8.2 G/DL (ref 12–15.9)
HGB BLD-MCNC: 8.7 G/DL (ref 12–15.9)
HGB UR QL STRIP.AUTO: NEGATIVE
HYALINE CASTS UR QL AUTO: ABNORMAL /LPF
KETONES UR QL STRIP: ABNORMAL
LEUKOCYTE ESTERASE UR QL STRIP.AUTO: ABNORMAL
LYMPHOCYTES # BLD MANUAL: 0.77 10*3/MM3 (ref 0.7–3.1)
LYMPHOCYTES NFR BLD MANUAL: 2 % (ref 5–12)
MAGNESIUM SERPL-MCNC: 1.9 MG/DL (ref 1.6–2.4)
MCH RBC QN AUTO: 26.9 PG (ref 26.6–33)
MCHC RBC AUTO-ENTMCNC: 32.4 G/DL (ref 31.5–35.7)
MCV RBC AUTO: 83.1 FL (ref 79–97)
METAMYELOCYTES NFR BLD MANUAL: 2 % (ref 0–0)
MONOCYTES # BLD: 0.14 10*3/MM3 (ref 0.1–0.9)
MYELOCYTES NFR BLD MANUAL: 2 % (ref 0–0)
NEUTROPHILS # BLD AUTO: 5.74 10*3/MM3 (ref 1.7–7)
NEUTROPHILS NFR BLD MANUAL: 77 % (ref 42.7–76)
NEUTS BAND NFR BLD MANUAL: 5 % (ref 0–5)
NITRITE UR QL STRIP: NEGATIVE
PH UR STRIP.AUTO: <=5 [PH] (ref 5–8)
PHOSPHATE SERPL-MCNC: 2.8 MG/DL (ref 2.5–4.5)
PLAT MORPH BLD: NORMAL
PLATELET # BLD AUTO: 150 10*3/MM3 (ref 140–450)
PMV BLD AUTO: 8.3 FL (ref 6–12)
POTASSIUM SERPL-SCNC: 4.5 MMOL/L (ref 3.5–5.2)
PROT SERPL-MCNC: 4.4 G/DL (ref 6–8.5)
PROT UR QL STRIP: ABNORMAL
RBC # BLD AUTO: 2.87 10*6/MM3 (ref 3.77–5.28)
RBC # UR STRIP: ABNORMAL /HPF
REF LAB TEST METHOD: ABNORMAL
SCAN SLIDE: NORMAL
SODIUM SERPL-SCNC: 142 MMOL/L (ref 136–145)
SODIUM UR-SCNC: 28 MMOL/L
SP GR UR STRIP: 1.02 (ref 1–1.03)
SQUAMOUS #/AREA URNS HPF: ABNORMAL /HPF
UROBILINOGEN UR QL STRIP: ABNORMAL
VARIANT LYMPHS NFR BLD MANUAL: 11 % (ref 19.6–45.3)
WBC # UR STRIP: ABNORMAL /HPF
WBC MORPH BLD: NORMAL
WBC NRBC COR # BLD: 7 10*3/MM3 (ref 3.4–10.8)

## 2022-11-10 PROCEDURE — 85018 HEMOGLOBIN: CPT | Performed by: HOSPITALIST

## 2022-11-10 PROCEDURE — 25010000002 PIPERACILLIN SOD-TAZOBACTAM PER 1 G: Performed by: HOSPITALIST

## 2022-11-10 PROCEDURE — 84300 ASSAY OF URINE SODIUM: CPT | Performed by: INTERNAL MEDICINE

## 2022-11-10 PROCEDURE — 83735 ASSAY OF MAGNESIUM: CPT | Performed by: PHYSICIAN ASSISTANT

## 2022-11-10 PROCEDURE — 81001 URINALYSIS AUTO W/SCOPE: CPT | Performed by: INTERNAL MEDICINE

## 2022-11-10 PROCEDURE — 97110 THERAPEUTIC EXERCISES: CPT

## 2022-11-10 PROCEDURE — 99232 SBSQ HOSP IP/OBS MODERATE 35: CPT | Performed by: NURSE PRACTITIONER

## 2022-11-10 PROCEDURE — 85007 BL SMEAR W/DIFF WBC COUNT: CPT | Performed by: NURSE PRACTITIONER

## 2022-11-10 PROCEDURE — 84100 ASSAY OF PHOSPHORUS: CPT | Performed by: INTERNAL MEDICINE

## 2022-11-10 PROCEDURE — 85025 COMPLETE CBC W/AUTO DIFF WBC: CPT | Performed by: NURSE PRACTITIONER

## 2022-11-10 PROCEDURE — 63710000001 INSULIN LISPRO (HUMAN) PER 5 UNITS: Performed by: PHYSICIAN ASSISTANT

## 2022-11-10 PROCEDURE — 25010000002 ENOXAPARIN PER 10 MG: Performed by: NURSE PRACTITIONER

## 2022-11-10 PROCEDURE — 82962 GLUCOSE BLOOD TEST: CPT

## 2022-11-10 PROCEDURE — 99232 SBSQ HOSP IP/OBS MODERATE 35: CPT | Performed by: INTERNAL MEDICINE

## 2022-11-10 PROCEDURE — 87205 SMEAR GRAM STAIN: CPT | Performed by: INTERNAL MEDICINE

## 2022-11-10 PROCEDURE — 80053 COMPREHEN METABOLIC PANEL: CPT | Performed by: NURSE PRACTITIONER

## 2022-11-10 PROCEDURE — 85014 HEMATOCRIT: CPT | Performed by: HOSPITALIST

## 2022-11-10 RX ORDER — ASPIRIN 81 MG/1
81 TABLET ORAL DAILY
Status: DISCONTINUED | OUTPATIENT
Start: 2022-11-10 | End: 2022-11-22

## 2022-11-10 RX ORDER — CEFDINIR 300 MG/1
300 CAPSULE ORAL EVERY 12 HOURS SCHEDULED
Status: COMPLETED | OUTPATIENT
Start: 2022-11-10 | End: 2022-11-13

## 2022-11-10 RX ORDER — MONTELUKAST SODIUM 10 MG/1
10 TABLET ORAL NIGHTLY
Status: DISCONTINUED | OUTPATIENT
Start: 2022-11-10 | End: 2022-11-23

## 2022-11-10 RX ORDER — LEVOTHYROXINE SODIUM 88 UG/1
88 TABLET ORAL
Status: DISCONTINUED | OUTPATIENT
Start: 2022-11-10 | End: 2022-11-23

## 2022-11-10 RX ORDER — ATORVASTATIN CALCIUM 40 MG/1
40 TABLET, FILM COATED ORAL NIGHTLY
Status: DISCONTINUED | OUTPATIENT
Start: 2022-11-10 | End: 2022-11-23

## 2022-11-10 RX ADMIN — CHOLESTYRAMINE 4 G: 4 POWDER, FOR SUSPENSION ORAL at 08:04

## 2022-11-10 RX ADMIN — DOXYCYCLINE 100 MG: 100 INJECTION, POWDER, LYOPHILIZED, FOR SOLUTION INTRAVENOUS at 05:46

## 2022-11-10 RX ADMIN — LEVOTHYROXINE SODIUM 88 MCG: 0.09 TABLET ORAL at 08:39

## 2022-11-10 RX ADMIN — PANTOPRAZOLE SODIUM 40 MG: 40 INJECTION, POWDER, FOR SOLUTION INTRAVENOUS at 17:53

## 2022-11-10 RX ADMIN — LOPERAMIDE HYDROCHLORIDE 4 MG: 2 CAPSULE ORAL at 03:29

## 2022-11-10 RX ADMIN — DIPHENOXYLATE HYDROCHLORIDE AND ATROPINE SULFATE 2 TABLET: 2.5; .025 TABLET ORAL at 17:54

## 2022-11-10 RX ADMIN — LOPERAMIDE HYDROCHLORIDE 4 MG: 2 CAPSULE ORAL at 22:22

## 2022-11-10 RX ADMIN — FOLIC ACID 1 MG: 1 TABLET ORAL at 08:04

## 2022-11-10 RX ADMIN — SODIUM CHLORIDE 75 ML/HR: 9 INJECTION, SOLUTION INTRAVENOUS at 14:34

## 2022-11-10 RX ADMIN — DIPHENOXYLATE HYDROCHLORIDE AND ATROPINE SULFATE 2 TABLET: 2.5; .025 TABLET ORAL at 05:46

## 2022-11-10 RX ADMIN — Medication 10 ML: at 22:22

## 2022-11-10 RX ADMIN — ATORVASTATIN CALCIUM 40 MG: 40 TABLET, FILM COATED ORAL at 22:22

## 2022-11-10 RX ADMIN — DEXAMETHASONE 6 MG: 6 TABLET ORAL at 08:04

## 2022-11-10 RX ADMIN — DIPHENOXYLATE HYDROCHLORIDE AND ATROPINE SULFATE 2 TABLET: 2.5; .025 TABLET ORAL at 00:47

## 2022-11-10 RX ADMIN — LOPERAMIDE HYDROCHLORIDE 4 MG: 2 CAPSULE ORAL at 08:04

## 2022-11-10 RX ADMIN — INSULIN LISPRO 2 UNITS: 100 INJECTION, SOLUTION INTRAVENOUS; SUBCUTANEOUS at 08:04

## 2022-11-10 RX ADMIN — SODIUM CHLORIDE 125 ML/HR: 9 INJECTION, SOLUTION INTRAVENOUS at 01:57

## 2022-11-10 RX ADMIN — PANTOPRAZOLE SODIUM 40 MG: 40 INJECTION, POWDER, FOR SOLUTION INTRAVENOUS at 08:04

## 2022-11-10 RX ADMIN — PIPERACILLIN AND TAZOBACTAM 3.38 G: 3; .375 INJECTION, POWDER, FOR SOLUTION INTRAVENOUS at 05:46

## 2022-11-10 RX ADMIN — MONTELUKAST 10 MG: 10 TABLET, FILM COATED ORAL at 22:21

## 2022-11-10 RX ADMIN — ENOXAPARIN SODIUM 40 MG: 100 INJECTION SUBCUTANEOUS at 17:53

## 2022-11-10 RX ADMIN — INSULIN LISPRO 2 UNITS: 100 INJECTION, SOLUTION INTRAVENOUS; SUBCUTANEOUS at 17:53

## 2022-11-10 RX ADMIN — Medication 10 ML: at 08:04

## 2022-11-10 RX ADMIN — Medication 81 MG: at 08:39

## 2022-11-10 RX ADMIN — CEFDINIR 300 MG: 300 CAPSULE ORAL at 08:39

## 2022-11-10 RX ADMIN — DIPHENOXYLATE HYDROCHLORIDE AND ATROPINE SULFATE 2 TABLET: 2.5; .025 TABLET ORAL at 14:33

## 2022-11-10 RX ADMIN — ANTI-FUNGAL POWDER MICONAZOLE NITRATE TALC FREE: 1.42 POWDER TOPICAL at 08:04

## 2022-11-10 RX ADMIN — CEFDINIR 300 MG: 300 CAPSULE ORAL at 22:21

## 2022-11-10 RX ADMIN — ANTI-FUNGAL POWDER MICONAZOLE NITRATE TALC FREE: 1.42 POWDER TOPICAL at 22:22

## 2022-11-11 ENCOUNTER — APPOINTMENT (OUTPATIENT)
Dept: GENERAL RADIOLOGY | Facility: HOSPITAL | Age: 72
End: 2022-11-11

## 2022-11-11 LAB
ALBUMIN SERPL-MCNC: 2.4 G/DL (ref 3.5–5.2)
ANION GAP SERPL CALCULATED.3IONS-SCNC: 12 MMOL/L (ref 5–15)
BUN SERPL-MCNC: 15 MG/DL (ref 8–23)
BUN/CREAT SERPL: 12 (ref 7–25)
CALCIUM SPEC-SCNC: 6.4 MG/DL (ref 8.6–10.5)
CHLORIDE SERPL-SCNC: 109 MMOL/L (ref 98–107)
CO2 SERPL-SCNC: 18 MMOL/L (ref 22–29)
CREAT SERPL-MCNC: 1.25 MG/DL (ref 0.57–1)
DEPRECATED RDW RBC AUTO: 58.6 FL (ref 37–54)
EGFRCR SERPLBLD CKD-EPI 2021: 46.5 ML/MIN/1.73
ERYTHROCYTE [DISTWIDTH] IN BLOOD BY AUTOMATED COUNT: 20 % (ref 12.3–15.4)
GLUCOSE BLDC GLUCOMTR-MCNC: 137 MG/DL (ref 70–105)
GLUCOSE BLDC GLUCOMTR-MCNC: 161 MG/DL (ref 70–105)
GLUCOSE BLDC GLUCOMTR-MCNC: 163 MG/DL (ref 70–105)
GLUCOSE SERPL-MCNC: 164 MG/DL (ref 65–99)
HCT VFR BLD AUTO: 24 % (ref 34–46.6)
HCT VFR BLD AUTO: 24.5 % (ref 34–46.6)
HCT VFR BLD AUTO: 24.8 % (ref 34–46.6)
HGB BLD-MCNC: 7.6 G/DL (ref 12–15.9)
HGB BLD-MCNC: 7.8 G/DL (ref 12–15.9)
HGB BLD-MCNC: 7.9 G/DL (ref 12–15.9)
MAGNESIUM SERPL-MCNC: 1.7 MG/DL (ref 1.6–2.4)
MCH RBC QN AUTO: 26.4 PG (ref 26.6–33)
MCHC RBC AUTO-ENTMCNC: 31.3 G/DL (ref 31.5–35.7)
MCV RBC AUTO: 84.3 FL (ref 79–97)
PHOSPHATE SERPL-MCNC: 2.6 MG/DL (ref 2.5–4.5)
PLATELET # BLD AUTO: 162 10*3/MM3 (ref 140–450)
PMV BLD AUTO: 7.8 FL (ref 6–12)
POTASSIUM SERPL-SCNC: 4.3 MMOL/L (ref 3.5–5.2)
RBC # BLD AUTO: 2.94 10*6/MM3 (ref 3.77–5.28)
SODIUM SERPL-SCNC: 139 MMOL/L (ref 136–145)
WBC NRBC COR # BLD: 8.6 10*3/MM3 (ref 3.4–10.8)

## 2022-11-11 PROCEDURE — 25010000002 ENOXAPARIN PER 10 MG: Performed by: NURSE PRACTITIONER

## 2022-11-11 PROCEDURE — 85014 HEMATOCRIT: CPT | Performed by: HOSPITALIST

## 2022-11-11 PROCEDURE — 63710000001 INSULIN LISPRO (HUMAN) PER 5 UNITS: Performed by: PHYSICIAN ASSISTANT

## 2022-11-11 PROCEDURE — 97530 THERAPEUTIC ACTIVITIES: CPT | Performed by: PHYSICAL THERAPIST

## 2022-11-11 PROCEDURE — 99232 SBSQ HOSP IP/OBS MODERATE 35: CPT | Performed by: INTERNAL MEDICINE

## 2022-11-11 PROCEDURE — 85018 HEMOGLOBIN: CPT | Performed by: HOSPITALIST

## 2022-11-11 PROCEDURE — 82962 GLUCOSE BLOOD TEST: CPT

## 2022-11-11 PROCEDURE — 85027 COMPLETE CBC AUTOMATED: CPT | Performed by: INTERNAL MEDICINE

## 2022-11-11 PROCEDURE — 71045 X-RAY EXAM CHEST 1 VIEW: CPT

## 2022-11-11 PROCEDURE — 83735 ASSAY OF MAGNESIUM: CPT | Performed by: PHYSICIAN ASSISTANT

## 2022-11-11 PROCEDURE — 80069 RENAL FUNCTION PANEL: CPT | Performed by: INTERNAL MEDICINE

## 2022-11-11 PROCEDURE — 97112 NEUROMUSCULAR REEDUCATION: CPT | Performed by: PHYSICAL THERAPIST

## 2022-11-11 RX ORDER — SODIUM BICARBONATE 650 MG/1
650 TABLET ORAL 3 TIMES DAILY
Status: DISCONTINUED | OUTPATIENT
Start: 2022-11-11 | End: 2022-11-17

## 2022-11-11 RX ADMIN — SODIUM BICARBONATE 650 MG: 650 TABLET ORAL at 08:42

## 2022-11-11 RX ADMIN — CEFDINIR 300 MG: 300 CAPSULE ORAL at 08:42

## 2022-11-11 RX ADMIN — ANTI-FUNGAL POWDER MICONAZOLE NITRATE TALC FREE: 1.42 POWDER TOPICAL at 20:27

## 2022-11-11 RX ADMIN — Medication 81 MG: at 08:42

## 2022-11-11 RX ADMIN — LOPERAMIDE HYDROCHLORIDE 4 MG: 2 CAPSULE ORAL at 20:21

## 2022-11-11 RX ADMIN — ATORVASTATIN CALCIUM 40 MG: 40 TABLET, FILM COATED ORAL at 20:22

## 2022-11-11 RX ADMIN — SODIUM CHLORIDE 75 ML/HR: 9 INJECTION, SOLUTION INTRAVENOUS at 00:36

## 2022-11-11 RX ADMIN — ANTI-FUNGAL POWDER MICONAZOLE NITRATE TALC FREE: 1.42 POWDER TOPICAL at 08:42

## 2022-11-11 RX ADMIN — FOLIC ACID 1 MG: 1 TABLET ORAL at 08:42

## 2022-11-11 RX ADMIN — DIPHENOXYLATE HYDROCHLORIDE AND ATROPINE SULFATE 2 TABLET: 2.5; .025 TABLET ORAL at 17:20

## 2022-11-11 RX ADMIN — CEFDINIR 300 MG: 300 CAPSULE ORAL at 20:21

## 2022-11-11 RX ADMIN — INSULIN LISPRO 2 UNITS: 100 INJECTION, SOLUTION INTRAVENOUS; SUBCUTANEOUS at 12:09

## 2022-11-11 RX ADMIN — ENOXAPARIN SODIUM 40 MG: 100 INJECTION SUBCUTANEOUS at 17:20

## 2022-11-11 RX ADMIN — PANTOPRAZOLE SODIUM 40 MG: 40 INJECTION, POWDER, FOR SOLUTION INTRAVENOUS at 08:43

## 2022-11-11 RX ADMIN — LOPERAMIDE HYDROCHLORIDE 4 MG: 2 CAPSULE ORAL at 17:20

## 2022-11-11 RX ADMIN — INSULIN LISPRO 2 UNITS: 100 INJECTION, SOLUTION INTRAVENOUS; SUBCUTANEOUS at 08:43

## 2022-11-11 RX ADMIN — Medication 10 ML: at 08:42

## 2022-11-11 RX ADMIN — SODIUM BICARBONATE 650 MG: 650 TABLET ORAL at 17:20

## 2022-11-11 RX ADMIN — DIPHENOXYLATE HYDROCHLORIDE AND ATROPINE SULFATE 2 TABLET: 2.5; .025 TABLET ORAL at 12:09

## 2022-11-11 RX ADMIN — MONTELUKAST 10 MG: 10 TABLET, FILM COATED ORAL at 20:21

## 2022-11-11 RX ADMIN — PANTOPRAZOLE SODIUM 40 MG: 40 INJECTION, POWDER, FOR SOLUTION INTRAVENOUS at 17:20

## 2022-11-11 RX ADMIN — LOPERAMIDE HYDROCHLORIDE 4 MG: 2 CAPSULE ORAL at 08:42

## 2022-11-11 RX ADMIN — DIPHENOXYLATE HYDROCHLORIDE AND ATROPINE SULFATE 2 TABLET: 2.5; .025 TABLET ORAL at 00:36

## 2022-11-11 RX ADMIN — SODIUM BICARBONATE 650 MG: 650 TABLET ORAL at 20:22

## 2022-11-11 RX ADMIN — DEXAMETHASONE 6 MG: 6 TABLET ORAL at 08:42

## 2022-11-11 RX ADMIN — DIPHENOXYLATE HYDROCHLORIDE AND ATROPINE SULFATE 2 TABLET: 2.5; .025 TABLET ORAL at 05:42

## 2022-11-11 RX ADMIN — LEVOTHYROXINE SODIUM 88 MCG: 0.09 TABLET ORAL at 05:42

## 2022-11-12 LAB
ALBUMIN SERPL-MCNC: 2.5 G/DL (ref 3.5–5.2)
ALBUMIN/GLOB SERPL: 1.3 G/DL
ALP SERPL-CCNC: 117 U/L (ref 39–117)
ALT SERPL W P-5'-P-CCNC: 17 U/L (ref 1–33)
ANION GAP SERPL CALCULATED.3IONS-SCNC: 11 MMOL/L (ref 5–15)
AST SERPL-CCNC: 28 U/L (ref 1–32)
BACTERIA SPEC AEROBE CULT: NORMAL
BACTERIA SPEC AEROBE CULT: NORMAL
BILIRUB SERPL-MCNC: 0.3 MG/DL (ref 0–1.2)
BUN SERPL-MCNC: 17 MG/DL (ref 8–23)
BUN/CREAT SERPL: 14.9 (ref 7–25)
CA-I SERPL ISE-MCNC: 0.94 MMOL/L (ref 1.2–1.3)
CALCIUM SPEC-SCNC: 6 MG/DL (ref 8.6–10.5)
CHLORIDE SERPL-SCNC: 111 MMOL/L (ref 98–107)
CO2 SERPL-SCNC: 18 MMOL/L (ref 22–29)
CREAT SERPL-MCNC: 1.14 MG/DL (ref 0.57–1)
DEPRECATED RDW RBC AUTO: 60.4 FL (ref 37–54)
EGFRCR SERPLBLD CKD-EPI 2021: 51.9 ML/MIN/1.73
ERYTHROCYTE [DISTWIDTH] IN BLOOD BY AUTOMATED COUNT: 20 % (ref 12.3–15.4)
GLOBULIN UR ELPH-MCNC: 2 GM/DL
GLUCOSE BLDC GLUCOMTR-MCNC: 149 MG/DL (ref 70–105)
GLUCOSE BLDC GLUCOMTR-MCNC: 158 MG/DL (ref 70–105)
GLUCOSE BLDC GLUCOMTR-MCNC: 191 MG/DL (ref 70–105)
GLUCOSE SERPL-MCNC: 183 MG/DL (ref 65–99)
HCT VFR BLD AUTO: 24.3 % (ref 34–46.6)
HCT VFR BLD AUTO: 25.6 % (ref 34–46.6)
HCT VFR BLD AUTO: 25.6 % (ref 34–46.6)
HGB BLD-MCNC: 7.9 G/DL (ref 12–15.9)
HGB BLD-MCNC: 8 G/DL (ref 12–15.9)
HGB BLD-MCNC: 8.2 G/DL (ref 12–15.9)
MAGNESIUM SERPL-MCNC: 1.7 MG/DL (ref 1.6–2.4)
MCH RBC QN AUTO: 26.7 PG (ref 26.6–33)
MCHC RBC AUTO-ENTMCNC: 32.4 G/DL (ref 31.5–35.7)
MCV RBC AUTO: 82.6 FL (ref 79–97)
PHOSPHATE SERPL-MCNC: 2.5 MG/DL (ref 2.5–4.5)
PLATELET # BLD AUTO: 148 10*3/MM3 (ref 140–450)
PMV BLD AUTO: 7.6 FL (ref 6–12)
POTASSIUM SERPL-SCNC: 4.6 MMOL/L (ref 3.5–5.2)
PROT SERPL-MCNC: 4.5 G/DL (ref 6–8.5)
RBC # BLD AUTO: 2.94 10*6/MM3 (ref 3.77–5.28)
SODIUM SERPL-SCNC: 140 MMOL/L (ref 136–145)
WBC NRBC COR # BLD: 9.9 10*3/MM3 (ref 3.4–10.8)

## 2022-11-12 PROCEDURE — 84100 ASSAY OF PHOSPHORUS: CPT | Performed by: INTERNAL MEDICINE

## 2022-11-12 PROCEDURE — 25010000002 ENOXAPARIN PER 10 MG: Performed by: NURSE PRACTITIONER

## 2022-11-12 PROCEDURE — 83735 ASSAY OF MAGNESIUM: CPT | Performed by: PHYSICIAN ASSISTANT

## 2022-11-12 PROCEDURE — 85027 COMPLETE CBC AUTOMATED: CPT | Performed by: HOSPITALIST

## 2022-11-12 PROCEDURE — 94640 AIRWAY INHALATION TREATMENT: CPT

## 2022-11-12 PROCEDURE — 85018 HEMOGLOBIN: CPT | Performed by: HOSPITALIST

## 2022-11-12 PROCEDURE — 82330 ASSAY OF CALCIUM: CPT | Performed by: HOSPITALIST

## 2022-11-12 PROCEDURE — 85014 HEMATOCRIT: CPT | Performed by: HOSPITALIST

## 2022-11-12 PROCEDURE — 80053 COMPREHEN METABOLIC PANEL: CPT | Performed by: HOSPITALIST

## 2022-11-12 PROCEDURE — 63710000001 INSULIN LISPRO (HUMAN) PER 5 UNITS: Performed by: PHYSICIAN ASSISTANT

## 2022-11-12 PROCEDURE — 94799 UNLISTED PULMONARY SVC/PX: CPT

## 2022-11-12 PROCEDURE — 82962 GLUCOSE BLOOD TEST: CPT

## 2022-11-12 RX ORDER — DEXAMETHASONE 4 MG/1
4 TABLET ORAL
Status: DISCONTINUED | OUTPATIENT
Start: 2022-11-12 | End: 2022-11-16

## 2022-11-12 RX ORDER — CALCIUM GLUCONATE 20 MG/ML
1 INJECTION, SOLUTION INTRAVENOUS AS NEEDED
Status: DISCONTINUED | OUTPATIENT
Start: 2022-11-12 | End: 2022-11-12

## 2022-11-12 RX ORDER — FUROSEMIDE 40 MG/1
40 TABLET ORAL DAILY
Status: DISCONTINUED | OUTPATIENT
Start: 2022-11-12 | End: 2022-11-15

## 2022-11-12 RX ORDER — CALCIUM GLUCONATE 20 MG/ML
2 INJECTION, SOLUTION INTRAVENOUS AS NEEDED
Status: DISCONTINUED | OUTPATIENT
Start: 2022-11-12 | End: 2022-11-12

## 2022-11-12 RX ADMIN — FOLIC ACID 1 MG: 1 TABLET ORAL at 07:48

## 2022-11-12 RX ADMIN — Medication 10 ML: at 21:17

## 2022-11-12 RX ADMIN — ATORVASTATIN CALCIUM 40 MG: 40 TABLET, FILM COATED ORAL at 21:09

## 2022-11-12 RX ADMIN — ALBUTEROL SULFATE 2 PUFF: 108 INHALANT RESPIRATORY (INHALATION) at 21:55

## 2022-11-12 RX ADMIN — Medication 81 MG: at 07:48

## 2022-11-12 RX ADMIN — LOPERAMIDE HYDROCHLORIDE 4 MG: 2 CAPSULE ORAL at 21:09

## 2022-11-12 RX ADMIN — INSULIN LISPRO 2 UNITS: 100 INJECTION, SOLUTION INTRAVENOUS; SUBCUTANEOUS at 17:13

## 2022-11-12 RX ADMIN — DIPHENOXYLATE HYDROCHLORIDE AND ATROPINE SULFATE 2 TABLET: 2.5; .025 TABLET ORAL at 01:13

## 2022-11-12 RX ADMIN — DIPHENOXYLATE HYDROCHLORIDE AND ATROPINE SULFATE 2 TABLET: 2.5; .025 TABLET ORAL at 06:11

## 2022-11-12 RX ADMIN — CEFDINIR 300 MG: 300 CAPSULE ORAL at 21:09

## 2022-11-12 RX ADMIN — ANTI-FUNGAL POWDER MICONAZOLE NITRATE TALC FREE: 1.42 POWDER TOPICAL at 21:16

## 2022-11-12 RX ADMIN — LOPERAMIDE HYDROCHLORIDE 4 MG: 2 CAPSULE ORAL at 15:38

## 2022-11-12 RX ADMIN — LEVOTHYROXINE SODIUM 88 MCG: 0.09 TABLET ORAL at 06:11

## 2022-11-12 RX ADMIN — ENOXAPARIN SODIUM 40 MG: 100 INJECTION SUBCUTANEOUS at 15:38

## 2022-11-12 RX ADMIN — ANTI-FUNGAL POWDER MICONAZOLE NITRATE TALC FREE: 1.42 POWDER TOPICAL at 07:50

## 2022-11-12 RX ADMIN — DEXAMETHASONE 4 MG: 4 TABLET ORAL at 07:48

## 2022-11-12 RX ADMIN — PANTOPRAZOLE SODIUM 40 MG: 40 INJECTION, POWDER, FOR SOLUTION INTRAVENOUS at 07:48

## 2022-11-12 RX ADMIN — SODIUM BICARBONATE 650 MG: 650 TABLET ORAL at 07:49

## 2022-11-12 RX ADMIN — INSULIN LISPRO 2 UNITS: 100 INJECTION, SOLUTION INTRAVENOUS; SUBCUTANEOUS at 11:49

## 2022-11-12 RX ADMIN — MONTELUKAST 10 MG: 10 TABLET, FILM COATED ORAL at 21:09

## 2022-11-12 RX ADMIN — INSULIN LISPRO 2 UNITS: 100 INJECTION, SOLUTION INTRAVENOUS; SUBCUTANEOUS at 16:46

## 2022-11-12 RX ADMIN — FUROSEMIDE 40 MG: 40 TABLET ORAL at 11:49

## 2022-11-12 RX ADMIN — DIPHENOXYLATE HYDROCHLORIDE AND ATROPINE SULFATE 2 TABLET: 2.5; .025 TABLET ORAL at 23:16

## 2022-11-12 RX ADMIN — SODIUM BICARBONATE 650 MG: 650 TABLET ORAL at 21:09

## 2022-11-12 RX ADMIN — CEFDINIR 300 MG: 300 CAPSULE ORAL at 07:48

## 2022-11-12 RX ADMIN — DIPHENOXYLATE HYDROCHLORIDE AND ATROPINE SULFATE 2 TABLET: 2.5; .025 TABLET ORAL at 11:49

## 2022-11-12 RX ADMIN — Medication 10 ML: at 07:49

## 2022-11-12 RX ADMIN — LOPERAMIDE HYDROCHLORIDE 4 MG: 2 CAPSULE ORAL at 07:49

## 2022-11-12 RX ADMIN — PANTOPRAZOLE SODIUM 40 MG: 40 INJECTION, POWDER, FOR SOLUTION INTRAVENOUS at 16:44

## 2022-11-12 RX ADMIN — SODIUM BICARBONATE 650 MG: 650 TABLET ORAL at 15:38

## 2022-11-12 RX ADMIN — DIPHENOXYLATE HYDROCHLORIDE AND ATROPINE SULFATE 2 TABLET: 2.5; .025 TABLET ORAL at 17:13

## 2022-11-13 LAB
25(OH)D3 SERPL-MCNC: 58.2 NG/ML (ref 30–100)
ALBUMIN SERPL-MCNC: 2.5 G/DL (ref 3.5–5.2)
ANION GAP SERPL CALCULATED.3IONS-SCNC: 11 MMOL/L (ref 5–15)
BUN SERPL-MCNC: 21 MG/DL (ref 8–23)
BUN/CREAT SERPL: 18.9 (ref 7–25)
CA-I SERPL ISE-MCNC: 0.92 MMOL/L (ref 1.2–1.3)
CALCIUM SPEC-SCNC: 5.8 MG/DL (ref 8.6–10.5)
CHLORIDE SERPL-SCNC: 110 MMOL/L (ref 98–107)
CO2 SERPL-SCNC: 20 MMOL/L (ref 22–29)
CREAT SERPL-MCNC: 1.11 MG/DL (ref 0.57–1)
EGFRCR SERPLBLD CKD-EPI 2021: 53.6 ML/MIN/1.73
GLUCOSE BLDC GLUCOMTR-MCNC: 146 MG/DL (ref 70–105)
GLUCOSE BLDC GLUCOMTR-MCNC: 156 MG/DL (ref 70–105)
GLUCOSE BLDC GLUCOMTR-MCNC: 177 MG/DL (ref 70–105)
GLUCOSE SERPL-MCNC: 166 MG/DL (ref 65–99)
HCT VFR BLD AUTO: 25.4 % (ref 34–46.6)
HCT VFR BLD AUTO: 25.7 % (ref 34–46.6)
HCT VFR BLD AUTO: 25.7 % (ref 34–46.6)
HGB BLD-MCNC: 8.1 G/DL (ref 12–15.9)
HGB BLD-MCNC: 8.2 G/DL (ref 12–15.9)
HGB BLD-MCNC: 8.2 G/DL (ref 12–15.9)
MAGNESIUM SERPL-MCNC: 1.6 MG/DL (ref 1.6–2.4)
METHYLMALONATE SERPL-SCNC: 155 NMOL/L (ref 0–378)
PHOSPHATE SERPL-MCNC: 2.4 MG/DL (ref 2.5–4.5)
POTASSIUM SERPL-SCNC: 4 MMOL/L (ref 3.5–5.2)
SODIUM SERPL-SCNC: 141 MMOL/L (ref 136–145)
TROPONIN T SERPL-MCNC: 0.01 NG/ML (ref 0–0.03)

## 2022-11-13 PROCEDURE — 85014 HEMATOCRIT: CPT | Performed by: HOSPITALIST

## 2022-11-13 PROCEDURE — 82962 GLUCOSE BLOOD TEST: CPT

## 2022-11-13 PROCEDURE — 93010 ELECTROCARDIOGRAM REPORT: CPT | Performed by: INTERNAL MEDICINE

## 2022-11-13 PROCEDURE — 84484 ASSAY OF TROPONIN QUANT: CPT | Performed by: HOSPITALIST

## 2022-11-13 PROCEDURE — 63710000001 DEXAMETHASONE PER 0.25 MG: Performed by: HOSPITALIST

## 2022-11-13 PROCEDURE — 63710000001 INSULIN LISPRO (HUMAN) PER 5 UNITS: Performed by: PHYSICIAN ASSISTANT

## 2022-11-13 PROCEDURE — 85018 HEMOGLOBIN: CPT | Performed by: HOSPITALIST

## 2022-11-13 PROCEDURE — 82330 ASSAY OF CALCIUM: CPT | Performed by: HOSPITALIST

## 2022-11-13 PROCEDURE — 82306 VITAMIN D 25 HYDROXY: CPT | Performed by: INTERNAL MEDICINE

## 2022-11-13 PROCEDURE — 83735 ASSAY OF MAGNESIUM: CPT | Performed by: PHYSICIAN ASSISTANT

## 2022-11-13 PROCEDURE — 99222 1ST HOSP IP/OBS MODERATE 55: CPT | Performed by: INTERNAL MEDICINE

## 2022-11-13 PROCEDURE — 25010000002 ENOXAPARIN PER 10 MG: Performed by: NURSE PRACTITIONER

## 2022-11-13 PROCEDURE — 25010000002 MAGNESIUM SULFATE 2 GM/50ML SOLUTION: Performed by: NURSE PRACTITIONER

## 2022-11-13 PROCEDURE — 80069 RENAL FUNCTION PANEL: CPT | Performed by: INTERNAL MEDICINE

## 2022-11-13 PROCEDURE — 93005 ELECTROCARDIOGRAM TRACING: CPT | Performed by: HOSPITALIST

## 2022-11-13 PROCEDURE — 25010000002 CALCIUM GLUCONATE 2-0.675 GM/100ML-% SOLUTION: Performed by: HOSPITALIST

## 2022-11-13 RX ORDER — CALCIUM CARBONATE 200(500)MG
2 TABLET,CHEWABLE ORAL 3 TIMES DAILY
Status: DISCONTINUED | OUTPATIENT
Start: 2022-11-13 | End: 2022-11-17

## 2022-11-13 RX ORDER — PANTOPRAZOLE SODIUM 40 MG/1
40 TABLET, DELAYED RELEASE ORAL
Status: DISCONTINUED | OUTPATIENT
Start: 2022-11-13 | End: 2022-11-16

## 2022-11-13 RX ORDER — MAGNESIUM SULFATE HEPTAHYDRATE 40 MG/ML
2 INJECTION, SOLUTION INTRAVENOUS ONCE
Status: COMPLETED | OUTPATIENT
Start: 2022-11-13 | End: 2022-11-13

## 2022-11-13 RX ORDER — CALCIUM GLUCONATE 20 MG/ML
2 INJECTION, SOLUTION INTRAVENOUS ONCE
Status: COMPLETED | OUTPATIENT
Start: 2022-11-13 | End: 2022-11-13

## 2022-11-13 RX ADMIN — FOLIC ACID 1 MG: 1 TABLET ORAL at 09:04

## 2022-11-13 RX ADMIN — DIPHENOXYLATE HYDROCHLORIDE AND ATROPINE SULFATE 2 TABLET: 2.5; .025 TABLET ORAL at 18:11

## 2022-11-13 RX ADMIN — PANTOPRAZOLE SODIUM 40 MG: 40 TABLET, DELAYED RELEASE ORAL at 18:12

## 2022-11-13 RX ADMIN — LOPERAMIDE HYDROCHLORIDE 4 MG: 2 CAPSULE ORAL at 09:04

## 2022-11-13 RX ADMIN — ATORVASTATIN CALCIUM 40 MG: 40 TABLET, FILM COATED ORAL at 20:05

## 2022-11-13 RX ADMIN — SODIUM BICARBONATE 650 MG: 650 TABLET ORAL at 20:05

## 2022-11-13 RX ADMIN — DIPHENOXYLATE HYDROCHLORIDE AND ATROPINE SULFATE 2 TABLET: 2.5; .025 TABLET ORAL at 05:01

## 2022-11-13 RX ADMIN — CALCIUM CARBONATE (ANTACID) CHEW TAB 500 MG 2 TABLET: 500 CHEW TAB at 18:11

## 2022-11-13 RX ADMIN — LOPERAMIDE HYDROCHLORIDE 4 MG: 2 CAPSULE ORAL at 20:05

## 2022-11-13 RX ADMIN — FUROSEMIDE 40 MG: 40 TABLET ORAL at 09:03

## 2022-11-13 RX ADMIN — ANTI-FUNGAL POWDER MICONAZOLE NITRATE TALC FREE: 1.42 POWDER TOPICAL at 20:07

## 2022-11-13 RX ADMIN — DEXAMETHASONE 4 MG: 4 TABLET ORAL at 09:03

## 2022-11-13 RX ADMIN — Medication 81 MG: at 09:05

## 2022-11-13 RX ADMIN — MAGNESIUM SULFATE HEPTAHYDRATE 2 G: 2 INJECTION, SOLUTION INTRAVENOUS at 14:08

## 2022-11-13 RX ADMIN — CALCIUM GLUCONATE 2 G: 20 INJECTION, SOLUTION INTRAVENOUS at 09:02

## 2022-11-13 RX ADMIN — Medication 12.5 MG: at 14:09

## 2022-11-13 RX ADMIN — SODIUM BICARBONATE 650 MG: 650 TABLET ORAL at 18:11

## 2022-11-13 RX ADMIN — PANTOPRAZOLE SODIUM 40 MG: 40 INJECTION, POWDER, FOR SOLUTION INTRAVENOUS at 09:04

## 2022-11-13 RX ADMIN — CALCIUM CARBONATE (ANTACID) CHEW TAB 500 MG 2 TABLET: 500 CHEW TAB at 20:05

## 2022-11-13 RX ADMIN — INSULIN LISPRO 2 UNITS: 100 INJECTION, SOLUTION INTRAVENOUS; SUBCUTANEOUS at 12:10

## 2022-11-13 RX ADMIN — LOPERAMIDE HYDROCHLORIDE 4 MG: 2 CAPSULE ORAL at 14:09

## 2022-11-13 RX ADMIN — INSULIN LISPRO 2 UNITS: 100 INJECTION, SOLUTION INTRAVENOUS; SUBCUTANEOUS at 18:12

## 2022-11-13 RX ADMIN — ENOXAPARIN SODIUM 40 MG: 100 INJECTION SUBCUTANEOUS at 18:12

## 2022-11-13 RX ADMIN — ALBUTEROL SULFATE 2 PUFF: 108 INHALANT RESPIRATORY (INHALATION) at 05:19

## 2022-11-13 RX ADMIN — ANTI-FUNGAL POWDER MICONAZOLE NITRATE TALC FREE: 1.42 POWDER TOPICAL at 09:05

## 2022-11-13 RX ADMIN — CEFDINIR 300 MG: 300 CAPSULE ORAL at 09:04

## 2022-11-13 RX ADMIN — Medication 10 ML: at 20:05

## 2022-11-13 RX ADMIN — SODIUM BICARBONATE 650 MG: 650 TABLET ORAL at 09:04

## 2022-11-13 RX ADMIN — LEVOTHYROXINE SODIUM 88 MCG: 0.09 TABLET ORAL at 05:02

## 2022-11-13 RX ADMIN — Medication 10 ML: at 09:02

## 2022-11-13 RX ADMIN — CEFDINIR 300 MG: 300 CAPSULE ORAL at 20:05

## 2022-11-13 RX ADMIN — MONTELUKAST 10 MG: 10 TABLET, FILM COATED ORAL at 20:05

## 2022-11-13 RX ADMIN — CALCIUM CARBONATE (ANTACID) CHEW TAB 500 MG 2 TABLET: 500 CHEW TAB at 09:04

## 2022-11-13 RX ADMIN — DIPHENOXYLATE HYDROCHLORIDE AND ATROPINE SULFATE 2 TABLET: 2.5; .025 TABLET ORAL at 12:10

## 2022-11-14 ENCOUNTER — APPOINTMENT (OUTPATIENT)
Dept: GENERAL RADIOLOGY | Facility: HOSPITAL | Age: 72
End: 2022-11-14

## 2022-11-14 LAB
ALBUMIN SERPL-MCNC: 2.5 G/DL (ref 3.5–5.2)
ALBUMIN/GLOB SERPL: 1.3 G/DL
ALP SERPL-CCNC: 122 U/L (ref 39–117)
ALT SERPL W P-5'-P-CCNC: 24 U/L (ref 1–33)
ANION GAP SERPL CALCULATED.3IONS-SCNC: 9 MMOL/L (ref 5–15)
ARTERIAL PATENCY WRIST A: POSITIVE
AST SERPL-CCNC: 50 U/L (ref 1–32)
ATMOSPHERIC PRESS: ABNORMAL MM[HG]
BASE EXCESS BLDA CALC-SCNC: -4.1 MMOL/L (ref 0–3)
BDY SITE: ABNORMAL
BILIRUB SERPL-MCNC: 0.4 MG/DL (ref 0–1.2)
BUN SERPL-MCNC: 22 MG/DL (ref 8–23)
BUN/CREAT SERPL: 18.8 (ref 7–25)
CALCIUM SPEC-SCNC: 6.3 MG/DL (ref 8.6–10.5)
CHLORIDE SERPL-SCNC: 111 MMOL/L (ref 98–107)
CO2 BLDA-SCNC: 24.4 MMOL/L (ref 22–29)
CO2 SERPL-SCNC: 22 MMOL/L (ref 22–29)
CREAT SERPL-MCNC: 1.17 MG/DL (ref 0.57–1)
DEPRECATED RDW RBC AUTO: 59.5 FL (ref 37–54)
EGFRCR SERPLBLD CKD-EPI 2021: 50.3 ML/MIN/1.73
ERYTHROCYTE [DISTWIDTH] IN BLOOD BY AUTOMATED COUNT: 20.1 % (ref 12.3–15.4)
GLOBULIN UR ELPH-MCNC: 2 GM/DL
GLUCOSE BLDC GLUCOMTR-MCNC: 117 MG/DL (ref 70–105)
GLUCOSE BLDC GLUCOMTR-MCNC: 168 MG/DL (ref 70–105)
GLUCOSE BLDC GLUCOMTR-MCNC: 205 MG/DL (ref 70–105)
GLUCOSE BLDC GLUCOMTR-MCNC: 87 MG/DL (ref 70–105)
GLUCOSE SERPL-MCNC: 96 MG/DL (ref 65–99)
HCO3 BLDA-SCNC: 22.9 MMOL/L (ref 21–28)
HCT VFR BLD AUTO: 24.5 % (ref 34–46.6)
HCT VFR BLD AUTO: 24.9 % (ref 34–46.6)
HCT VFR BLD AUTO: 25.2 % (ref 34–46.6)
HEMODILUTION: YES
HGB BLD-MCNC: 8 G/DL (ref 12–15.9)
INHALED O2 CONCENTRATION: 36 %
MAGNESIUM SERPL-MCNC: 2 MG/DL (ref 1.6–2.4)
MCH RBC QN AUTO: 26.6 PG (ref 26.6–33)
MCHC RBC AUTO-ENTMCNC: 32.6 G/DL (ref 31.5–35.7)
MCV RBC AUTO: 81.6 FL (ref 79–97)
MODALITY: ABNORMAL
PCO2 BLDA: 50.5 MM HG (ref 35–48)
PH BLDA: 7.26 PH UNITS (ref 7.35–7.45)
PHOSPHATE SERPL-MCNC: 2.7 MG/DL (ref 2.5–4.5)
PLATELET # BLD AUTO: 134 10*3/MM3 (ref 140–450)
PMV BLD AUTO: 8.2 FL (ref 6–12)
PO2 BLDA: 76 MM HG (ref 83–108)
POTASSIUM SERPL-SCNC: 3.7 MMOL/L (ref 3.5–5.2)
PROT SERPL-MCNC: 4.5 G/DL (ref 6–8.5)
QT INTERVAL: 443 MS
QT INTERVAL: 461 MS
RBC # BLD AUTO: 3 10*6/MM3 (ref 3.77–5.28)
SAO2 % BLDCOA: 92.6 % (ref 94–98)
SODIUM SERPL-SCNC: 142 MMOL/L (ref 136–145)
WBC NRBC COR # BLD: 15.2 10*3/MM3 (ref 3.4–10.8)

## 2022-11-14 PROCEDURE — 93010 ELECTROCARDIOGRAM REPORT: CPT | Performed by: INTERNAL MEDICINE

## 2022-11-14 PROCEDURE — 99232 SBSQ HOSP IP/OBS MODERATE 35: CPT | Performed by: INTERNAL MEDICINE

## 2022-11-14 PROCEDURE — 25010000002 ENOXAPARIN PER 10 MG: Performed by: NURSE PRACTITIONER

## 2022-11-14 PROCEDURE — 85018 HEMOGLOBIN: CPT | Performed by: HOSPITALIST

## 2022-11-14 PROCEDURE — 63710000001 DEXAMETHASONE PER 0.25 MG: Performed by: HOSPITALIST

## 2022-11-14 PROCEDURE — 85027 COMPLETE CBC AUTOMATED: CPT | Performed by: HOSPITALIST

## 2022-11-14 PROCEDURE — 94799 UNLISTED PULMONARY SVC/PX: CPT

## 2022-11-14 PROCEDURE — 97530 THERAPEUTIC ACTIVITIES: CPT

## 2022-11-14 PROCEDURE — 94664 DEMO&/EVAL PT USE INHALER: CPT

## 2022-11-14 PROCEDURE — 85014 HEMATOCRIT: CPT | Performed by: HOSPITALIST

## 2022-11-14 PROCEDURE — 93005 ELECTROCARDIOGRAM TRACING: CPT | Performed by: HOSPITALIST

## 2022-11-14 PROCEDURE — 83735 ASSAY OF MAGNESIUM: CPT | Performed by: PHYSICIAN ASSISTANT

## 2022-11-14 PROCEDURE — 36600 WITHDRAWAL OF ARTERIAL BLOOD: CPT

## 2022-11-14 PROCEDURE — 82962 GLUCOSE BLOOD TEST: CPT

## 2022-11-14 PROCEDURE — 71045 X-RAY EXAM CHEST 1 VIEW: CPT

## 2022-11-14 PROCEDURE — 97110 THERAPEUTIC EXERCISES: CPT

## 2022-11-14 PROCEDURE — 94761 N-INVAS EAR/PLS OXIMETRY MLT: CPT

## 2022-11-14 PROCEDURE — 63710000001 INSULIN LISPRO (HUMAN) PER 5 UNITS: Performed by: PHYSICIAN ASSISTANT

## 2022-11-14 PROCEDURE — 80053 COMPREHEN METABOLIC PANEL: CPT | Performed by: HOSPITALIST

## 2022-11-14 PROCEDURE — 84100 ASSAY OF PHOSPHORUS: CPT | Performed by: INTERNAL MEDICINE

## 2022-11-14 PROCEDURE — 82803 BLOOD GASES ANY COMBINATION: CPT

## 2022-11-14 RX ORDER — ALPRAZOLAM 0.25 MG/1
0.25 TABLET ORAL 3 TIMES DAILY PRN
Status: DISCONTINUED | OUTPATIENT
Start: 2022-11-14 | End: 2022-11-15

## 2022-11-14 RX ADMIN — DIPHENOXYLATE HYDROCHLORIDE AND ATROPINE SULFATE 2 TABLET: 2.5; .025 TABLET ORAL at 17:54

## 2022-11-14 RX ADMIN — DIPHENOXYLATE HYDROCHLORIDE AND ATROPINE SULFATE 2 TABLET: 2.5; .025 TABLET ORAL at 00:01

## 2022-11-14 RX ADMIN — FOLIC ACID 1 MG: 1 TABLET ORAL at 08:14

## 2022-11-14 RX ADMIN — LOPERAMIDE HYDROCHLORIDE 4 MG: 2 CAPSULE ORAL at 08:14

## 2022-11-14 RX ADMIN — LOPERAMIDE HYDROCHLORIDE 4 MG: 2 CAPSULE ORAL at 21:15

## 2022-11-14 RX ADMIN — Medication 5 MG: at 21:15

## 2022-11-14 RX ADMIN — LEVOTHYROXINE SODIUM 88 MCG: 0.09 TABLET ORAL at 05:39

## 2022-11-14 RX ADMIN — ENOXAPARIN SODIUM 40 MG: 100 INJECTION SUBCUTANEOUS at 15:20

## 2022-11-14 RX ADMIN — PANTOPRAZOLE SODIUM 40 MG: 40 TABLET, DELAYED RELEASE ORAL at 17:54

## 2022-11-14 RX ADMIN — ATORVASTATIN CALCIUM 40 MG: 40 TABLET, FILM COATED ORAL at 21:15

## 2022-11-14 RX ADMIN — CALCIUM CARBONATE (ANTACID) CHEW TAB 500 MG 2 TABLET: 500 CHEW TAB at 21:15

## 2022-11-14 RX ADMIN — ANTI-FUNGAL POWDER MICONAZOLE NITRATE TALC FREE: 1.42 POWDER TOPICAL at 21:16

## 2022-11-14 RX ADMIN — SODIUM BICARBONATE 650 MG: 650 TABLET ORAL at 08:14

## 2022-11-14 RX ADMIN — SODIUM BICARBONATE 650 MG: 650 TABLET ORAL at 15:18

## 2022-11-14 RX ADMIN — SODIUM BICARBONATE 650 MG: 650 TABLET ORAL at 21:15

## 2022-11-14 RX ADMIN — Medication 10 ML: at 08:01

## 2022-11-14 RX ADMIN — ANTI-FUNGAL POWDER MICONAZOLE NITRATE TALC FREE: 1.42 POWDER TOPICAL at 08:14

## 2022-11-14 RX ADMIN — DEXAMETHASONE 4 MG: 4 TABLET ORAL at 08:00

## 2022-11-14 RX ADMIN — MONTELUKAST 10 MG: 10 TABLET, FILM COATED ORAL at 21:15

## 2022-11-14 RX ADMIN — LOPERAMIDE HYDROCHLORIDE 4 MG: 2 CAPSULE ORAL at 03:31

## 2022-11-14 RX ADMIN — Medication 10 ML: at 21:15

## 2022-11-14 RX ADMIN — LOPERAMIDE HYDROCHLORIDE 4 MG: 2 CAPSULE ORAL at 15:18

## 2022-11-14 RX ADMIN — DIPHENOXYLATE HYDROCHLORIDE AND ATROPINE SULFATE 2 TABLET: 2.5; .025 TABLET ORAL at 12:04

## 2022-11-14 RX ADMIN — DIPHENOXYLATE HYDROCHLORIDE AND ATROPINE SULFATE 2 TABLET: 2.5; .025 TABLET ORAL at 05:39

## 2022-11-14 RX ADMIN — PANTOPRAZOLE SODIUM 40 MG: 40 TABLET, DELAYED RELEASE ORAL at 08:00

## 2022-11-14 RX ADMIN — FUROSEMIDE 40 MG: 40 TABLET ORAL at 08:14

## 2022-11-14 RX ADMIN — CALCIUM CARBONATE (ANTACID) CHEW TAB 500 MG 2 TABLET: 500 CHEW TAB at 15:18

## 2022-11-14 RX ADMIN — Medication 81 MG: at 08:14

## 2022-11-14 RX ADMIN — INSULIN LISPRO 3 UNITS: 100 INJECTION, SOLUTION INTRAVENOUS; SUBCUTANEOUS at 17:53

## 2022-11-14 RX ADMIN — CALCIUM CARBONATE (ANTACID) CHEW TAB 500 MG 2 TABLET: 500 CHEW TAB at 08:14

## 2022-11-14 RX ADMIN — ALBUTEROL SULFATE 2 PUFF: 108 INHALANT RESPIRATORY (INHALATION) at 13:16

## 2022-11-14 RX ADMIN — ALPRAZOLAM 0.25 MG: 0.25 TABLET ORAL at 17:54

## 2022-11-15 ENCOUNTER — HOSPITAL ENCOUNTER (OUTPATIENT)
Dept: ONCOLOGY | Facility: HOSPITAL | Age: 72
Setting detail: INFUSION SERIES
Discharge: HOME | End: 2022-11-15

## 2022-11-15 LAB
ACANTHOCYTES BLD QL SMEAR: ABNORMAL
ALBUMIN SERPL-MCNC: 2.5 G/DL (ref 3.5–5.2)
ALBUMIN/GLOB SERPL: 1.3 G/DL
ALP SERPL-CCNC: 130 U/L (ref 39–117)
ALT SERPL W P-5'-P-CCNC: 34 U/L (ref 1–33)
ANION GAP SERPL CALCULATED.3IONS-SCNC: 12 MMOL/L (ref 5–15)
ANISOCYTOSIS BLD QL: ABNORMAL
ARTERIAL PATENCY WRIST A: POSITIVE
AST SERPL-CCNC: 64 U/L (ref 1–32)
ATMOSPHERIC PRESS: ABNORMAL MM[HG]
BASE EXCESS BLDA CALC-SCNC: -2.5 MMOL/L (ref 0–3)
BDY SITE: ABNORMAL
BILIRUB SERPL-MCNC: 0.5 MG/DL (ref 0–1.2)
BUN SERPL-MCNC: 22 MG/DL (ref 8–23)
BUN/CREAT SERPL: 19.8 (ref 7–25)
BURR CELLS BLD QL SMEAR: ABNORMAL
CALCIUM SPEC-SCNC: 6.2 MG/DL (ref 8.6–10.5)
CHLORIDE SERPL-SCNC: 109 MMOL/L (ref 98–107)
CO2 BLDA-SCNC: 26.1 MMOL/L (ref 22–29)
CO2 SERPL-SCNC: 24 MMOL/L (ref 22–29)
CREAT SERPL-MCNC: 1.11 MG/DL (ref 0.57–1)
CRP SERPL-MCNC: 4.47 MG/DL (ref 0–0.5)
DEPRECATED RDW RBC AUTO: 59.5 FL (ref 37–54)
EGFRCR SERPLBLD CKD-EPI 2021: 53.6 ML/MIN/1.73
EOSINOPHIL # BLD MANUAL: 0.16 10*3/MM3 (ref 0–0.4)
EOSINOPHIL NFR BLD MANUAL: 1 % (ref 0.3–6.2)
ERYTHROCYTE [DISTWIDTH] IN BLOOD BY AUTOMATED COUNT: 20.2 % (ref 12.3–15.4)
ERYTHROCYTE [SEDIMENTATION RATE] IN BLOOD: 15 MM/HR (ref 0–30)
GLOBULIN UR ELPH-MCNC: 1.9 GM/DL
GLUCOSE BLDC GLUCOMTR-MCNC: 141 MG/DL (ref 70–105)
GLUCOSE BLDC GLUCOMTR-MCNC: 161 MG/DL (ref 70–105)
GLUCOSE SERPL-MCNC: 178 MG/DL (ref 65–99)
HCO3 BLDA-SCNC: 24.5 MMOL/L (ref 21–28)
HCT VFR BLD AUTO: 24.3 % (ref 34–46.6)
HEMODILUTION: NO
HGB BLD-MCNC: 7.9 G/DL (ref 12–15.9)
INHALED O2 CONCENTRATION: 44 %
LARGE PLATELETS: ABNORMAL
LYMPHOCYTES # BLD MANUAL: 0.82 10*3/MM3 (ref 0.7–3.1)
LYMPHOCYTES NFR BLD MANUAL: 2 % (ref 5–12)
MAGNESIUM SERPL-MCNC: 1.9 MG/DL (ref 1.6–2.4)
MCH RBC QN AUTO: 26.4 PG (ref 26.6–33)
MCHC RBC AUTO-ENTMCNC: 32.6 G/DL (ref 31.5–35.7)
MCV RBC AUTO: 81 FL (ref 79–97)
METAMYELOCYTES NFR BLD MANUAL: 2 % (ref 0–0)
MICROCYTES BLD QL: ABNORMAL
MODALITY: ABNORMAL
MONOCYTES # BLD: 0.33 10*3/MM3 (ref 0.1–0.9)
NEUTROPHILS # BLD AUTO: 14.76 10*3/MM3 (ref 1.7–7)
NEUTROPHILS NFR BLD MANUAL: 87 % (ref 42.7–76)
NEUTS BAND NFR BLD MANUAL: 3 % (ref 0–5)
PCO2 BLDA: 53.3 MM HG (ref 35–48)
PH BLDA: 7.27 PH UNITS (ref 7.35–7.45)
PHOSPHATE SERPL-MCNC: 2.7 MG/DL (ref 2.5–4.5)
PLATELET # BLD AUTO: 102 10*3/MM3 (ref 140–450)
PMV BLD AUTO: 8.2 FL (ref 6–12)
PO2 BLDA: 69.3 MM HG (ref 83–108)
POIKILOCYTOSIS BLD QL SMEAR: ABNORMAL
POTASSIUM SERPL-SCNC: 3.6 MMOL/L (ref 3.5–5.2)
PROCALCITONIN SERPL-MCNC: 0.32 NG/ML (ref 0–0.25)
PROT SERPL-MCNC: 4.4 G/DL (ref 6–8.5)
RBC # BLD AUTO: 3 10*6/MM3 (ref 3.77–5.28)
SAO2 % BLDCOA: 90.7 % (ref 94–98)
SCAN SLIDE: NORMAL
SMALL PLATELETS BLD QL SMEAR: ABNORMAL
SODIUM SERPL-SCNC: 145 MMOL/L (ref 136–145)
VARIANT LYMPHS NFR BLD MANUAL: 5 % (ref 19.6–45.3)
WBC MORPH BLD: NORMAL
WBC NRBC COR # BLD: 16.4 10*3/MM3 (ref 3.4–10.8)

## 2022-11-15 PROCEDURE — 84100 ASSAY OF PHOSPHORUS: CPT | Performed by: INTERNAL MEDICINE

## 2022-11-15 PROCEDURE — 85007 BL SMEAR W/DIFF WBC COUNT: CPT | Performed by: INTERNAL MEDICINE

## 2022-11-15 PROCEDURE — 93005 ELECTROCARDIOGRAM TRACING: CPT | Performed by: INTERNAL MEDICINE

## 2022-11-15 PROCEDURE — 82803 BLOOD GASES ANY COMBINATION: CPT

## 2022-11-15 PROCEDURE — 86140 C-REACTIVE PROTEIN: CPT | Performed by: INTERNAL MEDICINE

## 2022-11-15 PROCEDURE — 25010000002 FUROSEMIDE PER 20 MG: Performed by: INTERNAL MEDICINE

## 2022-11-15 PROCEDURE — 36600 WITHDRAWAL OF ARTERIAL BLOOD: CPT

## 2022-11-15 PROCEDURE — 85025 COMPLETE CBC W/AUTO DIFF WBC: CPT | Performed by: INTERNAL MEDICINE

## 2022-11-15 PROCEDURE — 93010 ELECTROCARDIOGRAM REPORT: CPT | Performed by: INTERNAL MEDICINE

## 2022-11-15 PROCEDURE — 85652 RBC SED RATE AUTOMATED: CPT | Performed by: INTERNAL MEDICINE

## 2022-11-15 PROCEDURE — 63710000001 INSULIN LISPRO (HUMAN) PER 5 UNITS: Performed by: PHYSICIAN ASSISTANT

## 2022-11-15 PROCEDURE — 84145 PROCALCITONIN (PCT): CPT | Performed by: INTERNAL MEDICINE

## 2022-11-15 PROCEDURE — 99232 SBSQ HOSP IP/OBS MODERATE 35: CPT | Performed by: INTERNAL MEDICINE

## 2022-11-15 PROCEDURE — 80053 COMPREHEN METABOLIC PANEL: CPT | Performed by: INTERNAL MEDICINE

## 2022-11-15 PROCEDURE — 82962 GLUCOSE BLOOD TEST: CPT

## 2022-11-15 PROCEDURE — 83735 ASSAY OF MAGNESIUM: CPT | Performed by: PHYSICIAN ASSISTANT

## 2022-11-15 PROCEDURE — 25010000002 ENOXAPARIN PER 10 MG: Performed by: NURSE PRACTITIONER

## 2022-11-15 PROCEDURE — 63710000001 DEXAMETHASONE PER 0.25 MG: Performed by: HOSPITALIST

## 2022-11-15 RX ORDER — GUAIFENESIN 600 MG/1
1200 TABLET, EXTENDED RELEASE ORAL EVERY 12 HOURS SCHEDULED
Status: DISCONTINUED | OUTPATIENT
Start: 2022-11-15 | End: 2022-11-15

## 2022-11-15 RX ORDER — HYDROXYZINE HYDROCHLORIDE 25 MG/1
25 TABLET, FILM COATED ORAL 3 TIMES DAILY PRN
Status: DISCONTINUED | OUTPATIENT
Start: 2022-11-15 | End: 2022-11-29

## 2022-11-15 RX ORDER — GUAIFENESIN 600 MG/1
1200 TABLET, EXTENDED RELEASE ORAL EVERY 12 HOURS SCHEDULED
Status: DISCONTINUED | OUTPATIENT
Start: 2022-11-16 | End: 2022-11-23

## 2022-11-15 RX ORDER — FUROSEMIDE 40 MG/1
40 TABLET ORAL
Status: DISCONTINUED | OUTPATIENT
Start: 2022-11-15 | End: 2022-11-17

## 2022-11-15 RX ORDER — FUROSEMIDE 10 MG/ML
40 INJECTION INTRAMUSCULAR; INTRAVENOUS ONCE
Status: COMPLETED | OUTPATIENT
Start: 2022-11-15 | End: 2022-11-15

## 2022-11-15 RX ADMIN — DIPHENOXYLATE HYDROCHLORIDE AND ATROPINE SULFATE 2 TABLET: 2.5; .025 TABLET ORAL at 00:52

## 2022-11-15 RX ADMIN — Medication 81 MG: at 08:22

## 2022-11-15 RX ADMIN — LOPERAMIDE HYDROCHLORIDE 4 MG: 2 CAPSULE ORAL at 03:48

## 2022-11-15 RX ADMIN — DIPHENOXYLATE HYDROCHLORIDE AND ATROPINE SULFATE 2 TABLET: 2.5; .025 TABLET ORAL at 23:17

## 2022-11-15 RX ADMIN — PANTOPRAZOLE SODIUM 40 MG: 40 TABLET, DELAYED RELEASE ORAL at 17:43

## 2022-11-15 RX ADMIN — LOPERAMIDE HYDROCHLORIDE 4 MG: 2 CAPSULE ORAL at 08:21

## 2022-11-15 RX ADMIN — FOLIC ACID 1 MG: 1 TABLET ORAL at 08:22

## 2022-11-15 RX ADMIN — Medication 5 MG: at 21:44

## 2022-11-15 RX ADMIN — PANTOPRAZOLE SODIUM 40 MG: 40 TABLET, DELAYED RELEASE ORAL at 08:22

## 2022-11-15 RX ADMIN — ANTI-FUNGAL POWDER MICONAZOLE NITRATE TALC FREE: 1.42 POWDER TOPICAL at 08:21

## 2022-11-15 RX ADMIN — DIPHENOXYLATE HYDROCHLORIDE AND ATROPINE SULFATE 2 TABLET: 2.5; .025 TABLET ORAL at 06:15

## 2022-11-15 RX ADMIN — FUROSEMIDE 40 MG: 10 INJECTION, SOLUTION INTRAMUSCULAR; INTRAVENOUS at 08:21

## 2022-11-15 RX ADMIN — CALCIUM CARBONATE (ANTACID) CHEW TAB 500 MG 2 TABLET: 500 CHEW TAB at 08:22

## 2022-11-15 RX ADMIN — ENOXAPARIN SODIUM 40 MG: 100 INJECTION SUBCUTANEOUS at 17:44

## 2022-11-15 RX ADMIN — INSULIN LISPRO 2 UNITS: 100 INJECTION, SOLUTION INTRAVENOUS; SUBCUTANEOUS at 12:05

## 2022-11-15 RX ADMIN — MONTELUKAST 10 MG: 10 TABLET, FILM COATED ORAL at 21:44

## 2022-11-15 RX ADMIN — HYDROXYZINE HYDROCHLORIDE 25 MG: 25 TABLET, FILM COATED ORAL at 21:44

## 2022-11-15 RX ADMIN — SODIUM BICARBONATE 650 MG: 650 TABLET ORAL at 08:21

## 2022-11-15 RX ADMIN — DEXAMETHASONE 4 MG: 4 TABLET ORAL at 08:21

## 2022-11-15 RX ADMIN — ATORVASTATIN CALCIUM 40 MG: 40 TABLET, FILM COATED ORAL at 21:44

## 2022-11-15 RX ADMIN — LOPERAMIDE HYDROCHLORIDE 4 MG: 2 CAPSULE ORAL at 17:43

## 2022-11-15 RX ADMIN — ANTI-FUNGAL POWDER MICONAZOLE NITRATE TALC FREE 1 APPLICATION: 1.42 POWDER TOPICAL at 21:45

## 2022-11-15 RX ADMIN — Medication 10 ML: at 21:45

## 2022-11-15 RX ADMIN — SODIUM BICARBONATE 650 MG: 650 TABLET ORAL at 21:44

## 2022-11-15 RX ADMIN — DIPHENOXYLATE HYDROCHLORIDE AND ATROPINE SULFATE 2 TABLET: 2.5; .025 TABLET ORAL at 17:43

## 2022-11-15 RX ADMIN — Medication 10 ML: at 08:40

## 2022-11-15 RX ADMIN — LOPERAMIDE HYDROCHLORIDE 4 MG: 2 CAPSULE ORAL at 21:44

## 2022-11-15 RX ADMIN — SODIUM BICARBONATE 650 MG: 650 TABLET ORAL at 17:43

## 2022-11-15 RX ADMIN — LEVOTHYROXINE SODIUM 88 MCG: 0.09 TABLET ORAL at 06:14

## 2022-11-15 RX ADMIN — CALCIUM CARBONATE (ANTACID) CHEW TAB 500 MG 2 TABLET: 500 CHEW TAB at 17:43

## 2022-11-15 RX ADMIN — FUROSEMIDE 40 MG: 40 TABLET ORAL at 17:43

## 2022-11-15 RX ADMIN — DIPHENOXYLATE HYDROCHLORIDE AND ATROPINE SULFATE 2 TABLET: 2.5; .025 TABLET ORAL at 12:05

## 2022-11-16 ENCOUNTER — APPOINTMENT (OUTPATIENT)
Dept: GENERAL RADIOLOGY | Facility: HOSPITAL | Age: 72
End: 2022-11-16

## 2022-11-16 PROBLEM — R65.20 SEVERE SEPSIS: Status: ACTIVE | Noted: 2022-11-16

## 2022-11-16 PROBLEM — A41.9 SEVERE SEPSIS: Status: ACTIVE | Noted: 2022-11-16

## 2022-11-16 LAB
ACANTHOCYTES BLD QL SMEAR: ABNORMAL
ALBUMIN FLD-MCNC: 1.2 G/DL
ALBUMIN SERPL-MCNC: 2.3 G/DL (ref 3.5–5.2)
ALBUMIN/GLOB SERPL: 1.1 G/DL
ALP SERPL-CCNC: 144 U/L (ref 39–117)
ALT SERPL W P-5'-P-CCNC: 38 U/L (ref 1–33)
ANION GAP SERPL CALCULATED.3IONS-SCNC: 10 MMOL/L (ref 5–15)
ANISOCYTOSIS BLD QL: ABNORMAL
APPEARANCE FLD: ABNORMAL
ARTERIAL PATENCY WRIST A: ABNORMAL
ARTERIAL PATENCY WRIST A: POSITIVE
ARTERIAL PATENCY WRIST A: POSITIVE
AST SERPL-CCNC: 71 U/L (ref 1–32)
ATMOSPHERIC PRESS: ABNORMAL MM[HG]
BASE EXCESS BLDA CALC-SCNC: -1.7 MMOL/L (ref 0–3)
BASE EXCESS BLDA CALC-SCNC: -1.8 MMOL/L (ref 0–3)
BASE EXCESS BLDA CALC-SCNC: -2 MMOL/L (ref 0–3)
BDY SITE: ABNORMAL
BILIRUB SERPL-MCNC: 0.5 MG/DL (ref 0–1.2)
BUN SERPL-MCNC: 21 MG/DL (ref 8–23)
BUN/CREAT SERPL: 16.9 (ref 7–25)
CA-I BLDA-SCNC: 0.97 MMOL/L (ref 1.15–1.33)
CALCIUM SPEC-SCNC: 6.1 MG/DL (ref 8.6–10.5)
CHLORIDE SERPL-SCNC: 108 MMOL/L (ref 98–107)
CO2 BLDA-SCNC: 26.4 MMOL/L (ref 22–29)
CO2 BLDA-SCNC: 26.6 MMOL/L (ref 22–29)
CO2 SERPL-SCNC: 26 MMOL/L (ref 22–29)
COLOR FLD: ABNORMAL
CREAT SERPL-MCNC: 1.24 MG/DL (ref 0.57–1)
CRP SERPL-MCNC: 7.37 MG/DL (ref 0–0.5)
D-LACTATE SERPL-SCNC: 0.8 MMOL/L (ref 0.5–2)
DEPRECATED RDW RBC AUTO: 56.9 FL (ref 37–54)
EGFRCR SERPLBLD CKD-EPI 2021: 46.9 ML/MIN/1.73
ERYTHROCYTE [DISTWIDTH] IN BLOOD BY AUTOMATED COUNT: 20.2 % (ref 12.3–15.4)
ERYTHROCYTE [SEDIMENTATION RATE] IN BLOOD: 17 MM/HR (ref 0–30)
GLOBULIN UR ELPH-MCNC: 2.1 GM/DL
GLUCOSE BLDC GLUCOMTR-MCNC: 132 MG/DL (ref 70–105)
GLUCOSE BLDC GLUCOMTR-MCNC: 145 MG/DL (ref 70–105)
GLUCOSE BLDC GLUCOMTR-MCNC: 153 MG/DL (ref 70–105)
GLUCOSE BLDC GLUCOMTR-MCNC: 159 MG/DL (ref 70–105)
GLUCOSE BLDC GLUCOMTR-MCNC: 162 MG/DL (ref 74–100)
GLUCOSE BLDC GLUCOMTR-MCNC: 162 MG/DL (ref 74–100)
GLUCOSE FLD-MCNC: 147 MG/DL
GLUCOSE SERPL-MCNC: 150 MG/DL (ref 65–99)
HCO3 BLDA-SCNC: 24.8 MMOL/L (ref 21–28)
HCO3 BLDA-SCNC: 25 MMOL/L (ref 21–28)
HCO3 BLDA-SCNC: 25.9 MMOL/L (ref 21–28)
HCT VFR BLD AUTO: 24.2 % (ref 34–46.6)
HCT VFR BLDA CALC: 24 % (ref 38–51)
HEMODILUTION: NO
HGB BLD-MCNC: 7.6 G/DL (ref 12–15.9)
HGB BLDA-MCNC: 8.3 G/DL (ref 12–17)
INHALED O2 CONCENTRATION: 100 %
INHALED O2 CONCENTRATION: 80 %
INHALED O2 CONCENTRATION: 98 %
LARGE PLATELETS: ABNORMAL
LDH FLD-CCNC: 254 U/L
LYMPHOCYTES # BLD MANUAL: 0.37 10*3/MM3 (ref 0.7–3.1)
LYMPHOCYTES NFR BLD MANUAL: 1 % (ref 5–12)
LYMPHOCYTES NFR FLD MANUAL: 18 %
MAGNESIUM SERPL-MCNC: 1.8 MG/DL (ref 1.6–2.4)
MCH RBC QN AUTO: 26 PG (ref 26.6–33)
MCHC RBC AUTO-ENTMCNC: 31.5 G/DL (ref 31.5–35.7)
MCV RBC AUTO: 82.4 FL (ref 79–97)
MESOTHL CELL NFR FLD MANUAL: 1 %
METAMYELOCYTES NFR BLD MANUAL: 4 % (ref 0–0)
MICROCYTES BLD QL: ABNORMAL
MODALITY: ABNORMAL
MONOCYTES # BLD: 0.18 10*3/MM3 (ref 0.1–0.9)
MONOCYTES NFR FLD: 13 %
NEUTROPHILS # BLD AUTO: 17.02 10*3/MM3 (ref 1.7–7)
NEUTROPHILS NFR BLD MANUAL: 85 % (ref 42.7–76)
NEUTROPHILS NFR FLD MANUAL: 68 %
NEUTS BAND NFR BLD MANUAL: 8 % (ref 0–5)
NRBC SPEC MANUAL: 2 /100 WBC (ref 0–0.2)
NT-PROBNP SERPL-MCNC: 870.6 PG/ML (ref 0–900)
NUC CELL # FLD: 290 /MM3
PCO2 BLDA: 51.2 MM HG (ref 35–48)
PCO2 BLDA: 53.7 MM HG (ref 35–48)
PCO2 BLDA: 62.4 MM HG (ref 35–48)
PEEP RESPIRATORY: 5 CM[H2O]
PH BLDA: 7.23 PH UNITS (ref 7.35–7.45)
PH BLDA: 7.28 PH UNITS (ref 7.35–7.45)
PH BLDA: 7.29 PH UNITS (ref 7.35–7.45)
PHOSPHATE SERPL-MCNC: 3 MG/DL (ref 2.5–4.5)
PLATELET # BLD AUTO: 107 10*3/MM3 (ref 140–450)
PMV BLD AUTO: 8.8 FL (ref 6–12)
PO2 BLDA: 332.6 MM HG (ref 83–108)
PO2 BLDA: 49.2 MM HG (ref 83–108)
PO2 BLDA: 74.6 MM HG (ref 83–108)
POIKILOCYTOSIS BLD QL SMEAR: ABNORMAL
POTASSIUM BLDA-SCNC: 3.2 MMOL/L (ref 3.5–4.5)
POTASSIUM SERPL-SCNC: 3.3 MMOL/L (ref 3.5–5.2)
PROT FLD-MCNC: 1.6 G/DL
PROT SERPL-MCNC: 4.4 G/DL (ref 6–8.5)
RBC # BLD AUTO: 2.94 10*6/MM3 (ref 3.77–5.28)
RESPIRATORY RATE: 24
RESPIRATORY RATE: 24
SAO2 % BLDCOA: 75.6 % (ref 94–98)
SAO2 % BLDCOA: 92.4 % (ref 94–98)
SAO2 % BLDCOA: 99.9 % (ref 94–98)
SCAN SLIDE: NORMAL
SMALL PLATELETS BLD QL SMEAR: ABNORMAL
SODIUM BLD-SCNC: 143 MMOL/L (ref 138–146)
SODIUM SERPL-SCNC: 144 MMOL/L (ref 136–145)
T4 FREE SERPL-MCNC: 0.82 NG/DL (ref 0.93–1.7)
TSH SERPL DL<=0.05 MIU/L-ACNC: 3.09 UIU/ML (ref 0.27–4.2)
VANCOMYCIN SERPL-MCNC: 15.6 MCG/ML (ref 5–40)
VARIANT LYMPHS NFR BLD MANUAL: 2 % (ref 19.6–45.3)
VENTILATOR MODE: ABNORMAL
VT ON VENT VENT: 450 ML
VT ON VENT VENT: 450 ML
WBC MORPH BLD: NORMAL
WBC NRBC COR # BLD: 18.3 10*3/MM3 (ref 3.4–10.8)

## 2022-11-16 PROCEDURE — 71045 X-RAY EXAM CHEST 1 VIEW: CPT

## 2022-11-16 PROCEDURE — 94664 DEMO&/EVAL PT USE INHALER: CPT

## 2022-11-16 PROCEDURE — 25010000002 CALCIUM GLUCONATE-NACL 1-0.675 GM/50ML-% SOLUTION: Performed by: NURSE PRACTITIONER

## 2022-11-16 PROCEDURE — 25010000002 ENOXAPARIN PER 10 MG: Performed by: NURSE PRACTITIONER

## 2022-11-16 PROCEDURE — 83735 ASSAY OF MAGNESIUM: CPT | Performed by: PHYSICIAN ASSISTANT

## 2022-11-16 PROCEDURE — 88108 CYTOPATH CONCENTRATE TECH: CPT | Performed by: INTERNAL MEDICINE

## 2022-11-16 PROCEDURE — 84100 ASSAY OF PHOSPHORUS: CPT | Performed by: INTERNAL MEDICINE

## 2022-11-16 PROCEDURE — 94799 UNLISTED PULMONARY SVC/PX: CPT

## 2022-11-16 PROCEDURE — 36600 WITHDRAWAL OF ARTERIAL BLOOD: CPT

## 2022-11-16 PROCEDURE — 80051 ELECTROLYTE PANEL: CPT

## 2022-11-16 PROCEDURE — P9041 ALBUMIN (HUMAN),5%, 50ML: HCPCS | Performed by: NURSE PRACTITIONER

## 2022-11-16 PROCEDURE — 87075 CULTR BACTERIA EXCEPT BLOOD: CPT | Performed by: INTERNAL MEDICINE

## 2022-11-16 PROCEDURE — 83615 LACTATE (LD) (LDH) ENZYME: CPT | Performed by: INTERNAL MEDICINE

## 2022-11-16 PROCEDURE — 84157 ASSAY OF PROTEIN OTHER: CPT | Performed by: INTERNAL MEDICINE

## 2022-11-16 PROCEDURE — 85652 RBC SED RATE AUTOMATED: CPT | Performed by: INTERNAL MEDICINE

## 2022-11-16 PROCEDURE — 84443 ASSAY THYROID STIM HORMONE: CPT | Performed by: NURSE PRACTITIONER

## 2022-11-16 PROCEDURE — 89051 BODY FLUID CELL COUNT: CPT | Performed by: INTERNAL MEDICINE

## 2022-11-16 PROCEDURE — 87205 SMEAR GRAM STAIN: CPT | Performed by: INTERNAL MEDICINE

## 2022-11-16 PROCEDURE — 82803 BLOOD GASES ANY COMBINATION: CPT

## 2022-11-16 PROCEDURE — 82962 GLUCOSE BLOOD TEST: CPT

## 2022-11-16 PROCEDURE — 99222 1ST HOSP IP/OBS MODERATE 55: CPT | Performed by: NURSE PRACTITIONER

## 2022-11-16 PROCEDURE — 83605 ASSAY OF LACTIC ACID: CPT

## 2022-11-16 PROCEDURE — 87070 CULTURE OTHR SPECIMN AEROBIC: CPT | Performed by: INTERNAL MEDICINE

## 2022-11-16 PROCEDURE — 87040 BLOOD CULTURE FOR BACTERIA: CPT | Performed by: INTERNAL MEDICINE

## 2022-11-16 PROCEDURE — 82945 GLUCOSE OTHER FLUID: CPT | Performed by: INTERNAL MEDICINE

## 2022-11-16 PROCEDURE — 80202 ASSAY OF VANCOMYCIN: CPT | Performed by: INTERNAL MEDICINE

## 2022-11-16 PROCEDURE — 82042 OTHER SOURCE ALBUMIN QUAN EA: CPT | Performed by: INTERNAL MEDICINE

## 2022-11-16 PROCEDURE — 85018 HEMOGLOBIN: CPT

## 2022-11-16 PROCEDURE — 82330 ASSAY OF CALCIUM: CPT

## 2022-11-16 PROCEDURE — 80053 COMPREHEN METABOLIC PANEL: CPT | Performed by: INTERNAL MEDICINE

## 2022-11-16 PROCEDURE — 94660 CPAP INITIATION&MGMT: CPT

## 2022-11-16 PROCEDURE — 94761 N-INVAS EAR/PLS OXIMETRY MLT: CPT

## 2022-11-16 PROCEDURE — 25010000002 ALBUMIN HUMAN 5% PER 50 ML: Performed by: NURSE PRACTITIONER

## 2022-11-16 PROCEDURE — 25010000002 CEFEPIME PER 500 MG: Performed by: INTERNAL MEDICINE

## 2022-11-16 PROCEDURE — 83880 ASSAY OF NATRIURETIC PEPTIDE: CPT | Performed by: NURSE PRACTITIONER

## 2022-11-16 PROCEDURE — 25010000002 VANCOMYCIN HCL 1.25 G RECONSTITUTED SOLUTION 1 EACH VIAL: Performed by: INTERNAL MEDICINE

## 2022-11-16 PROCEDURE — 84439 ASSAY OF FREE THYROXINE: CPT | Performed by: NURSE PRACTITIONER

## 2022-11-16 PROCEDURE — 85025 COMPLETE CBC W/AUTO DIFF WBC: CPT | Performed by: INTERNAL MEDICINE

## 2022-11-16 PROCEDURE — 25010000002 DOPAMINE PER 40 MG

## 2022-11-16 PROCEDURE — 25010000002 PIPERACILLIN SOD-TAZOBACTAM PER 1 G: Performed by: NURSE PRACTITIONER

## 2022-11-16 PROCEDURE — 85007 BL SMEAR W/DIFF WBC COUNT: CPT | Performed by: INTERNAL MEDICINE

## 2022-11-16 PROCEDURE — 25010000002 HYDROCORTISONE SOD SUC (PF) 100 MG RECONSTITUTED SOLUTION: Performed by: INTERNAL MEDICINE

## 2022-11-16 PROCEDURE — 86140 C-REACTIVE PROTEIN: CPT | Performed by: INTERNAL MEDICINE

## 2022-11-16 PROCEDURE — 0W9B30Z DRAINAGE OF LEFT PLEURAL CAVITY WITH DRAINAGE DEVICE, PERCUTANEOUS APPROACH: ICD-10-PCS | Performed by: INTERNAL MEDICINE

## 2022-11-16 PROCEDURE — 25010000002 FUROSEMIDE PER 20 MG: Performed by: NURSE PRACTITIONER

## 2022-11-16 PROCEDURE — 99232 SBSQ HOSP IP/OBS MODERATE 35: CPT | Performed by: INTERNAL MEDICINE

## 2022-11-16 RX ORDER — PANTOPRAZOLE SODIUM 40 MG/10ML
40 INJECTION, POWDER, LYOPHILIZED, FOR SOLUTION INTRAVENOUS
Status: DISCONTINUED | OUTPATIENT
Start: 2022-11-17 | End: 2022-11-19

## 2022-11-16 RX ORDER — NOREPINEPHRINE BIT/0.9 % NACL 8 MG/250ML
.02-.5 INFUSION BOTTLE (ML) INTRAVENOUS
Status: DISCONTINUED | OUTPATIENT
Start: 2022-11-16 | End: 2022-11-23

## 2022-11-16 RX ORDER — CALCIUM GLUCONATE 20 MG/ML
1 INJECTION, SOLUTION INTRAVENOUS ONCE
Status: COMPLETED | OUTPATIENT
Start: 2022-11-16 | End: 2022-11-16

## 2022-11-16 RX ORDER — POTASSIUM CHLORIDE 20 MEQ/1
40 TABLET, EXTENDED RELEASE ORAL ONCE
Status: DISCONTINUED | OUTPATIENT
Start: 2022-11-16 | End: 2022-11-16

## 2022-11-16 RX ORDER — DOPAMINE HYDROCHLORIDE 160 MG/100ML
INJECTION, SOLUTION INTRAVENOUS
Status: COMPLETED
Start: 2022-11-16 | End: 2022-11-16

## 2022-11-16 RX ORDER — MIDODRINE HYDROCHLORIDE 5 MG/1
10 TABLET ORAL
Status: DISCONTINUED | OUTPATIENT
Start: 2022-11-16 | End: 2022-11-17

## 2022-11-16 RX ORDER — DOPAMINE HYDROCHLORIDE 160 MG/100ML
2-20 INJECTION, SOLUTION INTRAVENOUS
Status: DISCONTINUED | OUTPATIENT
Start: 2022-11-16 | End: 2022-11-17

## 2022-11-16 RX ORDER — POTASSIUM CHLORIDE 20 MEQ/1
20 TABLET, EXTENDED RELEASE ORAL ONCE
Status: DISCONTINUED | OUTPATIENT
Start: 2022-11-16 | End: 2022-11-17

## 2022-11-16 RX ORDER — FUROSEMIDE 10 MG/ML
40 INJECTION INTRAMUSCULAR; INTRAVENOUS ONCE
Status: COMPLETED | OUTPATIENT
Start: 2022-11-16 | End: 2022-11-16

## 2022-11-16 RX ORDER — ALBUMIN, HUMAN INJ 5% 5 %
500 SOLUTION INTRAVENOUS ONCE
Status: COMPLETED | OUTPATIENT
Start: 2022-11-16 | End: 2022-11-16

## 2022-11-16 RX ADMIN — HYDROCORTISONE SODIUM SUCCINATE 100 MG: 100 INJECTION, POWDER, FOR SOLUTION INTRAMUSCULAR; INTRAVENOUS at 18:31

## 2022-11-16 RX ADMIN — ALBUTEROL SULFATE 2 PUFF: 108 INHALANT RESPIRATORY (INHALATION) at 05:17

## 2022-11-16 RX ADMIN — LEVOTHYROXINE SODIUM 88 MCG: 0.09 TABLET ORAL at 05:48

## 2022-11-16 RX ADMIN — CEFEPIME 2 G: 2 INJECTION, POWDER, FOR SOLUTION INTRAVENOUS at 13:52

## 2022-11-16 RX ADMIN — ENOXAPARIN SODIUM 40 MG: 100 INJECTION SUBCUTANEOUS at 21:59

## 2022-11-16 RX ADMIN — CALCIUM GLUCONATE 1 G: 20 INJECTION, SOLUTION INTRAVENOUS at 01:53

## 2022-11-16 RX ADMIN — POTASSIUM CHLORIDE 40 MEQ: 1.5 POWDER, FOR SOLUTION ORAL at 03:49

## 2022-11-16 RX ADMIN — ANTI-FUNGAL POWDER MICONAZOLE NITRATE TALC FREE: 1.42 POWDER TOPICAL at 11:31

## 2022-11-16 RX ADMIN — PANTOPRAZOLE SODIUM 40 MG: 40 TABLET, DELAYED RELEASE ORAL at 05:48

## 2022-11-16 RX ADMIN — GUAIFENESIN 1200 MG: 600 TABLET, EXTENDED RELEASE ORAL at 21:57

## 2022-11-16 RX ADMIN — Medication 0.02 MCG/KG/MIN: at 19:47

## 2022-11-16 RX ADMIN — HYDROCORTISONE SODIUM SUCCINATE 100 MG: 100 INJECTION, POWDER, FOR SOLUTION INTRAMUSCULAR; INTRAVENOUS at 05:48

## 2022-11-16 RX ADMIN — DOPAMINE HYDROCHLORIDE IN DEXTROSE 2.5 MCG/KG/MIN: 1.6 INJECTION, SOLUTION INTRAVENOUS at 08:30

## 2022-11-16 RX ADMIN — ALBUTEROL SULFATE 2 PUFF: 108 INHALANT RESPIRATORY (INHALATION) at 07:55

## 2022-11-16 RX ADMIN — DOPAMINE HYDROCHLORIDE 2.5 MCG/KG/MIN: 160 INJECTION, SOLUTION INTRAVENOUS at 08:30

## 2022-11-16 RX ADMIN — SODIUM BICARBONATE 650 MG: 650 TABLET ORAL at 21:57

## 2022-11-16 RX ADMIN — MIDODRINE HYDROCHLORIDE 10 MG: 5 TABLET ORAL at 07:51

## 2022-11-16 RX ADMIN — HYDROCORTISONE SODIUM SUCCINATE 100 MG: 100 INJECTION, POWDER, FOR SOLUTION INTRAMUSCULAR; INTRAVENOUS at 14:30

## 2022-11-16 RX ADMIN — Medication 10 ML: at 21:57

## 2022-11-16 RX ADMIN — HYDROCORTISONE SODIUM SUCCINATE 100 MG: 100 INJECTION, POWDER, FOR SOLUTION INTRAMUSCULAR; INTRAVENOUS at 22:00

## 2022-11-16 RX ADMIN — ALBUMIN (HUMAN) 500 ML: 12.5 INJECTION, SOLUTION INTRAVENOUS at 03:49

## 2022-11-16 RX ADMIN — MONTELUKAST 10 MG: 10 TABLET, FILM COATED ORAL at 21:57

## 2022-11-16 RX ADMIN — VANCOMYCIN HYDROCHLORIDE 1250 MG: 1.25 INJECTION, POWDER, LYOPHILIZED, FOR SOLUTION INTRAVENOUS at 11:33

## 2022-11-16 RX ADMIN — ENOXAPARIN SODIUM 40 MG: 100 INJECTION SUBCUTANEOUS at 18:31

## 2022-11-16 RX ADMIN — SODIUM CHLORIDE 1365 ML: 0.9 INJECTION, SOLUTION INTRAVENOUS at 04:44

## 2022-11-16 RX ADMIN — FUROSEMIDE 40 MG: 10 INJECTION, SOLUTION INTRAMUSCULAR; INTRAVENOUS at 05:09

## 2022-11-16 RX ADMIN — ANTI-FUNGAL POWDER MICONAZOLE NITRATE TALC FREE: 1.42 POWDER TOPICAL at 22:00

## 2022-11-16 RX ADMIN — PIPERACILLIN AND TAZOBACTAM 3.38 G: 3; .375 INJECTION, POWDER, FOR SOLUTION INTRAVENOUS at 03:55

## 2022-11-16 RX ADMIN — ATORVASTATIN CALCIUM 40 MG: 40 TABLET, FILM COATED ORAL at 21:57

## 2022-11-16 RX ADMIN — Medication 10 ML: at 11:33

## 2022-11-17 ENCOUNTER — APPOINTMENT (OUTPATIENT)
Dept: GENERAL RADIOLOGY | Facility: HOSPITAL | Age: 72
End: 2022-11-17

## 2022-11-17 LAB
ABO GROUP BLD: NORMAL
ACANTHOCYTES BLD QL SMEAR: ABNORMAL
ALBUMIN SERPL-MCNC: 2.5 G/DL (ref 3.5–5.2)
ALBUMIN SERPL-MCNC: 2.6 G/DL (ref 3.5–5.2)
ALBUMIN/GLOB SERPL: 1.4 G/DL
ALP SERPL-CCNC: 187 U/L (ref 39–117)
ALT SERPL W P-5'-P-CCNC: 37 U/L (ref 1–33)
ANION GAP SERPL CALCULATED.3IONS-SCNC: 11 MMOL/L (ref 5–15)
ANION GAP SERPL CALCULATED.3IONS-SCNC: 12 MMOL/L (ref 5–15)
ANISOCYTOSIS BLD QL: ABNORMAL
ARTERIAL PATENCY WRIST A: POSITIVE
ARTERIAL PATENCY WRIST A: POSITIVE
AST SERPL-CCNC: 74 U/L (ref 1–32)
ATMOSPHERIC PRESS: ABNORMAL MM[HG]
ATMOSPHERIC PRESS: ABNORMAL MM[HG]
BASE EXCESS BLDA CALC-SCNC: -2.7 MMOL/L (ref 0–3)
BASE EXCESS BLDA CALC-SCNC: 3.9 MMOL/L (ref 0–3)
BDY SITE: ABNORMAL
BDY SITE: ABNORMAL
BILIRUB SERPL-MCNC: 0.8 MG/DL (ref 0–1.2)
BLD GP AB SCN SERPL QL: NEGATIVE
BUN SERPL-MCNC: 23 MG/DL (ref 8–23)
BUN SERPL-MCNC: 27 MG/DL (ref 8–23)
BUN/CREAT SERPL: 17.4 (ref 7–25)
BUN/CREAT SERPL: 19.9 (ref 7–25)
BURR CELLS BLD QL SMEAR: ABNORMAL
C3 FRG RBC-MCNC: ABNORMAL
CALCIUM SPEC-SCNC: 6 MG/DL (ref 8.6–10.5)
CALCIUM SPEC-SCNC: 6.6 MG/DL (ref 8.6–10.5)
CHLORIDE SERPL-SCNC: 113 MMOL/L (ref 98–107)
CHLORIDE SERPL-SCNC: 114 MMOL/L (ref 98–107)
CO2 BLDA-SCNC: 25.4 MMOL/L (ref 22–29)
CO2 BLDA-SCNC: 32.3 MMOL/L (ref 22–29)
CO2 SERPL-SCNC: 23 MMOL/L (ref 22–29)
CO2 SERPL-SCNC: 24 MMOL/L (ref 22–29)
CREAT SERPL-MCNC: 1.32 MG/DL (ref 0.57–1)
CREAT SERPL-MCNC: 1.36 MG/DL (ref 0.57–1)
DEPRECATED RDW RBC AUTO: 59.5 FL (ref 37–54)
EGFRCR SERPLBLD CKD-EPI 2021: 42 ML/MIN/1.73
EGFRCR SERPLBLD CKD-EPI 2021: 43.5 ML/MIN/1.73
ERYTHROCYTE [DISTWIDTH] IN BLOOD BY AUTOMATED COUNT: 20.8 % (ref 12.3–15.4)
GLOBULIN UR ELPH-MCNC: 1.9 GM/DL
GLUCOSE BLDC GLUCOMTR-MCNC: 131 MG/DL (ref 70–105)
GLUCOSE BLDC GLUCOMTR-MCNC: 147 MG/DL (ref 70–105)
GLUCOSE BLDC GLUCOMTR-MCNC: 147 MG/DL (ref 70–105)
GLUCOSE BLDC GLUCOMTR-MCNC: 157 MG/DL (ref 70–105)
GLUCOSE BLDC GLUCOMTR-MCNC: 171 MG/DL (ref 70–105)
GLUCOSE SERPL-MCNC: 167 MG/DL (ref 65–99)
GLUCOSE SERPL-MCNC: 177 MG/DL (ref 65–99)
HCO3 BLDA-SCNC: 23.9 MMOL/L (ref 21–28)
HCO3 BLDA-SCNC: 30.7 MMOL/L (ref 21–28)
HCT VFR BLD AUTO: 22.2 % (ref 34–46.6)
HCT VFR BLD AUTO: 26.4 % (ref 34–46.6)
HEMODILUTION: NO
HEMODILUTION: NO
HGB BLD-MCNC: 6.9 G/DL (ref 12–15.9)
HGB BLD-MCNC: 8.5 G/DL (ref 12–15.9)
INHALED O2 CONCENTRATION: 100 %
INHALED O2 CONCENTRATION: 40 %
LYMPHOCYTES # BLD MANUAL: 0.76 10*3/MM3 (ref 0.7–3.1)
LYMPHOCYTES NFR BLD MANUAL: 2 % (ref 5–12)
MAGNESIUM SERPL-MCNC: 1.7 MG/DL (ref 1.6–2.4)
MCH RBC QN AUTO: 25.9 PG (ref 26.6–33)
MCHC RBC AUTO-ENTMCNC: 31 G/DL (ref 31.5–35.7)
MCV RBC AUTO: 83.6 FL (ref 79–97)
METAMYELOCYTES NFR BLD MANUAL: 3 % (ref 0–0)
MODALITY: ABNORMAL
MODALITY: ABNORMAL
MONOCYTES # BLD: 0.5 10*3/MM3 (ref 0.1–0.9)
MYELOCYTES NFR BLD MANUAL: 3 % (ref 0–0)
NEUTROPHILS # BLD AUTO: 22.43 10*3/MM3 (ref 1.7–7)
NEUTROPHILS NFR BLD MANUAL: 83 % (ref 42.7–76)
NEUTS BAND NFR BLD MANUAL: 6 % (ref 0–5)
PCO2 BLDA: 50.3 MM HG (ref 35–48)
PCO2 BLDA: 52.5 MM HG (ref 35–48)
PEEP RESPIRATORY: 5 CM[H2O]
PH BLDA: 7.29 PH UNITS (ref 7.35–7.45)
PH BLDA: 7.38 PH UNITS (ref 7.35–7.45)
PHOSPHATE SERPL-MCNC: 2.9 MG/DL (ref 2.5–4.5)
PHOSPHATE SERPL-MCNC: 3.2 MG/DL (ref 2.5–4.5)
PLATELET # BLD AUTO: 120 10*3/MM3 (ref 140–450)
PMV BLD AUTO: 9.2 FL (ref 6–12)
PO2 BLDA: 65.6 MM HG (ref 83–108)
PO2 BLDA: 89.2 MM HG (ref 83–108)
POIKILOCYTOSIS BLD QL SMEAR: ABNORMAL
POTASSIUM SERPL-SCNC: 3.5 MMOL/L (ref 3.5–5.2)
POTASSIUM SERPL-SCNC: 3.5 MMOL/L (ref 3.5–5.2)
PROT SERPL-MCNC: 4.5 G/DL (ref 6–8.5)
RBC # BLD AUTO: 2.66 10*6/MM3 (ref 3.77–5.28)
RESPIRATORY RATE: 28
RH BLD: POSITIVE
SAO2 % BLDCOA: 91.6 % (ref 94–98)
SAO2 % BLDCOA: 95.5 % (ref 94–98)
SCAN SLIDE: NORMAL
SMALL PLATELETS BLD QL SMEAR: ABNORMAL
SODIUM SERPL-SCNC: 148 MMOL/L (ref 136–145)
SODIUM SERPL-SCNC: 149 MMOL/L (ref 136–145)
T&S EXPIRATION DATE: NORMAL
TOTAL RATE: 28 BREATHS/MINUTE
VARIANT LYMPHS NFR BLD MANUAL: 3 % (ref 19.6–45.3)
VENTILATOR MODE: ABNORMAL
VT ON VENT VENT: 550 ML
WBC MORPH BLD: NORMAL
WBC NRBC COR # BLD: 25.2 10*3/MM3 (ref 3.4–10.8)

## 2022-11-17 PROCEDURE — 71045 X-RAY EXAM CHEST 1 VIEW: CPT

## 2022-11-17 PROCEDURE — 36430 TRANSFUSION BLD/BLD COMPNT: CPT

## 2022-11-17 PROCEDURE — 84100 ASSAY OF PHOSPHORUS: CPT | Performed by: INTERNAL MEDICINE

## 2022-11-17 PROCEDURE — 63710000001 INSULIN LISPRO (HUMAN) PER 5 UNITS: Performed by: PHYSICIAN ASSISTANT

## 2022-11-17 PROCEDURE — 85018 HEMOGLOBIN: CPT | Performed by: INTERNAL MEDICINE

## 2022-11-17 PROCEDURE — 86901 BLOOD TYPING SEROLOGIC RH(D): CPT | Performed by: NURSE PRACTITIONER

## 2022-11-17 PROCEDURE — 94660 CPAP INITIATION&MGMT: CPT

## 2022-11-17 PROCEDURE — 85007 BL SMEAR W/DIFF WBC COUNT: CPT | Performed by: INTERNAL MEDICINE

## 2022-11-17 PROCEDURE — 25010000002 CEFEPIME PER 500 MG: Performed by: INTERNAL MEDICINE

## 2022-11-17 PROCEDURE — 25010000002 HYDROCORTISONE SOD SUC (PF) 100 MG RECONSTITUTED SOLUTION: Performed by: INTERNAL MEDICINE

## 2022-11-17 PROCEDURE — 83735 ASSAY OF MAGNESIUM: CPT | Performed by: PHYSICIAN ASSISTANT

## 2022-11-17 PROCEDURE — 86900 BLOOD TYPING SEROLOGIC ABO: CPT | Performed by: NURSE PRACTITIONER

## 2022-11-17 PROCEDURE — 94799 UNLISTED PULMONARY SVC/PX: CPT

## 2022-11-17 PROCEDURE — 25010000002 MEROPENEM PER 100 MG: Performed by: INTERNAL MEDICINE

## 2022-11-17 PROCEDURE — 25010000002 CALCIUM GLUCONATE 2-0.675 GM/100ML-% SOLUTION: Performed by: NURSE PRACTITIONER

## 2022-11-17 PROCEDURE — 86923 COMPATIBILITY TEST ELECTRIC: CPT

## 2022-11-17 PROCEDURE — 80053 COMPREHEN METABOLIC PANEL: CPT | Performed by: INTERNAL MEDICINE

## 2022-11-17 PROCEDURE — 85025 COMPLETE CBC W/AUTO DIFF WBC: CPT | Performed by: INTERNAL MEDICINE

## 2022-11-17 PROCEDURE — 92610 EVALUATE SWALLOWING FUNCTION: CPT

## 2022-11-17 PROCEDURE — 36600 WITHDRAWAL OF ARTERIAL BLOOD: CPT

## 2022-11-17 PROCEDURE — 82803 BLOOD GASES ANY COMBINATION: CPT

## 2022-11-17 PROCEDURE — 85014 HEMATOCRIT: CPT | Performed by: INTERNAL MEDICINE

## 2022-11-17 PROCEDURE — 25010000002 ENOXAPARIN PER 10 MG: Performed by: NURSE PRACTITIONER

## 2022-11-17 PROCEDURE — 25010000002 CHLOROTHIAZIDE PER 500 MG: Performed by: NURSE PRACTITIONER

## 2022-11-17 PROCEDURE — 94761 N-INVAS EAR/PLS OXIMETRY MLT: CPT

## 2022-11-17 PROCEDURE — 82962 GLUCOSE BLOOD TEST: CPT

## 2022-11-17 PROCEDURE — 86900 BLOOD TYPING SEROLOGIC ABO: CPT

## 2022-11-17 PROCEDURE — P9016 RBC LEUKOCYTES REDUCED: HCPCS

## 2022-11-17 PROCEDURE — 25010000002 VANCOMYCIN HCL 1.25 G RECONSTITUTED SOLUTION 1 EACH VIAL: Performed by: INTERNAL MEDICINE

## 2022-11-17 PROCEDURE — 25010000002 MAGNESIUM SULFATE IN D5W 1G/100ML (PREMIX) 1-5 GM/100ML-% SOLUTION: Performed by: PHYSICIAN ASSISTANT

## 2022-11-17 PROCEDURE — 86850 RBC ANTIBODY SCREEN: CPT | Performed by: NURSE PRACTITIONER

## 2022-11-17 RX ORDER — MIDODRINE HYDROCHLORIDE 5 MG/1
10 TABLET ORAL EVERY 8 HOURS SCHEDULED
Status: DISCONTINUED | OUTPATIENT
Start: 2022-11-18 | End: 2022-11-23

## 2022-11-17 RX ORDER — FUROSEMIDE 10 MG/ML
40 INJECTION INTRAMUSCULAR; INTRAVENOUS ONCE
Status: DISCONTINUED | OUTPATIENT
Start: 2022-11-17 | End: 2022-11-17

## 2022-11-17 RX ORDER — LOPERAMIDE HYDROCHLORIDE 2 MG/1
4 CAPSULE ORAL 4 TIMES DAILY PRN
Status: DISCONTINUED | OUTPATIENT
Start: 2022-11-17 | End: 2022-11-29

## 2022-11-17 RX ORDER — CALCIUM GLUCONATE 20 MG/ML
2 INJECTION, SOLUTION INTRAVENOUS ONCE
Status: COMPLETED | OUTPATIENT
Start: 2022-11-17 | End: 2022-11-17

## 2022-11-17 RX ADMIN — SODIUM BICARBONATE 650 MG: 650 TABLET ORAL at 08:16

## 2022-11-17 RX ADMIN — MEROPENEM 1 G: 1 INJECTION, POWDER, FOR SOLUTION INTRAVENOUS at 18:05

## 2022-11-17 RX ADMIN — ENOXAPARIN SODIUM 40 MG: 100 INJECTION SUBCUTANEOUS at 21:20

## 2022-11-17 RX ADMIN — INSULIN LISPRO 2 UNITS: 100 INJECTION, SOLUTION INTRAVENOUS; SUBCUTANEOUS at 11:39

## 2022-11-17 RX ADMIN — FOLIC ACID 1 MG: 1 TABLET ORAL at 08:16

## 2022-11-17 RX ADMIN — MIDODRINE HYDROCHLORIDE 10 MG: 5 TABLET ORAL at 11:11

## 2022-11-17 RX ADMIN — HYDROCORTISONE SODIUM SUCCINATE 100 MG: 100 INJECTION, POWDER, FOR SOLUTION INTRAMUSCULAR; INTRAVENOUS at 04:40

## 2022-11-17 RX ADMIN — POTASSIUM CHLORIDE 40 MEQ: 1500 TABLET, EXTENDED RELEASE ORAL at 11:11

## 2022-11-17 RX ADMIN — CEFEPIME 2 G: 2 INJECTION, POWDER, FOR SOLUTION INTRAVENOUS at 00:29

## 2022-11-17 RX ADMIN — MIDODRINE HYDROCHLORIDE 10 MG: 5 TABLET ORAL at 06:54

## 2022-11-17 RX ADMIN — Medication 10 ML: at 08:21

## 2022-11-17 RX ADMIN — PANTOPRAZOLE SODIUM 40 MG: 40 INJECTION, POWDER, FOR SOLUTION INTRAVENOUS at 06:54

## 2022-11-17 RX ADMIN — ANTI-FUNGAL POWDER MICONAZOLE NITRATE TALC FREE: 1.42 POWDER TOPICAL at 08:37

## 2022-11-17 RX ADMIN — HYDROCORTISONE SODIUM SUCCINATE 100 MG: 100 INJECTION, POWDER, FOR SOLUTION INTRAMUSCULAR; INTRAVENOUS at 11:16

## 2022-11-17 RX ADMIN — FUROSEMIDE 40 MG: 40 TABLET ORAL at 08:16

## 2022-11-17 RX ADMIN — Medication 81 MG: at 08:16

## 2022-11-17 RX ADMIN — Medication 0.1 MCG/KG/MIN: at 11:16

## 2022-11-17 RX ADMIN — HYDROCORTISONE SODIUM SUCCINATE 50 MG: 100 INJECTION, POWDER, FOR SOLUTION INTRAMUSCULAR; INTRAVENOUS at 23:44

## 2022-11-17 RX ADMIN — CALCIUM CARBONATE (ANTACID) CHEW TAB 500 MG 2 TABLET: 500 CHEW TAB at 08:17

## 2022-11-17 RX ADMIN — MEROPENEM 1 G: 1 INJECTION, POWDER, FOR SOLUTION INTRAVENOUS at 15:02

## 2022-11-17 RX ADMIN — CALCIUM GLUCONATE 2 G: 20 INJECTION, SOLUTION INTRAVENOUS at 15:23

## 2022-11-17 RX ADMIN — INSULIN LISPRO 2 UNITS: 100 INJECTION, SOLUTION INTRAVENOUS; SUBCUTANEOUS at 08:26

## 2022-11-17 RX ADMIN — MAGNESIUM SULFATE IN DEXTROSE 1 G: 10 INJECTION, SOLUTION INTRAVENOUS at 11:16

## 2022-11-17 RX ADMIN — GUAIFENESIN 1200 MG: 600 TABLET, EXTENDED RELEASE ORAL at 08:16

## 2022-11-17 RX ADMIN — Medication 10 ML: at 21:22

## 2022-11-17 RX ADMIN — CHLOROTHIAZIDE SODIUM 250 MG: 500 INJECTION, POWDER, LYOPHILIZED, FOR SOLUTION INTRAVENOUS at 21:50

## 2022-11-17 RX ADMIN — ANTI-FUNGAL POWDER MICONAZOLE NITRATE TALC FREE: 1.42 POWDER TOPICAL at 21:22

## 2022-11-17 RX ADMIN — LEVOTHYROXINE SODIUM 88 MCG: 0.09 TABLET ORAL at 08:16

## 2022-11-17 RX ADMIN — HYDROCORTISONE SODIUM SUCCINATE 100 MG: 100 INJECTION, POWDER, FOR SOLUTION INTRAMUSCULAR; INTRAVENOUS at 16:11

## 2022-11-17 RX ADMIN — VANCOMYCIN HYDROCHLORIDE 1250 MG: 1.25 INJECTION, POWDER, LYOPHILIZED, FOR SOLUTION INTRAVENOUS at 02:30

## 2022-11-18 ENCOUNTER — APPOINTMENT (OUTPATIENT)
Dept: GENERAL RADIOLOGY | Facility: HOSPITAL | Age: 72
End: 2022-11-18

## 2022-11-18 LAB
ALBUMIN SERPL-MCNC: 2.6 G/DL (ref 3.5–5.2)
ALBUMIN/GLOB SERPL: 1.2 G/DL
ALP SERPL-CCNC: 260 U/L (ref 39–117)
ALT SERPL W P-5'-P-CCNC: 49 U/L (ref 1–33)
ANION GAP SERPL CALCULATED.3IONS-SCNC: 16 MMOL/L (ref 5–15)
AST SERPL-CCNC: 99 U/L (ref 1–32)
BASOPHILS # BLD AUTO: 0.3 10*3/MM3 (ref 0–0.2)
BASOPHILS NFR BLD AUTO: 0.9 % (ref 0–1.5)
BH BB BLOOD EXPIRATION DATE: NORMAL
BH BB BLOOD TYPE BARCODE: 6200
BH BB DISPENSE STATUS: NORMAL
BH BB PRODUCT CODE: NORMAL
BH BB UNIT NUMBER: NORMAL
BILIRUB SERPL-MCNC: 0.8 MG/DL (ref 0–1.2)
BUN SERPL-MCNC: 26 MG/DL (ref 8–23)
BUN/CREAT SERPL: 18.4 (ref 7–25)
CALCIUM SPEC-SCNC: 6.5 MG/DL (ref 8.6–10.5)
CHLORIDE SERPL-SCNC: 112 MMOL/L (ref 98–107)
CO2 SERPL-SCNC: 21 MMOL/L (ref 22–29)
CREAT SERPL-MCNC: 1.41 MG/DL (ref 0.57–1)
CROSSMATCH INTERPRETATION: NORMAL
DEPRECATED RDW RBC AUTO: 56.9 FL (ref 37–54)
EGFRCR SERPLBLD CKD-EPI 2021: 40.2 ML/MIN/1.73
EOSINOPHIL # BLD AUTO: 0 10*3/MM3 (ref 0–0.4)
EOSINOPHIL NFR BLD AUTO: 0 % (ref 0.3–6.2)
ERYTHROCYTE [DISTWIDTH] IN BLOOD BY AUTOMATED COUNT: 19.8 % (ref 12.3–15.4)
GLOBULIN UR ELPH-MCNC: 2.1 GM/DL
GLUCOSE BLDC GLUCOMTR-MCNC: 128 MG/DL (ref 70–105)
GLUCOSE BLDC GLUCOMTR-MCNC: 155 MG/DL (ref 70–105)
GLUCOSE BLDC GLUCOMTR-MCNC: 164 MG/DL (ref 70–105)
GLUCOSE SERPL-MCNC: 180 MG/DL (ref 65–99)
HCT VFR BLD AUTO: 26.8 % (ref 34–46.6)
HGB BLD-MCNC: 8.5 G/DL (ref 12–15.9)
LAB AP CASE REPORT: NORMAL
LYMPHOCYTES # BLD AUTO: 0.6 10*3/MM3 (ref 0.7–3.1)
LYMPHOCYTES NFR BLD AUTO: 2.2 % (ref 19.6–45.3)
MAGNESIUM SERPL-MCNC: 1.9 MG/DL (ref 1.6–2.4)
MCH RBC QN AUTO: 26.4 PG (ref 26.6–33)
MCHC RBC AUTO-ENTMCNC: 31.5 G/DL (ref 31.5–35.7)
MCV RBC AUTO: 83.9 FL (ref 79–97)
MONOCYTES # BLD AUTO: 0.7 10*3/MM3 (ref 0.1–0.9)
MONOCYTES NFR BLD AUTO: 2.4 % (ref 5–12)
NEUTROPHILS NFR BLD AUTO: 27.2 10*3/MM3 (ref 1.7–7)
NEUTROPHILS NFR BLD AUTO: 94.5 % (ref 42.7–76)
NRBC BLD AUTO-RTO: 0 /100 WBC (ref 0–0.2)
PATH REPORT.FINAL DX SPEC: NORMAL
PATH REPORT.GROSS SPEC: NORMAL
PLATELET # BLD AUTO: 128 10*3/MM3 (ref 140–450)
PMV BLD AUTO: 8.5 FL (ref 6–12)
POTASSIUM SERPL-SCNC: 4.1 MMOL/L (ref 3.5–5.2)
POTASSIUM SERPL-SCNC: 4.3 MMOL/L (ref 3.5–5.2)
PROT SERPL-MCNC: 4.7 G/DL (ref 6–8.5)
RBC # BLD AUTO: 3.2 10*6/MM3 (ref 3.77–5.28)
SODIUM SERPL-SCNC: 149 MMOL/L (ref 136–145)
UNIT  ABO: NORMAL
UNIT  RH: NORMAL
WBC NRBC COR # BLD: 28.8 10*3/MM3 (ref 3.4–10.8)

## 2022-11-18 PROCEDURE — 80053 COMPREHEN METABOLIC PANEL: CPT | Performed by: INTERNAL MEDICINE

## 2022-11-18 PROCEDURE — 94660 CPAP INITIATION&MGMT: CPT

## 2022-11-18 PROCEDURE — 63710000001 INSULIN LISPRO (HUMAN) PER 5 UNITS: Performed by: PHYSICIAN ASSISTANT

## 2022-11-18 PROCEDURE — P9047 ALBUMIN (HUMAN), 25%, 50ML: HCPCS | Performed by: INTERNAL MEDICINE

## 2022-11-18 PROCEDURE — 74018 RADEX ABDOMEN 1 VIEW: CPT

## 2022-11-18 PROCEDURE — 25010000002 ALBUMIN HUMAN 25% PER 50 ML: Performed by: INTERNAL MEDICINE

## 2022-11-18 PROCEDURE — 94761 N-INVAS EAR/PLS OXIMETRY MLT: CPT

## 2022-11-18 PROCEDURE — 85025 COMPLETE CBC W/AUTO DIFF WBC: CPT | Performed by: INTERNAL MEDICINE

## 2022-11-18 PROCEDURE — 83735 ASSAY OF MAGNESIUM: CPT | Performed by: PHYSICIAN ASSISTANT

## 2022-11-18 PROCEDURE — 25010000002 FUROSEMIDE PER 20 MG: Performed by: INTERNAL MEDICINE

## 2022-11-18 PROCEDURE — 25010000002 HYDROCORTISONE SOD SUC (PF) 100 MG RECONSTITUTED SOLUTION: Performed by: NURSE PRACTITIONER

## 2022-11-18 PROCEDURE — 0 POTASSIUM CHLORIDE 10 MEQ/100ML SOLUTION: Performed by: NURSE PRACTITIONER

## 2022-11-18 PROCEDURE — 71045 X-RAY EXAM CHEST 1 VIEW: CPT

## 2022-11-18 PROCEDURE — 25010000002 MEROPENEM PER 100 MG: Performed by: INTERNAL MEDICINE

## 2022-11-18 PROCEDURE — 82962 GLUCOSE BLOOD TEST: CPT

## 2022-11-18 PROCEDURE — 84132 ASSAY OF SERUM POTASSIUM: CPT | Performed by: INTERNAL MEDICINE

## 2022-11-18 PROCEDURE — 94799 UNLISTED PULMONARY SVC/PX: CPT

## 2022-11-18 PROCEDURE — 25010000002 ENOXAPARIN PER 10 MG: Performed by: NURSE PRACTITIONER

## 2022-11-18 RX ORDER — POTASSIUM CHLORIDE 1.5 G/1.77G
40 POWDER, FOR SOLUTION ORAL AS NEEDED
Status: DISCONTINUED | OUTPATIENT
Start: 2022-11-18 | End: 2022-11-29

## 2022-11-18 RX ORDER — POTASSIUM CHLORIDE 20 MEQ/1
40 TABLET, EXTENDED RELEASE ORAL AS NEEDED
Status: DISCONTINUED | OUTPATIENT
Start: 2022-11-18 | End: 2022-11-29

## 2022-11-18 RX ORDER — POTASSIUM CHLORIDE 7.45 MG/ML
10 INJECTION INTRAVENOUS
Status: DISCONTINUED | OUTPATIENT
Start: 2022-11-18 | End: 2022-11-29

## 2022-11-18 RX ORDER — ALBUMIN (HUMAN) 12.5 G/50ML
25 SOLUTION INTRAVENOUS EVERY 8 HOURS
Status: DISCONTINUED | OUTPATIENT
Start: 2022-11-18 | End: 2022-11-19

## 2022-11-18 RX ORDER — FUROSEMIDE 10 MG/ML
40 INJECTION INTRAMUSCULAR; INTRAVENOUS EVERY 12 HOURS
Status: DISCONTINUED | OUTPATIENT
Start: 2022-11-18 | End: 2022-11-19

## 2022-11-18 RX ADMIN — POTASSIUM CHLORIDE 10 MEQ: 7.46 INJECTION, SOLUTION INTRAVENOUS at 00:40

## 2022-11-18 RX ADMIN — MIDODRINE HYDROCHLORIDE 10 MG: 5 TABLET ORAL at 21:12

## 2022-11-18 RX ADMIN — ALBUMIN HUMAN 25 G: 0.25 SOLUTION INTRAVENOUS at 10:59

## 2022-11-18 RX ADMIN — POTASSIUM CHLORIDE 10 MEQ: 7.46 INJECTION, SOLUTION INTRAVENOUS at 00:43

## 2022-11-18 RX ADMIN — MONTELUKAST 10 MG: 10 TABLET, FILM COATED ORAL at 21:12

## 2022-11-18 RX ADMIN — Medication 0.1 MCG/KG/MIN: at 23:12

## 2022-11-18 RX ADMIN — INSULIN LISPRO 2 UNITS: 100 INJECTION, SOLUTION INTRAVENOUS; SUBCUTANEOUS at 18:03

## 2022-11-18 RX ADMIN — HYDROCORTISONE SODIUM SUCCINATE 50 MG: 100 INJECTION, POWDER, FOR SOLUTION INTRAMUSCULAR; INTRAVENOUS at 23:12

## 2022-11-18 RX ADMIN — Medication 10 ML: at 08:28

## 2022-11-18 RX ADMIN — Medication 10 ML: at 21:12

## 2022-11-18 RX ADMIN — PANTOPRAZOLE SODIUM 40 MG: 40 INJECTION, POWDER, FOR SOLUTION INTRAVENOUS at 06:41

## 2022-11-18 RX ADMIN — INSULIN LISPRO 2 UNITS: 100 INJECTION, SOLUTION INTRAVENOUS; SUBCUTANEOUS at 08:27

## 2022-11-18 RX ADMIN — Medication 0.15 MCG/KG/MIN: at 12:45

## 2022-11-18 RX ADMIN — POTASSIUM CHLORIDE 10 MEQ: 7.46 INJECTION, SOLUTION INTRAVENOUS at 00:41

## 2022-11-18 RX ADMIN — ATORVASTATIN CALCIUM 40 MG: 40 TABLET, FILM COATED ORAL at 21:12

## 2022-11-18 RX ADMIN — HYDROCORTISONE SODIUM SUCCINATE 50 MG: 100 INJECTION, POWDER, FOR SOLUTION INTRAMUSCULAR; INTRAVENOUS at 15:10

## 2022-11-18 RX ADMIN — POTASSIUM CHLORIDE 10 MEQ: 7.46 INJECTION, SOLUTION INTRAVENOUS at 00:38

## 2022-11-18 RX ADMIN — Medication 0.12 MCG/KG/MIN: at 00:38

## 2022-11-18 RX ADMIN — MEROPENEM 1 G: 1 INJECTION, POWDER, FOR SOLUTION INTRAVENOUS at 18:02

## 2022-11-18 RX ADMIN — ALBUMIN HUMAN 25 G: 0.25 SOLUTION INTRAVENOUS at 17:31

## 2022-11-18 RX ADMIN — ENOXAPARIN SODIUM 40 MG: 100 INJECTION SUBCUTANEOUS at 21:11

## 2022-11-18 RX ADMIN — FUROSEMIDE 40 MG: 10 INJECTION, SOLUTION INTRAMUSCULAR; INTRAVENOUS at 23:14

## 2022-11-18 RX ADMIN — ANTI-FUNGAL POWDER MICONAZOLE NITRATE TALC FREE: 1.42 POWDER TOPICAL at 08:59

## 2022-11-18 RX ADMIN — ANTI-FUNGAL POWDER MICONAZOLE NITRATE TALC FREE: 1.42 POWDER TOPICAL at 21:12

## 2022-11-18 RX ADMIN — HYDROCORTISONE SODIUM SUCCINATE 50 MG: 100 INJECTION, POWDER, FOR SOLUTION INTRAMUSCULAR; INTRAVENOUS at 08:27

## 2022-11-18 RX ADMIN — FUROSEMIDE 40 MG: 10 INJECTION, SOLUTION INTRAMUSCULAR; INTRAVENOUS at 10:59

## 2022-11-18 RX ADMIN — MEROPENEM 1 G: 1 INJECTION, POWDER, FOR SOLUTION INTRAVENOUS at 06:40

## 2022-11-19 ENCOUNTER — APPOINTMENT (OUTPATIENT)
Dept: GENERAL RADIOLOGY | Facility: HOSPITAL | Age: 72
End: 2022-11-19

## 2022-11-19 LAB
ACANTHOCYTES BLD QL SMEAR: ABNORMAL
ALBUMIN SERPL-MCNC: 3.5 G/DL (ref 3.5–5.2)
ALBUMIN/GLOB SERPL: 2.1 G/DL
ALP SERPL-CCNC: 208 U/L (ref 39–117)
ALT SERPL W P-5'-P-CCNC: 37 U/L (ref 1–33)
ANION GAP SERPL CALCULATED.3IONS-SCNC: 18 MMOL/L (ref 5–15)
ANISOCYTOSIS BLD QL: ABNORMAL
AST SERPL-CCNC: 64 U/L (ref 1–32)
BASO STIPL COARSE BLD QL SMEAR: ABNORMAL
BILIRUB SERPL-MCNC: 0.7 MG/DL (ref 0–1.2)
BUN SERPL-MCNC: 30 MG/DL (ref 8–23)
BUN/CREAT SERPL: 20 (ref 7–25)
BURR CELLS BLD QL SMEAR: ABNORMAL
C3 FRG RBC-MCNC: ABNORMAL
CALCIUM SPEC-SCNC: 6.5 MG/DL (ref 8.6–10.5)
CHLORIDE SERPL-SCNC: 112 MMOL/L (ref 98–107)
CO2 SERPL-SCNC: 23 MMOL/L (ref 22–29)
CREAT SERPL-MCNC: 1.5 MG/DL (ref 0.57–1)
DEPRECATED RDW RBC AUTO: 57.8 FL (ref 37–54)
EGFRCR SERPLBLD CKD-EPI 2021: 37.3 ML/MIN/1.73
ERYTHROCYTE [DISTWIDTH] IN BLOOD BY AUTOMATED COUNT: 20.1 % (ref 12.3–15.4)
GLOBULIN UR ELPH-MCNC: 1.7 GM/DL
GLUCOSE BLDC GLUCOMTR-MCNC: 157 MG/DL (ref 70–105)
GLUCOSE BLDC GLUCOMTR-MCNC: 171 MG/DL (ref 70–105)
GLUCOSE BLDC GLUCOMTR-MCNC: 171 MG/DL (ref 70–105)
GLUCOSE BLDC GLUCOMTR-MCNC: 174 MG/DL (ref 70–105)
GLUCOSE SERPL-MCNC: 213 MG/DL (ref 65–99)
HCT VFR BLD AUTO: 22 % (ref 34–46.6)
HGB BLD-MCNC: 7.1 G/DL (ref 12–15.9)
LYMPHOCYTES # BLD MANUAL: 0.4 10*3/MM3 (ref 0.7–3.1)
LYMPHOCYTES NFR BLD MANUAL: 2 % (ref 5–12)
MAGNESIUM SERPL-MCNC: 1.9 MG/DL (ref 1.6–2.4)
MCH RBC QN AUTO: 27 PG (ref 26.6–33)
MCHC RBC AUTO-ENTMCNC: 32.2 G/DL (ref 31.5–35.7)
MCV RBC AUTO: 83.6 FL (ref 79–97)
MONOCYTES # BLD: 0.4 10*3/MM3 (ref 0.1–0.9)
NEUTROPHILS # BLD AUTO: 19.3 10*3/MM3 (ref 1.7–7)
NEUTROPHILS NFR BLD MANUAL: 93 % (ref 42.7–76)
NEUTS BAND NFR BLD MANUAL: 3 % (ref 0–5)
PHOSPHATE SERPL-MCNC: 3.5 MG/DL (ref 2.5–4.5)
PLATELET # BLD AUTO: 102 10*3/MM3 (ref 140–450)
PMV BLD AUTO: 8.4 FL (ref 6–12)
POIKILOCYTOSIS BLD QL SMEAR: ABNORMAL
POLYCHROMASIA BLD QL SMEAR: ABNORMAL
POTASSIUM SERPL-SCNC: 3.2 MMOL/L (ref 3.5–5.2)
POTASSIUM SERPL-SCNC: 3.2 MMOL/L (ref 3.5–5.2)
PROT SERPL-MCNC: 5.2 G/DL (ref 6–8.5)
RBC # BLD AUTO: 2.63 10*6/MM3 (ref 3.77–5.28)
SCAN SLIDE: NORMAL
SMALL PLATELETS BLD QL SMEAR: ABNORMAL
SODIUM SERPL-SCNC: 153 MMOL/L (ref 136–145)
VARIANT LYMPHS NFR BLD MANUAL: 2 % (ref 19.6–45.3)
WBC MORPH BLD: NORMAL
WBC NRBC COR # BLD: 20.1 10*3/MM3 (ref 3.4–10.8)

## 2022-11-19 PROCEDURE — 85025 COMPLETE CBC W/AUTO DIFF WBC: CPT | Performed by: INTERNAL MEDICINE

## 2022-11-19 PROCEDURE — 63710000001 INSULIN LISPRO (HUMAN) PER 5 UNITS: Performed by: PHYSICIAN ASSISTANT

## 2022-11-19 PROCEDURE — P9047 ALBUMIN (HUMAN), 25%, 50ML: HCPCS | Performed by: INTERNAL MEDICINE

## 2022-11-19 PROCEDURE — 83735 ASSAY OF MAGNESIUM: CPT | Performed by: PHYSICIAN ASSISTANT

## 2022-11-19 PROCEDURE — 82962 GLUCOSE BLOOD TEST: CPT

## 2022-11-19 PROCEDURE — 0 POTASSIUM CHLORIDE 10 MEQ/100ML SOLUTION: Performed by: NURSE PRACTITIONER

## 2022-11-19 PROCEDURE — 25010000002 MEROPENEM PER 100 MG: Performed by: INTERNAL MEDICINE

## 2022-11-19 PROCEDURE — 94799 UNLISTED PULMONARY SVC/PX: CPT

## 2022-11-19 PROCEDURE — 25010000002 HYDROCORTISONE SOD SUC (PF) 100 MG RECONSTITUTED SOLUTION: Performed by: NURSE PRACTITIONER

## 2022-11-19 PROCEDURE — 92526 ORAL FUNCTION THERAPY: CPT

## 2022-11-19 PROCEDURE — 85007 BL SMEAR W/DIFF WBC COUNT: CPT | Performed by: INTERNAL MEDICINE

## 2022-11-19 PROCEDURE — 84100 ASSAY OF PHOSPHORUS: CPT | Performed by: INTERNAL MEDICINE

## 2022-11-19 PROCEDURE — 80053 COMPREHEN METABOLIC PANEL: CPT | Performed by: INTERNAL MEDICINE

## 2022-11-19 PROCEDURE — 25010000002 METHYLPREDNISOLONE PER 40 MG: Performed by: INTERNAL MEDICINE

## 2022-11-19 PROCEDURE — 84132 ASSAY OF SERUM POTASSIUM: CPT | Performed by: INTERNAL MEDICINE

## 2022-11-19 PROCEDURE — 25010000002 ENOXAPARIN PER 10 MG: Performed by: NURSE PRACTITIONER

## 2022-11-19 PROCEDURE — 25010000002 ALBUMIN HUMAN 25% PER 50 ML: Performed by: INTERNAL MEDICINE

## 2022-11-19 PROCEDURE — 94660 CPAP INITIATION&MGMT: CPT

## 2022-11-19 PROCEDURE — 25010000002 CALCIUM GLUCONATE 2-0.675 GM/100ML-% SOLUTION: Performed by: NURSE PRACTITIONER

## 2022-11-19 PROCEDURE — 94761 N-INVAS EAR/PLS OXIMETRY MLT: CPT

## 2022-11-19 PROCEDURE — 71045 X-RAY EXAM CHEST 1 VIEW: CPT

## 2022-11-19 RX ORDER — LANSOPRAZOLE 30 MG/1
30 TABLET, ORALLY DISINTEGRATING, DELAYED RELEASE ORAL
Status: DISCONTINUED | OUTPATIENT
Start: 2022-11-20 | End: 2022-11-20

## 2022-11-19 RX ORDER — DEXTROSE MONOHYDRATE 50 MG/ML
50 INJECTION, SOLUTION INTRAVENOUS CONTINUOUS
Status: DISCONTINUED | OUTPATIENT
Start: 2022-11-19 | End: 2022-11-21

## 2022-11-19 RX ORDER — CALCIUM GLUCONATE 20 MG/ML
2 INJECTION, SOLUTION INTRAVENOUS ONCE
Status: COMPLETED | OUTPATIENT
Start: 2022-11-19 | End: 2022-11-19

## 2022-11-19 RX ORDER — METHYLPREDNISOLONE SODIUM SUCCINATE 40 MG/ML
40 INJECTION, POWDER, LYOPHILIZED, FOR SOLUTION INTRAMUSCULAR; INTRAVENOUS EVERY 8 HOURS
Status: DISCONTINUED | OUTPATIENT
Start: 2022-11-19 | End: 2022-11-22

## 2022-11-19 RX ADMIN — POTASSIUM CHLORIDE 10 MEQ: 7.46 INJECTION, SOLUTION INTRAVENOUS at 21:30

## 2022-11-19 RX ADMIN — ENOXAPARIN SODIUM 40 MG: 100 INJECTION SUBCUTANEOUS at 20:02

## 2022-11-19 RX ADMIN — POTASSIUM CHLORIDE 10 MEQ: 7.46 INJECTION, SOLUTION INTRAVENOUS at 09:53

## 2022-11-19 RX ADMIN — INSULIN LISPRO 2 UNITS: 100 INJECTION, SOLUTION INTRAVENOUS; SUBCUTANEOUS at 17:39

## 2022-11-19 RX ADMIN — POTASSIUM CHLORIDE 10 MEQ: 7.46 INJECTION, SOLUTION INTRAVENOUS at 07:36

## 2022-11-19 RX ADMIN — ALBUMIN HUMAN 25 G: 0.25 SOLUTION INTRAVENOUS at 02:56

## 2022-11-19 RX ADMIN — Medication 0.02 MCG/KG/MIN: at 23:53

## 2022-11-19 RX ADMIN — ANTI-FUNGAL POWDER MICONAZOLE NITRATE TALC FREE: 1.42 POWDER TOPICAL at 08:05

## 2022-11-19 RX ADMIN — POTASSIUM CHLORIDE 10 MEQ: 7.46 INJECTION, SOLUTION INTRAVENOUS at 23:23

## 2022-11-19 RX ADMIN — MEROPENEM 1 G: 1 INJECTION, POWDER, FOR SOLUTION INTRAVENOUS at 06:03

## 2022-11-19 RX ADMIN — HYDROCORTISONE SODIUM SUCCINATE 50 MG: 100 INJECTION, POWDER, FOR SOLUTION INTRAMUSCULAR; INTRAVENOUS at 08:05

## 2022-11-19 RX ADMIN — POTASSIUM CHLORIDE 10 MEQ: 7.46 INJECTION, SOLUTION INTRAVENOUS at 08:40

## 2022-11-19 RX ADMIN — DEXTROSE MONOHYDRATE 50 ML/HR: 50 INJECTION, SOLUTION INTRAVENOUS at 15:55

## 2022-11-19 RX ADMIN — CALCIUM GLUCONATE 2 G: 20 INJECTION, SOLUTION INTRAVENOUS at 08:06

## 2022-11-19 RX ADMIN — Medication 10 ML: at 20:03

## 2022-11-19 RX ADMIN — POTASSIUM CHLORIDE 10 MEQ: 7.46 INJECTION, SOLUTION INTRAVENOUS at 20:02

## 2022-11-19 RX ADMIN — POTASSIUM CHLORIDE 10 MEQ: 7.46 INJECTION, SOLUTION INTRAVENOUS at 06:21

## 2022-11-19 RX ADMIN — Medication 0.2 MCG/KG/MIN: at 12:13

## 2022-11-19 RX ADMIN — MEROPENEM 1 G: 1 INJECTION, POWDER, FOR SOLUTION INTRAVENOUS at 17:38

## 2022-11-19 RX ADMIN — INSULIN LISPRO 2 UNITS: 100 INJECTION, SOLUTION INTRAVENOUS; SUBCUTANEOUS at 08:09

## 2022-11-19 RX ADMIN — METHYLPREDNISOLONE SODIUM SUCCINATE 40 MG: 40 INJECTION, POWDER, FOR SOLUTION INTRAMUSCULAR; INTRAVENOUS at 17:38

## 2022-11-19 RX ADMIN — INSULIN LISPRO 2 UNITS: 100 INJECTION, SOLUTION INTRAVENOUS; SUBCUTANEOUS at 12:20

## 2022-11-19 RX ADMIN — POTASSIUM CHLORIDE 10 MEQ: 7.46 INJECTION, SOLUTION INTRAVENOUS at 17:38

## 2022-11-19 RX ADMIN — ANTI-FUNGAL POWDER MICONAZOLE NITRATE TALC FREE: 1.42 POWDER TOPICAL at 20:03

## 2022-11-19 RX ADMIN — Medication 10 ML: at 08:09

## 2022-11-19 RX ADMIN — PANTOPRAZOLE SODIUM 40 MG: 40 INJECTION, POWDER, FOR SOLUTION INTRAVENOUS at 06:03

## 2022-11-20 LAB
ACANTHOCYTES BLD QL SMEAR: ABNORMAL
ALBUMIN SERPL-MCNC: 3.1 G/DL (ref 3.5–5.2)
ALBUMIN/GLOB SERPL: 1.9 G/DL
ALP SERPL-CCNC: 141 U/L (ref 39–117)
ALT SERPL W P-5'-P-CCNC: 30 U/L (ref 1–33)
ANION GAP SERPL CALCULATED.3IONS-SCNC: 13 MMOL/L (ref 5–15)
ANISOCYTOSIS BLD QL: ABNORMAL
AST SERPL-CCNC: 55 U/L (ref 1–32)
BILIRUB SERPL-MCNC: 0.7 MG/DL (ref 0–1.2)
BUN SERPL-MCNC: 34 MG/DL (ref 8–23)
BUN/CREAT SERPL: 23.6 (ref 7–25)
BURR CELLS BLD QL SMEAR: ABNORMAL
C3 FRG RBC-MCNC: ABNORMAL
CALCIUM SPEC-SCNC: 6.6 MG/DL (ref 8.6–10.5)
CHLORIDE SERPL-SCNC: 114 MMOL/L (ref 98–107)
CO2 SERPL-SCNC: 27 MMOL/L (ref 22–29)
CREAT SERPL-MCNC: 1.44 MG/DL (ref 0.57–1)
DEPRECATED RDW RBC AUTO: 58.2 FL (ref 37–54)
EGFRCR SERPLBLD CKD-EPI 2021: 39.2 ML/MIN/1.73
ELLIPTOCYTES BLD QL SMEAR: ABNORMAL
ERYTHROCYTE [DISTWIDTH] IN BLOOD BY AUTOMATED COUNT: 20.5 % (ref 12.3–15.4)
GLOBULIN UR ELPH-MCNC: 1.6 GM/DL
GLUCOSE BLDC GLUCOMTR-MCNC: 201 MG/DL (ref 70–105)
GLUCOSE BLDC GLUCOMTR-MCNC: 225 MG/DL (ref 70–105)
GLUCOSE BLDC GLUCOMTR-MCNC: 230 MG/DL (ref 70–105)
GLUCOSE SERPL-MCNC: 218 MG/DL (ref 65–99)
HCT VFR BLD AUTO: 19.2 % (ref 34–46.6)
HCT VFR BLD AUTO: 25.1 % (ref 34–46.6)
HGB BLD-MCNC: 6.2 G/DL (ref 12–15.9)
HGB BLD-MCNC: 8.1 G/DL (ref 12–15.9)
LYMPHOCYTES # BLD MANUAL: 1.13 10*3/MM3 (ref 0.7–3.1)
LYMPHOCYTES NFR BLD MANUAL: 1 % (ref 5–12)
MAGNESIUM SERPL-MCNC: 1.7 MG/DL (ref 1.6–2.4)
MCH RBC QN AUTO: 26.9 PG (ref 26.6–33)
MCHC RBC AUTO-ENTMCNC: 32.1 G/DL (ref 31.5–35.7)
MCV RBC AUTO: 84 FL (ref 79–97)
MICROCYTES BLD QL: ABNORMAL
MONOCYTES # BLD: 0.13 10*3/MM3 (ref 0.1–0.9)
NEUTROPHILS # BLD AUTO: 11.34 10*3/MM3 (ref 1.7–7)
NEUTROPHILS NFR BLD MANUAL: 86 % (ref 42.7–76)
NEUTS BAND NFR BLD MANUAL: 4 % (ref 0–5)
NRBC SPEC MANUAL: 1 /100 WBC (ref 0–0.2)
PLATELET # BLD AUTO: 76 10*3/MM3 (ref 140–450)
PMV BLD AUTO: 8.3 FL (ref 6–12)
POIKILOCYTOSIS BLD QL SMEAR: ABNORMAL
POLYCHROMASIA BLD QL SMEAR: ABNORMAL
POTASSIUM SERPL-SCNC: 3.7 MMOL/L (ref 3.5–5.2)
PROT SERPL-MCNC: 4.7 G/DL (ref 6–8.5)
RBC # BLD AUTO: 2.28 10*6/MM3 (ref 3.77–5.28)
SCAN SLIDE: NORMAL
SMALL PLATELETS BLD QL SMEAR: ABNORMAL
SODIUM SERPL-SCNC: 154 MMOL/L (ref 136–145)
VARIANT LYMPHS NFR BLD MANUAL: 9 % (ref 19.6–45.3)
WBC MORPH BLD: NORMAL
WBC NRBC COR # BLD: 12.6 10*3/MM3 (ref 3.4–10.8)

## 2022-11-20 PROCEDURE — 80053 COMPREHEN METABOLIC PANEL: CPT | Performed by: INTERNAL MEDICINE

## 2022-11-20 PROCEDURE — 85014 HEMATOCRIT: CPT

## 2022-11-20 PROCEDURE — 25010000002 MEROPENEM PER 100 MG: Performed by: INTERNAL MEDICINE

## 2022-11-20 PROCEDURE — 85007 BL SMEAR W/DIFF WBC COUNT: CPT | Performed by: INTERNAL MEDICINE

## 2022-11-20 PROCEDURE — 85025 COMPLETE CBC W/AUTO DIFF WBC: CPT | Performed by: INTERNAL MEDICINE

## 2022-11-20 PROCEDURE — 86900 BLOOD TYPING SEROLOGIC ABO: CPT

## 2022-11-20 PROCEDURE — 94799 UNLISTED PULMONARY SVC/PX: CPT

## 2022-11-20 PROCEDURE — 94761 N-INVAS EAR/PLS OXIMETRY MLT: CPT

## 2022-11-20 PROCEDURE — P9016 RBC LEUKOCYTES REDUCED: HCPCS

## 2022-11-20 PROCEDURE — 94660 CPAP INITIATION&MGMT: CPT

## 2022-11-20 PROCEDURE — 83735 ASSAY OF MAGNESIUM: CPT | Performed by: PHYSICIAN ASSISTANT

## 2022-11-20 PROCEDURE — 25010000002 METHYLPREDNISOLONE PER 40 MG: Performed by: INTERNAL MEDICINE

## 2022-11-20 PROCEDURE — 63710000001 INSULIN LISPRO (HUMAN) PER 5 UNITS

## 2022-11-20 PROCEDURE — 82962 GLUCOSE BLOOD TEST: CPT

## 2022-11-20 PROCEDURE — 85018 HEMOGLOBIN: CPT

## 2022-11-20 PROCEDURE — 25010000002 CALCIUM GLUCONATE 2-0.675 GM/100ML-% SOLUTION

## 2022-11-20 PROCEDURE — 36430 TRANSFUSION BLD/BLD COMPNT: CPT

## 2022-11-20 PROCEDURE — 86022 PLATELET ANTIBODIES: CPT

## 2022-11-20 RX ORDER — PANTOPRAZOLE SODIUM 40 MG/10ML
40 INJECTION, POWDER, LYOPHILIZED, FOR SOLUTION INTRAVENOUS
Status: DISCONTINUED | OUTPATIENT
Start: 2022-11-21 | End: 2022-11-22

## 2022-11-20 RX ORDER — INSULIN LISPRO 100 [IU]/ML
2-7 INJECTION, SOLUTION INTRAVENOUS; SUBCUTANEOUS EVERY 6 HOURS
Status: DISCONTINUED | OUTPATIENT
Start: 2022-11-20 | End: 2022-11-21

## 2022-11-20 RX ORDER — CALCIUM GLUCONATE 20 MG/ML
2 INJECTION, SOLUTION INTRAVENOUS ONCE
Status: COMPLETED | OUTPATIENT
Start: 2022-11-20 | End: 2022-11-20

## 2022-11-20 RX ADMIN — MEROPENEM 1 G: 1 INJECTION, POWDER, FOR SOLUTION INTRAVENOUS at 18:04

## 2022-11-20 RX ADMIN — INSULIN LISPRO 3 UNITS: 100 INJECTION, SOLUTION INTRAVENOUS; SUBCUTANEOUS at 18:03

## 2022-11-20 RX ADMIN — CALCIUM GLUCONATE 2 G: 20 INJECTION, SOLUTION INTRAVENOUS at 03:24

## 2022-11-20 RX ADMIN — ANTI-FUNGAL POWDER MICONAZOLE NITRATE TALC FREE: 1.42 POWDER TOPICAL at 20:02

## 2022-11-20 RX ADMIN — METHYLPREDNISOLONE SODIUM SUCCINATE 40 MG: 40 INJECTION, POWDER, FOR SOLUTION INTRAMUSCULAR; INTRAVENOUS at 18:03

## 2022-11-20 RX ADMIN — METHYLPREDNISOLONE SODIUM SUCCINATE 40 MG: 40 INJECTION, POWDER, FOR SOLUTION INTRAMUSCULAR; INTRAVENOUS at 02:09

## 2022-11-20 RX ADMIN — INSULIN LISPRO 3 UNITS: 100 INJECTION, SOLUTION INTRAVENOUS; SUBCUTANEOUS at 11:52

## 2022-11-20 RX ADMIN — INSULIN LISPRO 3 UNITS: 100 INJECTION, SOLUTION INTRAVENOUS; SUBCUTANEOUS at 05:39

## 2022-11-20 RX ADMIN — ANTI-FUNGAL POWDER MICONAZOLE NITRATE TALC FREE: 1.42 POWDER TOPICAL at 08:26

## 2022-11-20 RX ADMIN — Medication 10 ML: at 20:03

## 2022-11-20 RX ADMIN — Medication 10 ML: at 08:35

## 2022-11-20 RX ADMIN — METHYLPREDNISOLONE SODIUM SUCCINATE 40 MG: 40 INJECTION, POWDER, FOR SOLUTION INTRAMUSCULAR; INTRAVENOUS at 08:26

## 2022-11-20 RX ADMIN — MEROPENEM 1 G: 1 INJECTION, POWDER, FOR SOLUTION INTRAVENOUS at 05:40

## 2022-11-21 ENCOUNTER — APPOINTMENT (OUTPATIENT)
Dept: GENERAL RADIOLOGY | Facility: HOSPITAL | Age: 72
End: 2022-11-21

## 2022-11-21 LAB
ABO GROUP BLD: NORMAL
ALBUMIN SERPL-MCNC: 2.6 G/DL (ref 3.5–5.2)
ALBUMIN/GLOB SERPL: 1.7 G/DL
ALP SERPL-CCNC: 123 U/L (ref 39–117)
ALT SERPL W P-5'-P-CCNC: 31 U/L (ref 1–33)
ANION GAP SERPL CALCULATED.3IONS-SCNC: 11 MMOL/L (ref 5–15)
ANISOCYTOSIS BLD QL: ABNORMAL
AST SERPL-CCNC: 58 U/L (ref 1–32)
BACTERIA FLD CULT: NORMAL
BACTERIA SPEC AEROBE CULT: NORMAL
BACTERIA SPEC ANAEROBE CULT: NORMAL
BH BB BLOOD EXPIRATION DATE: NORMAL
BH BB BLOOD TYPE BARCODE: 6200
BH BB DISPENSE STATUS: NORMAL
BH BB PRODUCT CODE: NORMAL
BH BB UNIT NUMBER: NORMAL
BILIRUB SERPL-MCNC: 0.7 MG/DL (ref 0–1.2)
BLD GP AB SCN SERPL QL: NEGATIVE
BUN SERPL-MCNC: 35 MG/DL (ref 8–23)
BUN/CREAT SERPL: 31.3 (ref 7–25)
BURR CELLS BLD QL SMEAR: ABNORMAL
C3 FRG RBC-MCNC: ABNORMAL
CALCIUM SPEC-SCNC: 6.5 MG/DL (ref 8.6–10.5)
CHLORIDE SERPL-SCNC: 108 MMOL/L (ref 98–107)
CO2 SERPL-SCNC: 28 MMOL/L (ref 22–29)
CREAT SERPL-MCNC: 1.12 MG/DL (ref 0.57–1)
CROSSMATCH INTERPRETATION: NORMAL
DEPRECATED RDW RBC AUTO: 56.9 FL (ref 37–54)
EGFRCR SERPLBLD CKD-EPI 2021: 53 ML/MIN/1.73
ERYTHROCYTE [DISTWIDTH] IN BLOOD BY AUTOMATED COUNT: 18.8 % (ref 12.3–15.4)
GLOBULIN UR ELPH-MCNC: 1.5 GM/DL
GLUCOSE BLDC GLUCOMTR-MCNC: 106 MG/DL (ref 70–105)
GLUCOSE BLDC GLUCOMTR-MCNC: 161 MG/DL (ref 70–105)
GLUCOSE BLDC GLUCOMTR-MCNC: 203 MG/DL (ref 70–105)
GLUCOSE BLDC GLUCOMTR-MCNC: 257 MG/DL (ref 70–105)
GLUCOSE SERPL-MCNC: 428 MG/DL (ref 65–99)
GRAM STN SPEC: NORMAL
GRAM STN SPEC: NORMAL
HCT VFR BLD AUTO: 20.7 % (ref 34–46.6)
HCT VFR BLD AUTO: 29.5 % (ref 34–46.6)
HCT VFR BLD AUTO: 31.6 % (ref 34–46.6)
HGB BLD-MCNC: 10.3 G/DL (ref 12–15.9)
HGB BLD-MCNC: 6.7 G/DL (ref 12–15.9)
HGB BLD-MCNC: 9.8 G/DL (ref 12–15.9)
LYMPHOCYTES # BLD MANUAL: 0.65 10*3/MM3 (ref 0.7–3.1)
LYMPHOCYTES NFR BLD MANUAL: 6 % (ref 5–12)
MAGNESIUM SERPL-MCNC: 1.5 MG/DL (ref 1.6–2.4)
MCH RBC QN AUTO: 27.2 PG (ref 26.6–33)
MCHC RBC AUTO-ENTMCNC: 32.5 G/DL (ref 31.5–35.7)
MCV RBC AUTO: 83.7 FL (ref 79–97)
MONOCYTES # BLD: 0.77 10*3/MM3 (ref 0.1–0.9)
NEUTROPHILS # BLD AUTO: 11.48 10*3/MM3 (ref 1.7–7)
NEUTROPHILS NFR BLD MANUAL: 81 % (ref 42.7–76)
NEUTS BAND NFR BLD MANUAL: 8 % (ref 0–5)
PLAT MORPH BLD: NORMAL
PLATELET # BLD AUTO: 64 10*3/MM3 (ref 140–450)
PMV BLD AUTO: 9.3 FL (ref 6–12)
POIKILOCYTOSIS BLD QL SMEAR: ABNORMAL
POTASSIUM SERPL-SCNC: 2.9 MMOL/L (ref 3.5–5.2)
POTASSIUM SERPL-SCNC: 3.4 MMOL/L (ref 3.5–5.2)
PROT SERPL-MCNC: 4.1 G/DL (ref 6–8.5)
RBC # BLD AUTO: 2.47 10*6/MM3 (ref 3.77–5.28)
RH BLD: POSITIVE
SCAN SLIDE: NORMAL
SODIUM SERPL-SCNC: 147 MMOL/L (ref 136–145)
T&S EXPIRATION DATE: NORMAL
UNIT  ABO: NORMAL
UNIT  RH: NORMAL
VARIANT LYMPHS NFR BLD MANUAL: 5 % (ref 19.6–45.3)
WBC MORPH BLD: NORMAL
WBC NRBC COR # BLD: 12.9 10*3/MM3 (ref 3.4–10.8)

## 2022-11-21 PROCEDURE — 25010000002 FUROSEMIDE PER 20 MG: Performed by: INTERNAL MEDICINE

## 2022-11-21 PROCEDURE — 82962 GLUCOSE BLOOD TEST: CPT

## 2022-11-21 PROCEDURE — 0 POTASSIUM CHLORIDE 10 MEQ/100ML SOLUTION: Performed by: NURSE PRACTITIONER

## 2022-11-21 PROCEDURE — 97530 THERAPEUTIC ACTIVITIES: CPT

## 2022-11-21 PROCEDURE — P9016 RBC LEUKOCYTES REDUCED: HCPCS

## 2022-11-21 PROCEDURE — 86901 BLOOD TYPING SEROLOGIC RH(D): CPT

## 2022-11-21 PROCEDURE — 86850 RBC ANTIBODY SCREEN: CPT

## 2022-11-21 PROCEDURE — 85007 BL SMEAR W/DIFF WBC COUNT: CPT | Performed by: INTERNAL MEDICINE

## 2022-11-21 PROCEDURE — 83735 ASSAY OF MAGNESIUM: CPT | Performed by: PHYSICIAN ASSISTANT

## 2022-11-21 PROCEDURE — 94799 UNLISTED PULMONARY SVC/PX: CPT

## 2022-11-21 PROCEDURE — 85014 HEMATOCRIT: CPT | Performed by: NURSE PRACTITIONER

## 2022-11-21 PROCEDURE — 86900 BLOOD TYPING SEROLOGIC ABO: CPT

## 2022-11-21 PROCEDURE — 25010000002 METHYLPREDNISOLONE PER 40 MG: Performed by: INTERNAL MEDICINE

## 2022-11-21 PROCEDURE — 86923 COMPATIBILITY TEST ELECTRIC: CPT

## 2022-11-21 PROCEDURE — 94660 CPAP INITIATION&MGMT: CPT

## 2022-11-21 PROCEDURE — P9040 RBC LEUKOREDUCED IRRADIATED: HCPCS

## 2022-11-21 PROCEDURE — 63710000001 INSULIN LISPRO (HUMAN) PER 5 UNITS

## 2022-11-21 PROCEDURE — 97166 OT EVAL MOD COMPLEX 45 MIN: CPT

## 2022-11-21 PROCEDURE — 71045 X-RAY EXAM CHEST 1 VIEW: CPT

## 2022-11-21 PROCEDURE — 85018 HEMOGLOBIN: CPT | Performed by: NURSE PRACTITIONER

## 2022-11-21 PROCEDURE — 84132 ASSAY OF SERUM POTASSIUM: CPT | Performed by: NURSE PRACTITIONER

## 2022-11-21 PROCEDURE — 85025 COMPLETE CBC W/AUTO DIFF WBC: CPT | Performed by: INTERNAL MEDICINE

## 2022-11-21 PROCEDURE — 25010000002 MAGNESIUM SULFATE IN D5W 1G/100ML (PREMIX) 1-5 GM/100ML-% SOLUTION: Performed by: PHYSICIAN ASSISTANT

## 2022-11-21 PROCEDURE — 97535 SELF CARE MNGMENT TRAINING: CPT

## 2022-11-21 PROCEDURE — 97164 PT RE-EVAL EST PLAN CARE: CPT

## 2022-11-21 PROCEDURE — 25010000002 MEROPENEM PER 100 MG: Performed by: INTERNAL MEDICINE

## 2022-11-21 PROCEDURE — 36430 TRANSFUSION BLD/BLD COMPNT: CPT

## 2022-11-21 PROCEDURE — 80053 COMPREHEN METABOLIC PANEL: CPT | Performed by: INTERNAL MEDICINE

## 2022-11-21 RX ORDER — FUROSEMIDE 10 MG/ML
40 INJECTION INTRAMUSCULAR; INTRAVENOUS ONCE
Status: COMPLETED | OUTPATIENT
Start: 2022-11-21 | End: 2022-11-21

## 2022-11-21 RX ORDER — INSULIN LISPRO 100 [IU]/ML
4-24 INJECTION, SOLUTION INTRAVENOUS; SUBCUTANEOUS EVERY 6 HOURS SCHEDULED
Status: DISCONTINUED | OUTPATIENT
Start: 2022-11-21 | End: 2022-11-26

## 2022-11-21 RX ADMIN — POTASSIUM CHLORIDE 10 MEQ: 7.46 INJECTION, SOLUTION INTRAVENOUS at 10:06

## 2022-11-21 RX ADMIN — METHYLPREDNISOLONE SODIUM SUCCINATE 40 MG: 40 INJECTION, POWDER, FOR SOLUTION INTRAMUSCULAR; INTRAVENOUS at 08:20

## 2022-11-21 RX ADMIN — ANTI-FUNGAL POWDER MICONAZOLE NITRATE TALC FREE 1 APPLICATION: 1.42 POWDER TOPICAL at 08:20

## 2022-11-21 RX ADMIN — METHYLPREDNISOLONE SODIUM SUCCINATE 40 MG: 40 INJECTION, POWDER, FOR SOLUTION INTRAMUSCULAR; INTRAVENOUS at 00:21

## 2022-11-21 RX ADMIN — POTASSIUM CHLORIDE 10 MEQ: 7.46 INJECTION, SOLUTION INTRAVENOUS at 13:32

## 2022-11-21 RX ADMIN — METHYLPREDNISOLONE SODIUM SUCCINATE 40 MG: 40 INJECTION, POWDER, FOR SOLUTION INTRAMUSCULAR; INTRAVENOUS at 18:04

## 2022-11-21 RX ADMIN — POTASSIUM CHLORIDE 10 MEQ: 7.46 INJECTION, SOLUTION INTRAVENOUS at 08:20

## 2022-11-21 RX ADMIN — POTASSIUM CHLORIDE 10 MEQ: 7.46 INJECTION, SOLUTION INTRAVENOUS at 16:03

## 2022-11-21 RX ADMIN — Medication 10 ML: at 22:22

## 2022-11-21 RX ADMIN — FUROSEMIDE 40 MG: 10 INJECTION, SOLUTION INTRAMUSCULAR; INTRAVENOUS at 11:49

## 2022-11-21 RX ADMIN — MEROPENEM 1 G: 1 INJECTION, POWDER, FOR SOLUTION INTRAVENOUS at 18:03

## 2022-11-21 RX ADMIN — INSULIN LISPRO 4 UNITS: 100 INJECTION, SOLUTION INTRAVENOUS; SUBCUTANEOUS at 00:22

## 2022-11-21 RX ADMIN — PANTOPRAZOLE SODIUM 40 MG: 40 INJECTION, POWDER, FOR SOLUTION INTRAVENOUS at 06:08

## 2022-11-21 RX ADMIN — POTASSIUM CHLORIDE 10 MEQ: 7.46 INJECTION, SOLUTION INTRAVENOUS at 06:42

## 2022-11-21 RX ADMIN — MEROPENEM 1 G: 1 INJECTION, POWDER, FOR SOLUTION INTRAVENOUS at 06:08

## 2022-11-21 RX ADMIN — MAGNESIUM SULFATE IN DEXTROSE 1 G: 10 INJECTION, SOLUTION INTRAVENOUS at 16:03

## 2022-11-21 RX ADMIN — Medication 10 ML: at 08:22

## 2022-11-21 RX ADMIN — POTASSIUM CHLORIDE 10 MEQ: 7.46 INJECTION, SOLUTION INTRAVENOUS at 11:49

## 2022-11-21 RX ADMIN — INSULIN LISPRO 24 UNITS: 100 INJECTION, SOLUTION INTRAVENOUS; SUBCUTANEOUS at 06:08

## 2022-11-21 RX ADMIN — INSULIN LISPRO 8 UNITS: 100 INJECTION, SOLUTION INTRAVENOUS; SUBCUTANEOUS at 18:04

## 2022-11-21 RX ADMIN — ANTI-FUNGAL POWDER MICONAZOLE NITRATE TALC FREE: 1.42 POWDER TOPICAL at 22:22

## 2022-11-22 ENCOUNTER — APPOINTMENT (OUTPATIENT)
Dept: ONCOLOGY | Facility: HOSPITAL | Age: 72
End: 2022-11-22

## 2022-11-22 ENCOUNTER — APPOINTMENT (OUTPATIENT)
Dept: GENERAL RADIOLOGY | Facility: HOSPITAL | Age: 72
End: 2022-11-22

## 2022-11-22 ENCOUNTER — INPATIENT HOSPITAL (OUTPATIENT)
Dept: URBAN - METROPOLITAN AREA HOSPITAL 84 | Facility: HOSPITAL | Age: 72
End: 2022-11-22
Payer: COMMERCIAL

## 2022-11-22 DIAGNOSIS — K92.1 MELENA: ICD-10-CM

## 2022-11-22 PROBLEM — N17.9 AKI (ACUTE KIDNEY INJURY): Status: ACTIVE | Noted: 2022-11-22

## 2022-11-22 PROBLEM — J90 PLEURAL EFFUSION ON LEFT: Status: ACTIVE | Noted: 2022-11-22

## 2022-11-22 LAB
ACANTHOCYTES BLD QL SMEAR: ABNORMAL
ALBUMIN SERPL-MCNC: 2.8 G/DL (ref 3.5–5.2)
ALBUMIN/GLOB SERPL: 1.8 G/DL
ALP SERPL-CCNC: 163 U/L (ref 39–117)
ALT SERPL W P-5'-P-CCNC: 38 U/L (ref 1–33)
ANION GAP SERPL CALCULATED.3IONS-SCNC: 9 MMOL/L (ref 5–15)
AST SERPL-CCNC: 87 U/L (ref 1–32)
BH BB BLOOD EXPIRATION DATE: NORMAL
BH BB BLOOD TYPE BARCODE: 6200
BH BB DISPENSE STATUS: NORMAL
BH BB PRODUCT CODE: NORMAL
BH BB UNIT NUMBER: NORMAL
BILIRUB SERPL-MCNC: 1.4 MG/DL (ref 0–1.2)
BUN SERPL-MCNC: 39 MG/DL (ref 8–23)
BUN/CREAT SERPL: 37.5 (ref 7–25)
BURR CELLS BLD QL SMEAR: ABNORMAL
C3 FRG RBC-MCNC: ABNORMAL
CALCIUM SPEC-SCNC: 6.7 MG/DL (ref 8.6–10.5)
CHLORIDE SERPL-SCNC: 113 MMOL/L (ref 98–107)
CO2 SERPL-SCNC: 31 MMOL/L (ref 22–29)
CREAT SERPL-MCNC: 1.04 MG/DL (ref 0.57–1)
CROSSMATCH INTERPRETATION: NORMAL
DEPRECATED RDW RBC AUTO: 52.1 FL (ref 37–54)
EGFRCR SERPLBLD CKD-EPI 2021: 57.9 ML/MIN/1.73
ERYTHROCYTE [DISTWIDTH] IN BLOOD BY AUTOMATED COUNT: 17.5 % (ref 12.3–15.4)
GLOBULIN UR ELPH-MCNC: 1.6 GM/DL
GLUCOSE BLDC GLUCOMTR-MCNC: 139 MG/DL (ref 70–105)
GLUCOSE BLDC GLUCOMTR-MCNC: 146 MG/DL (ref 70–105)
GLUCOSE BLDC GLUCOMTR-MCNC: 148 MG/DL (ref 70–105)
GLUCOSE BLDC GLUCOMTR-MCNC: 178 MG/DL (ref 70–105)
GLUCOSE SERPL-MCNC: 177 MG/DL (ref 65–99)
HCT VFR BLD AUTO: 28.4 % (ref 34–46.6)
HGB BLD-MCNC: 9.1 G/DL (ref 12–15.9)
HGB BLD-MCNC: 9.3 G/DL (ref 12–15.9)
LYMPHOCYTES # BLD MANUAL: 0.41 10*3/MM3 (ref 0.7–3.1)
MAGNESIUM SERPL-MCNC: 1.8 MG/DL (ref 1.6–2.4)
MCH RBC QN AUTO: 28.4 PG (ref 26.6–33)
MCHC RBC AUTO-ENTMCNC: 32.8 G/DL (ref 31.5–35.7)
MCV RBC AUTO: 86.7 FL (ref 79–97)
METAMYELOCYTES NFR BLD MANUAL: 2 % (ref 0–0)
NEUTROPHILS # BLD AUTO: 19.49 10*3/MM3 (ref 1.7–7)
NEUTROPHILS NFR BLD MANUAL: 90 % (ref 42.7–76)
NEUTS BAND NFR BLD MANUAL: 6 % (ref 0–5)
PHOSPHATE SERPL-MCNC: 1.8 MG/DL (ref 2.5–4.5)
PLATELET # BLD AUTO: 75 10*3/MM3 (ref 140–450)
PMV BLD AUTO: 9 FL (ref 6–12)
POIKILOCYTOSIS BLD QL SMEAR: ABNORMAL
POTASSIUM SERPL-SCNC: 3.3 MMOL/L (ref 3.5–5.2)
PROT SERPL-MCNC: 4.4 G/DL (ref 6–8.5)
RBC # BLD AUTO: 3.28 10*6/MM3 (ref 3.77–5.28)
SCAN SLIDE: NORMAL
SMALL PLATELETS BLD QL SMEAR: ABNORMAL
SODIUM SERPL-SCNC: 153 MMOL/L (ref 136–145)
UNIT  ABO: NORMAL
UNIT  RH: NORMAL
VARIANT LYMPHS NFR BLD MANUAL: 2 % (ref 19.6–45.3)
WBC MORPH BLD: NORMAL
WBC NRBC COR # BLD: 20.3 10*3/MM3 (ref 3.4–10.8)

## 2022-11-22 PROCEDURE — 82962 GLUCOSE BLOOD TEST: CPT

## 2022-11-22 PROCEDURE — 85025 COMPLETE CBC W/AUTO DIFF WBC: CPT | Performed by: INTERNAL MEDICINE

## 2022-11-22 PROCEDURE — 94799 UNLISTED PULMONARY SVC/PX: CPT

## 2022-11-22 PROCEDURE — P9047 ALBUMIN (HUMAN), 25%, 50ML: HCPCS | Performed by: STUDENT IN AN ORGANIZED HEALTH CARE EDUCATION/TRAINING PROGRAM

## 2022-11-22 PROCEDURE — 80053 COMPREHEN METABOLIC PANEL: CPT | Performed by: INTERNAL MEDICINE

## 2022-11-22 PROCEDURE — 25010000002 ONDANSETRON PER 1 MG: Performed by: PHYSICIAN ASSISTANT

## 2022-11-22 PROCEDURE — 84100 ASSAY OF PHOSPHORUS: CPT | Performed by: INTERNAL MEDICINE

## 2022-11-22 PROCEDURE — 99222 1ST HOSP IP/OBS MODERATE 55: CPT | Mod: FS | Performed by: NURSE PRACTITIONER

## 2022-11-22 PROCEDURE — 25010000002 ALBUMIN HUMAN 25% PER 50 ML: Performed by: STUDENT IN AN ORGANIZED HEALTH CARE EDUCATION/TRAINING PROGRAM

## 2022-11-22 PROCEDURE — 74018 RADEX ABDOMEN 1 VIEW: CPT

## 2022-11-22 PROCEDURE — 94660 CPAP INITIATION&MGMT: CPT

## 2022-11-22 PROCEDURE — 85018 HEMOGLOBIN: CPT | Performed by: NURSE PRACTITIONER

## 2022-11-22 PROCEDURE — 0 HYDROXYZINE PER 25 MG: Performed by: STUDENT IN AN ORGANIZED HEALTH CARE EDUCATION/TRAINING PROGRAM

## 2022-11-22 PROCEDURE — 25010000002 MEROPENEM PER 100 MG: Performed by: INTERNAL MEDICINE

## 2022-11-22 PROCEDURE — 83735 ASSAY OF MAGNESIUM: CPT | Performed by: PHYSICIAN ASSISTANT

## 2022-11-22 PROCEDURE — 85007 BL SMEAR W/DIFF WBC COUNT: CPT | Performed by: INTERNAL MEDICINE

## 2022-11-22 PROCEDURE — 63710000001 INSULIN LISPRO (HUMAN) PER 5 UNITS

## 2022-11-22 PROCEDURE — 25010000002 METHYLPREDNISOLONE PER 40 MG: Performed by: INTERNAL MEDICINE

## 2022-11-22 RX ORDER — ALBUMIN (HUMAN) 12.5 G/50ML
25 SOLUTION INTRAVENOUS ONCE
Status: COMPLETED | OUTPATIENT
Start: 2022-11-22 | End: 2022-11-22

## 2022-11-22 RX ORDER — PREDNISONE 20 MG/1
20 TABLET ORAL DAILY
Status: DISCONTINUED | OUTPATIENT
Start: 2022-11-24 | End: 2022-11-23

## 2022-11-22 RX ORDER — PREDNISONE 10 MG/1
10 TABLET ORAL DAILY
Status: DISCONTINUED | OUTPATIENT
Start: 2022-11-26 | End: 2022-11-23

## 2022-11-22 RX ORDER — PANTOPRAZOLE SODIUM 40 MG/10ML
40 INJECTION, POWDER, LYOPHILIZED, FOR SOLUTION INTRAVENOUS 2 TIMES DAILY
Status: DISCONTINUED | OUTPATIENT
Start: 2022-11-22 | End: 2022-11-23

## 2022-11-22 RX ORDER — HEPARIN SODIUM 5000 [USP'U]/ML
5000 INJECTION, SOLUTION INTRAVENOUS; SUBCUTANEOUS EVERY 12 HOURS SCHEDULED
Status: DISCONTINUED | OUTPATIENT
Start: 2022-11-22 | End: 2022-11-22 | Stop reason: ALTCHOICE

## 2022-11-22 RX ORDER — HYDROXYZINE HYDROCHLORIDE 50 MG/ML
25 INJECTION, SOLUTION INTRAMUSCULAR EVERY 6 HOURS PRN
Status: DISCONTINUED | OUTPATIENT
Start: 2022-11-22 | End: 2022-12-06 | Stop reason: HOSPADM

## 2022-11-22 RX ORDER — DEXTROSE MONOHYDRATE 50 MG/ML
50 INJECTION, SOLUTION INTRAVENOUS CONTINUOUS
Status: DISCONTINUED | OUTPATIENT
Start: 2022-11-22 | End: 2022-11-23

## 2022-11-22 RX ADMIN — ANTI-FUNGAL POWDER MICONAZOLE NITRATE TALC FREE: 1.42 POWDER TOPICAL at 21:46

## 2022-11-22 RX ADMIN — ONDANSETRON 4 MG: 2 INJECTION INTRAMUSCULAR; INTRAVENOUS at 16:40

## 2022-11-22 RX ADMIN — ONDANSETRON 4 MG: 2 INJECTION INTRAMUSCULAR; INTRAVENOUS at 22:49

## 2022-11-22 RX ADMIN — ATORVASTATIN CALCIUM 40 MG: 40 TABLET, FILM COATED ORAL at 21:18

## 2022-11-22 RX ADMIN — INSULIN LISPRO 4 UNITS: 100 INJECTION, SOLUTION INTRAVENOUS; SUBCUTANEOUS at 00:01

## 2022-11-22 RX ADMIN — MONTELUKAST 10 MG: 10 TABLET, FILM COATED ORAL at 21:17

## 2022-11-22 RX ADMIN — METHYLPREDNISOLONE SODIUM SUCCINATE 40 MG: 40 INJECTION, POWDER, FOR SOLUTION INTRAMUSCULAR; INTRAVENOUS at 00:01

## 2022-11-22 RX ADMIN — INSULIN LISPRO 4 UNITS: 100 INJECTION, SOLUTION INTRAVENOUS; SUBCUTANEOUS at 17:00

## 2022-11-22 RX ADMIN — SODIUM CHLORIDE 500 ML: 9 INJECTION, SOLUTION INTRAVENOUS at 01:58

## 2022-11-22 RX ADMIN — Medication 10 ML: at 10:28

## 2022-11-22 RX ADMIN — MIDODRINE HYDROCHLORIDE 10 MG: 5 TABLET ORAL at 21:17

## 2022-11-22 RX ADMIN — ANTI-FUNGAL POWDER MICONAZOLE NITRATE TALC FREE: 1.42 POWDER TOPICAL at 10:28

## 2022-11-22 RX ADMIN — HYDROXYZINE HYDROCHLORIDE 25 MG: 50 INJECTION, SOLUTION INTRAMUSCULAR at 10:27

## 2022-11-22 RX ADMIN — MIDODRINE HYDROCHLORIDE 10 MG: 5 TABLET ORAL at 14:13

## 2022-11-22 RX ADMIN — HYDROXYZINE HYDROCHLORIDE 25 MG: 25 TABLET, FILM COATED ORAL at 16:40

## 2022-11-22 RX ADMIN — DEXTROSE MONOHYDRATE 50 ML/HR: 50 INJECTION, SOLUTION INTRAVENOUS at 10:36

## 2022-11-22 RX ADMIN — Medication 10 ML: at 21:17

## 2022-11-22 RX ADMIN — MEROPENEM 1 G: 1 INJECTION, POWDER, FOR SOLUTION INTRAVENOUS at 17:00

## 2022-11-22 RX ADMIN — ALBUMIN HUMAN 25 G: 0.25 SOLUTION INTRAVENOUS at 13:00

## 2022-11-22 RX ADMIN — GUAIFENESIN 1200 MG: 600 TABLET, EXTENDED RELEASE ORAL at 21:18

## 2022-11-22 RX ADMIN — INSULIN LISPRO 4 UNITS: 100 INJECTION, SOLUTION INTRAVENOUS; SUBCUTANEOUS at 06:22

## 2022-11-22 RX ADMIN — MEROPENEM 1 G: 1 INJECTION, POWDER, FOR SOLUTION INTRAVENOUS at 05:06

## 2022-11-22 RX ADMIN — ONDANSETRON 4 MG: 2 INJECTION INTRAMUSCULAR; INTRAVENOUS at 10:27

## 2022-11-22 RX ADMIN — PANTOPRAZOLE SODIUM 40 MG: 40 INJECTION, POWDER, FOR SOLUTION INTRAVENOUS at 05:05

## 2022-11-22 RX ADMIN — POTASSIUM PHOSPHATE, MONOBASIC AND POTASSIUM PHOSPHATE, DIBASIC 21 MMOL: 224; 236 INJECTION, SOLUTION, CONCENTRATE INTRAVENOUS at 10:28

## 2022-11-22 RX ADMIN — PANTOPRAZOLE SODIUM 40 MG: 40 INJECTION, POWDER, FOR SOLUTION INTRAVENOUS at 21:18

## 2022-11-23 ENCOUNTER — APPOINTMENT (OUTPATIENT)
Dept: GENERAL RADIOLOGY | Facility: HOSPITAL | Age: 72
End: 2022-11-23

## 2022-11-23 ENCOUNTER — ANESTHESIA EVENT (OUTPATIENT)
Dept: GASTROENTEROLOGY | Facility: HOSPITAL | Age: 72
End: 2022-11-23

## 2022-11-23 ENCOUNTER — INPATIENT HOSPITAL (OUTPATIENT)
Dept: URBAN - METROPOLITAN AREA HOSPITAL 84 | Facility: HOSPITAL | Age: 72
End: 2022-11-23
Payer: COMMERCIAL

## 2022-11-23 ENCOUNTER — APPOINTMENT (OUTPATIENT)
Dept: CT IMAGING | Facility: HOSPITAL | Age: 72
End: 2022-11-23

## 2022-11-23 ENCOUNTER — ANESTHESIA (OUTPATIENT)
Dept: GASTROENTEROLOGY | Facility: HOSPITAL | Age: 72
End: 2022-11-23

## 2022-11-23 DIAGNOSIS — K26.4 CHRONIC OR UNSPECIFIED DUODENAL ULCER WITH HEMORRHAGE: ICD-10-CM

## 2022-11-23 DIAGNOSIS — K92.1 MELENA: ICD-10-CM

## 2022-11-23 DIAGNOSIS — K25.9 GASTRIC ULCER, UNSPECIFIED AS ACUTE OR CHRONIC, WITHOUT HEMO: ICD-10-CM

## 2022-11-23 LAB
ALBUMIN SERPL-MCNC: 3 G/DL (ref 3.5–5.2)
ALBUMIN SERPL-MCNC: 3.2 G/DL (ref 3.5–5.2)
ALBUMIN/GLOB SERPL: 2.7 G/DL
ALP SERPL-CCNC: 179 U/L (ref 39–117)
ALT SERPL W P-5'-P-CCNC: 33 U/L (ref 1–33)
ANION GAP SERPL CALCULATED.3IONS-SCNC: 11 MMOL/L (ref 5–15)
ANION GAP SERPL CALCULATED.3IONS-SCNC: 9 MMOL/L (ref 5–15)
ARTERIAL PATENCY WRIST A: POSITIVE
AST SERPL-CCNC: 65 U/L (ref 1–32)
ATMOSPHERIC PRESS: ABNORMAL MM[HG]
BASE EXCESS BLDA CALC-SCNC: 0.8 MMOL/L (ref 0–3)
BDY SITE: ABNORMAL
BILIRUB SERPL-MCNC: 1.2 MG/DL (ref 0–1.2)
BUN SERPL-MCNC: 42 MG/DL (ref 8–23)
BUN SERPL-MCNC: 44 MG/DL (ref 8–23)
BUN/CREAT SERPL: 37.8 (ref 7–25)
BUN/CREAT SERPL: 38.6 (ref 7–25)
BURR CELLS BLD QL SMEAR: ABNORMAL
CA-I SERPL ISE-MCNC: 0.87 MMOL/L (ref 1.2–1.3)
CALCIUM SPEC-SCNC: 6.2 MG/DL (ref 8.6–10.5)
CALCIUM SPEC-SCNC: 6.5 MG/DL (ref 8.6–10.5)
CHLORIDE SERPL-SCNC: 109 MMOL/L (ref 98–107)
CHLORIDE SERPL-SCNC: 112 MMOL/L (ref 98–107)
CO2 BLDA-SCNC: 25.8 MMOL/L (ref 22–29)
CO2 SERPL-SCNC: 28 MMOL/L (ref 22–29)
CO2 SERPL-SCNC: 32 MMOL/L (ref 22–29)
CREAT SERPL-MCNC: 1.11 MG/DL (ref 0.57–1)
CREAT SERPL-MCNC: 1.14 MG/DL (ref 0.57–1)
DEPRECATED RDW RBC AUTO: 53.8 FL (ref 37–54)
DEPRECATED RDW RBC AUTO: 56.9 FL (ref 37–54)
EGFRCR SERPLBLD CKD-EPI 2021: 51.9 ML/MIN/1.73
EGFRCR SERPLBLD CKD-EPI 2021: 53.6 ML/MIN/1.73
ERYTHROCYTE [DISTWIDTH] IN BLOOD BY AUTOMATED COUNT: 18 % (ref 12.3–15.4)
ERYTHROCYTE [DISTWIDTH] IN BLOOD BY AUTOMATED COUNT: 18.5 % (ref 12.3–15.4)
GLOBULIN UR ELPH-MCNC: 1.1 GM/DL
GLUCOSE BLDC GLUCOMTR-MCNC: 142 MG/DL (ref 70–105)
GLUCOSE BLDC GLUCOMTR-MCNC: 189 MG/DL (ref 70–105)
GLUCOSE BLDC GLUCOMTR-MCNC: 204 MG/DL (ref 70–105)
GLUCOSE SERPL-MCNC: 165 MG/DL (ref 65–99)
GLUCOSE SERPL-MCNC: 269 MG/DL (ref 65–99)
HCO3 BLDA-SCNC: 24.7 MMOL/L (ref 21–28)
HCT VFR BLD AUTO: 26.1 % (ref 34–46.6)
HCT VFR BLD AUTO: 26.5 % (ref 34–46.6)
HCT VFR BLD AUTO: 27.1 % (ref 34–46.6)
HEMOCCULT STL QL IA: POSITIVE
HEMODILUTION: NO
HGB BLD-MCNC: 8.3 G/DL (ref 12–15.9)
HGB BLD-MCNC: 8.6 G/DL (ref 12–15.9)
HGB BLD-MCNC: 8.6 G/DL (ref 12–15.9)
INHALED O2 CONCENTRATION: 100 %
LYMPHOCYTES # BLD MANUAL: 1.1 10*3/MM3 (ref 0.7–3.1)
LYMPHOCYTES NFR BLD MANUAL: 6 % (ref 5–12)
MAGNESIUM SERPL-MCNC: 1.8 MG/DL (ref 1.6–2.4)
MAGNESIUM SERPL-MCNC: 1.8 MG/DL (ref 1.6–2.4)
MCH RBC QN AUTO: 28.3 PG (ref 26.6–33)
MCH RBC QN AUTO: 28.5 PG (ref 26.6–33)
MCHC RBC AUTO-ENTMCNC: 31.9 G/DL (ref 31.5–35.7)
MCHC RBC AUTO-ENTMCNC: 32.4 G/DL (ref 31.5–35.7)
MCV RBC AUTO: 87.3 FL (ref 79–97)
MCV RBC AUTO: 89.5 FL (ref 79–97)
METAMYELOCYTES NFR BLD MANUAL: 1 % (ref 0–0)
MODALITY: ABNORMAL
MONOCYTES # BLD: 1.64 10*3/MM3 (ref 0.1–0.9)
NEUTROPHILS # BLD AUTO: 24.39 10*3/MM3 (ref 1.7–7)
NEUTROPHILS NFR BLD MANUAL: 85 % (ref 42.7–76)
NEUTS BAND NFR BLD MANUAL: 4 % (ref 0–5)
PCO2 BLDA: 35.8 MM HG (ref 35–48)
PEEP RESPIRATORY: 5 CM[H2O]
PF4 HEPARIN CMPLX IGG SERPL IA: 0.02 OD (ref 0–0.4)
PH BLDA: 7.45 PH UNITS (ref 7.35–7.45)
PHOSPHATE SERPL-MCNC: 2.9 MG/DL (ref 2.5–4.5)
PHOSPHATE SERPL-MCNC: 3.5 MG/DL (ref 2.5–4.5)
PLATELET # BLD AUTO: 97 10*3/MM3 (ref 140–450)
PLATELET # BLD AUTO: 99 10*3/MM3 (ref 140–450)
PMV BLD AUTO: 9 FL (ref 6–12)
PMV BLD AUTO: 9.6 FL (ref 6–12)
PO2 BLDA: 456.1 MM HG (ref 83–108)
POLYCHROMASIA BLD QL SMEAR: ABNORMAL
POTASSIUM SERPL-SCNC: 3.8 MMOL/L (ref 3.5–5.2)
POTASSIUM SERPL-SCNC: 3.9 MMOL/L (ref 3.5–5.2)
PROT SERPL-MCNC: 4.1 G/DL (ref 6–8.5)
RBC # BLD AUTO: 2.92 10*6/MM3 (ref 3.77–5.28)
RBC # BLD AUTO: 3.04 10*6/MM3 (ref 3.77–5.28)
RESPIRATORY RATE: 18
SAO2 % BLDCOA: 100 % (ref 94–98)
SCAN SLIDE: NORMAL
SMALL PLATELETS BLD QL SMEAR: ABNORMAL
SODIUM SERPL-SCNC: 148 MMOL/L (ref 136–145)
SODIUM SERPL-SCNC: 153 MMOL/L (ref 136–145)
VARIANT LYMPHS NFR BLD MANUAL: 1 % (ref 0–5)
VARIANT LYMPHS NFR BLD MANUAL: 3 % (ref 19.6–45.3)
VENTILATOR MODE: ABNORMAL
VT ON VENT VENT: 500 ML
WBC MORPH BLD: NORMAL
WBC NRBC COR # BLD: 27.4 10*3/MM3 (ref 3.4–10.8)
WBC NRBC COR # BLD: 28.5 10*3/MM3 (ref 3.4–10.8)

## 2022-11-23 PROCEDURE — 71045 X-RAY EXAM CHEST 1 VIEW: CPT

## 2022-11-23 PROCEDURE — 43255 EGD CONTROL BLEEDING ANY: CPT | Performed by: INTERNAL MEDICINE

## 2022-11-23 PROCEDURE — 25010000002 MEROPENEM PER 100 MG: Performed by: INTERNAL MEDICINE

## 2022-11-23 PROCEDURE — 25010000002 PROPOFOL 1000 MG/100ML EMULSION: Performed by: NURSE ANESTHETIST, CERTIFIED REGISTERED

## 2022-11-23 PROCEDURE — 25010000002 CALCIUM GLUCONATE 2-0.675 GM/100ML-% SOLUTION: Performed by: NURSE PRACTITIONER

## 2022-11-23 PROCEDURE — 82962 GLUCOSE BLOOD TEST: CPT

## 2022-11-23 PROCEDURE — 85007 BL SMEAR W/DIFF WBC COUNT: CPT | Performed by: NURSE PRACTITIONER

## 2022-11-23 PROCEDURE — 25010000002 EPINEPHRINE 1 MG/10ML SOLUTION PREFILLED SYRINGE: Performed by: INTERNAL MEDICINE

## 2022-11-23 PROCEDURE — 82274 ASSAY TEST FOR BLOOD FECAL: CPT | Performed by: STUDENT IN AN ORGANIZED HEALTH CARE EDUCATION/TRAINING PROGRAM

## 2022-11-23 PROCEDURE — 25010000002 MIDAZOLAM PER 1 MG: Performed by: NURSE PRACTITIONER

## 2022-11-23 PROCEDURE — 84100 ASSAY OF PHOSPHORUS: CPT | Performed by: NURSE PRACTITIONER

## 2022-11-23 PROCEDURE — 82803 BLOOD GASES ANY COMBINATION: CPT

## 2022-11-23 PROCEDURE — 85018 HEMOGLOBIN: CPT | Performed by: INTERNAL MEDICINE

## 2022-11-23 PROCEDURE — 25010000002 FUROSEMIDE PER 20 MG: Performed by: NURSE PRACTITIONER

## 2022-11-23 PROCEDURE — 0W3P8ZZ CONTROL BLEEDING IN GASTROINTESTINAL TRACT, VIA NATURAL OR ARTIFICIAL OPENING ENDOSCOPIC: ICD-10-PCS | Performed by: INTERNAL MEDICINE

## 2022-11-23 PROCEDURE — 84100 ASSAY OF PHOSPHORUS: CPT | Performed by: INTERNAL MEDICINE

## 2022-11-23 PROCEDURE — 94799 UNLISTED PULMONARY SVC/PX: CPT

## 2022-11-23 PROCEDURE — 25010000002 ONDANSETRON PER 1 MG: Performed by: PHYSICIAN ASSISTANT

## 2022-11-23 PROCEDURE — 82330 ASSAY OF CALCIUM: CPT | Performed by: NURSE PRACTITIONER

## 2022-11-23 PROCEDURE — 80053 COMPREHEN METABOLIC PANEL: CPT | Performed by: INTERNAL MEDICINE

## 2022-11-23 PROCEDURE — 83735 ASSAY OF MAGNESIUM: CPT | Performed by: PHYSICIAN ASSISTANT

## 2022-11-23 PROCEDURE — 74176 CT ABD & PELVIS W/O CONTRAST: CPT

## 2022-11-23 PROCEDURE — 94761 N-INVAS EAR/PLS OXIMETRY MLT: CPT

## 2022-11-23 PROCEDURE — 0D598ZZ DESTRUCTION OF DUODENUM, VIA NATURAL OR ARTIFICIAL OPENING ENDOSCOPIC: ICD-10-PCS | Performed by: INTERNAL MEDICINE

## 2022-11-23 PROCEDURE — 63710000001 INSULIN LISPRO (HUMAN) PER 5 UNITS: Performed by: INTERNAL MEDICINE

## 2022-11-23 PROCEDURE — 25010000002 CHLOROTHIAZIDE PER 500 MG: Performed by: NURSE PRACTITIONER

## 2022-11-23 PROCEDURE — 25010000002 MEROPENEM PER 100 MG: Performed by: NURSE PRACTITIONER

## 2022-11-23 PROCEDURE — 63710000001 INSULIN LISPRO (HUMAN) PER 5 UNITS

## 2022-11-23 PROCEDURE — 85027 COMPLETE CBC AUTOMATED: CPT | Performed by: INTERNAL MEDICINE

## 2022-11-23 PROCEDURE — 25010000002 PROPOFOL 10 MG/ML EMULSION: Performed by: NURSE PRACTITIONER

## 2022-11-23 PROCEDURE — 94002 VENT MGMT INPAT INIT DAY: CPT

## 2022-11-23 PROCEDURE — 3E0G8TZ INTRODUCTION OF DESTRUCTIVE AGENT INTO UPPER GI, VIA NATURAL OR ARTIFICIAL OPENING ENDOSCOPIC: ICD-10-PCS | Performed by: INTERNAL MEDICINE

## 2022-11-23 PROCEDURE — 94660 CPAP INITIATION&MGMT: CPT

## 2022-11-23 PROCEDURE — 85025 COMPLETE CBC W/AUTO DIFF WBC: CPT | Performed by: NURSE PRACTITIONER

## 2022-11-23 PROCEDURE — 25010000002 PROPOFOL 10 MG/ML EMULSION: Performed by: NURSE ANESTHETIST, CERTIFIED REGISTERED

## 2022-11-23 PROCEDURE — 25010000002 SUCCINYLCHOLINE PER 20 MG: Performed by: NURSE ANESTHETIST, CERTIFIED REGISTERED

## 2022-11-23 PROCEDURE — 36600 WITHDRAWAL OF ARTERIAL BLOOD: CPT

## 2022-11-23 PROCEDURE — 85014 HEMATOCRIT: CPT | Performed by: INTERNAL MEDICINE

## 2022-11-23 PROCEDURE — 83735 ASSAY OF MAGNESIUM: CPT | Performed by: NURSE PRACTITIONER

## 2022-11-23 DEVICE — DEV CLIP HEMO RESOLUTION360/ULTR 235CM 17MM STRL: Type: IMPLANTABLE DEVICE | Site: DUODENUM | Status: FUNCTIONAL

## 2022-11-23 DEVICE — DEV CLIP ENDO RESOLUTION360 CONTRL ROT 235CM: Type: IMPLANTABLE DEVICE | Site: DUODENUM | Status: FUNCTIONAL

## 2022-11-23 RX ORDER — SUCCINYLCHOLINE CHLORIDE 20 MG/ML
INJECTION INTRAMUSCULAR; INTRAVENOUS AS NEEDED
Status: DISCONTINUED | OUTPATIENT
Start: 2022-11-23 | End: 2022-11-23 | Stop reason: SURG

## 2022-11-23 RX ORDER — LIDOCAINE HYDROCHLORIDE 20 MG/ML
INJECTION, SOLUTION EPIDURAL; INFILTRATION; INTRACAUDAL; PERINEURAL AS NEEDED
Status: DISCONTINUED | OUTPATIENT
Start: 2022-11-23 | End: 2022-11-23 | Stop reason: SURG

## 2022-11-23 RX ORDER — PROPOFOL 10 MG/ML
VIAL (ML) INTRAVENOUS AS NEEDED
Status: DISCONTINUED | OUTPATIENT
Start: 2022-11-23 | End: 2022-11-23 | Stop reason: SURG

## 2022-11-23 RX ORDER — ONDANSETRON 4 MG/1
4 TABLET, FILM COATED ORAL EVERY 6 HOURS PRN
Status: DISCONTINUED | OUTPATIENT
Start: 2022-11-23 | End: 2022-12-06 | Stop reason: HOSPADM

## 2022-11-23 RX ORDER — SODIUM CHLORIDE 9 MG/ML
INJECTION, SOLUTION INTRAVENOUS CONTINUOUS PRN
Status: DISCONTINUED | OUTPATIENT
Start: 2022-11-23 | End: 2022-11-23 | Stop reason: SURG

## 2022-11-23 RX ORDER — MIDAZOLAM HYDROCHLORIDE 1 MG/ML
1 INJECTION INTRAMUSCULAR; INTRAVENOUS
Status: DISCONTINUED | OUTPATIENT
Start: 2022-11-23 | End: 2022-12-06 | Stop reason: HOSPADM

## 2022-11-23 RX ORDER — EPINEPHRINE 0.1 MG/ML
SYRINGE (ML) INJECTION AS NEEDED
Status: DISCONTINUED | OUTPATIENT
Start: 2022-11-23 | End: 2022-11-23 | Stop reason: HOSPADM

## 2022-11-23 RX ORDER — CHLORHEXIDINE GLUCONATE 0.12 MG/ML
15 RINSE ORAL EVERY 12 HOURS SCHEDULED
Status: DISCONTINUED | OUTPATIENT
Start: 2022-11-23 | End: 2022-11-24

## 2022-11-23 RX ORDER — ONDANSETRON 2 MG/ML
4 INJECTION INTRAMUSCULAR; INTRAVENOUS EVERY 6 HOURS PRN
Status: DISCONTINUED | OUTPATIENT
Start: 2022-11-23 | End: 2022-12-06 | Stop reason: HOSPADM

## 2022-11-23 RX ORDER — PANTOPRAZOLE SODIUM 40 MG/10ML
80 INJECTION, POWDER, LYOPHILIZED, FOR SOLUTION INTRAVENOUS ONCE
Status: COMPLETED | OUTPATIENT
Start: 2022-11-23 | End: 2022-11-23

## 2022-11-23 RX ORDER — SIMETHICONE 80 MG
80 TABLET,CHEWABLE ORAL ONCE
Status: COMPLETED | OUTPATIENT
Start: 2022-11-23 | End: 2022-11-23

## 2022-11-23 RX ORDER — NOREPINEPHRINE BIT/0.9 % NACL 8 MG/250ML
.02-.5 INFUSION BOTTLE (ML) INTRAVENOUS
Status: DISCONTINUED | OUTPATIENT
Start: 2022-11-23 | End: 2022-12-04

## 2022-11-23 RX ORDER — CALCIUM GLUCONATE 20 MG/ML
2 INJECTION, SOLUTION INTRAVENOUS ONCE
Status: COMPLETED | OUTPATIENT
Start: 2022-11-23 | End: 2022-11-23

## 2022-11-23 RX ORDER — FUROSEMIDE 10 MG/ML
20 INJECTION INTRAMUSCULAR; INTRAVENOUS ONCE
Status: COMPLETED | OUTPATIENT
Start: 2022-11-23 | End: 2022-11-23

## 2022-11-23 RX ORDER — FENTANYL CITRATE-0.9 % NACL/PF 10 MCG/ML
50-300 PLASTIC BAG, INJECTION (ML) INTRAVENOUS
Status: DISCONTINUED | OUTPATIENT
Start: 2022-11-23 | End: 2022-11-24

## 2022-11-23 RX ORDER — PROPOFOL 10 MG/ML
INJECTION, EMULSION INTRAVENOUS CONTINUOUS PRN
Status: DISCONTINUED | OUTPATIENT
Start: 2022-11-23 | End: 2022-11-23 | Stop reason: SURG

## 2022-11-23 RX ORDER — ALBUTEROL SULFATE 2.5 MG/3ML
SOLUTION RESPIRATORY (INHALATION) AS NEEDED
Status: DISCONTINUED | OUTPATIENT
Start: 2022-11-23 | End: 2022-11-23 | Stop reason: SURG

## 2022-11-23 RX ORDER — PHENYLEPHRINE HCL IN 0.9% NACL 1 MG/10 ML
SYRINGE (ML) INTRAVENOUS AS NEEDED
Status: DISCONTINUED | OUTPATIENT
Start: 2022-11-23 | End: 2022-11-23 | Stop reason: SURG

## 2022-11-23 RX ADMIN — Medication 200 MCG: at 13:42

## 2022-11-23 RX ADMIN — SODIUM CHLORIDE 500 ML: 9 INJECTION, SOLUTION INTRAVENOUS at 20:53

## 2022-11-23 RX ADMIN — PROPOFOL 50 MG: 10 INJECTION, EMULSION INTRAVENOUS at 13:35

## 2022-11-23 RX ADMIN — CALCIUM GLUCONATE 2 G: 20 INJECTION, SOLUTION INTRAVENOUS at 16:13

## 2022-11-23 RX ADMIN — Medication 10 ML: at 20:06

## 2022-11-23 RX ADMIN — MIDAZOLAM HYDROCHLORIDE 1 MG: 1 INJECTION, SOLUTION INTRAMUSCULAR; INTRAVENOUS at 15:57

## 2022-11-23 RX ADMIN — INSULIN LISPRO 8 UNITS: 100 INJECTION, SOLUTION INTRAVENOUS; SUBCUTANEOUS at 18:34

## 2022-11-23 RX ADMIN — CHLOROTHIAZIDE SODIUM 500 MG: 500 INJECTION, POWDER, LYOPHILIZED, FOR SOLUTION INTRAVENOUS at 16:30

## 2022-11-23 RX ADMIN — SIMETHICONE 80 MG: 80 TABLET, CHEWABLE ORAL at 06:02

## 2022-11-23 RX ADMIN — PANTOPRAZOLE SODIUM 80 MG: 40 INJECTION, POWDER, FOR SOLUTION INTRAVENOUS at 16:13

## 2022-11-23 RX ADMIN — Medication 200 MCG: at 13:39

## 2022-11-23 RX ADMIN — FUROSEMIDE 20 MG: 10 INJECTION, SOLUTION INTRAMUSCULAR; INTRAVENOUS at 20:53

## 2022-11-23 RX ADMIN — LIDOCAINE HYDROCHLORIDE 50 MG: 20 INJECTION, SOLUTION EPIDURAL; INFILTRATION; INTRACAUDAL; PERINEURAL at 13:35

## 2022-11-23 RX ADMIN — SODIUM CHLORIDE: 0.9 INJECTION, SOLUTION INTRAVENOUS at 13:45

## 2022-11-23 RX ADMIN — Medication 50 MCG/HR: at 16:34

## 2022-11-23 RX ADMIN — PANTOPRAZOLE SODIUM 8 MG/HR: 40 INJECTION, POWDER, FOR SOLUTION INTRAVENOUS at 21:09

## 2022-11-23 RX ADMIN — Medication 0.02 MCG/KG/MIN: at 15:57

## 2022-11-23 RX ADMIN — Medication 200 MCG: at 13:44

## 2022-11-23 RX ADMIN — CHLORHEXIDINE GLUCONATE 15 ML: 1.2 RINSE ORAL at 20:05

## 2022-11-23 RX ADMIN — PROPOFOL 50 MG: 10 INJECTION, EMULSION INTRAVENOUS at 13:44

## 2022-11-23 RX ADMIN — MEROPENEM 1 G: 1 INJECTION, POWDER, FOR SOLUTION INTRAVENOUS at 06:16

## 2022-11-23 RX ADMIN — SUCCINYLCHOLINE CHLORIDE 120 MG: 20 INJECTION, SOLUTION INTRAMUSCULAR; INTRAVENOUS at 13:44

## 2022-11-23 RX ADMIN — ALBUTEROL SULFATE 2.5 MG: 2.5 SOLUTION RESPIRATORY (INHALATION) at 14:04

## 2022-11-23 RX ADMIN — Medication 200 MCG: at 13:35

## 2022-11-23 RX ADMIN — INSULIN LISPRO 4 UNITS: 100 INJECTION, SOLUTION INTRAVENOUS; SUBCUTANEOUS at 06:02

## 2022-11-23 RX ADMIN — PANTOPRAZOLE SODIUM 8 MG/HR: 40 INJECTION, POWDER, FOR SOLUTION INTRAVENOUS at 15:58

## 2022-11-23 RX ADMIN — ANTI-FUNGAL POWDER MICONAZOLE NITRATE TALC FREE: 1.42 POWDER TOPICAL at 20:05

## 2022-11-23 RX ADMIN — Medication 200 MCG: at 13:49

## 2022-11-23 RX ADMIN — PROPOFOL 25 MCG/KG/MIN: 10 INJECTION, EMULSION INTRAVENOUS at 21:09

## 2022-11-23 RX ADMIN — ONDANSETRON 4 MG: 2 INJECTION INTRAMUSCULAR; INTRAVENOUS at 04:21

## 2022-11-23 RX ADMIN — PROPOFOL 12.5 MCG/KG/MIN: 10 INJECTION, EMULSION INTRAVENOUS at 16:05

## 2022-11-23 RX ADMIN — PROPOFOL INJECTABLE EMULSION 50 MCG/KG/MIN: 10 INJECTION, EMULSION INTRAVENOUS at 14:09

## 2022-11-23 RX ADMIN — MEROPENEM 1 G: 1 INJECTION, POWDER, FOR SOLUTION INTRAVENOUS at 18:32

## 2022-11-23 RX ADMIN — DEXTROSE MONOHYDRATE 50 ML/HR: 50 INJECTION, SOLUTION INTRAVENOUS at 08:54

## 2022-11-23 NOTE — ANESTHESIA POSTPROCEDURE EVALUATION
Patient: Christiane Nazario    Procedure Summary     Date: 11/23/22 Room / Location: [facility] ENDOSCOPY 1 / [facility] ENDOSCOPY    Anesthesia Start: 1318 Anesthesia Stop: 1453    Procedure: ESOPHAGOGASTRODUODENOSCOPY with sclerotherapy to duodenal ulcers and endo clipping x 5 Diagnosis:       Melena      (Melena [K92.1])    Surgeons: Rubia Pham MD Provider: Paulina Masters MD    Anesthesia Type: MAC ASA Status: 3          Anesthesia Type: MAC    Vitals  Vitals Value Taken Time   /61 11/23/22 1751   Temp 96 °F (35.6 °C) 11/23/22 1509   Pulse 58 11/23/22 1754   Resp     SpO2 100 % 11/23/22 1754   Vitals shown include unvalidated device data.        Post Anesthesia Care and Evaluation    Patient location during evaluation: ICU  Patient participation: complete - patient cannot participate  Level of consciousness: responsive to verbal stimuli and sleepy but conscious    Airway patency: patent  Anesthetic complications: No anesthetic complications    Cardiovascular status: acceptable  Respiratory status: acceptable, ETT and ventilator  Hydration status: acceptable    Comments: TO ICU on VENTILATOR.. Awakens, follows commands.

## 2022-11-23 NOTE — ANESTHESIA PREPROCEDURE EVALUATION
Anesthesia Evaluation     Patient summary reviewed and Nursing notes reviewed   NPO Solid Status: > 8 hours  NPO Liquid Status: > 8 hours           Airway   Mallampati: II  TM distance: >3 FB  Neck ROM: full  No difficulty expected  Dental - normal exam     Pulmonary    (+) asthma,  Cardiovascular     (+) hypertension, hyperlipidemia,       Neuro/Psych  GI/Hepatic/Renal/Endo    (+) morbid obesity, GERD,  hepatitis, liver disease, renal disease, diabetes mellitus, thyroid problem     Musculoskeletal     Abdominal    Substance History      OB/GYN          Other   blood dyscrasia anemia thrombocytopenia,   history of cancer                    Anesthesia Plan    ASA 3     MAC     intravenous induction     Anesthetic plan, risks, benefits, and alternatives have been provided, discussed and informed consent has been obtained with: patient.        CODE STATUS:    Level Of Support Discussed With: Patient  Code Status (Patient has no pulse and is not breathing): CPR (Attempt to Resuscitate)  Medical Interventions (Patient has pulse or is breathing): Full Support

## 2022-11-24 ENCOUNTER — INPATIENT HOSPITAL (OUTPATIENT)
Dept: URBAN - METROPOLITAN AREA HOSPITAL 84 | Facility: HOSPITAL | Age: 72
End: 2022-11-24
Payer: COMMERCIAL

## 2022-11-24 ENCOUNTER — APPOINTMENT (OUTPATIENT)
Dept: GENERAL RADIOLOGY | Facility: HOSPITAL | Age: 72
End: 2022-11-24

## 2022-11-24 DIAGNOSIS — R19.7 DIARRHEA, UNSPECIFIED: ICD-10-CM

## 2022-11-24 LAB
ALBUMIN SERPL-MCNC: 2.9 G/DL (ref 3.5–5.2)
ALBUMIN/GLOB SERPL: 2.1 G/DL
ALP SERPL-CCNC: 161 U/L (ref 39–117)
ALT SERPL W P-5'-P-CCNC: 29 U/L (ref 1–33)
ANION GAP SERPL CALCULATED.3IONS-SCNC: 9 MMOL/L (ref 5–15)
ARTERIAL PATENCY WRIST A: POSITIVE
ARTERIAL PATENCY WRIST A: POSITIVE
AST SERPL-CCNC: 44 U/L (ref 1–32)
ATMOSPHERIC PRESS: ABNORMAL MM[HG]
ATMOSPHERIC PRESS: ABNORMAL MM[HG]
BASE EXCESS BLDA CALC-SCNC: 4.2 MMOL/L (ref 0–3)
BASE EXCESS BLDA CALC-SCNC: 6.8 MMOL/L (ref 0–3)
BDY SITE: ABNORMAL
BDY SITE: ABNORMAL
BILIRUB SERPL-MCNC: 1.2 MG/DL (ref 0–1.2)
BUN SERPL-MCNC: 37 MG/DL (ref 8–23)
BUN/CREAT SERPL: 34.6 (ref 7–25)
BURR CELLS BLD QL SMEAR: ABNORMAL
C3 FRG RBC-MCNC: ABNORMAL
CALCIUM SPEC-SCNC: 6.8 MG/DL (ref 8.6–10.5)
CHLORIDE SERPL-SCNC: 109 MMOL/L (ref 98–107)
CO2 BLDA-SCNC: 30.5 MMOL/L (ref 22–29)
CO2 BLDA-SCNC: 30.6 MMOL/L (ref 22–29)
CO2 SERPL-SCNC: 31 MMOL/L (ref 22–29)
CREAT SERPL-MCNC: 1.07 MG/DL (ref 0.57–1)
DEPRECATED RDW RBC AUTO: 55.6 FL (ref 37–54)
EGFRCR SERPLBLD CKD-EPI 2021: 56 ML/MIN/1.73
ELLIPTOCYTES BLD QL SMEAR: ABNORMAL
ERYTHROCYTE [DISTWIDTH] IN BLOOD BY AUTOMATED COUNT: 18.4 % (ref 12.3–15.4)
GLOBULIN UR ELPH-MCNC: 1.4 GM/DL
GLUCOSE BLDC GLUCOMTR-MCNC: 121 MG/DL (ref 70–105)
GLUCOSE BLDC GLUCOMTR-MCNC: 127 MG/DL (ref 70–105)
GLUCOSE BLDC GLUCOMTR-MCNC: 127 MG/DL (ref 70–105)
GLUCOSE BLDC GLUCOMTR-MCNC: 137 MG/DL (ref 70–105)
GLUCOSE BLDC GLUCOMTR-MCNC: 150 MG/DL (ref 70–105)
GLUCOSE SERPL-MCNC: 132 MG/DL (ref 65–99)
HCO3 BLDA-SCNC: 29.2 MMOL/L (ref 21–28)
HCO3 BLDA-SCNC: 29.5 MMOL/L (ref 21–28)
HCT VFR BLD AUTO: 25.1 % (ref 34–46.6)
HCT VFR BLD AUTO: 26.1 % (ref 34–46.6)
HCT VFR BLD AUTO: 27.3 % (ref 34–46.6)
HEMODILUTION: NO
HEMODILUTION: NO
HGB BLD-MCNC: 8.4 G/DL (ref 12–15.9)
HGB BLD-MCNC: 8.5 G/DL (ref 12–15.9)
HGB BLD-MCNC: 8.5 G/DL (ref 12–15.9)
INHALED O2 CONCENTRATION: 40 %
INHALED O2 CONCENTRATION: 40 %
LYMPHOCYTES # BLD MANUAL: 0.67 10*3/MM3 (ref 0.7–3.1)
MAGNESIUM SERPL-MCNC: 1.8 MG/DL (ref 1.6–2.4)
MCH RBC QN AUTO: 28.1 PG (ref 26.6–33)
MCHC RBC AUTO-ENTMCNC: 32 G/DL (ref 31.5–35.7)
MCV RBC AUTO: 87.8 FL (ref 79–97)
METAMYELOCYTES NFR BLD MANUAL: 1 % (ref 0–0)
MODALITY: ABNORMAL
MODALITY: ABNORMAL
MYELOCYTES NFR BLD MANUAL: 3 % (ref 0–0)
NEUTROPHILS # BLD AUTO: 20.65 10*3/MM3 (ref 1.7–7)
NEUTROPHILS NFR BLD MANUAL: 87 % (ref 42.7–76)
NEUTS BAND NFR BLD MANUAL: 6 % (ref 0–5)
NEUTS HYPERSEG # BLD: ABNORMAL 10*3/UL
NEUTS VAC BLD QL SMEAR: ABNORMAL
NRBC SPEC MANUAL: 1 /100 WBC (ref 0–0.2)
PCO2 BLDA: 33.4 MM HG (ref 35–48)
PCO2 BLDA: 45.2 MM HG (ref 35–48)
PEEP RESPIRATORY: 5 CM[H2O]
PEEP RESPIRATORY: 5 CM[H2O]
PH BLDA: 7.42 PH UNITS (ref 7.35–7.45)
PH BLDA: 7.55 PH UNITS (ref 7.35–7.45)
PHOSPHATE SERPL-MCNC: 2.2 MG/DL (ref 2.5–4.5)
PLAT MORPH BLD: NORMAL
PLATELET # BLD AUTO: 99 10*3/MM3 (ref 140–450)
PMV BLD AUTO: 9.7 FL (ref 6–12)
PO2 BLDA: 76.8 MM HG (ref 83–108)
PO2 BLDA: 92.9 MM HG (ref 83–108)
POTASSIUM SERPL-SCNC: 3 MMOL/L (ref 3.5–5.2)
POTASSIUM SERPL-SCNC: 4 MMOL/L (ref 3.5–5.2)
PROT SERPL-MCNC: 4.3 G/DL (ref 6–8.5)
PSV: 10 CMH2O
RBC # BLD AUTO: 2.97 10*6/MM3 (ref 3.77–5.28)
RESPIRATORY RATE: 16
SAO2 % BLDCOA: 95.3 % (ref 94–98)
SAO2 % BLDCOA: 98.2 % (ref 94–98)
SCAN SLIDE: NORMAL
SODIUM SERPL-SCNC: 149 MMOL/L (ref 136–145)
TRIGL SERPL-MCNC: 202 MG/DL (ref 0–150)
VARIANT LYMPHS NFR BLD MANUAL: 3 % (ref 19.6–45.3)
VENTILATOR MODE: ABNORMAL
VENTILATOR MODE: ABNORMAL
VT ON VENT VENT: 500 ML
WBC NRBC COR # BLD: 22.2 10*3/MM3 (ref 3.4–10.8)

## 2022-11-24 PROCEDURE — 71045 X-RAY EXAM CHEST 1 VIEW: CPT

## 2022-11-24 PROCEDURE — 84478 ASSAY OF TRIGLYCERIDES: CPT | Performed by: INTERNAL MEDICINE

## 2022-11-24 PROCEDURE — 82803 BLOOD GASES ANY COMBINATION: CPT

## 2022-11-24 PROCEDURE — 85014 HEMATOCRIT: CPT | Performed by: INTERNAL MEDICINE

## 2022-11-24 PROCEDURE — 63710000001 INSULIN LISPRO (HUMAN) PER 5 UNITS: Performed by: INTERNAL MEDICINE

## 2022-11-24 PROCEDURE — 25010000002 MAGNESIUM SULFATE 2 GM/50ML SOLUTION: Performed by: NURSE PRACTITIONER

## 2022-11-24 PROCEDURE — 85025 COMPLETE CBC W/AUTO DIFF WBC: CPT | Performed by: NURSE PRACTITIONER

## 2022-11-24 PROCEDURE — 94660 CPAP INITIATION&MGMT: CPT

## 2022-11-24 PROCEDURE — 80053 COMPREHEN METABOLIC PANEL: CPT | Performed by: NURSE PRACTITIONER

## 2022-11-24 PROCEDURE — 94799 UNLISTED PULMONARY SVC/PX: CPT

## 2022-11-24 PROCEDURE — 25010000002 CHLOROTHIAZIDE PER 500 MG: Performed by: NURSE PRACTITIONER

## 2022-11-24 PROCEDURE — 84132 ASSAY OF SERUM POTASSIUM: CPT

## 2022-11-24 PROCEDURE — 99232 SBSQ HOSP IP/OBS MODERATE 35: CPT | Performed by: INTERNAL MEDICINE

## 2022-11-24 PROCEDURE — 85007 BL SMEAR W/DIFF WBC COUNT: CPT | Performed by: NURSE PRACTITIONER

## 2022-11-24 PROCEDURE — 85018 HEMOGLOBIN: CPT | Performed by: INTERNAL MEDICINE

## 2022-11-24 PROCEDURE — 25010000002 MEROPENEM PER 100 MG: Performed by: NURSE PRACTITIONER

## 2022-11-24 PROCEDURE — 83735 ASSAY OF MAGNESIUM: CPT | Performed by: INTERNAL MEDICINE

## 2022-11-24 PROCEDURE — 0 POTASSIUM CHLORIDE 10 MEQ/100ML SOLUTION: Performed by: INTERNAL MEDICINE

## 2022-11-24 PROCEDURE — 36600 WITHDRAWAL OF ARTERIAL BLOOD: CPT

## 2022-11-24 PROCEDURE — 82962 GLUCOSE BLOOD TEST: CPT

## 2022-11-24 PROCEDURE — 94003 VENT MGMT INPAT SUBQ DAY: CPT

## 2022-11-24 PROCEDURE — 84100 ASSAY OF PHOSPHORUS: CPT | Performed by: NURSE PRACTITIONER

## 2022-11-24 PROCEDURE — 25010000002 PROPOFOL 10 MG/ML EMULSION: Performed by: NURSE PRACTITIONER

## 2022-11-24 RX ORDER — DEXTROSE MONOHYDRATE 50 MG/ML
50 INJECTION, SOLUTION INTRAVENOUS CONTINUOUS
Status: DISPENSED | OUTPATIENT
Start: 2022-11-24 | End: 2022-11-26

## 2022-11-24 RX ORDER — FENTANYL/ROPIVACAINE/NS/PF 2-625MCG/1
15 PLASTIC BAG, INJECTION (ML) EPIDURAL AS NEEDED
Status: DISCONTINUED | OUTPATIENT
Start: 2022-11-24 | End: 2022-12-06 | Stop reason: HOSPADM

## 2022-11-24 RX ORDER — MAGNESIUM SULFATE 1 G/100ML
1 INJECTION INTRAVENOUS AS NEEDED
Status: DISCONTINUED | OUTPATIENT
Start: 2022-11-24 | End: 2022-12-06 | Stop reason: HOSPADM

## 2022-11-24 RX ORDER — POTASSIUM PHOS IN 0.9 % NACL 30MMOL/250
30 PLASTIC BAG, INJECTION (ML) INTRAVENOUS AS NEEDED
Status: DISCONTINUED | OUTPATIENT
Start: 2022-11-24 | End: 2022-12-06 | Stop reason: HOSPADM

## 2022-11-24 RX ORDER — MAGNESIUM SULFATE HEPTAHYDRATE 40 MG/ML
4 INJECTION, SOLUTION INTRAVENOUS AS NEEDED
Status: DISCONTINUED | OUTPATIENT
Start: 2022-11-24 | End: 2022-12-06 | Stop reason: HOSPADM

## 2022-11-24 RX ORDER — MAGNESIUM SULFATE HEPTAHYDRATE 40 MG/ML
2 INJECTION, SOLUTION INTRAVENOUS AS NEEDED
Status: DISCONTINUED | OUTPATIENT
Start: 2022-11-24 | End: 2022-12-06 | Stop reason: HOSPADM

## 2022-11-24 RX ORDER — SODIUM PHOSPHATE IN 0.9 % NACL 15MMOL/100
15 PLASTIC BAG, INJECTION (ML) INTRAVENOUS AS NEEDED
Status: DISCONTINUED | OUTPATIENT
Start: 2022-11-24 | End: 2022-12-06 | Stop reason: HOSPADM

## 2022-11-24 RX ADMIN — ANTI-FUNGAL POWDER MICONAZOLE NITRATE TALC FREE: 1.42 POWDER TOPICAL at 21:04

## 2022-11-24 RX ADMIN — PANTOPRAZOLE SODIUM 8 MG/HR: 40 INJECTION, POWDER, FOR SOLUTION INTRAVENOUS at 11:39

## 2022-11-24 RX ADMIN — MEROPENEM 1 G: 1 INJECTION, POWDER, FOR SOLUTION INTRAVENOUS at 05:51

## 2022-11-24 RX ADMIN — POTASSIUM CHLORIDE 20 MEQ: 7.46 INJECTION, SOLUTION INTRAVENOUS at 13:59

## 2022-11-24 RX ADMIN — Medication 10 ML: at 08:40

## 2022-11-24 RX ADMIN — POTASSIUM CHLORIDE 40 MEQ: 7.46 INJECTION, SOLUTION INTRAVENOUS at 09:28

## 2022-11-24 RX ADMIN — Medication 0.02 MCG/KG/MIN: at 19:52

## 2022-11-24 RX ADMIN — PANTOPRAZOLE SODIUM 8 MG/HR: 40 INJECTION, POWDER, FOR SOLUTION INTRAVENOUS at 23:51

## 2022-11-24 RX ADMIN — ANTI-FUNGAL POWDER MICONAZOLE NITRATE TALC FREE: 1.42 POWDER TOPICAL at 08:40

## 2022-11-24 RX ADMIN — PANTOPRAZOLE SODIUM 8 MG/HR: 40 INJECTION, POWDER, FOR SOLUTION INTRAVENOUS at 02:22

## 2022-11-24 RX ADMIN — POTASSIUM PHOSPHATE, MONOBASIC AND POTASSIUM PHOSPHATE, DIBASIC 15 MMOL: 224; 236 INJECTION, SOLUTION, CONCENTRATE INTRAVENOUS at 09:23

## 2022-11-24 RX ADMIN — CHLOROTHIAZIDE SODIUM 250 MG: 500 INJECTION, POWDER, LYOPHILIZED, FOR SOLUTION INTRAVENOUS at 14:36

## 2022-11-24 RX ADMIN — FOLIC ACID 1 MG: 5 INJECTION, SOLUTION INTRAMUSCULAR; INTRAVENOUS; SUBCUTANEOUS at 08:40

## 2022-11-24 RX ADMIN — PROPOFOL 10 MCG/KG/MIN: 10 INJECTION, EMULSION INTRAVENOUS at 08:47

## 2022-11-24 RX ADMIN — Medication 10 ML: at 21:04

## 2022-11-24 RX ADMIN — MAGNESIUM SULFATE HEPTAHYDRATE 2 G: 2 INJECTION, SOLUTION INTRAVENOUS at 09:24

## 2022-11-24 RX ADMIN — DEXTROSE MONOHYDRATE 50 ML/HR: 50 INJECTION, SOLUTION INTRAVENOUS at 17:48

## 2022-11-24 RX ADMIN — CHLORHEXIDINE GLUCONATE 15 ML: 1.2 RINSE ORAL at 08:40

## 2022-11-24 RX ADMIN — PANTOPRAZOLE SODIUM 8 MG/HR: 40 INJECTION, POWDER, FOR SOLUTION INTRAVENOUS at 19:47

## 2022-11-24 RX ADMIN — INSULIN LISPRO 4 UNITS: 100 INJECTION, SOLUTION INTRAVENOUS; SUBCUTANEOUS at 00:20

## 2022-11-24 RX ADMIN — PANTOPRAZOLE SODIUM 8 MG/HR: 40 INJECTION, POWDER, FOR SOLUTION INTRAVENOUS at 04:38

## 2022-11-25 ENCOUNTER — APPOINTMENT (OUTPATIENT)
Dept: GENERAL RADIOLOGY | Facility: HOSPITAL | Age: 72
End: 2022-11-25

## 2022-11-25 LAB
ALBUMIN SERPL-MCNC: 2.8 G/DL (ref 3.5–5.2)
ALBUMIN/GLOB SERPL: 2 G/DL
ALP SERPL-CCNC: 147 U/L (ref 39–117)
ALT SERPL W P-5'-P-CCNC: 23 U/L (ref 1–33)
ANION GAP SERPL CALCULATED.3IONS-SCNC: 11 MMOL/L (ref 5–15)
ANISOCYTOSIS BLD QL: ABNORMAL
AST SERPL-CCNC: 34 U/L (ref 1–32)
BILIRUB SERPL-MCNC: 0.9 MG/DL (ref 0–1.2)
BUN SERPL-MCNC: 30 MG/DL (ref 8–23)
BUN/CREAT SERPL: 32.6 (ref 7–25)
C3 FRG RBC-MCNC: ABNORMAL
CALCIUM SPEC-SCNC: 6.4 MG/DL (ref 8.6–10.5)
CHLORIDE SERPL-SCNC: 110 MMOL/L (ref 98–107)
CO2 SERPL-SCNC: 29 MMOL/L (ref 22–29)
CREAT SERPL-MCNC: 0.92 MG/DL (ref 0.57–1)
DEPRECATED RDW RBC AUTO: 58.6 FL (ref 37–54)
EGFRCR SERPLBLD CKD-EPI 2021: 67.1 ML/MIN/1.73
ERYTHROCYTE [DISTWIDTH] IN BLOOD BY AUTOMATED COUNT: 18.4 % (ref 12.3–15.4)
GLOBULIN UR ELPH-MCNC: 1.4 GM/DL
GLUCOSE BLDC GLUCOMTR-MCNC: 126 MG/DL (ref 70–105)
GLUCOSE BLDC GLUCOMTR-MCNC: 149 MG/DL (ref 70–105)
GLUCOSE BLDC GLUCOMTR-MCNC: 169 MG/DL (ref 70–105)
GLUCOSE SERPL-MCNC: 198 MG/DL (ref 65–99)
HCT VFR BLD AUTO: 23.2 % (ref 34–46.6)
HGB BLD-MCNC: 7.8 G/DL (ref 12–15.9)
LYMPHOCYTES # BLD MANUAL: 0.46 10*3/MM3 (ref 0.7–3.1)
LYMPHOCYTES NFR BLD MANUAL: 3 % (ref 5–12)
MAGNESIUM SERPL-MCNC: 2.2 MG/DL (ref 1.6–2.4)
MCH RBC QN AUTO: 29.3 PG (ref 26.6–33)
MCHC RBC AUTO-ENTMCNC: 33.5 G/DL (ref 31.5–35.7)
MCV RBC AUTO: 87.5 FL (ref 79–97)
MONOCYTES # BLD: 0.46 10*3/MM3 (ref 0.1–0.9)
MYELOCYTES NFR BLD MANUAL: 1 % (ref 0–0)
NEUTROPHILS # BLD AUTO: 14.32 10*3/MM3 (ref 1.7–7)
NEUTROPHILS NFR BLD MANUAL: 89 % (ref 42.7–76)
NEUTS BAND NFR BLD MANUAL: 4 % (ref 0–5)
PHOSPHATE SERPL-MCNC: 3.1 MG/DL (ref 2.5–4.5)
PLAT MORPH BLD: NORMAL
PLATELET # BLD AUTO: 76 10*3/MM3 (ref 140–450)
PMV BLD AUTO: 10.3 FL (ref 6–12)
POIKILOCYTOSIS BLD QL SMEAR: ABNORMAL
POTASSIUM SERPL-SCNC: 3.7 MMOL/L (ref 3.5–5.2)
PROT SERPL-MCNC: 4.2 G/DL (ref 6–8.5)
RBC # BLD AUTO: 2.65 10*6/MM3 (ref 3.77–5.28)
SCAN SLIDE: NORMAL
SODIUM SERPL-SCNC: 150 MMOL/L (ref 136–145)
VARIANT LYMPHS NFR BLD MANUAL: 3 % (ref 19.6–45.3)
WBC MORPH BLD: NORMAL
WBC NRBC COR # BLD: 15.4 10*3/MM3 (ref 3.4–10.8)

## 2022-11-25 PROCEDURE — 25010000002 MIDAZOLAM PER 1 MG

## 2022-11-25 PROCEDURE — 94002 VENT MGMT INPAT INIT DAY: CPT

## 2022-11-25 PROCEDURE — 94660 CPAP INITIATION&MGMT: CPT

## 2022-11-25 PROCEDURE — 94799 UNLISTED PULMONARY SVC/PX: CPT

## 2022-11-25 PROCEDURE — 71045 X-RAY EXAM CHEST 1 VIEW: CPT

## 2022-11-25 PROCEDURE — 85007 BL SMEAR W/DIFF WBC COUNT: CPT | Performed by: NURSE PRACTITIONER

## 2022-11-25 PROCEDURE — 84100 ASSAY OF PHOSPHORUS: CPT | Performed by: NURSE PRACTITIONER

## 2022-11-25 PROCEDURE — 83735 ASSAY OF MAGNESIUM: CPT | Performed by: INTERNAL MEDICINE

## 2022-11-25 PROCEDURE — 94761 N-INVAS EAR/PLS OXIMETRY MLT: CPT

## 2022-11-25 PROCEDURE — 5A1945Z RESPIRATORY VENTILATION, 24-96 CONSECUTIVE HOURS: ICD-10-PCS | Performed by: STUDENT IN AN ORGANIZED HEALTH CARE EDUCATION/TRAINING PROGRAM

## 2022-11-25 PROCEDURE — 25010000002 FENTANYL CITRATE (PF) 50 MCG/ML SOLUTION

## 2022-11-25 PROCEDURE — 25010000002 CALCIUM GLUCONATE 2-0.675 GM/100ML-% SOLUTION: Performed by: INTERNAL MEDICINE

## 2022-11-25 PROCEDURE — 25010000002 PROPOFOL 10 MG/ML EMULSION

## 2022-11-25 PROCEDURE — 80053 COMPREHEN METABOLIC PANEL: CPT | Performed by: NURSE PRACTITIONER

## 2022-11-25 PROCEDURE — 31500 INSERT EMERGENCY AIRWAY: CPT

## 2022-11-25 PROCEDURE — 0BH18EZ INSERTION OF ENDOTRACHEAL AIRWAY INTO TRACHEA, VIA NATURAL OR ARTIFICIAL OPENING ENDOSCOPIC: ICD-10-PCS

## 2022-11-25 PROCEDURE — 63710000001 INSULIN LISPRO (HUMAN) PER 5 UNITS: Performed by: INTERNAL MEDICINE

## 2022-11-25 PROCEDURE — 85025 COMPLETE CBC W/AUTO DIFF WBC: CPT | Performed by: NURSE PRACTITIONER

## 2022-11-25 PROCEDURE — 82962 GLUCOSE BLOOD TEST: CPT

## 2022-11-25 RX ORDER — ETOMIDATE 2 MG/ML
20 INJECTION INTRAVENOUS ONCE
Status: COMPLETED | OUTPATIENT
Start: 2022-11-25 | End: 2022-11-25

## 2022-11-25 RX ORDER — ACETYLCYSTEINE 200 MG/ML
600 SOLUTION ORAL; RESPIRATORY (INHALATION) ONCE
Status: COMPLETED | OUTPATIENT
Start: 2022-11-25 | End: 2022-11-25

## 2022-11-25 RX ORDER — CALCIUM GLUCONATE 20 MG/ML
2 INJECTION, SOLUTION INTRAVENOUS ONCE
Status: COMPLETED | OUTPATIENT
Start: 2022-11-25 | End: 2022-11-25

## 2022-11-25 RX ORDER — MIDODRINE HYDROCHLORIDE 5 MG/1
10 TABLET ORAL
Status: DISCONTINUED | OUTPATIENT
Start: 2022-11-25 | End: 2022-11-25

## 2022-11-25 RX ORDER — ACETYLCYSTEINE 200 MG/ML
3 SOLUTION ORAL; RESPIRATORY (INHALATION)
Status: DISCONTINUED | OUTPATIENT
Start: 2022-11-25 | End: 2022-11-25

## 2022-11-25 RX ORDER — PROPOFOL 10 MG/ML
VIAL (ML) INTRAVENOUS
Status: COMPLETED
Start: 2022-11-25 | End: 2022-11-25

## 2022-11-25 RX ORDER — FENTANYL CITRATE 50 UG/ML
100 INJECTION, SOLUTION INTRAMUSCULAR; INTRAVENOUS ONCE
Status: COMPLETED | OUTPATIENT
Start: 2022-11-25 | End: 2022-11-25

## 2022-11-25 RX ORDER — CHLORHEXIDINE GLUCONATE 0.12 MG/ML
15 RINSE ORAL EVERY 12 HOURS SCHEDULED
Status: DISCONTINUED | OUTPATIENT
Start: 2022-11-25 | End: 2022-11-29

## 2022-11-25 RX ORDER — MIDAZOLAM HYDROCHLORIDE 1 MG/ML
5 INJECTION INTRAMUSCULAR; INTRAVENOUS ONCE
Status: COMPLETED | OUTPATIENT
Start: 2022-11-25 | End: 2022-11-25

## 2022-11-25 RX ORDER — FENTANYL CITRATE-0.9 % NACL/PF 10 MCG/ML
50-300 PLASTIC BAG, INJECTION (ML) INTRAVENOUS
Status: DISCONTINUED | OUTPATIENT
Start: 2022-11-25 | End: 2022-11-26

## 2022-11-25 RX ORDER — ACETYLCYSTEINE 200 MG/ML
3 SOLUTION ORAL; RESPIRATORY (INHALATION)
Status: DISCONTINUED | OUTPATIENT
Start: 2022-11-25 | End: 2022-11-28

## 2022-11-25 RX ADMIN — CALCIUM GLUCONATE 2 G: 20 INJECTION, SOLUTION INTRAVENOUS at 09:39

## 2022-11-25 RX ADMIN — PROPOFOL INJECTABLE EMULSION 15 MCG/KG/MIN: 10 INJECTION, EMULSION INTRAVENOUS at 23:19

## 2022-11-25 RX ADMIN — ANTI-FUNGAL POWDER MICONAZOLE NITRATE TALC FREE: 1.42 POWDER TOPICAL at 21:10

## 2022-11-25 RX ADMIN — Medication 10 ML: at 08:41

## 2022-11-25 RX ADMIN — ANTI-FUNGAL POWDER MICONAZOLE NITRATE TALC FREE: 1.42 POWDER TOPICAL at 08:41

## 2022-11-25 RX ADMIN — ETOMIDATE 20 MG: 2 INJECTION, SOLUTION INTRAVENOUS at 23:20

## 2022-11-25 RX ADMIN — PANTOPRAZOLE SODIUM 8 MG/HR: 40 INJECTION, POWDER, FOR SOLUTION INTRAVENOUS at 23:22

## 2022-11-25 RX ADMIN — ACETYLCYSTEINE 3 ML: 200 SOLUTION ORAL; RESPIRATORY (INHALATION) at 22:45

## 2022-11-25 RX ADMIN — FOLIC ACID 1 MG: 5 INJECTION, SOLUTION INTRAMUSCULAR; INTRAVENOUS; SUBCUTANEOUS at 08:41

## 2022-11-25 RX ADMIN — FENTANYL CITRATE 100 MCG: 50 INJECTION, SOLUTION INTRAMUSCULAR; INTRAVENOUS at 23:21

## 2022-11-25 RX ADMIN — CHLORHEXIDINE GLUCONATE 15 ML: 1.2 RINSE ORAL at 23:22

## 2022-11-25 RX ADMIN — PANTOPRAZOLE SODIUM 8 MG/HR: 40 INJECTION, POWDER, FOR SOLUTION INTRAVENOUS at 13:31

## 2022-11-25 RX ADMIN — Medication 10 ML: at 21:10

## 2022-11-25 RX ADMIN — Medication 50 MCG/HR: at 23:23

## 2022-11-25 RX ADMIN — DEXTROSE MONOHYDRATE 50 ML/HR: 50 INJECTION, SOLUTION INTRAVENOUS at 09:40

## 2022-11-25 RX ADMIN — PANTOPRAZOLE SODIUM 8 MG/HR: 40 INJECTION, POWDER, FOR SOLUTION INTRAVENOUS at 09:39

## 2022-11-25 RX ADMIN — PANTOPRAZOLE SODIUM 8 MG/HR: 40 INJECTION, POWDER, FOR SOLUTION INTRAVENOUS at 05:06

## 2022-11-25 RX ADMIN — INSULIN LISPRO 4 UNITS: 100 INJECTION, SOLUTION INTRAVENOUS; SUBCUTANEOUS at 11:45

## 2022-11-25 RX ADMIN — ACETYLCYSTEINE 600 MG: 200 INHALANT RESPIRATORY (INHALATION) at 23:21

## 2022-11-25 RX ADMIN — MIDAZOLAM HYDROCHLORIDE 4 MG: 1 INJECTION, SOLUTION INTRAMUSCULAR; INTRAVENOUS at 23:20

## 2022-11-25 RX ADMIN — INSULIN LISPRO 4 UNITS: 100 INJECTION, SOLUTION INTRAVENOUS; SUBCUTANEOUS at 05:06

## 2022-11-26 ENCOUNTER — APPOINTMENT (OUTPATIENT)
Dept: GENERAL RADIOLOGY | Facility: HOSPITAL | Age: 72
End: 2022-11-26

## 2022-11-26 LAB
ALBUMIN SERPL-MCNC: 2.6 G/DL (ref 3.5–5.2)
ALBUMIN/GLOB SERPL: 1.7 G/DL
ALP SERPL-CCNC: 154 U/L (ref 39–117)
ALT SERPL W P-5'-P-CCNC: 18 U/L (ref 1–33)
ANION GAP SERPL CALCULATED.3IONS-SCNC: 13 MMOL/L (ref 5–15)
ANISOCYTOSIS BLD QL: ABNORMAL
ARTERIAL PATENCY WRIST A: POSITIVE
ARTERIAL PATENCY WRIST A: POSITIVE
AST SERPL-CCNC: 35 U/L (ref 1–32)
ATMOSPHERIC PRESS: ABNORMAL MM[HG]
ATMOSPHERIC PRESS: ABNORMAL MM[HG]
BASE EXCESS BLDA CALC-SCNC: 3.7 MMOL/L (ref 0–3)
BASE EXCESS BLDA CALC-SCNC: 3.8 MMOL/L (ref 0–3)
BDY SITE: ABNORMAL
BDY SITE: ABNORMAL
BILIRUB SERPL-MCNC: 0.9 MG/DL (ref 0–1.2)
BUN SERPL-MCNC: 22 MG/DL (ref 8–23)
BUN/CREAT SERPL: 25.6 (ref 7–25)
C3 FRG RBC-MCNC: ABNORMAL
CALCIUM SPEC-SCNC: 6.7 MG/DL (ref 8.6–10.5)
CHLORIDE SERPL-SCNC: 108 MMOL/L (ref 98–107)
CO2 BLDA-SCNC: 28.2 MMOL/L (ref 22–29)
CO2 BLDA-SCNC: 28.7 MMOL/L (ref 22–29)
CO2 SERPL-SCNC: 26 MMOL/L (ref 22–29)
CREAT SERPL-MCNC: 0.86 MG/DL (ref 0.57–1)
DEPRECATED RDW RBC AUTO: 59.9 FL (ref 37–54)
EGFRCR SERPLBLD CKD-EPI 2021: 72.8 ML/MIN/1.73
EOSINOPHIL # BLD MANUAL: 0.5 10*3/MM3 (ref 0–0.4)
EOSINOPHIL NFR BLD MANUAL: 2 % (ref 0.3–6.2)
ERYTHROCYTE [DISTWIDTH] IN BLOOD BY AUTOMATED COUNT: 18.7 % (ref 12.3–15.4)
GLOBULIN UR ELPH-MCNC: 1.5 GM/DL
GLUCOSE BLDC GLUCOMTR-MCNC: 118 MG/DL (ref 70–105)
GLUCOSE BLDC GLUCOMTR-MCNC: 163 MG/DL (ref 70–105)
GLUCOSE SERPL-MCNC: 202 MG/DL (ref 65–99)
HCO3 BLDA-SCNC: 27.1 MMOL/L (ref 21–28)
HCO3 BLDA-SCNC: 27.5 MMOL/L (ref 21–28)
HCT VFR BLD AUTO: 24.9 % (ref 34–46.6)
HEMODILUTION: NO
HEMODILUTION: NO
HGB BLD-MCNC: 7.9 G/DL (ref 12–15.9)
INHALED O2 CONCENTRATION: 100 %
INHALED O2 CONCENTRATION: 50 %
LYMPHOCYTES # BLD MANUAL: 0.25 10*3/MM3 (ref 0.7–3.1)
LYMPHOCYTES NFR BLD MANUAL: 7 % (ref 5–12)
MAGNESIUM SERPL-MCNC: 1.9 MG/DL (ref 1.6–2.4)
MCH RBC QN AUTO: 28.3 PG (ref 26.6–33)
MCHC RBC AUTO-ENTMCNC: 31.8 G/DL (ref 31.5–35.7)
MCV RBC AUTO: 88.8 FL (ref 79–97)
MODALITY: ABNORMAL
MODALITY: ABNORMAL
MONOCYTES # BLD: 1.74 10*3/MM3 (ref 0.1–0.9)
NEUTROPHILS # BLD AUTO: 22.41 10*3/MM3 (ref 1.7–7)
NEUTROPHILS NFR BLD MANUAL: 89 % (ref 42.7–76)
NEUTS BAND NFR BLD MANUAL: 1 % (ref 0–5)
PCO2 BLDA: 34.4 MM HG (ref 35–48)
PCO2 BLDA: 37.4 MM HG (ref 35–48)
PEEP RESPIRATORY: 5 CM[H2O]
PEEP RESPIRATORY: 5 CM[H2O]
PH BLDA: 7.47 PH UNITS (ref 7.35–7.45)
PH BLDA: 7.5 PH UNITS (ref 7.35–7.45)
PHOSPHATE SERPL-MCNC: 2.2 MG/DL (ref 2.5–4.5)
PLAT MORPH BLD: NORMAL
PLATELET # BLD AUTO: 144 10*3/MM3 (ref 140–450)
PMV BLD AUTO: 10.2 FL (ref 6–12)
PO2 BLDA: 117 MM HG (ref 83–108)
PO2 BLDA: 285.6 MM HG (ref 83–108)
POIKILOCYTOSIS BLD QL SMEAR: ABNORMAL
POTASSIUM SERPL-SCNC: 3.5 MMOL/L (ref 3.5–5.2)
PROT SERPL-MCNC: 4.1 G/DL (ref 6–8.5)
RBC # BLD AUTO: 2.8 10*6/MM3 (ref 3.77–5.28)
RESPIRATORY RATE: 16
RESPIRATORY RATE: 18
SAO2 % BLDCOA: 99 % (ref 94–98)
SAO2 % BLDCOA: 99.9 % (ref 94–98)
SCAN SLIDE: NORMAL
SODIUM SERPL-SCNC: 147 MMOL/L (ref 136–145)
VARIANT LYMPHS NFR BLD MANUAL: 1 % (ref 19.6–45.3)
VENTILATOR MODE: ABNORMAL
VENTILATOR MODE: ABNORMAL
VT ON VENT VENT: 450 ML
VT ON VENT VENT: 450 ML
WBC MORPH BLD: NORMAL
WBC NRBC COR # BLD: 24.9 10*3/MM3 (ref 3.4–10.8)

## 2022-11-26 PROCEDURE — 84100 ASSAY OF PHOSPHORUS: CPT | Performed by: NURSE PRACTITIONER

## 2022-11-26 PROCEDURE — 94003 VENT MGMT INPAT SUBQ DAY: CPT

## 2022-11-26 PROCEDURE — 94799 UNLISTED PULMONARY SVC/PX: CPT

## 2022-11-26 PROCEDURE — 36600 WITHDRAWAL OF ARTERIAL BLOOD: CPT

## 2022-11-26 PROCEDURE — 85025 COMPLETE CBC W/AUTO DIFF WBC: CPT | Performed by: NURSE PRACTITIONER

## 2022-11-26 PROCEDURE — 71045 X-RAY EXAM CHEST 1 VIEW: CPT

## 2022-11-26 PROCEDURE — 25010000002 MAGNESIUM SULFATE 2 GM/50ML SOLUTION: Performed by: NURSE PRACTITIONER

## 2022-11-26 PROCEDURE — 94664 DEMO&/EVAL PT USE INHALER: CPT

## 2022-11-26 PROCEDURE — 94761 N-INVAS EAR/PLS OXIMETRY MLT: CPT

## 2022-11-26 PROCEDURE — 63710000001 INSULIN LISPRO (HUMAN) PER 5 UNITS: Performed by: INTERNAL MEDICINE

## 2022-11-26 PROCEDURE — 83735 ASSAY OF MAGNESIUM: CPT | Performed by: INTERNAL MEDICINE

## 2022-11-26 PROCEDURE — 94668 MNPJ CHEST WALL SBSQ: CPT

## 2022-11-26 PROCEDURE — 94667 MNPJ CHEST WALL 1ST: CPT

## 2022-11-26 PROCEDURE — 94669 MECHANICAL CHEST WALL OSCILL: CPT

## 2022-11-26 PROCEDURE — 87070 CULTURE OTHR SPECIMN AEROBIC: CPT | Performed by: INTERNAL MEDICINE

## 2022-11-26 PROCEDURE — 25010000002 PROPOFOL 10 MG/ML EMULSION

## 2022-11-26 PROCEDURE — 25010000002 FUROSEMIDE PER 20 MG: Performed by: INTERNAL MEDICINE

## 2022-11-26 PROCEDURE — 87205 SMEAR GRAM STAIN: CPT | Performed by: INTERNAL MEDICINE

## 2022-11-26 PROCEDURE — 85007 BL SMEAR W/DIFF WBC COUNT: CPT | Performed by: NURSE PRACTITIONER

## 2022-11-26 PROCEDURE — 82803 BLOOD GASES ANY COMBINATION: CPT

## 2022-11-26 PROCEDURE — 80053 COMPREHEN METABOLIC PANEL: CPT | Performed by: NURSE PRACTITIONER

## 2022-11-26 PROCEDURE — 82962 GLUCOSE BLOOD TEST: CPT

## 2022-11-26 RX ORDER — PANTOPRAZOLE SODIUM 40 MG/10ML
40 INJECTION, POWDER, LYOPHILIZED, FOR SOLUTION INTRAVENOUS EVERY 12 HOURS SCHEDULED
Status: DISCONTINUED | OUTPATIENT
Start: 2022-11-26 | End: 2022-12-06 | Stop reason: HOSPADM

## 2022-11-26 RX ORDER — FUROSEMIDE 10 MG/ML
20 INJECTION INTRAMUSCULAR; INTRAVENOUS ONCE
Status: COMPLETED | OUTPATIENT
Start: 2022-11-26 | End: 2022-11-26

## 2022-11-26 RX ORDER — INSULIN LISPRO 100 [IU]/ML
4-24 INJECTION, SOLUTION INTRAVENOUS; SUBCUTANEOUS
Status: DISCONTINUED | OUTPATIENT
Start: 2022-11-27 | End: 2022-11-29

## 2022-11-26 RX ORDER — FUROSEMIDE 10 MG/ML
20 INJECTION INTRAMUSCULAR; INTRAVENOUS DAILY
Status: DISCONTINUED | OUTPATIENT
Start: 2022-11-26 | End: 2022-11-26

## 2022-11-26 RX ORDER — ALBUTEROL SULFATE 2.5 MG/3ML
2.5 SOLUTION RESPIRATORY (INHALATION) EVERY 6 HOURS PRN
Status: DISCONTINUED | OUTPATIENT
Start: 2022-11-26 | End: 2022-12-06 | Stop reason: HOSPADM

## 2022-11-26 RX ADMIN — INSULIN LISPRO 8 UNITS: 100 INJECTION, SOLUTION INTRAVENOUS; SUBCUTANEOUS at 05:56

## 2022-11-26 RX ADMIN — INSULIN LISPRO 4 UNITS: 100 INJECTION, SOLUTION INTRAVENOUS; SUBCUTANEOUS at 11:23

## 2022-11-26 RX ADMIN — ACETYLCYSTEINE 3 ML: 200 SOLUTION ORAL; RESPIRATORY (INHALATION) at 18:05

## 2022-11-26 RX ADMIN — FOLIC ACID 1 MG: 5 INJECTION, SOLUTION INTRAMUSCULAR; INTRAVENOUS; SUBCUTANEOUS at 08:22

## 2022-11-26 RX ADMIN — Medication 0.07 MCG/KG/MIN: at 03:37

## 2022-11-26 RX ADMIN — ALBUTEROL SULFATE 2.5 MG: 2.5 SOLUTION RESPIRATORY (INHALATION) at 18:05

## 2022-11-26 RX ADMIN — Medication 10 ML: at 08:23

## 2022-11-26 RX ADMIN — MAGNESIUM SULFATE HEPTAHYDRATE 2 G: 2 INJECTION, SOLUTION INTRAVENOUS at 08:22

## 2022-11-26 RX ADMIN — POTASSIUM PHOSPHATE, MONOBASIC AND POTASSIUM PHOSPHATE, DIBASIC 15 MMOL: 224; 236 INJECTION, SOLUTION, CONCENTRATE INTRAVENOUS at 05:56

## 2022-11-26 RX ADMIN — CHLORHEXIDINE GLUCONATE 15 ML: 1.2 RINSE ORAL at 21:27

## 2022-11-26 RX ADMIN — ANTI-FUNGAL POWDER MICONAZOLE NITRATE TALC FREE 1 APPLICATION: 1.42 POWDER TOPICAL at 08:22

## 2022-11-26 RX ADMIN — Medication 0.12 MCG/KG/MIN: at 17:08

## 2022-11-26 RX ADMIN — PANTOPRAZOLE SODIUM 40 MG: 40 INJECTION, POWDER, FOR SOLUTION INTRAVENOUS at 10:36

## 2022-11-26 RX ADMIN — ALBUTEROL SULFATE 2.5 MG: 2.5 SOLUTION RESPIRATORY (INHALATION) at 07:38

## 2022-11-26 RX ADMIN — POTASSIUM PHOSPHATE, MONOBASIC 1000 MG: 500 TABLET, SOLUBLE ORAL at 21:27

## 2022-11-26 RX ADMIN — PROPOFOL INJECTABLE EMULSION 20 MCG/KG/MIN: 10 INJECTION, EMULSION INTRAVENOUS at 05:56

## 2022-11-26 RX ADMIN — PANTOPRAZOLE SODIUM 8 MG/HR: 40 INJECTION, POWDER, FOR SOLUTION INTRAVENOUS at 08:39

## 2022-11-26 RX ADMIN — PANTOPRAZOLE SODIUM 8 MG/HR: 40 INJECTION, POWDER, FOR SOLUTION INTRAVENOUS at 03:37

## 2022-11-26 RX ADMIN — FUROSEMIDE 20 MG: 10 INJECTION, SOLUTION INTRAMUSCULAR; INTRAVENOUS at 15:44

## 2022-11-26 RX ADMIN — PANTOPRAZOLE SODIUM 40 MG: 40 INJECTION, POWDER, FOR SOLUTION INTRAVENOUS at 21:27

## 2022-11-26 RX ADMIN — POTASSIUM PHOSPHATE, MONOBASIC 1000 MG: 500 TABLET, SOLUBLE ORAL at 17:09

## 2022-11-26 RX ADMIN — CHLORHEXIDINE GLUCONATE 15 ML: 1.2 RINSE ORAL at 08:23

## 2022-11-26 RX ADMIN — ANTI-FUNGAL POWDER MICONAZOLE NITRATE TALC FREE: 1.42 POWDER TOPICAL at 21:28

## 2022-11-26 RX ADMIN — Medication 10 ML: at 21:27

## 2022-11-26 RX ADMIN — ACETYLCYSTEINE 3 ML: 200 SOLUTION ORAL; RESPIRATORY (INHALATION) at 07:38

## 2022-11-27 ENCOUNTER — APPOINTMENT (OUTPATIENT)
Dept: GENERAL RADIOLOGY | Facility: HOSPITAL | Age: 72
End: 2022-11-27

## 2022-11-27 LAB
ALBUMIN SERPL-MCNC: 2.9 G/DL (ref 3.5–5.2)
ALBUMIN/GLOB SERPL: 1.5 G/DL
ALP SERPL-CCNC: 151 U/L (ref 39–117)
ALT SERPL W P-5'-P-CCNC: 21 U/L (ref 1–33)
ANION GAP SERPL CALCULATED.3IONS-SCNC: 11 MMOL/L (ref 5–15)
ANISOCYTOSIS BLD QL: ABNORMAL
ARTERIAL PATENCY WRIST A: POSITIVE
AST SERPL-CCNC: 35 U/L (ref 1–32)
ATMOSPHERIC PRESS: ABNORMAL MM[HG]
BASE EXCESS BLDA CALC-SCNC: 3.1 MMOL/L (ref 0–3)
BDY SITE: ABNORMAL
BILIRUB SERPL-MCNC: 0.9 MG/DL (ref 0–1.2)
BUN SERPL-MCNC: 22 MG/DL (ref 8–23)
BUN/CREAT SERPL: 24.7 (ref 7–25)
CA-I SERPL ISE-MCNC: 0.99 MMOL/L (ref 1.2–1.3)
CALCIUM SPEC-SCNC: 6.9 MG/DL (ref 8.6–10.5)
CHLORIDE SERPL-SCNC: 106 MMOL/L (ref 98–107)
CO2 BLDA-SCNC: 30.8 MMOL/L (ref 22–29)
CO2 SERPL-SCNC: 28 MMOL/L (ref 22–29)
CREAT SERPL-MCNC: 0.89 MG/DL (ref 0.57–1)
DEPRECATED RDW RBC AUTO: 58.6 FL (ref 37–54)
EGFRCR SERPLBLD CKD-EPI 2021: 69.8 ML/MIN/1.73
EOSINOPHIL # BLD MANUAL: 0.17 10*3/MM3 (ref 0–0.4)
EOSINOPHIL NFR BLD MANUAL: 1 % (ref 0.3–6.2)
ERYTHROCYTE [DISTWIDTH] IN BLOOD BY AUTOMATED COUNT: 18.8 % (ref 12.3–15.4)
GLOBULIN UR ELPH-MCNC: 1.9 GM/DL
GLUCOSE BLDC GLUCOMTR-MCNC: 125 MG/DL (ref 70–105)
GLUCOSE BLDC GLUCOMTR-MCNC: 142 MG/DL (ref 70–105)
GLUCOSE BLDC GLUCOMTR-MCNC: 195 MG/DL (ref 70–105)
GLUCOSE BLDC GLUCOMTR-MCNC: 199 MG/DL (ref 70–105)
GLUCOSE SERPL-MCNC: 223 MG/DL (ref 65–99)
HCO3 BLDA-SCNC: 29.2 MMOL/L (ref 21–28)
HCT VFR BLD AUTO: 24.8 % (ref 34–46.6)
HEMODILUTION: NO
HGB BLD-MCNC: 7.7 G/DL (ref 12–15.9)
INHALED O2 CONCENTRATION: 60 %
INSPIRATORY TIME: 1
LYMPHOCYTES # BLD MANUAL: 0.17 10*3/MM3 (ref 0.7–3.1)
LYMPHOCYTES NFR BLD MANUAL: 1 % (ref 5–12)
MAGNESIUM SERPL-MCNC: 2.3 MG/DL (ref 1.6–2.4)
MCH RBC QN AUTO: 28.7 PG (ref 26.6–33)
MCHC RBC AUTO-ENTMCNC: 31.3 G/DL (ref 31.5–35.7)
MCV RBC AUTO: 91.5 FL (ref 79–97)
MODALITY: ABNORMAL
MONOCYTES # BLD: 0.17 10*3/MM3 (ref 0.1–0.9)
NEUTROPHILS # BLD AUTO: 16.88 10*3/MM3 (ref 1.7–7)
NEUTROPHILS NFR BLD MANUAL: 97 % (ref 42.7–76)
OVALOCYTES BLD QL SMEAR: ABNORMAL
PCO2 BLDA: 53 MM HG (ref 35–48)
PEEP RESPIRATORY: 5 CM[H2O]
PH BLDA: 7.35 PH UNITS (ref 7.35–7.45)
PHOSPHATE SERPL-MCNC: 4 MG/DL (ref 2.5–4.5)
PLATELET # BLD AUTO: 135 10*3/MM3 (ref 140–450)
PMV BLD AUTO: 9.5 FL (ref 6–12)
PO2 BLDA: 76.7 MM HG (ref 83–108)
POTASSIUM SERPL-SCNC: 4.2 MMOL/L (ref 3.5–5.2)
PROT SERPL-MCNC: 4.8 G/DL (ref 6–8.5)
RBC # BLD AUTO: 2.7 10*6/MM3 (ref 3.77–5.28)
RESPIRATORY RATE: 18
SAO2 % BLDCOA: 94.2 % (ref 94–98)
SCAN SLIDE: NORMAL
SMALL PLATELETS BLD QL SMEAR: ABNORMAL
SODIUM SERPL-SCNC: 145 MMOL/L (ref 136–145)
VARIANT LYMPHS NFR BLD MANUAL: 1 % (ref 19.6–45.3)
VENTILATOR MODE: ABNORMAL
VT ON VENT VENT: 450 ML
WBC MORPH BLD: NORMAL
WBC NRBC COR # BLD: 17.4 10*3/MM3 (ref 3.4–10.8)

## 2022-11-27 PROCEDURE — 85007 BL SMEAR W/DIFF WBC COUNT: CPT | Performed by: NURSE PRACTITIONER

## 2022-11-27 PROCEDURE — 94669 MECHANICAL CHEST WALL OSCILL: CPT

## 2022-11-27 PROCEDURE — 84100 ASSAY OF PHOSPHORUS: CPT | Performed by: NURSE PRACTITIONER

## 2022-11-27 PROCEDURE — 94799 UNLISTED PULMONARY SVC/PX: CPT

## 2022-11-27 PROCEDURE — 25010000002 FUROSEMIDE PER 20 MG: Performed by: INTERNAL MEDICINE

## 2022-11-27 PROCEDURE — 36600 WITHDRAWAL OF ARTERIAL BLOOD: CPT

## 2022-11-27 PROCEDURE — 82962 GLUCOSE BLOOD TEST: CPT

## 2022-11-27 PROCEDURE — 85025 COMPLETE CBC W/AUTO DIFF WBC: CPT | Performed by: NURSE PRACTITIONER

## 2022-11-27 PROCEDURE — 80053 COMPREHEN METABOLIC PANEL: CPT | Performed by: NURSE PRACTITIONER

## 2022-11-27 PROCEDURE — 82803 BLOOD GASES ANY COMBINATION: CPT

## 2022-11-27 PROCEDURE — 94761 N-INVAS EAR/PLS OXIMETRY MLT: CPT

## 2022-11-27 PROCEDURE — 71045 X-RAY EXAM CHEST 1 VIEW: CPT

## 2022-11-27 PROCEDURE — 94668 MNPJ CHEST WALL SBSQ: CPT

## 2022-11-27 PROCEDURE — 94660 CPAP INITIATION&MGMT: CPT

## 2022-11-27 PROCEDURE — 83735 ASSAY OF MAGNESIUM: CPT | Performed by: INTERNAL MEDICINE

## 2022-11-27 PROCEDURE — 82330 ASSAY OF CALCIUM: CPT | Performed by: INTERNAL MEDICINE

## 2022-11-27 PROCEDURE — 25010000002 ONDANSETRON PER 1 MG: Performed by: INTERNAL MEDICINE

## 2022-11-27 PROCEDURE — 63710000001 INSULIN LISPRO (HUMAN) PER 5 UNITS: Performed by: NURSE PRACTITIONER

## 2022-11-27 PROCEDURE — 94664 DEMO&/EVAL PT USE INHALER: CPT

## 2022-11-27 PROCEDURE — 25010000002 CALCIUM GLUCONATE 2-0.675 GM/100ML-% SOLUTION: Performed by: INTERNAL MEDICINE

## 2022-11-27 RX ORDER — CALCIUM GLUCONATE 20 MG/ML
2 INJECTION, SOLUTION INTRAVENOUS EVERY 6 HOURS
Status: COMPLETED | OUTPATIENT
Start: 2022-11-27 | End: 2022-11-27

## 2022-11-27 RX ORDER — MIDODRINE HYDROCHLORIDE 5 MG/1
10 TABLET ORAL
Status: DISCONTINUED | OUTPATIENT
Start: 2022-11-27 | End: 2022-11-29

## 2022-11-27 RX ORDER — FUROSEMIDE 10 MG/ML
20 INJECTION INTRAMUSCULAR; INTRAVENOUS ONCE
Status: COMPLETED | OUTPATIENT
Start: 2022-11-27 | End: 2022-11-27

## 2022-11-27 RX ADMIN — PANTOPRAZOLE SODIUM 40 MG: 40 INJECTION, POWDER, FOR SOLUTION INTRAVENOUS at 21:44

## 2022-11-27 RX ADMIN — FUROSEMIDE 20 MG: 10 INJECTION, SOLUTION INTRAMUSCULAR; INTRAVENOUS at 12:34

## 2022-11-27 RX ADMIN — ALBUTEROL SULFATE 2.5 MG: 2.5 SOLUTION RESPIRATORY (INHALATION) at 19:49

## 2022-11-27 RX ADMIN — CHLORHEXIDINE GLUCONATE 15 ML: 1.2 RINSE ORAL at 21:44

## 2022-11-27 RX ADMIN — POTASSIUM PHOSPHATE, MONOBASIC 1000 MG: 500 TABLET, SOLUBLE ORAL at 17:23

## 2022-11-27 RX ADMIN — CHLORHEXIDINE GLUCONATE 15 ML: 1.2 RINSE ORAL at 08:00

## 2022-11-27 RX ADMIN — ACETAMINOPHEN 650 MG: 325 TABLET, FILM COATED ORAL at 14:39

## 2022-11-27 RX ADMIN — HYDROXYZINE HYDROCHLORIDE 25 MG: 25 TABLET, FILM COATED ORAL at 01:28

## 2022-11-27 RX ADMIN — FOLIC ACID 1 MG: 5 INJECTION, SOLUTION INTRAMUSCULAR; INTRAVENOUS; SUBCUTANEOUS at 08:00

## 2022-11-27 RX ADMIN — ACETAMINOPHEN 650 MG: 325 TABLET, FILM COATED ORAL at 01:27

## 2022-11-27 RX ADMIN — INSULIN LISPRO 4 UNITS: 100 INJECTION, SOLUTION INTRAVENOUS; SUBCUTANEOUS at 11:34

## 2022-11-27 RX ADMIN — CALCIUM GLUCONATE 2 G: 20 INJECTION, SOLUTION INTRAVENOUS at 16:22

## 2022-11-27 RX ADMIN — HYDROXYZINE HYDROCHLORIDE 25 MG: 25 TABLET, FILM COATED ORAL at 14:39

## 2022-11-27 RX ADMIN — INSULIN LISPRO 4 UNITS: 100 INJECTION, SOLUTION INTRAVENOUS; SUBCUTANEOUS at 07:58

## 2022-11-27 RX ADMIN — MIDODRINE HYDROCHLORIDE 10 MG: 5 TABLET ORAL at 17:23

## 2022-11-27 RX ADMIN — ANTI-FUNGAL POWDER MICONAZOLE NITRATE TALC FREE: 1.42 POWDER TOPICAL at 21:44

## 2022-11-27 RX ADMIN — POTASSIUM PHOSPHATE, MONOBASIC 1000 MG: 500 TABLET, SOLUBLE ORAL at 11:34

## 2022-11-27 RX ADMIN — ALBUTEROL SULFATE 2.5 MG: 2.5 SOLUTION RESPIRATORY (INHALATION) at 07:11

## 2022-11-27 RX ADMIN — Medication 0.05 MCG/KG/MIN: at 12:35

## 2022-11-27 RX ADMIN — ACETYLCYSTEINE 3 ML: 200 SOLUTION ORAL; RESPIRATORY (INHALATION) at 07:12

## 2022-11-27 RX ADMIN — Medication 10 ML: at 21:44

## 2022-11-27 RX ADMIN — MIDODRINE HYDROCHLORIDE 10 MG: 5 TABLET ORAL at 11:34

## 2022-11-27 RX ADMIN — ONDANSETRON 4 MG: 2 INJECTION INTRAMUSCULAR; INTRAVENOUS at 11:44

## 2022-11-27 RX ADMIN — POTASSIUM PHOSPHATE, MONOBASIC 1000 MG: 500 TABLET, SOLUBLE ORAL at 07:59

## 2022-11-27 RX ADMIN — PANTOPRAZOLE SODIUM 40 MG: 40 INJECTION, POWDER, FOR SOLUTION INTRAVENOUS at 08:00

## 2022-11-27 RX ADMIN — LOPERAMIDE HYDROCHLORIDE 4 MG: 2 CAPSULE ORAL at 11:45

## 2022-11-27 RX ADMIN — Medication 0.12 MCG/KG/MIN: at 00:29

## 2022-11-27 RX ADMIN — Medication 10 ML: at 08:02

## 2022-11-27 RX ADMIN — ACETYLCYSTEINE 3 ML: 200 SOLUTION ORAL; RESPIRATORY (INHALATION) at 19:49

## 2022-11-27 RX ADMIN — CALCIUM GLUCONATE 2 G: 20 INJECTION, SOLUTION INTRAVENOUS at 11:34

## 2022-11-28 LAB
ALBUMIN SERPL-MCNC: 2.8 G/DL (ref 3.5–5.2)
ALBUMIN/GLOB SERPL: 1.6 G/DL
ALP SERPL-CCNC: 142 U/L (ref 39–117)
ALT SERPL W P-5'-P-CCNC: 17 U/L (ref 1–33)
ANION GAP SERPL CALCULATED.3IONS-SCNC: 17 MMOL/L (ref 5–15)
ANISOCYTOSIS BLD QL: ABNORMAL
AST SERPL-CCNC: 24 U/L (ref 1–32)
BACTERIA SPEC RESP CULT: NO GROWTH
BILIRUB SERPL-MCNC: 0.6 MG/DL (ref 0–1.2)
BUN SERPL-MCNC: 21 MG/DL (ref 8–23)
BUN/CREAT SERPL: 21.9 (ref 7–25)
CA-I SERPL ISE-MCNC: 1.1 MMOL/L (ref 1.2–1.3)
CALCIUM SPEC-SCNC: 7.8 MG/DL (ref 8.6–10.5)
CHLORIDE SERPL-SCNC: 104 MMOL/L (ref 98–107)
CO2 SERPL-SCNC: 25 MMOL/L (ref 22–29)
CREAT SERPL-MCNC: 0.96 MG/DL (ref 0.57–1)
DEPRECATED RDW RBC AUTO: 58.6 FL (ref 37–54)
EGFRCR SERPLBLD CKD-EPI 2021: 63.8 ML/MIN/1.73
ERYTHROCYTE [DISTWIDTH] IN BLOOD BY AUTOMATED COUNT: 19 % (ref 12.3–15.4)
GLOBULIN UR ELPH-MCNC: 1.8 GM/DL
GLUCOSE BLDC GLUCOMTR-MCNC: 107 MG/DL (ref 70–105)
GLUCOSE BLDC GLUCOMTR-MCNC: 128 MG/DL (ref 70–105)
GLUCOSE BLDC GLUCOMTR-MCNC: 150 MG/DL (ref 70–105)
GLUCOSE BLDC GLUCOMTR-MCNC: 170 MG/DL (ref 70–105)
GLUCOSE SERPL-MCNC: 153 MG/DL (ref 65–99)
GRAM STN SPEC: NORMAL
HCT VFR BLD AUTO: 24.3 % (ref 34–46.6)
HGB BLD-MCNC: 7.5 G/DL (ref 12–15.9)
LYMPHOCYTES # BLD MANUAL: 0.59 10*3/MM3 (ref 0.7–3.1)
LYMPHOCYTES NFR BLD MANUAL: 4 % (ref 5–12)
MAGNESIUM SERPL-MCNC: 1.9 MG/DL (ref 1.6–2.4)
MCH RBC QN AUTO: 28.1 PG (ref 26.6–33)
MCHC RBC AUTO-ENTMCNC: 31 G/DL (ref 31.5–35.7)
MCV RBC AUTO: 90.8 FL (ref 79–97)
MONOCYTES # BLD: 0.78 10*3/MM3 (ref 0.1–0.9)
NEUTROPHILS # BLD AUTO: 18.14 10*3/MM3 (ref 1.7–7)
NEUTROPHILS NFR BLD MANUAL: 93 % (ref 42.7–76)
PHOSPHATE SERPL-MCNC: 4.7 MG/DL (ref 2.5–4.5)
PLAT MORPH BLD: NORMAL
PLATELET # BLD AUTO: 183 10*3/MM3 (ref 140–450)
PMV BLD AUTO: 9.9 FL (ref 6–12)
POIKILOCYTOSIS BLD QL SMEAR: ABNORMAL
POTASSIUM SERPL-SCNC: 4.1 MMOL/L (ref 3.5–5.2)
PROT SERPL-MCNC: 4.6 G/DL (ref 6–8.5)
RBC # BLD AUTO: 2.68 10*6/MM3 (ref 3.77–5.28)
SCAN SLIDE: NORMAL
SODIUM SERPL-SCNC: 146 MMOL/L (ref 136–145)
VARIANT LYMPHS NFR BLD MANUAL: 3 % (ref 19.6–45.3)
WBC MORPH BLD: NORMAL
WBC NRBC COR # BLD: 19.5 10*3/MM3 (ref 3.4–10.8)

## 2022-11-28 PROCEDURE — 94799 UNLISTED PULMONARY SVC/PX: CPT

## 2022-11-28 PROCEDURE — 94664 DEMO&/EVAL PT USE INHALER: CPT

## 2022-11-28 PROCEDURE — 84100 ASSAY OF PHOSPHORUS: CPT | Performed by: NURSE PRACTITIONER

## 2022-11-28 PROCEDURE — 25010000002 ONDANSETRON PER 1 MG: Performed by: INTERNAL MEDICINE

## 2022-11-28 PROCEDURE — 82962 GLUCOSE BLOOD TEST: CPT

## 2022-11-28 PROCEDURE — 25010000002 CALCIUM GLUCONATE-NACL 1-0.675 GM/50ML-% SOLUTION: Performed by: HOSPITALIST

## 2022-11-28 PROCEDURE — 25010000002 ALBUMIN HUMAN 25% PER 50 ML: Performed by: HOSPITALIST

## 2022-11-28 PROCEDURE — 92526 ORAL FUNCTION THERAPY: CPT

## 2022-11-28 PROCEDURE — 82330 ASSAY OF CALCIUM: CPT | Performed by: INTERNAL MEDICINE

## 2022-11-28 PROCEDURE — 63710000001 INSULIN LISPRO (HUMAN) PER 5 UNITS: Performed by: NURSE PRACTITIONER

## 2022-11-28 PROCEDURE — 25010000002 MAGNESIUM SULFATE 2 GM/50ML SOLUTION: Performed by: NURSE PRACTITIONER

## 2022-11-28 PROCEDURE — 94660 CPAP INITIATION&MGMT: CPT

## 2022-11-28 PROCEDURE — 85025 COMPLETE CBC W/AUTO DIFF WBC: CPT | Performed by: NURSE PRACTITIONER

## 2022-11-28 PROCEDURE — P9047 ALBUMIN (HUMAN), 25%, 50ML: HCPCS | Performed by: HOSPITALIST

## 2022-11-28 PROCEDURE — 85007 BL SMEAR W/DIFF WBC COUNT: CPT | Performed by: NURSE PRACTITIONER

## 2022-11-28 PROCEDURE — 94761 N-INVAS EAR/PLS OXIMETRY MLT: CPT

## 2022-11-28 PROCEDURE — 80053 COMPREHEN METABOLIC PANEL: CPT | Performed by: NURSE PRACTITIONER

## 2022-11-28 PROCEDURE — 83735 ASSAY OF MAGNESIUM: CPT | Performed by: INTERNAL MEDICINE

## 2022-11-28 PROCEDURE — 97164 PT RE-EVAL EST PLAN CARE: CPT

## 2022-11-28 RX ORDER — ACETYLCYSTEINE 200 MG/ML
2 SOLUTION ORAL; RESPIRATORY (INHALATION)
Status: DISCONTINUED | OUTPATIENT
Start: 2022-11-28 | End: 2022-11-28

## 2022-11-28 RX ORDER — BUMETANIDE 0.25 MG/ML
1 INJECTION INTRAMUSCULAR; INTRAVENOUS EVERY 12 HOURS
Status: DISCONTINUED | OUTPATIENT
Start: 2022-11-28 | End: 2022-11-29

## 2022-11-28 RX ORDER — ACETYLCYSTEINE 200 MG/ML
2 SOLUTION ORAL; RESPIRATORY (INHALATION)
Status: DISCONTINUED | OUTPATIENT
Start: 2022-11-28 | End: 2022-11-29

## 2022-11-28 RX ORDER — DEXTROSE MONOHYDRATE 50 MG/ML
50 INJECTION, SOLUTION INTRAVENOUS CONTINUOUS
Status: DISCONTINUED | OUTPATIENT
Start: 2022-11-28 | End: 2022-11-28

## 2022-11-28 RX ORDER — FUROSEMIDE 10 MG/ML
40 INJECTION INTRAMUSCULAR; INTRAVENOUS EVERY 12 HOURS
Status: DISCONTINUED | OUTPATIENT
Start: 2022-11-28 | End: 2022-11-28

## 2022-11-28 RX ORDER — ALBUMIN (HUMAN) 12.5 G/50ML
25 SOLUTION INTRAVENOUS EVERY 8 HOURS
Status: DISCONTINUED | OUTPATIENT
Start: 2022-11-28 | End: 2022-11-28

## 2022-11-28 RX ORDER — IPRATROPIUM BROMIDE AND ALBUTEROL SULFATE 2.5; .5 MG/3ML; MG/3ML
3 SOLUTION RESPIRATORY (INHALATION)
Status: DISCONTINUED | OUTPATIENT
Start: 2022-11-28 | End: 2022-12-03

## 2022-11-28 RX ORDER — CALCIUM GLUCONATE 20 MG/ML
1 INJECTION, SOLUTION INTRAVENOUS ONCE
Status: COMPLETED | OUTPATIENT
Start: 2022-11-28 | End: 2022-11-28

## 2022-11-28 RX ORDER — BUDESONIDE 0.5 MG/2ML
1 INHALANT ORAL
Status: DISCONTINUED | OUTPATIENT
Start: 2022-11-28 | End: 2022-11-29

## 2022-11-28 RX ORDER — ALBUMIN (HUMAN) 12.5 G/50ML
25 SOLUTION INTRAVENOUS EVERY 12 HOURS
Status: DISCONTINUED | OUTPATIENT
Start: 2022-11-28 | End: 2022-12-01

## 2022-11-28 RX ADMIN — ANTI-FUNGAL POWDER MICONAZOLE NITRATE TALC FREE: 1.42 POWDER TOPICAL at 20:08

## 2022-11-28 RX ADMIN — ONDANSETRON 4 MG: 2 INJECTION INTRAMUSCULAR; INTRAVENOUS at 11:28

## 2022-11-28 RX ADMIN — Medication 0.08 MCG/KG/MIN: at 06:32

## 2022-11-28 RX ADMIN — INSULIN LISPRO 4 UNITS: 100 INJECTION, SOLUTION INTRAVENOUS; SUBCUTANEOUS at 11:11

## 2022-11-28 RX ADMIN — BUMETANIDE 1 MG: 0.25 INJECTION, SOLUTION INTRAMUSCULAR; INTRAVENOUS at 21:25

## 2022-11-28 RX ADMIN — PANTOPRAZOLE SODIUM 40 MG: 40 INJECTION, POWDER, FOR SOLUTION INTRAVENOUS at 08:08

## 2022-11-28 RX ADMIN — ALBUMIN (HUMAN) 25 G: 0.25 INJECTION, SOLUTION INTRAVENOUS at 21:26

## 2022-11-28 RX ADMIN — CHLORHEXIDINE GLUCONATE 15 ML: 1.2 RINSE ORAL at 08:07

## 2022-11-28 RX ADMIN — POTASSIUM PHOSPHATE, MONOBASIC 1000 MG: 500 TABLET, SOLUBLE ORAL at 11:11

## 2022-11-28 RX ADMIN — Medication 0.06 MCG/KG/MIN: at 20:19

## 2022-11-28 RX ADMIN — IPRATROPIUM BROMIDE AND ALBUTEROL SULFATE 3 ML: 2.5; .5 SOLUTION RESPIRATORY (INHALATION) at 18:16

## 2022-11-28 RX ADMIN — IPRATROPIUM BROMIDE AND ALBUTEROL SULFATE 3 ML: 2.5; .5 SOLUTION RESPIRATORY (INHALATION) at 15:45

## 2022-11-28 RX ADMIN — Medication 10 ML: at 08:08

## 2022-11-28 RX ADMIN — ACETYLCYSTEINE 3 ML: 200 SOLUTION ORAL; RESPIRATORY (INHALATION) at 07:45

## 2022-11-28 RX ADMIN — BUDESONIDE 1 MG: 0.5 INHALANT RESPIRATORY (INHALATION) at 18:17

## 2022-11-28 RX ADMIN — MIDODRINE HYDROCHLORIDE 10 MG: 5 TABLET ORAL at 08:07

## 2022-11-28 RX ADMIN — ACETYLCYSTEINE 2 ML: 200 SOLUTION ORAL; RESPIRATORY (INHALATION) at 18:17

## 2022-11-28 RX ADMIN — POTASSIUM PHOSPHATE, MONOBASIC 1000 MG: 500 TABLET, SOLUBLE ORAL at 08:59

## 2022-11-28 RX ADMIN — MIDODRINE HYDROCHLORIDE 10 MG: 5 TABLET ORAL at 11:11

## 2022-11-28 RX ADMIN — INSULIN LISPRO 4 UNITS: 100 INJECTION, SOLUTION INTRAVENOUS; SUBCUTANEOUS at 08:07

## 2022-11-28 RX ADMIN — FOLIC ACID 1 MG: 5 INJECTION, SOLUTION INTRAMUSCULAR; INTRAVENOUS; SUBCUTANEOUS at 08:08

## 2022-11-28 RX ADMIN — Medication 10 ML: at 20:08

## 2022-11-28 RX ADMIN — CHLORHEXIDINE GLUCONATE 15 ML: 1.2 RINSE ORAL at 20:08

## 2022-11-28 RX ADMIN — ALBUMIN (HUMAN) 25 G: 0.25 INJECTION, SOLUTION INTRAVENOUS at 09:39

## 2022-11-28 RX ADMIN — DEXTROSE MONOHYDRATE 50 ML/HR: 50 INJECTION, SOLUTION INTRAVENOUS at 09:42

## 2022-11-28 RX ADMIN — PANTOPRAZOLE SODIUM 40 MG: 40 INJECTION, POWDER, FOR SOLUTION INTRAVENOUS at 20:14

## 2022-11-28 RX ADMIN — ONDANSETRON 4 MG: 2 INJECTION INTRAMUSCULAR; INTRAVENOUS at 20:04

## 2022-11-28 RX ADMIN — ALBUTEROL SULFATE 2.5 MG: 2.5 SOLUTION RESPIRATORY (INHALATION) at 07:45

## 2022-11-28 RX ADMIN — BUMETANIDE 1 MG: 0.25 INJECTION, SOLUTION INTRAMUSCULAR; INTRAVENOUS at 09:40

## 2022-11-28 RX ADMIN — ANTI-FUNGAL POWDER MICONAZOLE NITRATE TALC FREE: 1.42 POWDER TOPICAL at 08:08

## 2022-11-28 RX ADMIN — MAGNESIUM SULFATE HEPTAHYDRATE 2 G: 2 INJECTION, SOLUTION INTRAVENOUS at 08:07

## 2022-11-28 RX ADMIN — CALCIUM GLUCONATE 1 G: 20 INJECTION, SOLUTION INTRAVENOUS at 09:39

## 2022-11-29 ENCOUNTER — HOSPITAL ENCOUNTER (OUTPATIENT)
Dept: ONCOLOGY | Facility: HOSPITAL | Age: 72
Setting detail: INFUSION SERIES
Discharge: HOME | End: 2022-11-29

## 2022-11-29 ENCOUNTER — APPOINTMENT (OUTPATIENT)
Dept: GENERAL RADIOLOGY | Facility: HOSPITAL | Age: 72
End: 2022-11-29

## 2022-11-29 ENCOUNTER — INPATIENT HOSPITAL (OUTPATIENT)
Dept: URBAN - METROPOLITAN AREA HOSPITAL 84 | Facility: HOSPITAL | Age: 72
End: 2022-11-29
Payer: COMMERCIAL

## 2022-11-29 DIAGNOSIS — R63.4 ABNORMAL WEIGHT LOSS: ICD-10-CM

## 2022-11-29 DIAGNOSIS — U07.1 COVID-19: ICD-10-CM

## 2022-11-29 DIAGNOSIS — R10.84 GENERALIZED ABDOMINAL PAIN: ICD-10-CM

## 2022-11-29 DIAGNOSIS — D64.9 ANEMIA, UNSPECIFIED: ICD-10-CM

## 2022-11-29 DIAGNOSIS — K26.4 CHRONIC OR UNSPECIFIED DUODENAL ULCER WITH HEMORRHAGE: ICD-10-CM

## 2022-11-29 DIAGNOSIS — R93.3 ABNORMAL FINDINGS ON DIAGNOSTIC IMAGING OF OTHER PARTS OF DI: ICD-10-CM

## 2022-11-29 DIAGNOSIS — R74.8 ABNORMAL LEVELS OF OTHER SERUM ENZYMES: ICD-10-CM

## 2022-11-29 DIAGNOSIS — D72.829 ELEVATED WHITE BLOOD CELL COUNT, UNSPECIFIED: ICD-10-CM

## 2022-11-29 LAB
ABO GROUP BLD: NORMAL
ALBUMIN SERPL-MCNC: 3.3 G/DL (ref 3.5–5.2)
ALBUMIN SERPL-MCNC: 4.2 G/DL (ref 3.5–5.2)
ALBUMIN/GLOB SERPL: 2.1 G/DL
ALP SERPL-CCNC: 119 U/L (ref 39–117)
ALT SERPL W P-5'-P-CCNC: 14 U/L (ref 1–33)
ANION GAP SERPL CALCULATED.3IONS-SCNC: 14 MMOL/L (ref 5–15)
ANION GAP SERPL CALCULATED.3IONS-SCNC: 17 MMOL/L (ref 5–15)
AST SERPL-CCNC: 17 U/L (ref 1–32)
BASOPHILS # BLD AUTO: 0.1 10*3/MM3 (ref 0–0.2)
BASOPHILS NFR BLD AUTO: 0.4 % (ref 0–1.5)
BILIRUB SERPL-MCNC: 0.7 MG/DL (ref 0–1.2)
BLD GP AB SCN SERPL QL: NEGATIVE
BUN SERPL-MCNC: 20 MG/DL (ref 8–23)
BUN SERPL-MCNC: 21 MG/DL (ref 8–23)
BUN/CREAT SERPL: 20.6 (ref 7–25)
BUN/CREAT SERPL: 22 (ref 7–25)
CA-I SERPL ISE-MCNC: 1.04 MMOL/L (ref 1.2–1.3)
CALCIUM SPEC-SCNC: 7.5 MG/DL (ref 8.6–10.5)
CALCIUM SPEC-SCNC: 7.5 MG/DL (ref 8.6–10.5)
CHLORIDE SERPL-SCNC: 105 MMOL/L (ref 98–107)
CHLORIDE SERPL-SCNC: 107 MMOL/L (ref 98–107)
CO2 SERPL-SCNC: 25 MMOL/L (ref 22–29)
CO2 SERPL-SCNC: 27 MMOL/L (ref 22–29)
CREAT SERPL-MCNC: 0.91 MG/DL (ref 0.57–1)
CREAT SERPL-MCNC: 1.02 MG/DL (ref 0.57–1)
DEPRECATED RDW RBC AUTO: 63 FL (ref 37–54)
EGFRCR SERPLBLD CKD-EPI 2021: 59.3 ML/MIN/1.73
EGFRCR SERPLBLD CKD-EPI 2021: 68 ML/MIN/1.73
EOSINOPHIL # BLD AUTO: 0 10*3/MM3 (ref 0–0.4)
EOSINOPHIL NFR BLD AUTO: 0.4 % (ref 0.3–6.2)
ERYTHROCYTE [DISTWIDTH] IN BLOOD BY AUTOMATED COUNT: 19.5 % (ref 12.3–15.4)
GLOBULIN UR ELPH-MCNC: 1.6 GM/DL
GLUCOSE BLDC GLUCOMTR-MCNC: 134 MG/DL (ref 70–105)
GLUCOSE BLDC GLUCOMTR-MCNC: 185 MG/DL (ref 70–105)
GLUCOSE BLDC GLUCOMTR-MCNC: 231 MG/DL (ref 70–105)
GLUCOSE SERPL-MCNC: 156 MG/DL (ref 65–99)
GLUCOSE SERPL-MCNC: 240 MG/DL (ref 65–99)
HCT VFR BLD AUTO: 19 % (ref 34–46.6)
HCT VFR BLD AUTO: 22.5 % (ref 34–46.6)
HCT VFR BLD AUTO: 29.3 % (ref 34–46.6)
HGB BLD-MCNC: 6.1 G/DL (ref 12–15.9)
HGB BLD-MCNC: 7.3 G/DL (ref 12–15.9)
HGB BLD-MCNC: 9.7 G/DL (ref 12–15.9)
LYMPHOCYTES # BLD AUTO: 0.3 10*3/MM3 (ref 0.7–3.1)
LYMPHOCYTES NFR BLD AUTO: 2 % (ref 19.6–45.3)
MAGNESIUM SERPL-MCNC: 2 MG/DL (ref 1.6–2.4)
MCH RBC QN AUTO: 28.7 PG (ref 26.6–33)
MCHC RBC AUTO-ENTMCNC: 32.2 G/DL (ref 31.5–35.7)
MCV RBC AUTO: 89.2 FL (ref 79–97)
MONOCYTES # BLD AUTO: 0.9 10*3/MM3 (ref 0.1–0.9)
MONOCYTES NFR BLD AUTO: 6.4 % (ref 5–12)
NEUTROPHILS NFR BLD AUTO: 12.1 10*3/MM3 (ref 1.7–7)
NEUTROPHILS NFR BLD AUTO: 90.8 % (ref 42.7–76)
NRBC BLD AUTO-RTO: 0.3 /100 WBC (ref 0–0.2)
PHOSPHATE SERPL-MCNC: 4.5 MG/DL (ref 2.5–4.5)
PHOSPHATE SERPL-MCNC: 4.6 MG/DL (ref 2.5–4.5)
PLATELET # BLD AUTO: 127 10*3/MM3 (ref 140–450)
PMV BLD AUTO: 9.4 FL (ref 6–12)
POTASSIUM SERPL-SCNC: 3.5 MMOL/L (ref 3.5–5.2)
POTASSIUM SERPL-SCNC: 4 MMOL/L (ref 3.5–5.2)
PROCALCITONIN SERPL-MCNC: 0.36 NG/ML (ref 0–0.25)
PROT SERPL-MCNC: 4.9 G/DL (ref 6–8.5)
RBC # BLD AUTO: 2.13 10*6/MM3 (ref 3.77–5.28)
RH BLD: POSITIVE
SODIUM SERPL-SCNC: 147 MMOL/L (ref 136–145)
SODIUM SERPL-SCNC: 148 MMOL/L (ref 136–145)
T&S EXPIRATION DATE: NORMAL
WBC NRBC COR # BLD: 13.4 10*3/MM3 (ref 3.4–10.8)

## 2022-11-29 PROCEDURE — 85014 HEMATOCRIT: CPT | Performed by: INTERNAL MEDICINE

## 2022-11-29 PROCEDURE — 25010000002 ALBUMIN HUMAN 25% PER 50 ML: Performed by: HOSPITALIST

## 2022-11-29 PROCEDURE — 94664 DEMO&/EVAL PT USE INHALER: CPT

## 2022-11-29 PROCEDURE — 25010000002 PIPERACILLIN SOD-TAZOBACTAM PER 1 G: Performed by: INTERNAL MEDICINE

## 2022-11-29 PROCEDURE — 25010000002 ONDANSETRON PER 1 MG: Performed by: NURSE PRACTITIONER

## 2022-11-29 PROCEDURE — P9016 RBC LEUKOCYTES REDUCED: HCPCS

## 2022-11-29 PROCEDURE — 86923 COMPATIBILITY TEST ELECTRIC: CPT

## 2022-11-29 PROCEDURE — 0 POTASSIUM CHLORIDE 10 MEQ/100ML SOLUTION: Performed by: INTERNAL MEDICINE

## 2022-11-29 PROCEDURE — P9047 ALBUMIN (HUMAN), 25%, 50ML: HCPCS

## 2022-11-29 PROCEDURE — 85018 HEMOGLOBIN: CPT | Performed by: NURSE PRACTITIONER

## 2022-11-29 PROCEDURE — 84100 ASSAY OF PHOSPHORUS: CPT | Performed by: NURSE PRACTITIONER

## 2022-11-29 PROCEDURE — 63710000001 INSULIN LISPRO (HUMAN) PER 5 UNITS: Performed by: NURSE PRACTITIONER

## 2022-11-29 PROCEDURE — 94660 CPAP INITIATION&MGMT: CPT

## 2022-11-29 PROCEDURE — 86900 BLOOD TYPING SEROLOGIC ABO: CPT

## 2022-11-29 PROCEDURE — 82330 ASSAY OF CALCIUM: CPT | Performed by: HOSPITALIST

## 2022-11-29 PROCEDURE — 36430 TRANSFUSION BLD/BLD COMPNT: CPT

## 2022-11-29 PROCEDURE — 85018 HEMOGLOBIN: CPT | Performed by: INTERNAL MEDICINE

## 2022-11-29 PROCEDURE — P9047 ALBUMIN (HUMAN), 25%, 50ML: HCPCS | Performed by: HOSPITALIST

## 2022-11-29 PROCEDURE — 80053 COMPREHEN METABOLIC PANEL: CPT | Performed by: NURSE PRACTITIONER

## 2022-11-29 PROCEDURE — 99222 1ST HOSP IP/OBS MODERATE 55: CPT | Mod: FS | Performed by: NURSE PRACTITIONER

## 2022-11-29 PROCEDURE — 25010000002 CALCIUM GLUCONATE 2-0.675 GM/100ML-% SOLUTION: Performed by: NURSE PRACTITIONER

## 2022-11-29 PROCEDURE — 25010000002 ALBUMIN HUMAN 25% PER 50 ML

## 2022-11-29 PROCEDURE — 83735 ASSAY OF MAGNESIUM: CPT | Performed by: INTERNAL MEDICINE

## 2022-11-29 PROCEDURE — 86850 RBC ANTIBODY SCREEN: CPT

## 2022-11-29 PROCEDURE — 84145 PROCALCITONIN (PCT): CPT | Performed by: INTERNAL MEDICINE

## 2022-11-29 PROCEDURE — 74018 RADEX ABDOMEN 1 VIEW: CPT

## 2022-11-29 PROCEDURE — 86901 BLOOD TYPING SEROLOGIC RH(D): CPT

## 2022-11-29 PROCEDURE — 94799 UNLISTED PULMONARY SVC/PX: CPT

## 2022-11-29 PROCEDURE — 82962 GLUCOSE BLOOD TEST: CPT

## 2022-11-29 PROCEDURE — 25010000002 METHYLPREDNISOLONE PER 40 MG: Performed by: INTERNAL MEDICINE

## 2022-11-29 PROCEDURE — 71045 X-RAY EXAM CHEST 1 VIEW: CPT

## 2022-11-29 PROCEDURE — 25010000002 ONDANSETRON PER 1 MG: Performed by: INTERNAL MEDICINE

## 2022-11-29 PROCEDURE — 94761 N-INVAS EAR/PLS OXIMETRY MLT: CPT

## 2022-11-29 PROCEDURE — 85014 HEMATOCRIT: CPT | Performed by: NURSE PRACTITIONER

## 2022-11-29 PROCEDURE — 85025 COMPLETE CBC W/AUTO DIFF WBC: CPT | Performed by: NURSE PRACTITIONER

## 2022-11-29 RX ORDER — POTASSIUM CHLORIDE 20 MEQ/1
40 TABLET, EXTENDED RELEASE ORAL AS NEEDED
Status: DISCONTINUED | OUTPATIENT
Start: 2022-11-29 | End: 2022-12-06 | Stop reason: HOSPADM

## 2022-11-29 RX ORDER — CALCIUM GLUCONATE 20 MG/ML
2 INJECTION, SOLUTION INTRAVENOUS ONCE
Status: COMPLETED | OUTPATIENT
Start: 2022-11-29 | End: 2022-11-29

## 2022-11-29 RX ORDER — HYDROXYZINE HYDROCHLORIDE 25 MG/1
25 TABLET, FILM COATED ORAL 3 TIMES DAILY PRN
Status: DISCONTINUED | OUTPATIENT
Start: 2022-11-29 | End: 2022-12-06 | Stop reason: HOSPADM

## 2022-11-29 RX ORDER — ALBUMIN (HUMAN) 12.5 G/50ML
50 SOLUTION INTRAVENOUS ONCE
Status: COMPLETED | OUTPATIENT
Start: 2022-11-29 | End: 2022-11-29

## 2022-11-29 RX ORDER — METHYLPREDNISOLONE SODIUM SUCCINATE 40 MG/ML
40 INJECTION, POWDER, LYOPHILIZED, FOR SOLUTION INTRAMUSCULAR; INTRAVENOUS EVERY 12 HOURS
Status: DISCONTINUED | OUTPATIENT
Start: 2022-11-29 | End: 2022-12-01

## 2022-11-29 RX ORDER — BUDESONIDE 0.5 MG/2ML
1 INHALANT ORAL
Status: DISCONTINUED | OUTPATIENT
Start: 2022-11-29 | End: 2022-11-30

## 2022-11-29 RX ORDER — ACETAMINOPHEN 325 MG/1
650 TABLET ORAL EVERY 4 HOURS PRN
Status: DISCONTINUED | OUTPATIENT
Start: 2022-11-29 | End: 2022-12-06 | Stop reason: HOSPADM

## 2022-11-29 RX ORDER — BUMETANIDE 0.25 MG/ML
2 INJECTION INTRAMUSCULAR; INTRAVENOUS EVERY 12 HOURS
Status: DISCONTINUED | OUTPATIENT
Start: 2022-11-29 | End: 2022-11-29

## 2022-11-29 RX ORDER — ACETYLCYSTEINE 200 MG/ML
3 SOLUTION ORAL; RESPIRATORY (INHALATION)
Status: DISCONTINUED | OUTPATIENT
Start: 2022-11-29 | End: 2022-12-01

## 2022-11-29 RX ORDER — DEXTROSE MONOHYDRATE 50 MG/ML
75 INJECTION, SOLUTION INTRAVENOUS CONTINUOUS
Status: DISCONTINUED | OUTPATIENT
Start: 2022-11-29 | End: 2022-11-29

## 2022-11-29 RX ORDER — ACETAMINOPHEN 160 MG/5ML
650 SOLUTION ORAL EVERY 4 HOURS PRN
Status: DISCONTINUED | OUTPATIENT
Start: 2022-11-29 | End: 2022-12-06 | Stop reason: HOSPADM

## 2022-11-29 RX ORDER — POTASSIUM CHLORIDE 1.5 G/1.77G
40 POWDER, FOR SOLUTION ORAL AS NEEDED
Status: DISCONTINUED | OUTPATIENT
Start: 2022-11-29 | End: 2022-12-06 | Stop reason: HOSPADM

## 2022-11-29 RX ORDER — CHOLECALCIFEROL (VITAMIN D3) 125 MCG
5 CAPSULE ORAL NIGHTLY PRN
Status: DISCONTINUED | OUTPATIENT
Start: 2022-11-29 | End: 2022-12-06 | Stop reason: HOSPADM

## 2022-11-29 RX ORDER — ALBUTEROL SULFATE 2.5 MG/3ML
2.5 SOLUTION RESPIRATORY (INHALATION)
Status: DISCONTINUED | OUTPATIENT
Start: 2022-11-29 | End: 2022-11-29

## 2022-11-29 RX ORDER — INSULIN LISPRO 100 [IU]/ML
4-24 INJECTION, SOLUTION INTRAVENOUS; SUBCUTANEOUS EVERY 6 HOURS
Status: DISCONTINUED | OUTPATIENT
Start: 2022-11-30 | End: 2022-12-06 | Stop reason: HOSPADM

## 2022-11-29 RX ORDER — ACETYLCYSTEINE 200 MG/ML
3 SOLUTION ORAL; RESPIRATORY (INHALATION)
Status: DISCONTINUED | OUTPATIENT
Start: 2022-11-29 | End: 2022-11-29

## 2022-11-29 RX ORDER — LOPERAMIDE HYDROCHLORIDE 2 MG/1
4 CAPSULE ORAL 4 TIMES DAILY PRN
Status: DISCONTINUED | OUTPATIENT
Start: 2022-11-29 | End: 2022-12-06 | Stop reason: HOSPADM

## 2022-11-29 RX ORDER — MIDODRINE HYDROCHLORIDE 5 MG/1
10 TABLET ORAL
Status: DISCONTINUED | OUTPATIENT
Start: 2022-11-30 | End: 2022-11-30

## 2022-11-29 RX ORDER — BUMETANIDE 0.25 MG/ML
0.5 INJECTION, SOLUTION INTRAMUSCULAR; INTRAVENOUS CONTINUOUS
Status: DISCONTINUED | OUTPATIENT
Start: 2022-11-29 | End: 2022-12-01

## 2022-11-29 RX ORDER — POTASSIUM CHLORIDE 7.45 MG/ML
10 INJECTION INTRAVENOUS
Status: DISCONTINUED | OUTPATIENT
Start: 2022-11-29 | End: 2022-12-06 | Stop reason: HOSPADM

## 2022-11-29 RX ORDER — ONDANSETRON 2 MG/ML
8 INJECTION INTRAMUSCULAR; INTRAVENOUS ONCE
Status: COMPLETED | OUTPATIENT
Start: 2022-11-29 | End: 2022-11-29

## 2022-11-29 RX ORDER — ACETAMINOPHEN 650 MG/1
650 SUPPOSITORY RECTAL EVERY 4 HOURS PRN
Status: DISCONTINUED | OUTPATIENT
Start: 2022-11-29 | End: 2022-12-06 | Stop reason: HOSPADM

## 2022-11-29 RX ADMIN — Medication 0.14 MCG/KG/MIN: at 15:20

## 2022-11-29 RX ADMIN — ANTI-FUNGAL POWDER MICONAZOLE NITRATE TALC FREE: 1.42 POWDER TOPICAL at 21:18

## 2022-11-29 RX ADMIN — BUMETANIDE 2 MG: 0.25 INJECTION, SOLUTION INTRAMUSCULAR; INTRAVENOUS at 10:02

## 2022-11-29 RX ADMIN — ONDANSETRON 8 MG: 2 INJECTION INTRAMUSCULAR; INTRAVENOUS at 19:17

## 2022-11-29 RX ADMIN — POTASSIUM CHLORIDE 10 MEQ: 7.46 INJECTION, SOLUTION INTRAVENOUS at 08:08

## 2022-11-29 RX ADMIN — IPRATROPIUM BROMIDE AND ALBUTEROL SULFATE 3 ML: 2.5; .5 SOLUTION RESPIRATORY (INHALATION) at 07:03

## 2022-11-29 RX ADMIN — Medication 10 ML: at 21:18

## 2022-11-29 RX ADMIN — POTASSIUM PHOSPHATE, MONOBASIC 1000 MG: 500 TABLET, SOLUBLE ORAL at 21:18

## 2022-11-29 RX ADMIN — PIPERACILLIN AND TAZOBACTAM 4.5 G: 4; .5 INJECTION, POWDER, FOR SOLUTION INTRAVENOUS at 09:16

## 2022-11-29 RX ADMIN — ALBUMIN (HUMAN) 50 G: 0.25 INJECTION, SOLUTION INTRAVENOUS at 06:40

## 2022-11-29 RX ADMIN — FOLIC ACID 1 MG: 5 INJECTION, SOLUTION INTRAMUSCULAR; INTRAVENOUS; SUBCUTANEOUS at 08:08

## 2022-11-29 RX ADMIN — IPRATROPIUM BROMIDE AND ALBUTEROL SULFATE 3 ML: 2.5; .5 SOLUTION RESPIRATORY (INHALATION) at 15:14

## 2022-11-29 RX ADMIN — PANTOPRAZOLE SODIUM 40 MG: 40 INJECTION, POWDER, FOR SOLUTION INTRAVENOUS at 21:18

## 2022-11-29 RX ADMIN — PANTOPRAZOLE SODIUM 40 MG: 40 INJECTION, POWDER, FOR SOLUTION INTRAVENOUS at 08:00

## 2022-11-29 RX ADMIN — METHYLPREDNISOLONE SODIUM SUCCINATE 40 MG: 40 INJECTION, POWDER, FOR SOLUTION INTRAMUSCULAR; INTRAVENOUS at 21:18

## 2022-11-29 RX ADMIN — POTASSIUM CHLORIDE 10 MEQ: 7.46 INJECTION, SOLUTION INTRAVENOUS at 09:17

## 2022-11-29 RX ADMIN — CALCIUM GLUCONATE 2 G: 20 INJECTION, SOLUTION INTRAVENOUS at 12:20

## 2022-11-29 RX ADMIN — PIPERACILLIN AND TAZOBACTAM 4.5 G: 4; .5 INJECTION, POWDER, FOR SOLUTION INTRAVENOUS at 15:49

## 2022-11-29 RX ADMIN — POTASSIUM CHLORIDE 10 MEQ: 7.46 INJECTION, SOLUTION INTRAVENOUS at 10:30

## 2022-11-29 RX ADMIN — ALBUMIN (HUMAN) 25 G: 0.25 INJECTION, SOLUTION INTRAVENOUS at 10:03

## 2022-11-29 RX ADMIN — INSULIN LISPRO 8 UNITS: 100 INJECTION, SOLUTION INTRAVENOUS; SUBCUTANEOUS at 18:11

## 2022-11-29 RX ADMIN — DEXTROSE MONOHYDRATE 75 ML/HR: 50 INJECTION, SOLUTION INTRAVENOUS at 09:23

## 2022-11-29 RX ADMIN — MIDODRINE HYDROCHLORIDE 10 MG: 5 TABLET ORAL at 17:17

## 2022-11-29 RX ADMIN — ONDANSETRON 4 MG: 2 INJECTION INTRAMUSCULAR; INTRAVENOUS at 15:20

## 2022-11-29 RX ADMIN — MIDODRINE HYDROCHLORIDE 10 MG: 5 TABLET ORAL at 11:30

## 2022-11-29 RX ADMIN — ALBUMIN (HUMAN) 25 G: 0.25 INJECTION, SOLUTION INTRAVENOUS at 21:18

## 2022-11-29 RX ADMIN — IPRATROPIUM BROMIDE AND ALBUTEROL SULFATE 3 ML: 2.5; .5 SOLUTION RESPIRATORY (INHALATION) at 11:02

## 2022-11-29 RX ADMIN — INSULIN LISPRO 4 UNITS: 100 INJECTION, SOLUTION INTRAVENOUS; SUBCUTANEOUS at 11:44

## 2022-11-29 RX ADMIN — BUDESONIDE 0.5 MG: 0.5 INHALANT RESPIRATORY (INHALATION) at 07:07

## 2022-11-29 RX ADMIN — POTASSIUM PHOSPHATE, MONOBASIC 1000 MG: 500 TABLET, SOLUBLE ORAL at 17:17

## 2022-11-29 RX ADMIN — CHLORHEXIDINE GLUCONATE 15 ML: 1.2 RINSE ORAL at 08:04

## 2022-11-29 RX ADMIN — POTASSIUM PHOSPHATE, MONOBASIC 1000 MG: 500 TABLET, SOLUBLE ORAL at 11:44

## 2022-11-29 RX ADMIN — METHYLPREDNISOLONE SODIUM SUCCINATE 40 MG: 40 INJECTION, POWDER, FOR SOLUTION INTRAMUSCULAR; INTRAVENOUS at 09:16

## 2022-11-29 RX ADMIN — ANTI-FUNGAL POWDER MICONAZOLE NITRATE TALC FREE: 1.42 POWDER TOPICAL at 08:04

## 2022-11-29 RX ADMIN — POTASSIUM CHLORIDE 10 MEQ: 7.46 INJECTION, SOLUTION INTRAVENOUS at 12:13

## 2022-11-29 RX ADMIN — BUMETANIDE 0.5 MG/HR: 0.25 INJECTION INTRAMUSCULAR; INTRAVENOUS at 13:20

## 2022-11-30 ENCOUNTER — APPOINTMENT (OUTPATIENT)
Dept: GENERAL RADIOLOGY | Facility: HOSPITAL | Age: 72
End: 2022-11-30

## 2022-11-30 LAB
ALBUMIN SERPL-MCNC: 4 G/DL (ref 3.5–5.2)
ALBUMIN/GLOB SERPL: 2.9 G/DL
ALP SERPL-CCNC: 222 U/L (ref 39–117)
ALT SERPL W P-5'-P-CCNC: 12 U/L (ref 1–33)
ANION GAP SERPL CALCULATED.3IONS-SCNC: 17 MMOL/L (ref 5–15)
ANISOCYTOSIS BLD QL: ABNORMAL
AST SERPL-CCNC: 21 U/L (ref 1–32)
BH BB BLOOD EXPIRATION DATE: NORMAL
BH BB BLOOD EXPIRATION DATE: NORMAL
BH BB BLOOD TYPE BARCODE: 6200
BH BB BLOOD TYPE BARCODE: 6200
BH BB DISPENSE STATUS: NORMAL
BH BB DISPENSE STATUS: NORMAL
BH BB PRODUCT CODE: NORMAL
BH BB PRODUCT CODE: NORMAL
BH BB UNIT NUMBER: NORMAL
BH BB UNIT NUMBER: NORMAL
BILIRUB SERPL-MCNC: 1.3 MG/DL (ref 0–1.2)
BUN SERPL-MCNC: 23 MG/DL (ref 8–23)
BUN/CREAT SERPL: 19.7 (ref 7–25)
CALCIUM SPEC-SCNC: 7.5 MG/DL (ref 8.6–10.5)
CHLORIDE SERPL-SCNC: 103 MMOL/L (ref 98–107)
CO2 SERPL-SCNC: 28 MMOL/L (ref 22–29)
CREAT SERPL-MCNC: 1.17 MG/DL (ref 0.57–1)
CROSSMATCH INTERPRETATION: NORMAL
CROSSMATCH INTERPRETATION: NORMAL
DEPRECATED RDW RBC AUTO: 51.6 FL (ref 37–54)
EGFRCR SERPLBLD CKD-EPI 2021: 50.3 ML/MIN/1.73
ERYTHROCYTE [DISTWIDTH] IN BLOOD BY AUTOMATED COUNT: 16.7 % (ref 12.3–15.4)
GLOBULIN UR ELPH-MCNC: 1.4 GM/DL
GLUCOSE BLDC GLUCOMTR-MCNC: 144 MG/DL (ref 70–105)
GLUCOSE BLDC GLUCOMTR-MCNC: 168 MG/DL (ref 70–105)
GLUCOSE BLDC GLUCOMTR-MCNC: 222 MG/DL (ref 70–105)
GLUCOSE BLDC GLUCOMTR-MCNC: 226 MG/DL (ref 70–105)
GLUCOSE SERPL-MCNC: 188 MG/DL (ref 65–99)
HCT VFR BLD AUTO: 27.1 % (ref 34–46.6)
HGB BLD-MCNC: 8.8 G/DL (ref 12–15.9)
LYMPHOCYTES # BLD MANUAL: 0.13 10*3/MM3 (ref 0.7–3.1)
LYMPHOCYTES NFR BLD MANUAL: 1 % (ref 5–12)
MAGNESIUM SERPL-MCNC: 1.7 MG/DL (ref 1.6–2.4)
MCH RBC QN AUTO: 29.5 PG (ref 26.6–33)
MCHC RBC AUTO-ENTMCNC: 32.5 G/DL (ref 31.5–35.7)
MCV RBC AUTO: 90.8 FL (ref 79–97)
MONOCYTES # BLD: 0.13 10*3/MM3 (ref 0.1–0.9)
NEUTROPHILS # BLD AUTO: 12.45 10*3/MM3 (ref 1.7–7)
NEUTROPHILS NFR BLD MANUAL: 90 % (ref 42.7–76)
NEUTS BAND NFR BLD MANUAL: 8 % (ref 0–5)
PHOSPHATE SERPL-MCNC: 4.6 MG/DL (ref 2.5–4.5)
PLAT MORPH BLD: NORMAL
PLATELET # BLD AUTO: 132 10*3/MM3 (ref 140–450)
PMV BLD AUTO: 9.2 FL (ref 6–12)
POIKILOCYTOSIS BLD QL SMEAR: ABNORMAL
POTASSIUM SERPL-SCNC: 3.4 MMOL/L (ref 3.5–5.2)
POTASSIUM SERPL-SCNC: 3.6 MMOL/L (ref 3.5–5.2)
PROT SERPL-MCNC: 5.4 G/DL (ref 6–8.5)
RBC # BLD AUTO: 2.99 10*6/MM3 (ref 3.77–5.28)
SCAN SLIDE: NORMAL
SODIUM SERPL-SCNC: 148 MMOL/L (ref 136–145)
TOXIC GRANULATION: ABNORMAL
UNIT  ABO: NORMAL
UNIT  ABO: NORMAL
UNIT  RH: NORMAL
UNIT  RH: NORMAL
VARIANT LYMPHS NFR BLD MANUAL: 1 % (ref 19.6–45.3)
WBC NRBC COR # BLD: 12.7 10*3/MM3 (ref 3.4–10.8)

## 2022-11-30 PROCEDURE — 25010000002 ONDANSETRON PER 1 MG: Performed by: INTERNAL MEDICINE

## 2022-11-30 PROCEDURE — 85025 COMPLETE CBC W/AUTO DIFF WBC: CPT | Performed by: NURSE PRACTITIONER

## 2022-11-30 PROCEDURE — 97110 THERAPEUTIC EXERCISES: CPT

## 2022-11-30 PROCEDURE — 85007 BL SMEAR W/DIFF WBC COUNT: CPT | Performed by: NURSE PRACTITIONER

## 2022-11-30 PROCEDURE — 94799 UNLISTED PULMONARY SVC/PX: CPT

## 2022-11-30 PROCEDURE — 97530 THERAPEUTIC ACTIVITIES: CPT

## 2022-11-30 PROCEDURE — 83735 ASSAY OF MAGNESIUM: CPT | Performed by: INTERNAL MEDICINE

## 2022-11-30 PROCEDURE — 63710000001 INSULIN LISPRO (HUMAN) PER 5 UNITS: Performed by: NURSE PRACTITIONER

## 2022-11-30 PROCEDURE — 84100 ASSAY OF PHOSPHORUS: CPT | Performed by: HOSPITALIST

## 2022-11-30 PROCEDURE — 25010000002 METHYLPREDNISOLONE PER 40 MG: Performed by: INTERNAL MEDICINE

## 2022-11-30 PROCEDURE — 25010000002 PIPERACILLIN SOD-TAZOBACTAM PER 1 G: Performed by: INTERNAL MEDICINE

## 2022-11-30 PROCEDURE — 71045 X-RAY EXAM CHEST 1 VIEW: CPT

## 2022-11-30 PROCEDURE — 84132 ASSAY OF SERUM POTASSIUM: CPT | Performed by: NURSE PRACTITIONER

## 2022-11-30 PROCEDURE — 82962 GLUCOSE BLOOD TEST: CPT

## 2022-11-30 PROCEDURE — P9047 ALBUMIN (HUMAN), 25%, 50ML: HCPCS | Performed by: HOSPITALIST

## 2022-11-30 PROCEDURE — 80053 COMPREHEN METABOLIC PANEL: CPT | Performed by: NURSE PRACTITIONER

## 2022-11-30 PROCEDURE — 94660 CPAP INITIATION&MGMT: CPT

## 2022-11-30 PROCEDURE — 94664 DEMO&/EVAL PT USE INHALER: CPT

## 2022-11-30 PROCEDURE — 94760 N-INVAS EAR/PLS OXIMETRY 1: CPT

## 2022-11-30 PROCEDURE — 25010000002 ALBUMIN HUMAN 25% PER 50 ML: Performed by: HOSPITALIST

## 2022-11-30 PROCEDURE — 94761 N-INVAS EAR/PLS OXIMETRY MLT: CPT

## 2022-11-30 RX ORDER — BUDESONIDE 0.5 MG/2ML
0.5 INHALANT ORAL
Status: DISCONTINUED | OUTPATIENT
Start: 2022-11-30 | End: 2022-12-01

## 2022-11-30 RX ORDER — MIDODRINE HYDROCHLORIDE 5 MG/1
10 TABLET ORAL EVERY 8 HOURS SCHEDULED
Status: DISCONTINUED | OUTPATIENT
Start: 2022-11-30 | End: 2022-12-01

## 2022-11-30 RX ADMIN — PIPERACILLIN AND TAZOBACTAM 4.5 G: 4; .5 INJECTION, POWDER, FOR SOLUTION INTRAVENOUS at 08:18

## 2022-11-30 RX ADMIN — POTASSIUM CHLORIDE 40 MEQ: 1.5 POWDER, FOR SOLUTION ORAL at 20:11

## 2022-11-30 RX ADMIN — ACETYLCYSTEINE 3 ML: 200 SOLUTION ORAL; RESPIRATORY (INHALATION) at 07:45

## 2022-11-30 RX ADMIN — INSULIN LISPRO 8 UNITS: 100 INJECTION, SOLUTION INTRAVENOUS; SUBCUTANEOUS at 23:46

## 2022-11-30 RX ADMIN — PANTOPRAZOLE SODIUM 40 MG: 40 INJECTION, POWDER, FOR SOLUTION INTRAVENOUS at 20:11

## 2022-11-30 RX ADMIN — IPRATROPIUM BROMIDE AND ALBUTEROL SULFATE 3 ML: 2.5; .5 SOLUTION RESPIRATORY (INHALATION) at 11:40

## 2022-11-30 RX ADMIN — METHYLPREDNISOLONE SODIUM SUCCINATE 40 MG: 40 INJECTION, POWDER, FOR SOLUTION INTRAMUSCULAR; INTRAVENOUS at 08:45

## 2022-11-30 RX ADMIN — ALBUMIN (HUMAN) 25 G: 0.25 INJECTION, SOLUTION INTRAVENOUS at 10:35

## 2022-11-30 RX ADMIN — ONDANSETRON 4 MG: 2 INJECTION INTRAMUSCULAR; INTRAVENOUS at 01:28

## 2022-11-30 RX ADMIN — PIPERACILLIN AND TAZOBACTAM 4.5 G: 4; .5 INJECTION, POWDER, FOR SOLUTION INTRAVENOUS at 23:47

## 2022-11-30 RX ADMIN — IPRATROPIUM BROMIDE AND ALBUTEROL SULFATE 3 ML: 2.5; .5 SOLUTION RESPIRATORY (INHALATION) at 15:05

## 2022-11-30 RX ADMIN — MIDODRINE HYDROCHLORIDE 10 MG: 5 TABLET ORAL at 14:29

## 2022-11-30 RX ADMIN — INSULIN LISPRO 8 UNITS: 100 INJECTION, SOLUTION INTRAVENOUS; SUBCUTANEOUS at 17:12

## 2022-11-30 RX ADMIN — POTASSIUM CHLORIDE 40 MEQ: 1.5 POWDER, FOR SOLUTION ORAL at 17:54

## 2022-11-30 RX ADMIN — Medication 0.02 MCG/KG/MIN: at 02:05

## 2022-11-30 RX ADMIN — INSULIN LISPRO 4 UNITS: 100 INJECTION, SOLUTION INTRAVENOUS; SUBCUTANEOUS at 06:09

## 2022-11-30 RX ADMIN — IPRATROPIUM BROMIDE AND ALBUTEROL SULFATE 3 ML: 2.5; .5 SOLUTION RESPIRATORY (INHALATION) at 07:45

## 2022-11-30 RX ADMIN — MIDODRINE HYDROCHLORIDE 10 MG: 5 TABLET ORAL at 06:11

## 2022-11-30 RX ADMIN — IPRATROPIUM BROMIDE AND ALBUTEROL SULFATE 3 ML: 2.5; .5 SOLUTION RESPIRATORY (INHALATION) at 19:08

## 2022-11-30 RX ADMIN — COLLAGENASE SANTYL 1 APPLICATION: 250 OINTMENT TOPICAL at 17:05

## 2022-11-30 RX ADMIN — BUMETANIDE 0.5 MG/HR: 0.25 INJECTION INTRAMUSCULAR; INTRAVENOUS at 16:31

## 2022-11-30 RX ADMIN — METHYLPREDNISOLONE SODIUM SUCCINATE 40 MG: 40 INJECTION, POWDER, FOR SOLUTION INTRAMUSCULAR; INTRAVENOUS at 20:11

## 2022-11-30 RX ADMIN — ALBUMIN (HUMAN) 25 G: 0.25 INJECTION, SOLUTION INTRAVENOUS at 20:11

## 2022-11-30 RX ADMIN — POTASSIUM PHOSPHATE, MONOBASIC 1000 MG: 500 TABLET, SOLUBLE ORAL at 08:18

## 2022-11-30 RX ADMIN — ACETYLCYSTEINE 3 ML: 200 SOLUTION ORAL; RESPIRATORY (INHALATION) at 19:08

## 2022-11-30 RX ADMIN — PIPERACILLIN AND TAZOBACTAM 4.5 G: 4; .5 INJECTION, POWDER, FOR SOLUTION INTRAVENOUS at 15:08

## 2022-11-30 RX ADMIN — BUDESONIDE 0.5 MG: 0.5 INHALANT RESPIRATORY (INHALATION) at 19:08

## 2022-11-30 RX ADMIN — Medication 10 ML: at 08:19

## 2022-11-30 RX ADMIN — ANTI-FUNGAL POWDER MICONAZOLE NITRATE TALC FREE: 1.42 POWDER TOPICAL at 08:43

## 2022-11-30 RX ADMIN — LOPERAMIDE HYDROCHLORIDE 4 MG: 2 CAPSULE ORAL at 17:54

## 2022-11-30 RX ADMIN — ONDANSETRON 4 MG: 2 INJECTION INTRAMUSCULAR; INTRAVENOUS at 07:28

## 2022-11-30 RX ADMIN — PIPERACILLIN AND TAZOBACTAM 4.5 G: 4; .5 INJECTION, POWDER, FOR SOLUTION INTRAVENOUS at 00:06

## 2022-11-30 RX ADMIN — ANTI-FUNGAL POWDER MICONAZOLE NITRATE TALC FREE: 1.42 POWDER TOPICAL at 20:12

## 2022-11-30 RX ADMIN — INSULIN LISPRO 4 UNITS: 100 INJECTION, SOLUTION INTRAVENOUS; SUBCUTANEOUS at 11:47

## 2022-11-30 RX ADMIN — POTASSIUM PHOSPHATE, MONOBASIC 1000 MG: 500 TABLET, SOLUBLE ORAL at 11:47

## 2022-11-30 RX ADMIN — PANTOPRAZOLE SODIUM 40 MG: 40 INJECTION, POWDER, FOR SOLUTION INTRAVENOUS at 08:18

## 2022-11-30 RX ADMIN — POTASSIUM PHOSPHATE, MONOBASIC 1000 MG: 500 TABLET, SOLUBLE ORAL at 17:05

## 2022-11-30 RX ADMIN — LOPERAMIDE HYDROCHLORIDE 4 MG: 2 CAPSULE ORAL at 08:18

## 2022-11-30 RX ADMIN — POTASSIUM CHLORIDE 40 MEQ: 1.5 POWDER, FOR SOLUTION ORAL at 06:12

## 2022-11-30 RX ADMIN — MIDODRINE HYDROCHLORIDE 10 MG: 5 TABLET ORAL at 20:11

## 2022-11-30 RX ADMIN — POTASSIUM CHLORIDE 40 MEQ: 1.5 POWDER, FOR SOLUTION ORAL at 10:34

## 2022-11-30 RX ADMIN — FOLIC ACID 1 MG: 5 INJECTION, SOLUTION INTRAMUSCULAR; INTRAVENOUS; SUBCUTANEOUS at 08:19

## 2022-11-30 RX ADMIN — BUDESONIDE 0.5 MG: 0.5 INHALANT RESPIRATORY (INHALATION) at 07:45

## 2022-12-01 ENCOUNTER — APPOINTMENT (OUTPATIENT)
Dept: GENERAL RADIOLOGY | Facility: HOSPITAL | Age: 72
End: 2022-12-01

## 2022-12-01 LAB
ALBUMIN SERPL-MCNC: 4.3 G/DL (ref 3.5–5.2)
ALBUMIN/GLOB SERPL: 3.6 G/DL
ALP SERPL-CCNC: 174 U/L (ref 39–117)
ALT SERPL W P-5'-P-CCNC: 12 U/L (ref 1–33)
ANION GAP SERPL CALCULATED.3IONS-SCNC: 14 MMOL/L (ref 5–15)
AST SERPL-CCNC: 21 U/L (ref 1–32)
BASOPHILS # BLD AUTO: 0.1 10*3/MM3 (ref 0–0.2)
BASOPHILS NFR BLD AUTO: 1 % (ref 0–1.5)
BILIRUB SERPL-MCNC: 1.3 MG/DL (ref 0–1.2)
BUN SERPL-MCNC: 26 MG/DL (ref 8–23)
BUN/CREAT SERPL: 21.5 (ref 7–25)
CALCIUM SPEC-SCNC: 7.2 MG/DL (ref 8.6–10.5)
CHLORIDE SERPL-SCNC: 99 MMOL/L (ref 98–107)
CO2 SERPL-SCNC: 31 MMOL/L (ref 22–29)
CREAT SERPL-MCNC: 1.21 MG/DL (ref 0.57–1)
DEPRECATED RDW RBC AUTO: 55.1 FL (ref 37–54)
EGFRCR SERPLBLD CKD-EPI 2021: 48.3 ML/MIN/1.73
EOSINOPHIL # BLD AUTO: 0 10*3/MM3 (ref 0–0.4)
EOSINOPHIL NFR BLD AUTO: 0 % (ref 0.3–6.2)
ERYTHROCYTE [DISTWIDTH] IN BLOOD BY AUTOMATED COUNT: 17.5 % (ref 12.3–15.4)
GLOBULIN UR ELPH-MCNC: 1.2 GM/DL
GLUCOSE BLDC GLUCOMTR-MCNC: 168 MG/DL (ref 70–105)
GLUCOSE BLDC GLUCOMTR-MCNC: 171 MG/DL (ref 70–105)
GLUCOSE BLDC GLUCOMTR-MCNC: 217 MG/DL (ref 70–105)
GLUCOSE SERPL-MCNC: 226 MG/DL (ref 65–99)
HCT VFR BLD AUTO: 27.5 % (ref 34–46.6)
HGB BLD-MCNC: 8.7 G/DL (ref 12–15.9)
LYMPHOCYTES # BLD AUTO: 0.2 10*3/MM3 (ref 0.7–3.1)
LYMPHOCYTES NFR BLD AUTO: 1.5 % (ref 19.6–45.3)
MAGNESIUM SERPL-MCNC: 1.6 MG/DL (ref 1.6–2.4)
MCH RBC QN AUTO: 29.1 PG (ref 26.6–33)
MCHC RBC AUTO-ENTMCNC: 31.6 G/DL (ref 31.5–35.7)
MCV RBC AUTO: 92.1 FL (ref 79–97)
MONOCYTES # BLD AUTO: 0.5 10*3/MM3 (ref 0.1–0.9)
MONOCYTES NFR BLD AUTO: 4.4 % (ref 5–12)
NEUTROPHILS NFR BLD AUTO: 10.9 10*3/MM3 (ref 1.7–7)
NEUTROPHILS NFR BLD AUTO: 93.1 % (ref 42.7–76)
NRBC BLD AUTO-RTO: 0.1 /100 WBC (ref 0–0.2)
PHOSPHATE SERPL-MCNC: 3.1 MG/DL (ref 2.5–4.5)
PLATELET # BLD AUTO: 137 10*3/MM3 (ref 140–450)
PMV BLD AUTO: 9 FL (ref 6–12)
POTASSIUM SERPL-SCNC: 4 MMOL/L (ref 3.5–5.2)
PROT SERPL-MCNC: 5.5 G/DL (ref 6–8.5)
RBC # BLD AUTO: 2.99 10*6/MM3 (ref 3.77–5.28)
SODIUM SERPL-SCNC: 144 MMOL/L (ref 136–145)
WBC NRBC COR # BLD: 11.7 10*3/MM3 (ref 3.4–10.8)

## 2022-12-01 PROCEDURE — 94799 UNLISTED PULMONARY SVC/PX: CPT

## 2022-12-01 PROCEDURE — 94660 CPAP INITIATION&MGMT: CPT

## 2022-12-01 PROCEDURE — 94761 N-INVAS EAR/PLS OXIMETRY MLT: CPT

## 2022-12-01 PROCEDURE — 94664 DEMO&/EVAL PT USE INHALER: CPT

## 2022-12-01 PROCEDURE — 97530 THERAPEUTIC ACTIVITIES: CPT

## 2022-12-01 PROCEDURE — 82962 GLUCOSE BLOOD TEST: CPT

## 2022-12-01 PROCEDURE — 85025 COMPLETE CBC W/AUTO DIFF WBC: CPT | Performed by: NURSE PRACTITIONER

## 2022-12-01 PROCEDURE — 83735 ASSAY OF MAGNESIUM: CPT | Performed by: INTERNAL MEDICINE

## 2022-12-01 PROCEDURE — 25010000002 PIPERACILLIN SOD-TAZOBACTAM PER 1 G: Performed by: INTERNAL MEDICINE

## 2022-12-01 PROCEDURE — 63710000001 INSULIN LISPRO (HUMAN) PER 5 UNITS: Performed by: NURSE PRACTITIONER

## 2022-12-01 PROCEDURE — 71045 X-RAY EXAM CHEST 1 VIEW: CPT

## 2022-12-01 PROCEDURE — 84100 ASSAY OF PHOSPHORUS: CPT | Performed by: NURSE PRACTITIONER

## 2022-12-01 PROCEDURE — 80053 COMPREHEN METABOLIC PANEL: CPT | Performed by: NURSE PRACTITIONER

## 2022-12-01 PROCEDURE — 25010000002 METHYLPREDNISOLONE PER 40 MG: Performed by: INTERNAL MEDICINE

## 2022-12-01 PROCEDURE — 63710000001 INSULIN GLARGINE PER 5 UNITS: Performed by: INTERNAL MEDICINE

## 2022-12-01 RX ORDER — MIDODRINE HYDROCHLORIDE 5 MG/1
20 TABLET ORAL EVERY 8 HOURS SCHEDULED
Status: DISCONTINUED | OUTPATIENT
Start: 2022-12-01 | End: 2022-12-06 | Stop reason: HOSPADM

## 2022-12-01 RX ORDER — BUMETANIDE 0.25 MG/ML
1 INJECTION INTRAMUSCULAR; INTRAVENOUS EVERY 6 HOURS
Status: DISCONTINUED | OUTPATIENT
Start: 2022-12-01 | End: 2022-12-04

## 2022-12-01 RX ORDER — POTASSIUM CHLORIDE 1.5 G/1.77G
40 POWDER, FOR SOLUTION ORAL DAILY
Status: DISCONTINUED | OUTPATIENT
Start: 2022-12-01 | End: 2022-12-06 | Stop reason: HOSPADM

## 2022-12-01 RX ORDER — SIMETHICONE 80 MG
80 TABLET,CHEWABLE ORAL ONCE
Status: COMPLETED | OUTPATIENT
Start: 2022-12-02 | End: 2022-12-02

## 2022-12-01 RX ORDER — METHYLPREDNISOLONE SODIUM SUCCINATE 40 MG/ML
40 INJECTION, POWDER, LYOPHILIZED, FOR SOLUTION INTRAMUSCULAR; INTRAVENOUS EVERY 12 HOURS
Status: DISCONTINUED | OUTPATIENT
Start: 2022-12-01 | End: 2022-12-01

## 2022-12-01 RX ADMIN — POTASSIUM CHLORIDE 40 MEQ: 1.5 POWDER, FOR SOLUTION ORAL at 10:55

## 2022-12-01 RX ADMIN — INSULIN LISPRO 4 UNITS: 100 INJECTION, SOLUTION INTRAVENOUS; SUBCUTANEOUS at 11:41

## 2022-12-01 RX ADMIN — IPRATROPIUM BROMIDE AND ALBUTEROL SULFATE 3 ML: 2.5; .5 SOLUTION RESPIRATORY (INHALATION) at 15:36

## 2022-12-01 RX ADMIN — MIDODRINE HYDROCHLORIDE 10 MG: 5 TABLET ORAL at 07:15

## 2022-12-01 RX ADMIN — PANTOPRAZOLE SODIUM 40 MG: 40 INJECTION, POWDER, FOR SOLUTION INTRAVENOUS at 08:30

## 2022-12-01 RX ADMIN — Medication 10 ML: at 08:30

## 2022-12-01 RX ADMIN — FOLIC ACID 1 MG: 5 INJECTION, SOLUTION INTRAMUSCULAR; INTRAVENOUS; SUBCUTANEOUS at 08:30

## 2022-12-01 RX ADMIN — IPRATROPIUM BROMIDE AND ALBUTEROL SULFATE 3 ML: 2.5; .5 SOLUTION RESPIRATORY (INHALATION) at 20:34

## 2022-12-01 RX ADMIN — IPRATROPIUM BROMIDE AND ALBUTEROL SULFATE 3 ML: 2.5; .5 SOLUTION RESPIRATORY (INHALATION) at 11:22

## 2022-12-01 RX ADMIN — INSULIN GLARGINE 5 UNITS: 100 INJECTION, SOLUTION SUBCUTANEOUS at 10:55

## 2022-12-01 RX ADMIN — Medication 0.02 MCG/KG/MIN: at 14:46

## 2022-12-01 RX ADMIN — POTASSIUM PHOSPHATE, MONOBASIC 1000 MG: 500 TABLET, SOLUBLE ORAL at 07:36

## 2022-12-01 RX ADMIN — ACETYLCYSTEINE 3 ML: 200 SOLUTION ORAL; RESPIRATORY (INHALATION) at 08:00

## 2022-12-01 RX ADMIN — ANTI-FUNGAL POWDER MICONAZOLE NITRATE TALC FREE: 1.42 POWDER TOPICAL at 08:30

## 2022-12-01 RX ADMIN — Medication 10 ML: at 21:43

## 2022-12-01 RX ADMIN — COLLAGENASE SANTYL 1 APPLICATION: 250 OINTMENT TOPICAL at 08:30

## 2022-12-01 RX ADMIN — LOPERAMIDE HYDROCHLORIDE 4 MG: 2 CAPSULE ORAL at 11:01

## 2022-12-01 RX ADMIN — LOPERAMIDE HYDROCHLORIDE 4 MG: 2 CAPSULE ORAL at 21:44

## 2022-12-01 RX ADMIN — BUDESONIDE 0.5 MG: 0.5 INHALANT RESPIRATORY (INHALATION) at 08:10

## 2022-12-01 RX ADMIN — ANTI-FUNGAL POWDER MICONAZOLE NITRATE TALC FREE: 1.42 POWDER TOPICAL at 21:44

## 2022-12-01 RX ADMIN — BUMETANIDE 1 MG: 0.25 INJECTION, SOLUTION INTRAMUSCULAR; INTRAVENOUS at 14:38

## 2022-12-01 RX ADMIN — PANTOPRAZOLE SODIUM 40 MG: 40 INJECTION, POWDER, FOR SOLUTION INTRAVENOUS at 21:44

## 2022-12-01 RX ADMIN — BUMETANIDE 1 MG: 0.25 INJECTION, SOLUTION INTRAMUSCULAR; INTRAVENOUS at 08:30

## 2022-12-01 RX ADMIN — INSULIN LISPRO 8 UNITS: 100 INJECTION, SOLUTION INTRAVENOUS; SUBCUTANEOUS at 07:35

## 2022-12-01 RX ADMIN — METHYLPREDNISOLONE SODIUM SUCCINATE 40 MG: 40 INJECTION, POWDER, FOR SOLUTION INTRAMUSCULAR; INTRAVENOUS at 08:51

## 2022-12-01 RX ADMIN — IPRATROPIUM BROMIDE AND ALBUTEROL SULFATE 3 ML: 2.5; .5 SOLUTION RESPIRATORY (INHALATION) at 08:00

## 2022-12-01 RX ADMIN — MIDODRINE HYDROCHLORIDE 20 MG: 5 TABLET ORAL at 14:38

## 2022-12-01 RX ADMIN — MIDODRINE HYDROCHLORIDE 20 MG: 5 TABLET ORAL at 21:44

## 2022-12-01 RX ADMIN — INSULIN LISPRO 4 UNITS: 100 INJECTION, SOLUTION INTRAVENOUS; SUBCUTANEOUS at 17:31

## 2022-12-01 RX ADMIN — BUMETANIDE 1 MG: 0.25 INJECTION, SOLUTION INTRAMUSCULAR; INTRAVENOUS at 21:43

## 2022-12-01 RX ADMIN — PIPERACILLIN AND TAZOBACTAM 4.5 G: 4; .5 INJECTION, POWDER, FOR SOLUTION INTRAVENOUS at 07:36

## 2022-12-01 NOTE — PLAN OF CARE
Goal Outcome Evaluation:  Plan of Care Reviewed With: patient   Progress: no change      Problem: Fall Injury Risk  Goal: Absence of Fall and Fall-Related Injury  Intervention: Identify and Manage Contributors  Recent Flowsheet Documentation  Taken 12/1/2022 0830 by Dilan Singh RN  Self-Care Promotion: independence encouraged     Problem: Adult Inpatient Plan of Care  Goal: Plan of Care Review  Outcome: Ongoing, Progressing  Flowsheets (Taken 12/1/2022 1721)  Progress: no change  Plan of Care Reviewed With: patient     Problem: Adult Inpatient Plan of Care  Goal: Optimal Comfort and Wellbeing  Intervention: Monitor Pain and Promote Comfort  Recent Flowsheet Documentation  Taken 12/1/2022 1253 by Dilan Singh, RN  Pain Management Interventions:   care clustered   unnecessary movement minimized   pillow support provided   position adjusted   quiet environment facilitated  Taken 12/1/2022 0830 by Dilan Singh, RN  Pain Management Interventions:   care clustered   unnecessary movement minimized   position adjusted   pillow support provided     Problem: Skin Injury Risk Increased  Goal: Skin Health and Integrity  Outcome: Ongoing, Progressing     Problem: Feeding Intolerance (Enteral Nutrition)  Goal: Feeding Tolerance  Intervention: Prevent and Manage Feeding Intolerance  Recent Flowsheet Documentation  Taken 12/1/2022 1253 by Dilan Singh RN  Nutrition Support Management: tube feeding rate increased

## 2022-12-01 NOTE — PROGRESS NOTES
[facility] Progress Note      Patient Name: Christiane Nazario  : 1950  MRN: [id number]  Primary Care Physician:  Margarita Melissa, TOMMY  Date of admission: 2022    Subjective     Interval History:     Overnight no event  Patient continues to require BiPAP ,  Attempting to remove BiPAP causes hypoxia  Patient continues to be hypotensive on Levophed  Patient continues to be on bumetanide drip with a urine output of 2.6 L    Patient continues to be volume overloaded    Urine output 2.7 L    Review of Systems:   As noted above    Objective     Vitals:   Temp:  [98.6 °F (37 °C)-100.6 °F (38.1 °C)] 100 °F (37.8 °C)  Heart Rate:  [] 82  Resp:  [20-28] 22  BP: ()/(42-76) 107/57    Intake/Output Summary (Last 24 hours) at 2022 0729  Last data filed at 2022 0000  Gross per 24 hour   Intake 1722 ml   Output 2750 ml   Net -1028 ml       Physical Exam:    General Appearance: Patient on BiPAP  Skin: warm and dry  HEENT: oral mucosa normal, nonicteric sclera  Neck: supple, no JVD  Lungs: Crackles bibasal  Heart: RRR, normal S1 and S2  Abdomen: soft, nontender, nondistended  : no palpable bladder  Extremities: Anasarca  Neuro: Alert X3 no focalization    Scheduled Meds:     acetylcysteine, 3 mL, Nebulization, BID - RT  albumin human, 25 g, Intravenous, Q12H  budesonide, 0.5 mg, Nebulization, BID - RT  bumetanide, 1 mg, Intravenous, Q6H  collagenase, 1 application, Topical, Q24H  folic acid (FOLVITE) IVPB, 1 mg, Intravenous, Daily  insulin lispro, 4-24 Units, Subcutaneous, Q6H  ipratropium-albuterol, 3 mL, Nebulization, 4x Daily - RT  methylPREDNISolone sodium succinate, 40 mg, Intravenous, Q12H  miconazole, , Topical, Q12H  midodrine, 10 mg, Nasogastric, Q8H  pantoprazole, 40 mg, Intravenous, Q12H  piperacillin-tazobactam, 4.5 g, Intravenous, Q8H  potassium phosphate (monobasic), 1,000 mg, Nasogastric, 4x Daily With Meals & Nightly  sodium chloride, 10 mL,  Intravenous, Q12H      IV Meds:   norepinephrine, 0.02-0.5 mcg/kg/min, Last Rate: 0.02 mcg/kg/min (12/01/22 0423)  Pharmacy Consult - Pharmacy to dose,   Pharmacy Consult - Steroid Insulin Protocol,         Results Reviewed:   I have personally reviewed the results from the time of this admission to 12/1/2022 07:29 EST     Results from last 7 days   Lab Units 11/30/22  1704 11/30/22  0508 11/29/22  1716 11/29/22  0504 11/28/22  0605   SODIUM mmol/L  --  148* 147* 148* 146*   POTASSIUM mmol/L 3.6 3.4* 4.0 3.5 4.1   CHLORIDE mmol/L  --  103 105 107 104   CO2 mmol/L  --  28.0 25.0 27.0 25.0   BUN mg/dL  --  23 21 20 21   CREATININE mg/dL  --  1.17* 1.02* 0.91 0.96   CALCIUM mg/dL  --  7.5* 7.5* 7.5* 7.8*   BILIRUBIN mg/dL  --  1.3*  --  0.7 0.6   ALK PHOS U/L  --  222*  --  119* 142*   ALT (SGPT) U/L  --  12  --  14 17   AST (SGOT) U/L  --  21  --  17 24   GLUCOSE mg/dL  --  188* 240* 156* 153*       Estimated Creatinine Clearance: 44.7 mL/min (A) (by C-G formula based on SCr of 1.17 mg/dL (H)).    Results from last 7 days   Lab Units 11/30/22  0508 11/29/22  1716 11/29/22  0504 11/28/22  0605   MAGNESIUM mg/dL 1.7  --  2.0 1.9   PHOSPHORUS mg/dL 4.6* 4.5 4.6* 4.7*             Results from last 7 days   Lab Units 11/30/22  0508 11/29/22  1716 11/29/22  1058 11/29/22  0534 11/28/22  0605 11/27/22  0616 11/26/22  0355   WBC 10*3/mm3 12.70*  --   --  13.40* 19.50* 17.40* 24.90*   HEMOGLOBIN g/dL 8.8* 9.7* 7.3* 6.1* 7.5* 7.7* 7.9*   PLATELETS 10*3/mm3 132*  --   --  127* 183 135* 144             Assessment / Plan              ASSESSMENT:    1.  Acute kidney injury Nonoliguric ATN.  Renal function improved volume status generous which seems to be third spacing  2.  COVID-19 infection/respiratory failure.    On isolation completed for 2 weeks followed by respiratory therapy  3.  Edema/ Anasarca .  Seems to be third spacing due to inflammatory state, nutritional, prolonged hospital stay , Currently on bumetanide drip change  to q 6 hours   4.   Hypernatremia since patient was placed on bumetanide slightly increased we will continue at this point as patient is severely volume overloaded  5.  Acute on chronic normocytic anemia secondary to GI bleeding from duodenal ulcers S/p EGD and sclerotherapy 2 duodenal ulcers.  Follow H&H closely transfuse as needed  6. Hypokalemia.  On potassium phosphate replacement following trend  7. Hypophosphatemia.  Following closely replacing accordingly  8.  Urinary tract infection with isolation of Enterobacter as per primary team    PLAN:    -Challenging case as patient is  significant volume overload secondary to third spacing but intravascular depleted with evidence of hypernatremia, increase in CO2 suggestive of slowly gradual volume contraction and borderline hypotension on Levophed  -Patient  continues to require BiPAP 24/7, attempts to wean has failed reason why we will continue her diuresis  -Will change bumetanide drip to boluses to attempt to minimize volume given despite having a good diuresis of 2.7 L her total ins are 1.7 L, limiting her negative balance  -Awaiting lab work today  -We will continue to attempt to optimize volume status with low-dose bumetanide, will change from drip to every 6 hours, and will continue to follow sodium and renal function closely for now   -We will continue to follow closely  -We will continue surveillance labs       Thank you for involving us in the care of Christiane Nazario.  Please feel free to call with any questions.    Benji Cheng MD  12/01/22  07:29 [facility], [place]    [facility]  [phone]    Please note that portions of this note were completed with a voice recognition program.

## 2022-12-01 NOTE — PLAN OF CARE
Goal Outcome Evaluation:         ST has followed peripherally for dysphagia. She was last seen on 11/28 for swallow evaluation with recs for NPO awaiting VFSS.     However unable to complete as pt has since been on continuous bipap/avaps and not appropriate for re-evaluation. Patient remains on continuous AVAPS at this time and not appropriate for swallow intervention.     Will sign off at this time as pt has not been medically stable x3 for treatment. Please reconsult when pt's resp status improved to to tolerate nasal cannula or if otherwise indicated. Recommend continue NPO until pt stable for ST swallow eval.

## 2022-12-01 NOTE — PROGRESS NOTES
Critical Care Progress Note   Christiane Nazario : 1950 MRN:[id number] LOS:24  Rm: 2316/1     Principal Problem: COVID     Reason for follow up: All the medical problems listed below    Summary     Christiane Nazario is a 72 y.o. female with a past medical history of diabetes, hypertension, hypothyroidism,  breast/liver/bone CA currently on chemotherapy,presented to [facility] on 2022  with diarrhea for 6 weeks along with dyspnea.  CT abdomen pelvis showed subtle wall thickening of the colon, suspicious for infectious or inflammatory colitis.    Also found a stone that appears to be lodged in the distal CBD near the level of ampulla with mild extra hepatic biliary ductal dilatation.  Hepatic metastatic disease is again noted.  GI was consulted, underwent ERCP 2022 with stone retraction and stenting of the CBD.    Subsequently, patient was found to have COVID-pneumonia.  Initially required 4 L nasal cannula and subsequently had progressive worsening of oxygenation.  Needed OptiFlow alternating with BiPAP.  Also found to have left pleural effusion and had a left chest tube placed by pulmonary on the floor.  Treated with diuretics.  Subsequently, developed DILLON and nephrology was consulted.  Patient underwent EGD on 2022 after being transferred out of the ICU.  Subsequently, patient became hypoxic acutely during her EGD, intubated.  Subsequently, improved and was extubated on 111.  Post extubation, still required high FiO2 over OptiFlow, alternating with BiPAP.    Multidisciplinary Rounds     22 : Still requiring BiPAP persistently.    Assessment / Plan     Acute respiratory failure with hypoxia / ARDS  · Likely due to combination of COVID-19 pneumonia, pleural effusion and CHF.  · Completed antibiotics for possible superimposed bacterial infection.  ID signed off  · Echocardiogram with normal LVEF.  Diuresis per nephrology  · Persistently requiring high flow nasal cannula or  BiPAP.  · Late stage ARDS, no benefit for steroids.  DC further steroids.  · We will do a lung ultrasound and possible thoracentesis.     Septic shock, due to COVID-pneumonia & UTI  UTI Enterobacter cloacae, present on admission  Colitis  · Still on low-dose norepinephrine.  Increase midodrine.  · ?  Cardiogenic shock from severe pulmonary hypertension due to refractory hypoxia. ?  Role for inhaled Flolan.    Volume Overload /right pleural effusion  · Currently on scheduled Lasix  · Nephrology following  Net IO Since Admission: 12,694.88 mL [12/01/22 1538]    Acute Kidney Injury:   • Remains non oliguric.   • Continue diuretics  • Monitor Input/Output very closely.   • Net IO Since Admission: 12,694.88 mL [12/01/22 1538]       Duodenal ulcers with bleeding / Acute blood loss anemia /anemia of chronic disease  · Multifactorial, undergoing chemo and metastatic disease  · EGD 11/23, with findings of actively bleeding duodenal ulcers x4 as well as a nonbleeding gastric ulcer.    · H. pylori status pending.  · Continue Protonix  · GI signed off, reconsult on 11/30  · Received 1 PRBC since admission.     Essential hypertension    · Currently hypotensive, on midodrine.     Common bile duct stone  -Underwent ERCP 11/8/2022 with stone retraction and stenting of the CBD  -GI signed off      Breast CA-Metastatic  -Hematology/oncology  not currently following     Diabetes mellitus Type 2, not insulin-dependent : well controlled.   • Accu checks before meals and at bedtime, c/w humalog coverage as needed.   • Added Lantus 5 units daily.  • A1c of 6.2.      Primary hypothyroidism: Chronic and stable. Continue synthroid.    Code status:   Level Of Support Discussed With: Patient  Code Status (Patient has no pulse and is not breathing): CPR (Attempt to Resuscitate)  Medical Interventions (Patient has pulse or is breathing): Full Support       Nutrition: NPO Diet NPO Type: Ice Chips     DVT prophylaxis:   Mechanical Order History:      None      Pharmalogical Order History:      Ordered     Dose Route Frequency Stop    11/07/22 1148  Enoxaparin Sodium (LOVENOX) syringe 40 mg         40 mg SC Daily --    11/07/22 1144  Enoxaparin Sodium (LOVENOX) syringe 40 mg  Status:  Discontinued         40 mg SC Daily 11/07/22 1148                 Subjective / Review of systems     Review of Systems   Respiratory: Positive for shortness of breath. Negative for cough, wheezing and stridor.    Cardiovascular: Negative for chest pain and palpitations.   Gastrointestinal: Negative for nausea and vomiting.        Objective / Physical Exam     Vital signs:  Temp: 99.9 °F (37.7 °C)  BP: 105/59  Heart Rate: 72  Resp: 22  SpO2: 96 %  Weight: 90.1 kg (198 lb 10.2 oz)    Admission Weight: Weight: 81.6 kg (180 lb)  Current Weight: Weight: 90.1 kg (198 lb 10.2 oz)    Input/Output in last 24 hours:    Intake/Output Summary (Last 24 hours) at 12/1/2022 1538  Last data filed at 12/1/2022 0736  Gross per 24 hour   Intake 1722 ml   Output 3550 ml   Net -1828 ml      Net IO Since Admission: 12,694.88 mL [12/01/22 1538]     Physical Exam  Vitals and nursing note reviewed.   Constitutional:       General: She is not in acute distress.     Appearance: Normal appearance.   HENT:      Mouth/Throat:      Mouth: Mucous membranes are moist.      Pharynx: Oropharynx is clear.   Eyes:      General: No scleral icterus.     Extraocular Movements: Extraocular movements intact.      Conjunctiva/sclera: Conjunctivae normal.   Cardiovascular:      Rate and Rhythm: Normal rate.      Heart sounds: No murmur heard.    No gallop.   Pulmonary:      Breath sounds: Normal breath sounds. No wheezing or rales (Right basal).      Comments: On BiPAP  Abdominal:      General: Bowel sounds are normal.      Palpations: Abdomen is soft.      Tenderness: There is no abdominal tenderness.   Musculoskeletal:         General: No tenderness.      Right lower leg: No edema.      Left lower leg: No edema.    Neurological:      General: No focal deficit present.      Mental Status: She is alert and oriented to person, place, and time.          Radiology and Labs     Results from last 7 days   Lab Units 12/01/22 0449 11/30/22  0508 11/29/22  1716 11/29/22  1058 11/29/22  0534 11/28/22  0605 11/27/22  0616   WBC 10*3/mm3 11.70* 12.70*  --   --  13.40* 19.50* 17.40*   HEMATOCRIT % 27.5* 27.1* 29.3* 22.5* 19.0* 24.3* 24.8*   PLATELETS 10*3/mm3 137* 132*  --   --  127* 183 135*      Results from last 7 days   Lab Units 12/01/22 0449 11/30/22  1704 11/30/22  0508 11/29/22  1716 11/29/22  0504 11/28/22  0605   SODIUM mmol/L 144  --  148* 147* 148* 146*   POTASSIUM mmol/L 4.0 3.6 3.4* 4.0 3.5 4.1   CHLORIDE mmol/L 99  --  103 105 107 104   CO2 mmol/L 31.0*  --  28.0 25.0 27.0 25.0   BUN mg/dL 26*  --  23 21 20 21   CREATININE mg/dL 1.21*  --  1.17* 1.02* 0.91 0.96        Current medications     Scheduled Meds:   bumetanide, 1 mg, Intravenous, Q6H  collagenase, 1 application, Topical, Q24H  insulin glargine, 5 Units, Subcutaneous, Daily  insulin lispro, 4-24 Units, Subcutaneous, Q6H  ipratropium-albuterol, 3 mL, Nebulization, 4x Daily - RT  miconazole, , Topical, Q12H  midodrine, 20 mg, Nasogastric, Q8H  pantoprazole, 40 mg, Intravenous, Q12H  potassium chloride, 40 mEq, Nasogastric, Daily  sodium chloride, 10 mL, Intravenous, Q12H        Continuous Infusions:   norepinephrine, 0.02-0.5 mcg/kg/min, Last Rate: 0.02 mcg/kg/min (12/01/22 1446)  Pharmacy Consult - Pharmacy to dose,   Pharmacy Consult - Steroid Insulin Protocol,         Plan discussed with RN. Reviewed all other data in the last 24 hours, including but not limited to vitals, labs, microbiology, imaging and pertinent notes from other providers.  High complexity decision making and high risk of deterioration.      Hilton Hernandez MD   Critical Care  12/01/22   15:38 EST

## 2022-12-01 NOTE — THERAPY TREATMENT NOTE
"Subjective: Pt agreeable to therapeutic plan of care. Pt with  at bedside during session this date.   Cognition: arousal/alertness: Alert and Attentive    Objective:     Bed Mobility: Max-A and Assist x 2   Roll L<>R mod X2   Functional Transfers: N/A or Not attempted.  Functional Ambulation: N/A or Not attempted.    BUE exercises x1 set, x10 sets, in all planes     Vitals: 89 BPM rest  131 BPM with supine > sit   HR decreased 92 BPM post activity     Pain: 4 VAS  Location: generalized, pt grimaced and shouted upon movement, however unable to notify where pain was located   Interventions for pain: Repositioned, Increased Activity and Therapeutic Presence  Education: Provided education on the importance of mobility in the acute care setting and Verbal/Tactile Cues    Assessment: Christiane Nazario presents with ADL impairments below baseline abilities which indicate the need for continued skilled intervention while inpatient. Pt demo limited BUE ROM with BUE exercises this date. Pt required max A x2 for supine to sit this date, however unable to tolerate sitting edge of bed. Pt requested to lie back down quickly after sitting. Pt able to roll R<>L with mod A x2 this date, utilizing bed rails for UE support. Pt continues to be at high risk for falls and is unsafe to return home at this time. Tolerating session today without incident. Will continue to follow and progress as tolerated.     Plan/Recommendations:   Moderate Intensity Therapy recommended post-acute care. This is recommended as therapy feels the patient would require 3-4 days per week and wouldn't tolerate \"3 hour daily\" rehab intensity. SNF would be the preferred choice. If the patient does not agree to SNF, arrange HH or OP depending on home bound status. If patient is medically complex, consider LTACH.. Pt requires no DME at discharge.     Pt desires Skilled Rehab placement at discharge. Pt cooperative; agreeable to therapeutic recommendations and " plan of care.     Modified Mikie: N/A = No pre-op stroke/TIA    Post-Tx Position: Supine with HOB Elevated, Alarms activated and Call light and personal items within reach  PPE: gloves and surgical mask

## 2022-12-01 NOTE — PLAN OF CARE
"  Assessment: Christiane Nazario presents with ADL impairments below baseline abilities which indicate the need for continued skilled intervention while inpatient. Pt demo limited BUE ROM with BUE exercises this date. Pt required max A x2 for supine to sit this date, however unable to tolerate sitting edge of bed. Pt requested to lie back down quickly after sitting. Pt able to roll R<>L with mod A x2 this date, utilizing bed rails for UE support. Pt continues to be at high risk for falls and is unsafe to return home at this time. Tolerating session today without incident. Will continue to follow and progress as tolerated.     Plan/Recommendations:   Moderate Intensity Therapy recommended post-acute care. This is recommended as therapy feels the patient would require 3-4 days per week and wouldn't tolerate \"3 hour daily\" rehab intensity. SNF would be the preferred choice. If the patient does not agree to SNF, arrange HH or OP depending on home bound status. If patient is medically complex, consider LTACH.. Pt requires no DME at discharge.     Pt desires Skilled Rehab placement at discharge. Pt cooperative; agreeable to therapeutic recommendations and plan of care.     "

## 2022-12-01 NOTE — PROGRESS NOTES
Nutrition Services    Patient Name: Christiane Nazario  YOB: 1950  MRN: [id number]  Admission date: 11/7/2022    PPE Documentation        PPE Worn By Provider Did not enter room for this encounter   PPE Worn By Patient  N/A     PROGRESS NOTE      Encounter Information: Check on for tube feeding.  Patient discussed in AM rounds.  On levo.  Continuous bipap.         PO Diet: NPO Diet NPO Type: Ice Chips   PO Supplements: None ordered    PO Intake:  NPO       Current nutrition support: Diabetisource AC 20 ml/hr + 75 ml/hr water flush + Prosource TF BID   Nutrition support review: Documented as above per EMR        Labs (reviewed below): Hypernatremia - will increase water flush  Hypokalemia - replaced today   Hyperglycemia  Hyperphosphatemia        GI Function:  Last BM 11/30 (today)  Residuals WNL        Nutrition Intervention Updates: Gradual advancement in tube feeding to goal rate of 60 mL/hour + continue 100 mL/hour water flush         Results from last 7 days   Lab Units 12/01/22  0449 11/30/22  1704 11/30/22  0508 11/29/22  1716 11/29/22  0504   SODIUM mmol/L 144  --  148* 147* 148*   POTASSIUM mmol/L 4.0 3.6 3.4* 4.0 3.5   CHLORIDE mmol/L 99  --  103 105 107   CO2 mmol/L 31.0*  --  28.0 25.0 27.0   BUN mg/dL 26*  --  23 21 20   CREATININE mg/dL 1.21*  --  1.17* 1.02* 0.91   CALCIUM mg/dL 7.2*  --  7.5* 7.5* 7.5*   BILIRUBIN mg/dL 1.3*  --  1.3*  --  0.7   ALK PHOS U/L 174*  --  222*  --  119*   ALT (SGPT) U/L 12  --  12  --  14   AST (SGOT) U/L 21  --  21  --  17   GLUCOSE mg/dL 226*  --  188* 240* 156*     Results from last 7 days   Lab Units 12/01/22  0449 11/30/22  0508 11/29/22  0534 11/29/22  0504   MAGNESIUM mg/dL 1.6 1.7  --  2.0   PHOSPHORUS mg/dL 3.1 4.6*   < > 4.6*   HEMOGLOBIN g/dL 8.7* 8.8*   < >  --    HEMATOCRIT % 27.5* 27.1*   < >  --     < > = values in this interval not displayed.     COVID19   Date Value Ref Range Status   11/07/2022 Detected (C) Not Detected - Ref. Range Final      Lab Results   Component Value Date    HGBA1C 6.2 (H) 08/08/2022     RD to follow up per protocol.    Electronically signed by:  Lizeth Perkins RD  12/01/22 11:21 EST

## 2022-12-02 ENCOUNTER — APPOINTMENT (OUTPATIENT)
Dept: CARDIOLOGY | Facility: HOSPITAL | Age: 72
End: 2022-12-02

## 2022-12-02 LAB
ALBUMIN SERPL-MCNC: 4.1 G/DL (ref 3.5–5.2)
ALBUMIN/GLOB SERPL: 3.4 G/DL
ALP SERPL-CCNC: 160 U/L (ref 39–117)
ALT SERPL W P-5'-P-CCNC: 15 U/L (ref 1–33)
ANION GAP SERPL CALCULATED.3IONS-SCNC: 15 MMOL/L (ref 5–15)
AST SERPL-CCNC: 24 U/L (ref 1–32)
BASOPHILS # BLD AUTO: 0 10*3/MM3 (ref 0–0.2)
BASOPHILS NFR BLD AUTO: 0.3 % (ref 0–1.5)
BILIRUB SERPL-MCNC: 1 MG/DL (ref 0–1.2)
BUN SERPL-MCNC: 32 MG/DL (ref 8–23)
BUN/CREAT SERPL: 25 (ref 7–25)
CALCIUM SPEC-SCNC: 7.1 MG/DL (ref 8.6–10.5)
CHLORIDE SERPL-SCNC: 95 MMOL/L (ref 98–107)
CO2 SERPL-SCNC: 33 MMOL/L (ref 22–29)
CREAT SERPL-MCNC: 1.28 MG/DL (ref 0.57–1)
DEPRECATED RDW RBC AUTO: 55.6 FL (ref 37–54)
EGFRCR SERPLBLD CKD-EPI 2021: 45.2 ML/MIN/1.73
EOSINOPHIL # BLD AUTO: 0 10*3/MM3 (ref 0–0.4)
EOSINOPHIL NFR BLD AUTO: 0 % (ref 0.3–6.2)
ERYTHROCYTE [DISTWIDTH] IN BLOOD BY AUTOMATED COUNT: 17.6 % (ref 12.3–15.4)
GLOBULIN UR ELPH-MCNC: 1.2 GM/DL
GLUCOSE BLDC GLUCOMTR-MCNC: 167 MG/DL (ref 70–105)
GLUCOSE BLDC GLUCOMTR-MCNC: 173 MG/DL (ref 70–105)
GLUCOSE BLDC GLUCOMTR-MCNC: 243 MG/DL (ref 70–105)
GLUCOSE SERPL-MCNC: 115 MG/DL (ref 65–99)
HCT VFR BLD AUTO: 28.2 % (ref 34–46.6)
HGB BLD-MCNC: 9 G/DL (ref 12–15.9)
LYMPHOCYTES # BLD AUTO: 0.4 10*3/MM3 (ref 0.7–3.1)
LYMPHOCYTES NFR BLD AUTO: 2.6 % (ref 19.6–45.3)
MAGNESIUM SERPL-MCNC: 1.4 MG/DL (ref 1.6–2.4)
MAGNESIUM SERPL-MCNC: 2 MG/DL (ref 1.6–2.4)
MCH RBC QN AUTO: 29.2 PG (ref 26.6–33)
MCHC RBC AUTO-ENTMCNC: 31.9 G/DL (ref 31.5–35.7)
MCV RBC AUTO: 91.7 FL (ref 79–97)
MONOCYTES # BLD AUTO: 1.5 10*3/MM3 (ref 0.1–0.9)
MONOCYTES NFR BLD AUTO: 9.2 % (ref 5–12)
NEUTROPHILS NFR BLD AUTO: 14.2 10*3/MM3 (ref 1.7–7)
NEUTROPHILS NFR BLD AUTO: 87.9 % (ref 42.7–76)
NRBC BLD AUTO-RTO: 0.1 /100 WBC (ref 0–0.2)
PHOSPHATE SERPL-MCNC: 2.3 MG/DL (ref 2.5–4.5)
PLATELET # BLD AUTO: 160 10*3/MM3 (ref 140–450)
PMV BLD AUTO: 8.9 FL (ref 6–12)
POTASSIUM SERPL-SCNC: 3.2 MMOL/L (ref 3.5–5.2)
POTASSIUM SERPL-SCNC: 3.2 MMOL/L (ref 3.5–5.2)
PROT SERPL-MCNC: 5.3 G/DL (ref 6–8.5)
RBC # BLD AUTO: 3.07 10*6/MM3 (ref 3.77–5.28)
SODIUM SERPL-SCNC: 143 MMOL/L (ref 136–145)
WBC NRBC COR # BLD: 16.1 10*3/MM3 (ref 3.4–10.8)

## 2022-12-02 PROCEDURE — 97110 THERAPEUTIC EXERCISES: CPT

## 2022-12-02 PROCEDURE — 85025 COMPLETE CBC W/AUTO DIFF WBC: CPT | Performed by: NURSE PRACTITIONER

## 2022-12-02 PROCEDURE — 84100 ASSAY OF PHOSPHORUS: CPT

## 2022-12-02 PROCEDURE — 83735 ASSAY OF MAGNESIUM: CPT | Performed by: INTERNAL MEDICINE

## 2022-12-02 PROCEDURE — 63710000001 INSULIN GLARGINE PER 5 UNITS: Performed by: INTERNAL MEDICINE

## 2022-12-02 PROCEDURE — 94660 CPAP INITIATION&MGMT: CPT

## 2022-12-02 PROCEDURE — 84132 ASSAY OF SERUM POTASSIUM: CPT | Performed by: INTERNAL MEDICINE

## 2022-12-02 PROCEDURE — 63710000001 INSULIN LISPRO (HUMAN) PER 5 UNITS: Performed by: NURSE PRACTITIONER

## 2022-12-02 PROCEDURE — 97530 THERAPEUTIC ACTIVITIES: CPT

## 2022-12-02 PROCEDURE — 82962 GLUCOSE BLOOD TEST: CPT

## 2022-12-02 PROCEDURE — 94799 UNLISTED PULMONARY SVC/PX: CPT

## 2022-12-02 PROCEDURE — 25010000002 SULFUR HEXAFLUORIDE MICROSPH 60.7-25 MG RECONSTITUTED SUSPENSION: Performed by: INTERNAL MEDICINE

## 2022-12-02 PROCEDURE — 93306 TTE W/DOPPLER COMPLETE: CPT

## 2022-12-02 PROCEDURE — 80053 COMPREHEN METABOLIC PANEL: CPT | Performed by: NURSE PRACTITIONER

## 2022-12-02 PROCEDURE — 25010000002 MAGNESIUM SULFATE IN D5W 1G/100ML (PREMIX) 1-5 GM/100ML-% SOLUTION: Performed by: NURSE PRACTITIONER

## 2022-12-02 PROCEDURE — 93306 TTE W/DOPPLER COMPLETE: CPT | Performed by: INTERNAL MEDICINE

## 2022-12-02 PROCEDURE — 25010000002 ONDANSETRON PER 1 MG: Performed by: INTERNAL MEDICINE

## 2022-12-02 RX ADMIN — POTASSIUM CHLORIDE 40 MEQ: 1500 TABLET, EXTENDED RELEASE ORAL at 08:14

## 2022-12-02 RX ADMIN — MAGNESIUM SULFATE IN DEXTROSE 1 G: 10 INJECTION, SOLUTION INTRAVENOUS at 13:10

## 2022-12-02 RX ADMIN — PANTOPRAZOLE SODIUM 40 MG: 40 INJECTION, POWDER, FOR SOLUTION INTRAVENOUS at 08:16

## 2022-12-02 RX ADMIN — LOPERAMIDE HYDROCHLORIDE 4 MG: 2 CAPSULE ORAL at 11:22

## 2022-12-02 RX ADMIN — PANTOPRAZOLE SODIUM 40 MG: 40 INJECTION, POWDER, FOR SOLUTION INTRAVENOUS at 20:17

## 2022-12-02 RX ADMIN — POTASSIUM CHLORIDE 40 MEQ: 1.5 POWDER, FOR SOLUTION ORAL at 12:15

## 2022-12-02 RX ADMIN — SIMETHICONE 80 MG: 80 TABLET, CHEWABLE ORAL at 00:57

## 2022-12-02 RX ADMIN — INSULIN LISPRO 8 UNITS: 100 INJECTION, SOLUTION INTRAVENOUS; SUBCUTANEOUS at 00:57

## 2022-12-02 RX ADMIN — INSULIN GLARGINE 5 UNITS: 100 INJECTION, SOLUTION SUBCUTANEOUS at 08:14

## 2022-12-02 RX ADMIN — LOPERAMIDE HYDROCHLORIDE 4 MG: 2 CAPSULE ORAL at 20:18

## 2022-12-02 RX ADMIN — LOPERAMIDE HYDROCHLORIDE 4 MG: 2 CAPSULE ORAL at 04:25

## 2022-12-02 RX ADMIN — SODIUM PHOSPHATE, MONOBASIC, MONOHYDRATE AND SODIUM PHOSPHATE, DIBASIC, ANHYDROUS 15 MMOL: 142; 276 INJECTION, SOLUTION INTRAVENOUS at 13:10

## 2022-12-02 RX ADMIN — ONDANSETRON 4 MG: 2 INJECTION INTRAMUSCULAR; INTRAVENOUS at 06:25

## 2022-12-02 RX ADMIN — IPRATROPIUM BROMIDE AND ALBUTEROL SULFATE 3 ML: 2.5; .5 SOLUTION RESPIRATORY (INHALATION) at 19:19

## 2022-12-02 RX ADMIN — IPRATROPIUM BROMIDE AND ALBUTEROL SULFATE 3 ML: 2.5; .5 SOLUTION RESPIRATORY (INHALATION) at 11:34

## 2022-12-02 RX ADMIN — Medication 10 ML: at 08:15

## 2022-12-02 RX ADMIN — MAGNESIUM SULFATE IN DEXTROSE 1 G: 10 INJECTION, SOLUTION INTRAVENOUS at 11:21

## 2022-12-02 RX ADMIN — BUMETANIDE 1 MG: 0.25 INJECTION, SOLUTION INTRAMUSCULAR; INTRAVENOUS at 14:59

## 2022-12-02 RX ADMIN — SULFUR HEXAFLUORIDE 2 ML: KIT at 16:06

## 2022-12-02 RX ADMIN — MIDODRINE HYDROCHLORIDE 20 MG: 5 TABLET ORAL at 20:19

## 2022-12-02 RX ADMIN — ANTI-FUNGAL POWDER MICONAZOLE NITRATE TALC FREE: 1.42 POWDER TOPICAL at 20:18

## 2022-12-02 RX ADMIN — BUMETANIDE 1 MG: 0.25 INJECTION, SOLUTION INTRAMUSCULAR; INTRAVENOUS at 04:25

## 2022-12-02 RX ADMIN — INSULIN LISPRO 4 UNITS: 100 INJECTION, SOLUTION INTRAVENOUS; SUBCUTANEOUS at 17:54

## 2022-12-02 RX ADMIN — IPRATROPIUM BROMIDE AND ALBUTEROL SULFATE 3 ML: 2.5; .5 SOLUTION RESPIRATORY (INHALATION) at 07:41

## 2022-12-02 RX ADMIN — MIDODRINE HYDROCHLORIDE 20 MG: 5 TABLET ORAL at 05:58

## 2022-12-02 RX ADMIN — IPRATROPIUM BROMIDE AND ALBUTEROL SULFATE 3 ML: 2.5; .5 SOLUTION RESPIRATORY (INHALATION) at 15:51

## 2022-12-02 RX ADMIN — COLLAGENASE SANTYL 1 APPLICATION: 250 OINTMENT TOPICAL at 08:15

## 2022-12-02 RX ADMIN — ANTI-FUNGAL POWDER MICONAZOLE NITRATE TALC FREE: 1.42 POWDER TOPICAL at 08:15

## 2022-12-02 RX ADMIN — BUMETANIDE 1 MG: 0.25 INJECTION, SOLUTION INTRAMUSCULAR; INTRAVENOUS at 20:18

## 2022-12-02 RX ADMIN — BUMETANIDE 1 MG: 0.25 INJECTION, SOLUTION INTRAMUSCULAR; INTRAVENOUS at 08:14

## 2022-12-02 RX ADMIN — MAGNESIUM SULFATE IN DEXTROSE 1 G: 10 INJECTION, SOLUTION INTRAVENOUS at 14:59

## 2022-12-02 RX ADMIN — Medication 10 ML: at 20:18

## 2022-12-02 RX ADMIN — MIDODRINE HYDROCHLORIDE 20 MG: 5 TABLET ORAL at 13:10

## 2022-12-02 RX ADMIN — POTASSIUM CHLORIDE 40 MEQ: 1.5 POWDER, FOR SOLUTION ORAL at 08:16

## 2022-12-02 RX ADMIN — INSULIN LISPRO 4 UNITS: 100 INJECTION, SOLUTION INTRAVENOUS; SUBCUTANEOUS at 11:21

## 2022-12-02 NOTE — PLAN OF CARE
"Goal Outcome Evaluation:    Assessment: Christiane Nazario was able to participate in eob activity. Her BP improves w/ activity rather than dropping w/ change in position. She presents with functional mobility impairments which indicate the need for skilled intervention. Tolerating session today without incident. Will continue to follow and progress as tolerated.     Plan/Recommendations:   Moderate Intensity Therapy recommended post-acute care. This is recommended as therapy feels the patient would require 3-4 days per week and wouldn't tolerate \"3 hour daily\" rehab intensity. SNF would be the preferred choice. If the patient does not agree to SNF, arrange HH or OP depending on home bound status. If patient is medically complex, consider LTACH.. Pt requires no DME at discharge.     Pt desires LTACH placement at discharge. Pt cooperative; agreeable to therapeutic recommendations and plan of care.     "

## 2022-12-02 NOTE — THERAPY TREATMENT NOTE
"Subjective: Pt agreeable to therapeutic plan of care.   Cognition: arousal/alertness: Alert and Attentive    Objective:     Bed Mobility: Max-A and Assist x 2   Functional Transfers: N/A or Not attempted.  Functional Ambulation: N/A or Not attempted.    BUE exercises sitting edge of bed x10 reps, x1 set in all planes.     Vitals: Orthostatic   Supine: 85/46 mmHg 86 BPM  Sitting edge of bed: 109/63 mmHg 99 BPM  Sitting edge of bed x2 minutes: 103/54 mmHg 92 BPM  Supine: 100/63 mmHg 89 BPM    Pain: 4 VAS  Location: RLE, generalized pain with movement   Interventions for pain: Repositioned, Increased Activity and Therapeutic Presence  Education: Provided education on the importance of mobility in the acute care setting and Verbal/Tactile Cues    Assessment: Christiane Nazario presents with ADL impairments below baseline abilities which indicate the need for continued skilled intervention while inpatient. Pt required max A x2 for bed mobility this date. When rolling R<>L, pt utilized bed rails for UE support. Pt tolerated sitting edge of bed x5 minutes with Tello-CGA for dynamic sitting. Pt completed BUE exercises sitting edge of bed, tolerating this well. Pt noted to have limited BUE ROM with exercises. Pt BP responded well to activity this date, increasing with activity. Pt BP 85/46 in supine, however increased sitting edge of bed 109/63 initially and remained 103/54 while sitting edge of bed. Pt continues to voice pain with movement. Pt continues to be at high risk for falls and is unsafe to return home at this time. Tolerating session today without incident. Will continue to follow and progress as tolerated.     Plan/Recommendations:   Moderate Intensity Therapy recommended post-acute care. This is recommended as therapy feels the patient would require 3-4 days per week and wouldn't tolerate \"3 hour daily\" rehab intensity. SNF would be the preferred choice. If the patient does not agree to SNF, arrange HH or OP " depending on home bound status. If patient is medically complex, consider LTACH.. Pt requires no DME at discharge.     Pt desires Skilled Rehab placement at discharge. Pt cooperative; agreeable to therapeutic recommendations and plan of care.     Modified Mikie: N/A = No pre-op stroke/TIA    Post-Tx Position: Supine with HOB Elevated, Alarms activated and Call light and personal items within reach repositioned on L side with foam wedge   PPE: gloves and surgical mask

## 2022-12-02 NOTE — PROGRESS NOTES
Critical Care Progress Note   Christiane Nazario : 1950 MRN:[id number] LOS:25  Rm: 2316/1     Principal Problem: COVID     Reason for follow up: All the medical problems listed below    Summary     Christiane Nazario is a 72 y.o. female with a past medical history of diabetes, hypertension, hypothyroidism,  breast/liver/bone CA currently on chemotherapy,presented to [facility] on 2022  with diarrhea for 6 weeks along with dyspnea.  CT abdomen pelvis showed subtle wall thickening of the colon, suspicious for infectious or inflammatory colitis.  Also found a stone that appears to be lodged in the distal CBD near the level of ampulla with mild extra hepatic biliary ductal dilatation.  Hepatic metastatic disease is again noted.  GI was consulted, underwent ERCP 2022 with stone retraction and stenting of the CBD.    Subsequently, patient was found to have COVID-pneumonia.  Initially required 4 L nasal cannula and subsequently had progressive worsening of oxygenation.  Needed OptiFlow alternating with BiPAP.  Also found to have left pleural effusion and had a left chest tube placed by pulmonary on the floor.  Treated with diuretics.  Subsequently, developed DILLON and nephrology was consulted.  Patient underwent EGD on 2022 after being transferred out of the ICU.  Subsequently, patient became hypoxic acutely during her EGD, intubated.  Subsequently, improved and was extubated on .  Post extubation, still required high FiO2 over OptiFlow, alternating with BiPAP.  Subsequently, oxygenation improved with aggressive diuresis.    Multidisciplinary Rounds     22 : Switched to BiPAP to OptiFlow, repeat echocardiogram ordered given persistent shock state.    Assessment / Plan     Acute respiratory failure with hypoxia / ARDS  · Likely due to combination of COVID-19 pneumonia, pleural effusion and CHF.  · Completed antibiotics for possible superimposed bacterial infection.  ID signed  off  · Echocardiogram with normal LVEF.  Diuresis per nephrology  · Off BiPAP, on high flow nasal cannula.  · Late stage ARDS, no benefit for steroids.  DC further steroids.     Septic shock, due to COVID-pneumonia & UTI  UTI Enterobacter cloacae, present on admission  Colitis  · Still on low-dose norepinephrine.  Increased midodrine.  · ?  Cardiogenic shock from severe pulmonary hypertension due to refractory hypoxia. ?  Role for inhaled Flolan.  · Follow repeat echocardiogram.    Volume Overload /right pleural effusion  · Currently on scheduled Lasix  · Nephrology following  Net IO Since Admission: 12,953.88 mL [12/02/22 1221]    Acute Kidney Injury:   • Remains non oliguric.   • Continue diuretics  • Monitor Input/Output very closely.   • Net IO Since Admission: 12,953.88 mL [12/02/22 1221]       Duodenal ulcers with bleeding / Acute blood loss anemia /anemia of chronic disease  · Multifactorial, undergoing chemo and metastatic disease  · EGD 11/23, with findings of actively bleeding duodenal ulcers x4 as well as a nonbleeding gastric ulcer.    · Continue Protonix  · GI signed off  · Received 1 PRBC since admission.     Essential hypertension    · Currently hypotensive, on midodrine.     Common bile duct stone  -Underwent ERCP 11/8/2022 with stone retraction and stenting of the CBD  -GI signed off      Breast CA-Metastatic : Outpatient follow-up with heme-onc.     Diabetes mellitus Type 2, not insulin-dependent : well controlled.   • Accu checks before meals and at bedtime, c/w humalog coverage as needed.   • Continue Lantus 5 units daily.  • A1c of 6.2.    Primary hypothyroidism: Chronic and stable. Continue synthroid.     Morbid Obesity: Body mass index is 38.79 kg/m².      Code status:   Level Of Support Discussed With: Patient  Code Status (Patient has no pulse and is not breathing): CPR (Attempt to Resuscitate)  Medical Interventions (Patient has pulse or is breathing): Full Support       Nutrition: NPO Diet  NPO Type: Ice Chips     DVT prophylaxis:   Mechanical Order History:     None      Pharmalogical Order History:      Ordered     Dose Route Frequency Stop    11/07/22 1148  Enoxaparin Sodium (LOVENOX) syringe 40 mg         40 mg SC Daily --    11/07/22 1144  Enoxaparin Sodium (LOVENOX) syringe 40 mg  Status:  Discontinued         40 mg SC Daily 11/07/22 1148                 Subjective / Review of systems     Review of Systems   Respiratory: Positive for shortness of breath. Negative for cough, wheezing and stridor.    Cardiovascular: Negative for chest pain and palpitations.   Gastrointestinal: Negative for nausea and vomiting.      Somewhat feels improved than yesterday.  Objective / Physical Exam     Vital signs:  Temp: 99 °F (37.2 °C)  BP: 97/56  Heart Rate: 79  Resp: 23  SpO2: 98 %  Weight: 90.1 kg (198 lb 10.2 oz)    Admission Weight: Weight: 81.6 kg (180 lb)  Current Weight: Weight: 90.1 kg (198 lb 10.2 oz)    Input/Output in last 24 hours:    Intake/Output Summary (Last 24 hours) at 12/2/2022 1221  Last data filed at 12/2/2022 0625  Gross per 24 hour   Intake 3884 ml   Output 3625 ml   Net 259 ml      Net IO Since Admission: 12,953.88 mL [12/02/22 1221]     Physical Exam  Vitals and nursing note reviewed.   Constitutional:       General: She is not in acute distress.     Appearance: Normal appearance.   HENT:      Nose:      Comments: NG tube in place     Mouth/Throat:      Mouth: Mucous membranes are moist.      Pharynx: Oropharynx is clear.   Eyes:      General: No scleral icterus.     Extraocular Movements: Extraocular movements intact.      Conjunctiva/sclera: Conjunctivae normal.   Cardiovascular:      Rate and Rhythm: Normal rate.      Heart sounds: No murmur heard.    No gallop.   Pulmonary:      Breath sounds: Rales (Right basal) present. No wheezing.      Comments: On OptiFlow  Abdominal:      General: Bowel sounds are normal.      Palpations: Abdomen is soft.      Tenderness: There is no abdominal  tenderness.   Musculoskeletal:         General: No tenderness.      Right lower leg: No edema.      Left lower leg: No edema.   Neurological:      General: No focal deficit present.      Mental Status: She is alert and oriented to person, place, and time.          Radiology and Labs     Results from last 7 days   Lab Units 12/02/22 0439 12/01/22 0449 11/30/22  0508 11/29/22  1716 11/29/22  1058 11/29/22  0534 11/28/22  0605   WBC 10*3/mm3 16.10* 11.70* 12.70*  --   --  13.40* 19.50*   HEMATOCRIT % 28.2* 27.5* 27.1* 29.3* 22.5* 19.0* 24.3*   PLATELETS 10*3/mm3 160 137* 132*  --   --  127* 183      Results from last 7 days   Lab Units 12/02/22 0439 12/01/22 0449 11/30/22  1704 11/30/22  0508 11/29/22  1716 11/29/22  0504   SODIUM mmol/L 143 144  --  148* 147* 148*   POTASSIUM mmol/L 3.2* 4.0 3.6 3.4* 4.0 3.5   CHLORIDE mmol/L 95* 99  --  103 105 107   CO2 mmol/L 33.0* 31.0*  --  28.0 25.0 27.0   BUN mg/dL 32* 26*  --  23 21 20   CREATININE mg/dL 1.28* 1.21*  --  1.17* 1.02* 0.91        Current medications     Scheduled Meds:   bumetanide, 1 mg, Intravenous, Q6H  collagenase, 1 application, Topical, Q24H  insulin glargine, 5 Units, Subcutaneous, Daily  insulin lispro, 4-24 Units, Subcutaneous, Q6H  ipratropium-albuterol, 3 mL, Nebulization, 4x Daily - RT  miconazole, , Topical, Q12H  midodrine, 20 mg, Nasogastric, Q8H  pantoprazole, 40 mg, Intravenous, Q12H  potassium chloride, 40 mEq, Nasogastric, Daily  sodium chloride, 10 mL, Intravenous, Q12H        Continuous Infusions:   norepinephrine, 0.02-0.5 mcg/kg/min, Last Rate: 0.01 mcg/kg/min (12/02/22 0514)  Pharmacy Consult - Pharmacy to dose,   Pharmacy Consult - Steroid Insulin Protocol,         Plan discussed with RN. Reviewed all other data in the last 24 hours, including but not limited to vitals, labs, microbiology, imaging and pertinent notes from other providers.  High complexity decision making and high risk of deterioration.      Hilton Hernandez MD    Critical Care  12/02/22   12:21 EST

## 2022-12-02 NOTE — THERAPY TREATMENT NOTE
"Subjective: Pt agreeable to therapeutic plan of care. RN notes pt is only on a small amount of pressers. Wants to see how she reacts w/ sitting w/ PT before she lowers meds.    Objective:     Bed mobility - Assist x 2 and Dependent  Transfers - N/A or Not attempted.  Ambulation - 0 feet N/A or Not attempted.    Vitals: WNL   On bipap throughout rx.   BP supine - 85/46(60)  Sitting eob - 109/63(72)  After 3 min eob - 103/54(65)  Supine - 100/63(70).  Ending HR 79, sats 97%, RR 22.  All vitals stable.     Pain: 7 VAS   Location: generalized; BLEs as well, L more than R  Intervention for pain: Repositioned, RN notified, Increased Activity and Therapeutic Presence    Education: Provided education on the importance of mobility in the acute care setting and Verbal/Tactile Cues    Assessment: Christiane Nazario was able to participate in eob activity. Her BP improves w/ activity rather than dropping w/ change in position. She presents with functional mobility impairments which indicate the need for skilled intervention. Tolerating session today without incident. Will continue to follow and progress as tolerated.     Plan/Recommendations:   Moderate Intensity Therapy recommended post-acute care. This is recommended as therapy feels the patient would require 3-4 days per week and wouldn't tolerate \"3 hour daily\" rehab intensity. SNF would be the preferred choice. If the patient does not agree to SNF, arrange HH or OP depending on home bound status. If patient is medically complex, consider LTACH.. Pt requires no DME at discharge.     Pt desires LTACH placement at discharge. Pt cooperative; agreeable to therapeutic recommendations and plan of care.         Basic Mobility 6-click:  Rollin = Total, A lot = 2, A little = 3; 4 = None  Supine>Sit:   1 = Total, A lot = 2, A little = 3; 4 = None   Sit>Stand with arms:  1 = Total, A lot = 2, A little = 3; 4 = None  Bed>Chair:   1 = Total, A lot = 2, A little = 3; 4 = " None  Ambulate in room:  1 = Total, A lot = 2, A little = 3; 4 = None  3-5 Steps with railin = Total, A lot = 2, A little = 3; 4 = None  Score: 6    Modified Mikie: N/A = No pre-op stroke/TIA    Post-Tx Position: Supine with HOB Elevated and Call light and personal items within reach; in L sidelying  PPE: gloves and surgical mask

## 2022-12-02 NOTE — PLAN OF CARE
Goal Outcome Evaluation:  Plan of Care Reviewed With: patient  Progress: improving  Patient remains on bipap, Fio2 titrated throughout shift. Levo gtt titrated off at 1230. BP stable. Tube feeds adjusted, 60cc/hr + 75cc/hr h20. Tolerating well. Hinds cath removed per protocol, external cath placed. UOP adequate. Multiple loose stools throughout shift, PRN imodium given. Electrolytes replaced. Echo completed at bedside. Family and pt updated at bedside of plans of care.

## 2022-12-02 NOTE — PROGRESS NOTES
Nutrition Services    Patient Name: Christiane Nazario  YOB: 1950  MRN: [id number]  Admission date: 11/7/2022    PPE Documentation        PPE Worn By Provider Did not enter room for this encounter   PPE Worn By Patient  N/A     PROGRESS NOTE      Encounter Information: Check on for tube feeding.  Patient discussed in AM rounds.  On levo.  Continuous bipap.  SLP unable to see yesterday related to oxygen requirements.  RD ordered Phos lab for review.         PO Diet: NPO Diet NPO Type: Ice Chips   PO Supplements: None ordered    PO Intake:  NPO       Current nutrition support: Diabetisource AC 60 ml/hr + 100 ml/hr water flush + Prosource TF BID   Nutrition support review: Documented as above per EMR        Labs (reviewed below): Hypernatremia - resolved   Hypokalemia - replaced today   Hyperglycemia  Hypomagnesemia -- to replace today   Hypophosphatemia - to replace today        GI Function:  Last BM 11/30 (today)  Residuals WNL        Nutrition Intervention Updates: Continue current tube feeding Diabetisource AC at 60 mL/hour (goal rate)    Decrease water flush to 75 mL/hour water flush         Results from last 7 days   Lab Units 12/02/22 0439 12/01/22 0449 11/30/22  1704 11/30/22  0508   SODIUM mmol/L 143 144  --  148*   POTASSIUM mmol/L 3.2* 4.0 3.6 3.4*   CHLORIDE mmol/L 95* 99  --  103   CO2 mmol/L 33.0* 31.0*  --  28.0   BUN mg/dL 32* 26*  --  23   CREATININE mg/dL 1.28* 1.21*  --  1.17*   CALCIUM mg/dL 7.1* 7.2*  --  7.5*   BILIRUBIN mg/dL 1.0 1.3*  --  1.3*   ALK PHOS U/L 160* 174*  --  222*   ALT (SGPT) U/L 15 12  --  12   AST (SGOT) U/L 24 21  --  21   GLUCOSE mg/dL 115* 226*  --  188*     Results from last 7 days   Lab Units 12/02/22 0439 12/01/22 0449 11/30/22  0508   MAGNESIUM mg/dL 1.4* 1.6 1.7   PHOSPHORUS mg/dL 2.3* 3.1 4.6*   HEMOGLOBIN g/dL 9.0* 8.7* 8.8*   HEMATOCRIT % 28.2* 27.5* 27.1*     COVID19   Date Value Ref Range Status   11/07/2022 Detected (C) Not Detected - Ref. Range  Final     Lab Results   Component Value Date    HGBA1C 6.2 (H) 08/08/2022     RD to follow up per protocol.    Electronically signed by:  Lizeth Perkins RD  12/02/22 08:27 EST

## 2022-12-02 NOTE — PROGRESS NOTES
[facility] Progress Note      Patient Name: Christiane Nazario  : 1950  MRN: [id number]  Primary Care Physician:  Margarita Melissa, TOMMY  Date of admission: 2022    Subjective     Interval History:     Overnight no event  Patient continues to require BiPAP   Currently on feeds tolerating    Volume status in 3884 ml , out 5075 ml negative balance 1191 ml    Review of Systems:   As noted above    Objective     Vitals:   Temp:  [93.6 °F (34.2 °C)-99.9 °F (37.7 °C)] 99 °F (37.2 °C)  Heart Rate:  [] 92  Resp:  [20-33] 23  BP: ()/(14-79) 101/52    Intake/Output Summary (Last 24 hours) at 2022 1428  Last data filed at 2022 0625  Gross per 24 hour   Intake 3884 ml   Output 3625 ml   Net 259 ml       Physical Exam:    General Appearance: Patient on BiPAP  Skin: warm and dry  HEENT: oral mucosa normal, nonicteric sclera  Neck: supple, no JVD  Lungs: Crackles bibasal  Heart: RRR, normal S1 and S2  Abdomen: soft, nontender, nondistended  : no palpable bladder  Extremities: Anasarca  Neuro: Alert X3 no focalization    Scheduled Meds:     bumetanide, 1 mg, Intravenous, Q6H  collagenase, 1 application, Topical, Q24H  insulin glargine, 5 Units, Subcutaneous, Daily  insulin lispro, 4-24 Units, Subcutaneous, Q6H  ipratropium-albuterol, 3 mL, Nebulization, 4x Daily - RT  miconazole, , Topical, Q12H  midodrine, 20 mg, Nasogastric, Q8H  pantoprazole, 40 mg, Intravenous, Q12H  potassium chloride, 40 mEq, Nasogastric, Daily  sodium chloride, 10 mL, Intravenous, Q12H      IV Meds:   norepinephrine, 0.02-0.5 mcg/kg/min, Last Rate: Stopped (22 1223)  Pharmacy Consult - Pharmacy to dose,   Pharmacy Consult - Steroid Insulin Protocol,         Results Reviewed:   I have personally reviewed the results from the time of this admission to 2022 14:28 EST     Results from last 7 days   Lab Units 22  0439 22  0449 22  1704 22  0508   SODIUM  mmol/L 143 144  --  148*   POTASSIUM mmol/L 3.2* 4.0 3.6 3.4*   CHLORIDE mmol/L 95* 99  --  103   CO2 mmol/L 33.0* 31.0*  --  28.0   BUN mg/dL 32* 26*  --  23   CREATININE mg/dL 1.28* 1.21*  --  1.17*   CALCIUM mg/dL 7.1* 7.2*  --  7.5*   BILIRUBIN mg/dL 1.0 1.3*  --  1.3*   ALK PHOS U/L 160* 174*  --  222*   ALT (SGPT) U/L 15 12  --  12   AST (SGOT) U/L 24 21  --  21   GLUCOSE mg/dL 115* 226*  --  188*       Estimated Creatinine Clearance: 40.9 mL/min (A) (by C-G formula based on SCr of 1.28 mg/dL (H)).    Results from last 7 days   Lab Units 12/02/22  0439 12/01/22  0449 11/30/22  0508   MAGNESIUM mg/dL 1.4* 1.6 1.7   PHOSPHORUS mg/dL 2.3* 3.1 4.6*             Results from last 7 days   Lab Units 12/02/22  0439 12/01/22  0449 11/30/22  0508 11/29/22  1716 11/29/22  1058 11/29/22  0534 11/28/22  0605   WBC 10*3/mm3 16.10* 11.70* 12.70*  --   --  13.40* 19.50*   HEMOGLOBIN g/dL 9.0* 8.7* 8.8* 9.7* 7.3* 6.1* 7.5*   PLATELETS 10*3/mm3 160 137* 132*  --   --  127* 183             Assessment / Plan              ASSESSMENT:    1.  Acute kidney injury Nonoliguric ATN.  Renal function improved volume status generous which seems to be third spacing  2.  COVID-19 infection/respiratory failure.    On isolation completed for 2 weeks followed by respiratory therapy,  3.  Edema/ Anasarca .  Seems to be third spacing due to inflammatory state, nutritional, prolonged hospital stay , Currently on bumetanide drip change to q 6 hours   4.   Hypernatremia since patient was placed on bumetanide slightly increased we will continue at this point as patient is severely volume overloaded  5.  Acute on chronic normocytic anemia secondary to GI bleeding from duodenal ulcers S/p EGD and sclerotherapy 2 duodenal ulcers.  Follow H&H closely transfuse as needed  6. Hypokalemia.  On potassium phosphate replacement following trend  7. Hypophosphatemia.  Following closely replacing accordingly  8.  Urinary tract infection with isolation of  Enterobacter as per primary team    PLAN:    -Will  Continue  Bumetanide  and titrate accordingly, patient continued to have slowly improvement of her negative balance and volume status  -We will continue to follow closely  -We will continue surveillance labs       Thank you for involving us in the care of Christiane Nazario.  Please feel free to call with any questions.    Benji Cheng MD  12/02/22  14:28 [facility]    [facility]  [phone]    Please note that portions of this note were completed with a voice recognition program.

## 2022-12-02 NOTE — PLAN OF CARE
"  Assessment: Christiane Nazario presents with ADL impairments below baseline abilities which indicate the need for continued skilled intervention while inpatient. Pt required max A x2 for bed mobility this date. When rolling R<>L, pt utilized bed rails for UE support. Pt tolerated sitting edge of bed x5 minutes with Tello-CGA for dynamic sitting. Pt completed BUE exercises sitting edge of bed, tolerating this well. Pt noted to have limited BUE ROM with exercises. Pt BP responded well to activity this date, increasing with activity. Pt BP 85/46 in supine, however increased sitting edge of bed 109/63 initially and remained 103/54 while sitting edge of bed. Pt continues to voice pain with movement. Pt continues to be at high risk for falls and is unsafe to return home at this time. Tolerating session today without incident. Will continue to follow and progress as tolerated.     Plan/Recommendations:   Moderate Intensity Therapy recommended post-acute care. This is recommended as therapy feels the patient would require 3-4 days per week and wouldn't tolerate \"3 hour daily\" rehab intensity. SNF would be the preferred choice. If the patient does not agree to SNF, arrange HH or OP depending on home bound status. If patient is medically complex, consider LTACH.. Pt requires no DME at discharge.     Pt desires Skilled Rehab placement at discharge. Pt cooperative; agreeable to therapeutic recommendations and plan of care.   "

## 2022-12-02 NOTE — CASE MANAGEMENT/SOCIAL WORK
Continued Stay Note  JAZLYN Renner     Patient Name: Christiane Nazario  MRN: [id number]  Today's Date: 12/2/2022    Admit Date: 11/7/2022    Plan: Accepted at LTACH. No precert needed. Patient with dc barriers as noted below   Discharge Plan     Row Name 12/02/22 1324       Plan    Plan Comments Dc barrier; iv electrolyte replacement; airvo max at 60L/95%, norepi, NGT with TF  updated Rogelio liasion that pt not ready for transfer.                Expected Discharge Date and Time     Expected Discharge Date Expected Discharge Time    Dec 5, 2022       Phone communication or documentation only - no physical contact with patient or family.        Anna Berry RN

## 2022-12-03 LAB
ALBUMIN SERPL-MCNC: 3.7 G/DL (ref 3.5–5.2)
ALBUMIN/GLOB SERPL: 2.8 G/DL
ALP SERPL-CCNC: 149 U/L (ref 39–117)
ALT SERPL W P-5'-P-CCNC: 15 U/L (ref 1–33)
ANION GAP SERPL CALCULATED.3IONS-SCNC: 10 MMOL/L (ref 5–15)
ANION GAP SERPL CALCULATED.3IONS-SCNC: 11 MMOL/L (ref 5–15)
AST SERPL-CCNC: 21 U/L (ref 1–32)
BASOPHILS # BLD AUTO: 0 10*3/MM3 (ref 0–0.2)
BASOPHILS NFR BLD AUTO: 0.4 % (ref 0–1.5)
BH CV ECHO MEAS - ACS: 2.19 CM
BH CV ECHO MEAS - AI P1/2T: 538.4 MSEC
BH CV ECHO MEAS - AO MAX PG: 11.5 MMHG
BH CV ECHO MEAS - AO MEAN PG: 6.5 MMHG
BH CV ECHO MEAS - AO ROOT DIAM: 3.2 CM
BH CV ECHO MEAS - AO V2 MAX: 169.3 CM/SEC
BH CV ECHO MEAS - AO V2 VTI: 31.4 CM
BH CV ECHO MEAS - AVA(I,D): 1.95 CM2
BH CV ECHO MEAS - EDV(CUBED): 53 ML
BH CV ECHO MEAS - EDV(MOD-SP4): 76.2 ML
BH CV ECHO MEAS - EF(MOD-BP): 71 %
BH CV ECHO MEAS - EF(MOD-SP4): 71 %
BH CV ECHO MEAS - ESV(CUBED): 18.7 ML
BH CV ECHO MEAS - ESV(MOD-SP4): 22.1 ML
BH CV ECHO MEAS - FS: 29.4 %
BH CV ECHO MEAS - IVS/LVPW: 0.8 CM
BH CV ECHO MEAS - IVSD: 0.76 CM
BH CV ECHO MEAS - LA DIMENSION: 3.7 CM
BH CV ECHO MEAS - LV DIASTOLIC VOL/BSA (35-75): 41 CM2
BH CV ECHO MEAS - LV MASS(C)D: 92.1 GRAMS
BH CV ECHO MEAS - LV MAX PG: 5.4 MMHG
BH CV ECHO MEAS - LV MEAN PG: 3.1 MMHG
BH CV ECHO MEAS - LV SYSTOLIC VOL/BSA (12-30): 11.9 CM2
BH CV ECHO MEAS - LV V1 MAX: 115.7 CM/SEC
BH CV ECHO MEAS - LV V1 VTI: 21.7 CM
BH CV ECHO MEAS - LVIDD: 3.8 CM
BH CV ECHO MEAS - LVIDS: 2.7 CM
BH CV ECHO MEAS - LVOT AREA: 2.8 CM2
BH CV ECHO MEAS - LVOT DIAM: 1.9 CM
BH CV ECHO MEAS - LVPWD: 0.95 CM
BH CV ECHO MEAS - MV A MAX VEL: 127.8 CM/SEC
BH CV ECHO MEAS - MV DEC SLOPE: 419.7 CM/SEC2
BH CV ECHO MEAS - MV DEC TIME: 0.26 MSEC
BH CV ECHO MEAS - MV E MAX VEL: 107.3 CM/SEC
BH CV ECHO MEAS - MV E/A: 0.84
BH CV ECHO MEAS - MV MAX PG: 7.6 MMHG
BH CV ECHO MEAS - MV MEAN PG: 2.45 MMHG
BH CV ECHO MEAS - MV V2 VTI: 27.9 CM
BH CV ECHO MEAS - MVA(VTI): 2.2 CM2
BH CV ECHO MEAS - RV MAX PG: 1.7 MMHG
BH CV ECHO MEAS - RV V1 MAX: 65.1 CM/SEC
BH CV ECHO MEAS - RV V1 VTI: 7.3 CM
BH CV ECHO MEAS - RVDD: 2.8 CM
BH CV ECHO MEAS - SI(MOD-SP4): 29.1 ML/M2
BH CV ECHO MEAS - SV(LVOT): 61.2 ML
BH CV ECHO MEAS - SV(MOD-SP4): 54.1 ML
BH CV ECHO MEAS - TR MAX PG: 30.9 MMHG
BH CV ECHO MEAS - TR MAX VEL: 270.4 CM/SEC
BILIRUB SERPL-MCNC: 0.9 MG/DL (ref 0–1.2)
BUN SERPL-MCNC: 41 MG/DL (ref 8–23)
BUN SERPL-MCNC: 44 MG/DL (ref 8–23)
BUN/CREAT SERPL: 35.7 (ref 7–25)
BUN/CREAT SERPL: 41.5 (ref 7–25)
CA-I SERPL ISE-MCNC: 0.89 MMOL/L (ref 1.2–1.3)
CALCIUM SPEC-SCNC: 6.5 MG/DL (ref 8.6–10.5)
CALCIUM SPEC-SCNC: 6.5 MG/DL (ref 8.6–10.5)
CHLORIDE SERPL-SCNC: 94 MMOL/L (ref 98–107)
CHLORIDE SERPL-SCNC: 97 MMOL/L (ref 98–107)
CO2 SERPL-SCNC: 30 MMOL/L (ref 22–29)
CO2 SERPL-SCNC: 33 MMOL/L (ref 22–29)
CREAT SERPL-MCNC: 1.06 MG/DL (ref 0.57–1)
CREAT SERPL-MCNC: 1.15 MG/DL (ref 0.57–1)
DEPRECATED RDW RBC AUTO: 59.1 FL (ref 37–54)
EGFRCR SERPLBLD CKD-EPI 2021: 51.4 ML/MIN/1.73
EGFRCR SERPLBLD CKD-EPI 2021: 56.6 ML/MIN/1.73
EOSINOPHIL # BLD AUTO: 0 10*3/MM3 (ref 0–0.4)
EOSINOPHIL NFR BLD AUTO: 0.3 % (ref 0.3–6.2)
ERYTHROCYTE [DISTWIDTH] IN BLOOD BY AUTOMATED COUNT: 17.8 % (ref 12.3–15.4)
GLOBULIN UR ELPH-MCNC: 1.3 GM/DL
GLUCOSE BLDC GLUCOMTR-MCNC: 127 MG/DL (ref 70–105)
GLUCOSE BLDC GLUCOMTR-MCNC: 142 MG/DL (ref 70–105)
GLUCOSE BLDC GLUCOMTR-MCNC: 224 MG/DL (ref 70–105)
GLUCOSE SERPL-MCNC: 191 MG/DL (ref 65–99)
GLUCOSE SERPL-MCNC: 248 MG/DL (ref 65–99)
HCT VFR BLD AUTO: 25.8 % (ref 34–46.6)
HGB BLD-MCNC: 8.6 G/DL (ref 12–15.9)
LYMPHOCYTES # BLD AUTO: 0.2 10*3/MM3 (ref 0.7–3.1)
LYMPHOCYTES NFR BLD AUTO: 2.1 % (ref 19.6–45.3)
MAGNESIUM SERPL-MCNC: 1.9 MG/DL (ref 1.6–2.4)
MAXIMAL PREDICTED HEART RATE: 150 BPM
MCH RBC QN AUTO: 30.3 PG (ref 26.6–33)
MCHC RBC AUTO-ENTMCNC: 33.3 G/DL (ref 31.5–35.7)
MCV RBC AUTO: 91.2 FL (ref 79–97)
MONOCYTES # BLD AUTO: 0.7 10*3/MM3 (ref 0.1–0.9)
MONOCYTES NFR BLD AUTO: 6.4 % (ref 5–12)
NEUTROPHILS NFR BLD AUTO: 9.5 10*3/MM3 (ref 1.7–7)
NEUTROPHILS NFR BLD AUTO: 90.8 % (ref 42.7–76)
NRBC BLD AUTO-RTO: 0 /100 WBC (ref 0–0.2)
PHOSPHATE SERPL-MCNC: 3.1 MG/DL (ref 2.5–4.5)
PLATELET # BLD AUTO: 101 10*3/MM3 (ref 140–450)
PMV BLD AUTO: 8.9 FL (ref 6–12)
POTASSIUM SERPL-SCNC: 2.8 MMOL/L (ref 3.5–5.2)
POTASSIUM SERPL-SCNC: 4.4 MMOL/L (ref 3.5–5.2)
PROT SERPL-MCNC: 5 G/DL (ref 6–8.5)
RBC # BLD AUTO: 2.83 10*6/MM3 (ref 3.77–5.28)
SODIUM SERPL-SCNC: 137 MMOL/L (ref 136–145)
SODIUM SERPL-SCNC: 138 MMOL/L (ref 136–145)
STRESS TARGET HR: 128 BPM
WBC NRBC COR # BLD: 10.5 10*3/MM3 (ref 3.4–10.8)

## 2022-12-03 PROCEDURE — 85025 COMPLETE CBC W/AUTO DIFF WBC: CPT | Performed by: NURSE PRACTITIONER

## 2022-12-03 PROCEDURE — 94660 CPAP INITIATION&MGMT: CPT

## 2022-12-03 PROCEDURE — 82962 GLUCOSE BLOOD TEST: CPT

## 2022-12-03 PROCEDURE — 82330 ASSAY OF CALCIUM: CPT | Performed by: INTERNAL MEDICINE

## 2022-12-03 PROCEDURE — 94799 UNLISTED PULMONARY SVC/PX: CPT

## 2022-12-03 PROCEDURE — 84100 ASSAY OF PHOSPHORUS: CPT | Performed by: NURSE PRACTITIONER

## 2022-12-03 PROCEDURE — 63710000001 INSULIN LISPRO (HUMAN) PER 5 UNITS: Performed by: NURSE PRACTITIONER

## 2022-12-03 PROCEDURE — 80053 COMPREHEN METABOLIC PANEL: CPT | Performed by: NURSE PRACTITIONER

## 2022-12-03 PROCEDURE — 83735 ASSAY OF MAGNESIUM: CPT | Performed by: INTERNAL MEDICINE

## 2022-12-03 PROCEDURE — 63710000001 INSULIN GLARGINE PER 5 UNITS: Performed by: INTERNAL MEDICINE

## 2022-12-03 RX ORDER — CALCIUM GLUCONATE 20 MG/ML
1 INJECTION, SOLUTION INTRAVENOUS AS NEEDED
Status: DISCONTINUED | OUTPATIENT
Start: 2022-12-03 | End: 2022-12-06 | Stop reason: HOSPADM

## 2022-12-03 RX ORDER — CALCIUM GLUCONATE 20 MG/ML
2 INJECTION, SOLUTION INTRAVENOUS AS NEEDED
Status: DISCONTINUED | OUTPATIENT
Start: 2022-12-03 | End: 2022-12-06 | Stop reason: HOSPADM

## 2022-12-03 RX ADMIN — INSULIN LISPRO 8 UNITS: 100 INJECTION, SOLUTION INTRAVENOUS; SUBCUTANEOUS at 18:22

## 2022-12-03 RX ADMIN — ANTI-FUNGAL POWDER MICONAZOLE NITRATE TALC FREE: 1.42 POWDER TOPICAL at 09:33

## 2022-12-03 RX ADMIN — LOPERAMIDE HYDROCHLORIDE 4 MG: 2 CAPSULE ORAL at 19:59

## 2022-12-03 RX ADMIN — INSULIN GLARGINE 5 UNITS: 100 INJECTION, SOLUTION SUBCUTANEOUS at 09:33

## 2022-12-03 RX ADMIN — HYDROCORTISONE 1 APPLICATION: 1 OINTMENT TOPICAL at 20:00

## 2022-12-03 RX ADMIN — COLLAGENASE SANTYL 1 APPLICATION: 250 OINTMENT TOPICAL at 09:19

## 2022-12-03 RX ADMIN — BUMETANIDE 1 MG: 0.25 INJECTION, SOLUTION INTRAMUSCULAR; INTRAVENOUS at 03:04

## 2022-12-03 RX ADMIN — IPRATROPIUM BROMIDE AND ALBUTEROL SULFATE 3 ML: 2.5; .5 SOLUTION RESPIRATORY (INHALATION) at 06:52

## 2022-12-03 RX ADMIN — BUMETANIDE 1 MG: 0.25 INJECTION, SOLUTION INTRAMUSCULAR; INTRAVENOUS at 19:59

## 2022-12-03 RX ADMIN — HYDROCORTISONE 1 APPLICATION: 1 OINTMENT TOPICAL at 09:19

## 2022-12-03 RX ADMIN — INSULIN LISPRO 4 UNITS: 100 INJECTION, SOLUTION INTRAVENOUS; SUBCUTANEOUS at 05:55

## 2022-12-03 RX ADMIN — ACETAMINOPHEN 1300 MG: 650 SOLUTION ORAL at 21:46

## 2022-12-03 RX ADMIN — MIDODRINE HYDROCHLORIDE 20 MG: 5 TABLET ORAL at 13:53

## 2022-12-03 RX ADMIN — ANTI-FUNGAL POWDER MICONAZOLE NITRATE TALC FREE: 1.42 POWDER TOPICAL at 19:59

## 2022-12-03 RX ADMIN — BUMETANIDE 1 MG: 0.25 INJECTION, SOLUTION INTRAMUSCULAR; INTRAVENOUS at 09:19

## 2022-12-03 RX ADMIN — MIDODRINE HYDROCHLORIDE 20 MG: 5 TABLET ORAL at 19:59

## 2022-12-03 RX ADMIN — MIDODRINE HYDROCHLORIDE 20 MG: 5 TABLET ORAL at 05:38

## 2022-12-03 RX ADMIN — Medication 5 MG: at 20:04

## 2022-12-03 RX ADMIN — POTASSIUM CHLORIDE 40 MEQ: 1.5 POWDER, FOR SOLUTION ORAL at 09:58

## 2022-12-03 RX ADMIN — POTASSIUM CHLORIDE 40 MEQ: 1.5 POWDER, FOR SOLUTION ORAL at 13:52

## 2022-12-03 RX ADMIN — Medication 10 ML: at 19:59

## 2022-12-03 RX ADMIN — PANTOPRAZOLE SODIUM 40 MG: 40 INJECTION, POWDER, FOR SOLUTION INTRAVENOUS at 19:59

## 2022-12-03 RX ADMIN — POTASSIUM CHLORIDE 40 MEQ: 1.5 POWDER, FOR SOLUTION ORAL at 05:38

## 2022-12-03 RX ADMIN — PANTOPRAZOLE SODIUM 40 MG: 40 INJECTION, POWDER, FOR SOLUTION INTRAVENOUS at 09:19

## 2022-12-03 RX ADMIN — Medication 10 ML: at 09:34

## 2022-12-03 NOTE — PROGRESS NOTES
[facility] Medicine Services Daily Progress Note    Patient Name: Christiane Nazario  : 1950  MRN: [id number]  Primary Care Physician:  Margarita Melissa, TOMMY  Date of admission: 2022      Subjective      Chief Complaint:     Shortness of breath    Patient Reports     12/3  Seen and examined vitals and labs reviewed  Shortness of breath improved  Bumex drip changed to IV  Nurse reports that she has chronic diarrhea has worsened and requested rectal tube placement but eventually patient had improvement in her bowel movements.  Calcium and potassium both low will need reevaluation replacement per protocol    ROS   All other systems reviewed and negative except as above      Objective      Vitals:   Temp:  [97.4 °F (36.3 °C)-99 °F (37.2 °C)] 97.4 °F (36.3 °C)  Heart Rate:  [68-86] 74  Resp:  [22-26] 22  BP: ()/(44-80) 107/54  Flow (L/min):  [60] 60    Physical Exam      GENERAL APPEARANCE: Moderate distress on BiPAP  HEAD: normocephalic.  EYES: PERRL, EOMI. vision intact grossly.  EARS: Intact hearing.  No gross abnormalities.  NOSE: No nasal discharge.  THROAT: Clear   NECK: Neck supple, non-tender without lymphadenopathy, masses or thyromegaly.  CARDIAC: Normal S1 and S2. No S3, S4 or murmurs. Rhythm is regular. There is no peripheral edema, cyanosis or pallor. Extremities are warm and well perfused. Capillary refill is less than 2 seconds. No carotid bruits.  LUNGS: Clear to auscultation and percussion without rales, rhonchi, wheezing or diminished breath sounds.  ABDOMEN: Positive bowel sounds. Soft, nondistended, nontender. No guarding or rebound. No masses.  MUSKULOSKELETAL: No deformity or swelling   BACK: No abnormalities noted     EXTREMITIES: Bilateral extremity swelling 2      NEUROLOGICAL: CN II-XII intact. Strength and sensation symmetric and intact throughout. Reflexes 2+ throughout. Cerebellar testing normal.  SKIN: Skin normal color, texture and turgor with  no lesions or eruptions.  PSYCHIATRIC: Alert cooperative not suicidal         Result Review    Result Review:  I have personally reviewed the results from the time of this admission to 12/3/2022 13:04 EST and agree with these findings:  []  Laboratory  []  Microbiology  []  Radiology  []  EKG/Telemetry   []  Cardiology/Vascular   []  Pathology  []  Old records  []  Other:  Most notable findings include:       Wounds (last 24 hours)     LDA Wound     Row Name 12/03/22 0815 12/03/22 0400 12/03/22 0000       Wound 09/28/22 0007 Bilateral upper coccyx Pressure Injury    Wound - Properties Group Placement Date: 09/28/22  -AW Placement Time: 0007 -AW Present on Hospital Admission: Y  -AW Side: Bilateral  -AW Orientation: upper  -AW Location: coccyx  -AW Primary Wound Type: Pressure inj  -AW    Pressure Injury Stage 3  -SN -- --    Dressing Appearance dry;intact  -SN -- --    Closure Adhesive bandage  -SN Adhesive bandage  -AB Adhesive bandage  -AB    Base dressing in place, unable to visualize  -SN dressing in place, unable to visualize  -AB dressing in place, unable to visualize  -AB    Periwound non-blanchable;redness  -SN non-blanchable;redness  -AB non-blanchable;redness  -AB    Periwound Temperature warm  -SN warm  -AB warm  -AB    Periwound Skin Turgor soft  -SN soft  -AB soft  -AB    Retired Wound - Properties Group Placement Date: 09/28/22  -AW Placement Time: 0007 -AW Present on Hospital Admission: Y  -AW Side: Bilateral  -AW Orientation: upper  -AW Location: coccyx  -AW Primary Wound Type: Pressure inj  -AW    Retired Wound - Properties Group Date first assessed: 09/28/22  -AW Time first assessed: 0007 -AW Present on Hospital Admission: Y  -AW Side: Bilateral  -AW Location: coccyx  -AW Primary Wound Type: Pressure inj  -AW       Wound 11/10/22 0901 Left heel Pressure Injury    Wound - Properties Group Placement Date: 11/10/22  -CC Placement Time: 0901  -CC Side: Left  -CC Location: heel  -CC Primary Wound  Type: Pressure inj  -CC Additional Comments: Redness to left heel, right heel seems to show normal skin  -CC    Pressure Injury Stage 1  -SN -- --    Dressing Appearance dry;intact  -SN -- --    Closure Open to air  -SN Open to air  -AB Open to air  -AB    Base non-blanchable;red;dry  -SN non-blanchable;red;dry  -AB non-blanchable;red;dry  -AB    Periwound intact;dry  -SN intact;dry  -AB intact;dry  -AB    Periwound Temperature warm  -SN warm  -AB warm  -AB    Periwound Skin Turgor soft  -SN soft  -AB soft  -AB    Drainage Amount none  -SN none  -AB none  -AB    Care, Wound pressure removed  -SN -- --    Dressing Care open to air  -SN -- --    Retired Wound - Properties Group Placement Date: 11/10/22  -CC Placement Time: 0901 -CC Side: Left  -CC Location: heel  -CC Primary Wound Type: Pressure inj  -CC Additional Comments: Redness to left heel, right heel seems to show normal skin  -CC    Retired Wound - Properties Group Date first assessed: 11/10/22  -CC Time first assessed: 0901 -CC Side: Left  -CC Location: heel  -CC Primary Wound Type: Pressure inj  -CC Additional Comments: Redness to left heel, right heel seems to show normal skin  -CC       Wound 11/22/22 0300 Bilateral labia MASD (Moisture associated skin damage)    Wound - Properties Group Placement Date: 11/22/22  - Placement Time: 0300  - Present on Hospital Admission: N  - Side: Bilateral  - Location: labia  - Primary Wound Type: MASD  -    Dressing Appearance dry;intact  -SN -- --    Closure Open to air  -SN Open to air  -AB Open to air  -AB    Base moist;red  -SN moist;red  -AB moist;red  -AB    Periwound pink  -SN pink  -AB pink  -AB    Care, Wound cleansed with;sterile normal saline  -SN -- --    Dressing Care open to air  cream applied  -SN -- --    Retired Wound - Properties Group Placement Date: 11/22/22  - Placement Time: 0300  - Present on Hospital Admission: N  - Side: Bilateral  - Location: labia  - Primary Wound Type:  MASD  -    Retired Wound - Properties Group Date first assessed: 11/22/22  - Time first assessed: 0300  - Present on Hospital Admission: N  - Side: Bilateral  - Location: labia  - Primary Wound Type: MASD  -KARINA    Row Name 12/02/22 2000 12/02/22 1554          Wound 09/28/22 0007 Bilateral upper coccyx Pressure Injury    Wound - Properties Group Placement Date: 09/28/22  -AW Placement Time: 0007 -AW Present on Hospital Admission: Y  -AW Side: Bilateral  -AW Orientation: upper  -AW Location: coccyx  -AW Primary Wound Type: Pressure inj  -AW    Pressure Injury Stage 3  -AB --     Dressing Appearance dry;intact  -AB --     Closure Adhesive bandage  -AB Adhesive bandage  -AA     Base dressing in place, unable to visualize  -AB red;non-blanchable;pink  -AA     Periwound non-blanchable;redness  -AB non-blanchable;redness  -AA     Periwound Temperature warm  -AB warm  -AA     Periwound Skin Turgor soft  -AB soft  -AA     Periwound Care dry periwound area maintained  -AB --     Retired Wound - Properties Group Placement Date: 09/28/22  -AW Placement Time: 0007 -AW Present on Hospital Admission: Y  -AW Side: Bilateral  -AW Orientation: upper  -AW Location: coccyx  -AW Primary Wound Type: Pressure inj  -AW    Retired Wound - Properties Group Date first assessed: 09/28/22  -AW Time first assessed: 0007 -AW Present on Hospital Admission: Y  -AW Side: Bilateral  -AW Location: coccyx  -AW Primary Wound Type: Pressure inj  -AW       Wound 11/10/22 0901 Left heel Pressure Injury    Wound - Properties Group Placement Date: 11/10/22  -CC Placement Time: 0901 -CC Side: Left  -CC Location: heel  -CC Primary Wound Type: Pressure inj  -CC Additional Comments: Redness to left heel, right heel seems to show normal skin  -CC    Pressure Injury Stage 1  -AB --     Dressing Appearance dry;intact  -AB --     Closure Open to air  -AB Open to air  -AA     Base non-blanchable;red;dry  -AB non-blanchable;red;dry  -AA     Periwound  intact;dry  -AB intact;dry  -AA     Periwound Temperature warm  -AB warm  -AA     Periwound Skin Turgor soft  -AB soft  -AA     Drainage Amount none  -AB --     Care, Wound pressure removed  -AB --     Dressing Care open to air  -AB --     Retired Wound - Properties Group Placement Date: 11/10/22  -CC Placement Time: 0901 -CC Side: Left  -CC Location: heel  -CC Primary Wound Type: Pressure inj  -CC Additional Comments: Redness to left heel, right heel seems to show normal skin  -CC    Retired Wound - Properties Group Date first assessed: 11/10/22  -CC Time first assessed: 0901 -CC Side: Left  -CC Location: heel  -CC Primary Wound Type: Pressure inj  -CC Additional Comments: Redness to left heel, right heel seems to show normal skin  -CC       Wound 11/22/22 0300 Bilateral labia MASD (Moisture associated skin damage)    Wound - Properties Group Placement Date: 11/22/22  - Placement Time: 0300  - Present on Hospital Admission: N  - Side: Bilateral  - Location: labia  - Primary Wound Type: MASD  -    Dressing Appearance dry;intact  -AB --     Closure Open to air  -AB Open to air  -AA     Base moist;red  -AB moist;red  -AA     Periwound pink  -AB --     Dressing Care open to air  -AB --     Periwound Care dry periwound area maintained  -AB --     Retired Wound - Properties Group Placement Date: 11/22/22  - Placement Time: 0300  - Present on Hospital Admission: N  - Side: Bilateral  - Location: labia  - Primary Wound Type: MASD  -    Retired Wound - Properties Group Date first assessed: 11/22/22  - Time first assessed: 0300  - Present on Hospital Admission: N  - Side: Bilateral  - Location: labia  - Primary Wound Type: MASD  -          User Key  (r) = Recorded By, (t) = Taken By, (c) = Cosigned By    Initials Name Provider Type    Dilan Sexton, RN Registered Nurse    Nika Fish RN Registered Nurse    Carlie Marquez RN Registered Nurse    Starr Murphy RN Registered  Nurse    CC Brenda Dill, RN Registered Nurse    Dilan Odom, RN Registered Nurse                  Assessment & Plan      Brief Patient Summary:  Christiane Nazario is a 72 y.o. female who         bumetanide, 1 mg, Intravenous, Q6H  collagenase, 1 application, Topical, Q24H  hydrocortisone-bacitracin-zinc oxide-nystatin, 1 application, Topical, BID  insulin glargine, 5 Units, Subcutaneous, Daily  insulin lispro, 4-24 Units, Subcutaneous, Q6H  miconazole, , Topical, Q12H  midodrine, 20 mg, Nasogastric, Q8H  pantoprazole, 40 mg, Intravenous, Q12H  potassium chloride, 40 mEq, Nasogastric, Daily  sodium chloride, 10 mL, Intravenous, Q12H       norepinephrine, 0.02-0.5 mcg/kg/min, Last Rate: Stopped (12/02/22 1223)  Pharmacy Consult - Pharmacy to dose,   Pharmacy Consult - Steroid Insulin Protocol,          Active Hospital Problems:  Active Hospital Problems    Diagnosis    • **COVID    • DILLON (acute kidney injury) (HCC)    • Pleural effusion on left    • Sepsis, unspecified organism (HCC)    • Choledocholithiasis    • Melena    • Metastatic cancer, multiple hepatic, osseous and pleural lesions (HCC)    • Acute UTI (urinary tract infection) Enterobacter Cloacae    • Essential hypertension    • Acquired hypothyroidism    • Primary malignant neoplasm of female breast (HCC)    • Type 2 diabetes mellitus with microalbuminuria, with long-term current use of insulin (HCC)      Plan:   History of Present Illness: Christiane Nazario is a 72 y.o. female with a past medical history to include DM2 with peripheral neuropathy, breast/liver/bone ca currently on chemotherapy, hyperlipidemia, hypertension and hypothyroidism who presented to [facility] on 11/7/2022 complaining of diarrhea for 6 weeks along with recent mild cough/congestion and shortness of air.  She denies having any hematochezia or melena.  She denies emesis.  She denies any nausea but reports having 1 episode of vomiting yesterday.  She denies  having lightheadedness, dizziness or syncopal episodes.  She denies having chest pain but reports mild cough and shortness of air.  She denies abdominal pain.  She denies having lower extremity pain.  She reports some dysuria and frequency.  According to record she has had several evaluations for diarrhea and noted negative PCR's.  She is currently alternating Lomotil and Imodium without relief.     Emergency room work-up pulse 94, BP 94/48.  Respirations 18-20.  2 L nasal cannula 93%.  Afebrile.  UA positive.  Urine culture pending.  COVID-19 detected.  Gastrointestinal panel negative.  C. difficile negative.  Glucose 173.  Creatinine 0.99.  Potassium 4.3.  Chloride 109.  CO2 21.0.  Calcium 7.0.  Albumin 2.60.  ALT 16, AST 24.  Alk phos 125.  Lipase 8.  WBC 2.9.  Hemoglobin 7.3.     CT abdomen pelvis without contrast there is subtle wall thickening of the transverse colon, descending colon and sigmoid colon suggesting mild infectious or inflammatory colitis.  There is no bowel obstruction.  There is a stone that appears to be lodged in the distal CBD near the level of ampulla with mild extra hepatic biliary ductal dilatation.  Hepatic metastatic disease is again noted.  Couple of lesions are slightly smaller, which may reflect positive interval response to therapy in the appropriate clinical context.  Features of pleural-based metastatic disease on the left lower thorax are seen to better advantage on prior CT imaging. extensive osseous metastatic disease is not thought to be significantly changed. However, there is a new mild compression fracture of the superior endplate of the L5 vertebral body without retropulsion  or subluxation. A previously seen T12 compression fracture has had slight progressive loss of vertebral body height in the interval. Stable small to moderate left basilar pleural effusion with passive left basilar atelectasis.      Chest x-ray right upper extremity PICC line tip at the cavoatrial  junction. Mild bibasilar atelectasis and small pleural effusions, stable. Pulmonary nodules better assessed on recent chest CT.       Assessment/plan    Acute respiratory failure with hypoxia / ARDS  • Likely due to combination of COVID-19 pneumonia, pleural effusion and CHF.  • Completed antibiotics for possible superimposed bacterial infection.  ID signed off  • Echocardiogram with normal LVEF.    • - On diuretics by nephrologist  • Off/on BiPAP alternating with high flow nasal cannula  • Late stage ARDS, no benefit for steroids.  DC further steroids.  -Consult pulmonologist outside ICU    Septic shock, due to COVID-pneumonia & UTI  UTI Enterobacter cloacae, present on admission  Colitis  • Off norepinephrine.  Continue with high-dose midodrine.  • Finished antibiotic course.     Acute on chronic diastolic heart failure / Volume Overload / Right pleural effusion  • Currently decompensated.   •    Diuretics managed by nephrologist.  • Last ECHO showed an EF of 65%, grade 1 LV diastolic dysfunction.  • Monitor Input/Output very closely. Follow daily weights.   • Net IO Since Admission: 19,336.88 mL [12/03/22 1343]      Acute Kidney Injury:   • Remains non oliguric.   • Likely cardiorenal, continue with diuresis  • Monitor Input/Output very closely.   • Defer to nephrologist      Severe hypokalemia: On aggressive potassium replacement.    Managed by nephrologist  Repeat evaluation  Received replacement today  Magnesium 1.9     Duodenal ulcers with bleeding / Acute blood loss anemia /anemia of chronic disease  • Multifactorial, undergoing chemo and metastatic disease  • EGD 11/23, with findings of actively bleeding duodenal ulcers x4 as well as a nonbleeding gastric ulcer.   • Continue Protonix  • GI signed off  • Received 1 PRBC since admission.     Essential hypertension    • On midodrine.  Home antihypertensives on hold.     Common bile duct stone  -Underwent ERCP 11/8/2022 with stone retraction and stenting of the  CBD  -GI signed off      Breast CA-Metastatic : Outpatient follow-up with heme-onc.     Diabetes mellitus Type 2, not insulin-dependent : well controlled.   • Accu checks before meals and at bedtime, c/w humalog coverage as needed.   • Continue Lantus 5 units daily.  • A1c of 6.2.     Primary hypothyroidism: Chronic and stable. Continue synthroid.     Morbid Obesity: Body mass index is 38.67 kg/m².    Hypocalcemia and hypokalemia  Replacement per protocol  Reevaluate    Chronic diarrhea  Initially thought may need rectal tube but has not been placed       DVT prophylaxis: SCDs  No anticoagulants secondary to GI bleed and gastric ulcers  High risk for DVT and PE  Consider screening Doppler  Discussed with patient and family    Mechanical DVT prophylaxis orders are present.    CODE STATUS:    Level Of Support Discussed With: Patient  Code Status (Patient has no pulse and is not breathing): CPR (Attempt to Resuscitate)  Medical Interventions (Patient has pulse or is breathing): Full Support      Disposition:  I expect patient to be discharged rehab.    This patient has been examined wearing appropriate Personal Protective Equipment and discussed with hospital infection control department. 12/03/22      Electronically signed by Gregor Hunter MD, 12/03/22, 13:04 EST.  [facility] Hospitalist Team

## 2022-12-03 NOTE — PROGRESS NOTES
Critical Care Progress Note   Christiane Nazario : 1950 MRN:[id number] LOS:26  Rm: 2316/1     Principal Problem: COVID     Reason for follow up: All the medical problems listed below    Summary     Christiane Nazario is a 72 y.o. female with a past medical history of diabetes, hypertension, hypothyroidism,  breast/liver/bone CA currently on chemotherapy,presented to [facility] on 2022  with diarrhea for 6 weeks along with dyspnea.  CT abdomen pelvis showed subtle wall thickening of the colon, suspicious for infectious or inflammatory colitis.  Also found a stone that appears to be lodged in the distal CBD near the level of ampulla with mild extra hepatic biliary ductal dilatation.  Hepatic metastatic disease is again noted.  GI was consulted, underwent ERCP 2022 with stone retraction and stenting of the CBD.    Subsequently, patient was found to have COVID-pneumonia.  Initially required 4 L nasal cannula and subsequently had progressive worsening of oxygenation.  Needed OptiFlow alternating with BiPAP.  Also found to have left pleural effusion and had a left chest tube placed by pulmonary on the floor.  Treated with diuretics.  Subsequently, developed DILLON and nephrology was consulted.  Patient underwent EGD on 2022 after being transferred out of the ICU.  Subsequently, patient became hypoxic acutely during her EGD, intubated.  Subsequently, improved and was extubated on .  Post extubation, still required high FiO2 over OptiFlow, alternating with BiPAP.  Subsequently, oxygenation improved with aggressive diuresis.    Multidisciplinary Rounds     22 : Persistent BiPAP needs, aggressive potassium replacements, hold Bumex until K> 3.5.    Assessment / Plan     Acute respiratory failure with hypoxia / ARDS  · Likely due to combination of COVID-19 pneumonia, pleural effusion and CHF.  · Completed antibiotics for possible superimposed bacterial infection.  ID signed  off  · Echocardiogram with normal LVEF.  Diuresis per nephrology  · Off BiPAP alternating with high flow nasal cannula  · Late stage ARDS, no benefit for steroids.  DC further steroids.     Septic shock, due to COVID-pneumonia & UTI  UTI Enterobacter cloacae, present on admission  Colitis  · Off norepinephrine.  Continue with high-dose midodrine.  · Finished antibiotic course.    Acute on chronic diastolic heart failure / Volume Overload / Right pleural effusion  • Currently decompensated.  Continue with scheduled Lasix  • Last ECHO showed an EF of 65%, grade 1 LV diastolic dysfunction.  • Monitor Input/Output very closely. Follow daily weights.   • Net IO Since Admission: 19,336.88 mL [12/03/22 1343]     Acute Kidney Injury:   • Remains non oliguric.   • Likely cardiorenal, continue with diuresis  • Monitor Input/Output very closely.   • Net IO Since Admission: 19,336.88 mL [12/03/22 1343]     Severe hypokalemia: On aggressive potassium replacement.  Hold Bumex until K>3,5    Duodenal ulcers with bleeding / Acute blood loss anemia /anemia of chronic disease  · Multifactorial, undergoing chemo and metastatic disease  · EGD 11/23, with findings of actively bleeding duodenal ulcers x4 as well as a nonbleeding gastric ulcer.   · Continue Protonix  · GI signed off  · Received 1 PRBC since admission.     Essential hypertension    · On midodrine.  Home antihypertensives on hold.     Common bile duct stone  -Underwent ERCP 11/8/2022 with stone retraction and stenting of the CBD  -GI signed off      Breast CA-Metastatic : Outpatient follow-up with heme-onc.     Diabetes mellitus Type 2, not insulin-dependent : well controlled.   • Accu checks before meals and at bedtime, c/w humalog coverage as needed.   • Continue Lantus 5 units daily.  • A1c of 6.2.    Primary hypothyroidism: Chronic and stable. Continue synthroid.     Morbid Obesity: Body mass index is 38.67 kg/m².      Code status:   Level Of Support Discussed With:  Patient  Code Status (Patient has no pulse and is not breathing): CPR (Attempt to Resuscitate)  Medical Interventions (Patient has pulse or is breathing): Full Support       Nutrition: NPO Diet NPO Type: Ice Chips     DVT prophylaxis:   Mechanical Order History:     None      Pharmalogical Order History:      Ordered     Dose Route Frequency Stop    11/07/22 1148  Enoxaparin Sodium (LOVENOX) syringe 40 mg         40 mg SC Daily --    11/07/22 1144  Enoxaparin Sodium (LOVENOX) syringe 40 mg  Status:  Discontinued         40 mg SC Daily 11/07/22 1148                 Subjective / Review of systems     Review of Systems   Respiratory: Positive for shortness of breath. Negative for cough, wheezing and stridor.    Cardiovascular: Negative for chest pain and palpitations.   Gastrointestinal: Negative for nausea and vomiting.      Still feels short of breath, not much improvement from yesterday.  Objective / Physical Exam     Vital signs:  Temp: 98.2 °F (36.8 °C)  BP: 104/46  Heart Rate: 73  Resp: 22  SpO2: 99 %  Weight: 89.8 kg (198 lb)    Admission Weight: Weight: 81.6 kg (180 lb)  Current Weight: Weight: 89.8 kg (198 lb)    Input/Output in last 24 hours:    Intake/Output Summary (Last 24 hours) at 12/3/2022 1343  Last data filed at 12/3/2022 1000  Gross per 24 hour   Intake 8483 ml   Output 1550 ml   Net 6933 ml      Net IO Since Admission: 19,336.88 mL [12/03/22 1343]     Physical Exam  Vitals and nursing note reviewed.   Constitutional:       General: She is not in acute distress.     Appearance: Normal appearance.   HENT:      Nose:      Comments: NG tube in place     Mouth/Throat:      Mouth: Mucous membranes are moist.      Pharynx: Oropharynx is clear.   Eyes:      General: No scleral icterus.     Extraocular Movements: Extraocular movements intact.      Conjunctiva/sclera: Conjunctivae normal.   Cardiovascular:      Rate and Rhythm: Normal rate.      Heart sounds: No murmur heard.    No gallop.   Pulmonary:       Breath sounds: Rales (Right basal) present. No wheezing.      Comments: On BiPAP  Abdominal:      General: Bowel sounds are normal.      Palpations: Abdomen is soft.      Tenderness: There is no abdominal tenderness.   Musculoskeletal:         General: No tenderness.      Right lower leg: No edema.      Left lower leg: No edema.   Neurological:      General: No focal deficit present.      Mental Status: She is alert and oriented to person, place, and time.          Radiology and Labs     Results from last 7 days   Lab Units 12/03/22 0428 12/02/22 0439 12/01/22  0449 11/30/22  0508 11/29/22  1716 11/29/22  1058 11/29/22  0534   WBC 10*3/mm3 10.50 16.10* 11.70* 12.70*  --   --  13.40*   HEMATOCRIT % 25.8* 28.2* 27.5* 27.1* 29.3*   < > 19.0*   PLATELETS 10*3/mm3 101* 160 137* 132*  --   --  127*    < > = values in this interval not displayed.      Results from last 7 days   Lab Units 12/03/22 0428 12/02/22  1730 12/02/22 0439 12/01/22  0449 11/30/22  1704 11/30/22  0508 11/29/22  1716   SODIUM mmol/L 138  --  143 144  --  148* 147*   POTASSIUM mmol/L 2.8* 3.2* 3.2* 4.0 3.6 3.4* 4.0   CHLORIDE mmol/L 94*  --  95* 99  --  103 105   CO2 mmol/L 33.0*  --  33.0* 31.0*  --  28.0 25.0   BUN mg/dL 41*  --  32* 26*  --  23 21   CREATININE mg/dL 1.15*  --  1.28* 1.21*  --  1.17* 1.02*        Current medications     Scheduled Meds:   bumetanide, 1 mg, Intravenous, Q6H  collagenase, 1 application, Topical, Q24H  hydrocortisone-bacitracin-zinc oxide-nystatin, 1 application, Topical, BID  insulin glargine, 5 Units, Subcutaneous, Daily  insulin lispro, 4-24 Units, Subcutaneous, Q6H  miconazole, , Topical, Q12H  midodrine, 20 mg, Nasogastric, Q8H  pantoprazole, 40 mg, Intravenous, Q12H  potassium chloride, 40 mEq, Nasogastric, Daily  sodium chloride, 10 mL, Intravenous, Q12H        Continuous Infusions:   norepinephrine, 0.02-0.5 mcg/kg/min, Last Rate: Stopped (12/02/22 1223)  Pharmacy Consult - Pharmacy to dose,   Pharmacy  Consult - Steroid Insulin Protocol,         Plan discussed with RN. Reviewed all other data in the last 24 hours, including but not limited to vitals, labs, microbiology, imaging and pertinent notes from other providers.  High complexity decision making and high risk of deterioration.      Hilton Hernandez MD   Critical Care  12/03/22   13:43 EST

## 2022-12-03 NOTE — PROGRESS NOTES
Nutrition Services    Patient Name:  Christiane Nazario  YOB: 1950  MRN: [id number]  Admit Date:  11/7/2022    Brief progress note to check on enteral nutrition support. Pt is at goal rate, with Diabetisource AC infusing at 60 mL/hour + Prosource TF BID with good tolerance. Bowel are moving, with last BM today. Labs reviewed; no changes indicated to TF regimen today. Potassium is low, scheduled to be replaced today Will continue to follow.     Continue current TF, which is at goal rate.    Electronically signed by:  Roseann Sethi RD  12/03/22 10:23 EST

## 2022-12-03 NOTE — PROGRESS NOTES
[facility] Progress Note      Patient Name: Christiane Nazario  : 1950  MRN: [id number]  Primary Care Physician:  Margarita Melissa, TOMMY  Date of admission: 2022    Subjective     Interval History:     Overnight no event  Patient continues to require BiPAP   Currently on feeds tolerating   No events noted overnight.    Review of Systems:   As noted above    Objective     Vitals:   Temp:  [97.4 °F (36.3 °C)-99 °F (37.2 °C)] 97.4 °F (36.3 °C)  Heart Rate:  [68-86] 74  Resp:  [22-26] 22  BP: ()/(44-80) 107/54  Flow (L/min):  [60] 60    Intake/Output Summary (Last 24 hours) at 12/3/2022 1324  Last data filed at 12/3/2022 1000  Gross per 24 hour   Intake 8483 ml   Output 1550 ml   Net 6933 ml       Physical Exam:    General Appearance: Patient on BiPAP  Skin: warm and dry  HEENT: oral mucosa normal, nonicteric sclera  Neck: supple, no JVD  Lungs: Crackles bibasal  Heart: RRR, normal S1 and S2  Abdomen: soft, nontender, nondistended  : no palpable bladder  Extremities: Anasarca  Neuro: Alert X3 no focalization    Scheduled Meds:     bumetanide, 1 mg, Intravenous, Q6H  collagenase, 1 application, Topical, Q24H  hydrocortisone-bacitracin-zinc oxide-nystatin, 1 application, Topical, BID  insulin glargine, 5 Units, Subcutaneous, Daily  insulin lispro, 4-24 Units, Subcutaneous, Q6H  miconazole, , Topical, Q12H  midodrine, 20 mg, Nasogastric, Q8H  pantoprazole, 40 mg, Intravenous, Q12H  potassium chloride, 40 mEq, Nasogastric, Daily  sodium chloride, 10 mL, Intravenous, Q12H      IV Meds:   norepinephrine, 0.02-0.5 mcg/kg/min, Last Rate: Stopped (22 1223)  Pharmacy Consult - Pharmacy to dose,   Pharmacy Consult - Steroid Insulin Protocol,         Results Reviewed:   I have personally reviewed the results from the time of this admission to 12/3/2022 13:24 EST     Results from last 7 days   Lab Units 22  0428 22  1730 22  0439 22  0449   SODIUM  mmol/L 138  --  143 144   POTASSIUM mmol/L 2.8* 3.2* 3.2* 4.0   CHLORIDE mmol/L 94*  --  95* 99   CO2 mmol/L 33.0*  --  33.0* 31.0*   BUN mg/dL 41*  --  32* 26*   CREATININE mg/dL 1.15*  --  1.28* 1.21*   CALCIUM mg/dL 6.5*  --  7.1* 7.2*   BILIRUBIN mg/dL 0.9  --  1.0 1.3*   ALK PHOS U/L 149*  --  160* 174*   ALT (SGPT) U/L 15  --  15 12   AST (SGOT) U/L 21  --  24 21   GLUCOSE mg/dL 191*  --  115* 226*       Estimated Creatinine Clearance: 45.4 mL/min (A) (by C-G formula based on SCr of 1.15 mg/dL (H)).    Results from last 7 days   Lab Units 12/03/22 0428 12/02/22  1730 12/02/22  0439 12/01/22  0449   MAGNESIUM mg/dL 1.9 2.0 1.4* 1.6   PHOSPHORUS mg/dL 3.1  --  2.3* 3.1             Results from last 7 days   Lab Units 12/03/22  0428 12/02/22  0439 12/01/22  0449 11/30/22  0508 11/29/22  1716 11/29/22  1058 11/29/22  0534   WBC 10*3/mm3 10.50 16.10* 11.70* 12.70*  --   --  13.40*   HEMOGLOBIN g/dL 8.6* 9.0* 8.7* 8.8* 9.7*   < > 6.1*   PLATELETS 10*3/mm3 101* 160 137* 132*  --   --  127*    < > = values in this interval not displayed.             Assessment / Plan              ASSESSMENT:    1.  Acute kidney injury Nonoliguric ATN.  Renal function improved volume status generous which seems to be third spacing  2.  COVID-19 infection/respiratory failure.    On isolation completed for 2 weeks followed by respiratory therapy,  3.  Edema/ Anasarca .  Seems to be third spacing due to inflammatory state, nutritional, prolonged hospital stay , Currently on bumetanide drip change to q 6 hours   4.   Hypernatremia since patient was placed on bumetanide slightly increased we will continue at this point as patient is severely volume overloaded  5.  Acute on chronic normocytic anemia secondary to GI bleeding from duodenal ulcers S/p EGD and sclerotherapy 2 duodenal ulcers.  Follow H&H closely transfuse as needed  6. Hypokalemia.  On potassium phosphate replacement following trend  7. Hypophosphatemia.  Following closely  replacing accordingly  8.  Urinary tract infection with isolation of Enterobacter as per primary team    PLAN:    Agree with holding Bumex and her potassium is above 3.5  We will continue diuresis once potassium is corrected.  Check magnesium and renal panel in a.m.       Thank you for involving us in the care of Christiane Nazario.  Please feel free to call with any questions.    Tamy Arroyo MD  12/03/22  13:24 [facility]    [facility]  [phone]    Please note that portions of this note were completed with a voice recognition program.

## 2022-12-03 NOTE — PLAN OF CARE
Goal Outcome Evaluation:     Patient has had multiple bowel movements today. Other than this, she has not had any outstanding events to occur. Patient is currently resting in bed with no complaints. Vital signs are stable. Will continue to monitor and follow plan of care.

## 2022-12-04 LAB
ALBUMIN SERPL-MCNC: 3.4 G/DL (ref 3.5–5.2)
ALBUMIN/GLOB SERPL: 2.4 G/DL
ALP SERPL-CCNC: 128 U/L (ref 39–117)
ALT SERPL W P-5'-P-CCNC: 16 U/L (ref 1–33)
ANION GAP SERPL CALCULATED.3IONS-SCNC: 10 MMOL/L (ref 5–15)
ANION GAP SERPL CALCULATED.3IONS-SCNC: 7 MMOL/L (ref 5–15)
AST SERPL-CCNC: 19 U/L (ref 1–32)
BASOPHILS # BLD AUTO: 0 10*3/MM3 (ref 0–0.2)
BASOPHILS NFR BLD AUTO: 0.3 % (ref 0–1.5)
BILIRUB SERPL-MCNC: 0.7 MG/DL (ref 0–1.2)
BUN SERPL-MCNC: 47 MG/DL (ref 8–23)
BUN SERPL-MCNC: 51 MG/DL (ref 8–23)
BUN/CREAT SERPL: 43.9 (ref 7–25)
BUN/CREAT SERPL: 49 (ref 7–25)
CA-I SERPL ISE-MCNC: 0.96 MMOL/L (ref 1.2–1.3)
CALCIUM SPEC-SCNC: 6.8 MG/DL (ref 8.6–10.5)
CALCIUM SPEC-SCNC: 6.9 MG/DL (ref 8.6–10.5)
CHLORIDE SERPL-SCNC: 96 MMOL/L (ref 98–107)
CHLORIDE SERPL-SCNC: 98 MMOL/L (ref 98–107)
CO2 SERPL-SCNC: 29 MMOL/L (ref 22–29)
CO2 SERPL-SCNC: 31 MMOL/L (ref 22–29)
CREAT SERPL-MCNC: 1.04 MG/DL (ref 0.57–1)
CREAT SERPL-MCNC: 1.07 MG/DL (ref 0.57–1)
DEPRECATED RDW RBC AUTO: 60.4 FL (ref 37–54)
EGFRCR SERPLBLD CKD-EPI 2021: 56 ML/MIN/1.73
EGFRCR SERPLBLD CKD-EPI 2021: 57.9 ML/MIN/1.73
EOSINOPHIL # BLD AUTO: 0.1 10*3/MM3 (ref 0–0.4)
EOSINOPHIL NFR BLD AUTO: 1.1 % (ref 0.3–6.2)
ERYTHROCYTE [DISTWIDTH] IN BLOOD BY AUTOMATED COUNT: 18.1 % (ref 12.3–15.4)
FIBRINOGEN PPP-MCNC: 305 MG/DL (ref 210–450)
GLOBULIN UR ELPH-MCNC: 1.4 GM/DL
GLUCOSE BLDC GLUCOMTR-MCNC: 130 MG/DL (ref 70–105)
GLUCOSE BLDC GLUCOMTR-MCNC: 134 MG/DL (ref 70–105)
GLUCOSE BLDC GLUCOMTR-MCNC: 160 MG/DL (ref 70–105)
GLUCOSE BLDC GLUCOMTR-MCNC: 179 MG/DL (ref 70–105)
GLUCOSE SERPL-MCNC: 121 MG/DL (ref 65–99)
GLUCOSE SERPL-MCNC: 150 MG/DL (ref 65–99)
HCT VFR BLD AUTO: 24.4 % (ref 34–46.6)
HGB BLD-MCNC: 8.2 G/DL (ref 12–15.9)
LYMPHOCYTES # BLD AUTO: 0.2 10*3/MM3 (ref 0.7–3.1)
LYMPHOCYTES NFR BLD AUTO: 2.3 % (ref 19.6–45.3)
MAGNESIUM SERPL-MCNC: 1.8 MG/DL (ref 1.6–2.4)
MCH RBC QN AUTO: 30.7 PG (ref 26.6–33)
MCHC RBC AUTO-ENTMCNC: 33.5 G/DL (ref 31.5–35.7)
MCV RBC AUTO: 91.5 FL (ref 79–97)
MONOCYTES # BLD AUTO: 0.6 10*3/MM3 (ref 0.1–0.9)
MONOCYTES NFR BLD AUTO: 6 % (ref 5–12)
NEUTROPHILS NFR BLD AUTO: 8.3 10*3/MM3 (ref 1.7–7)
NEUTROPHILS NFR BLD AUTO: 90.3 % (ref 42.7–76)
NRBC BLD AUTO-RTO: 0 /100 WBC (ref 0–0.2)
PHOSPHATE SERPL-MCNC: 1.9 MG/DL (ref 2.5–4.5)
PHOSPHATE SERPL-MCNC: 2.9 MG/DL (ref 2.5–4.5)
PLATELET # BLD AUTO: 80 10*3/MM3 (ref 140–450)
PMV BLD AUTO: 9.1 FL (ref 6–12)
POTASSIUM SERPL-SCNC: 3.5 MMOL/L (ref 3.5–5.2)
POTASSIUM SERPL-SCNC: 4.4 MMOL/L (ref 3.5–5.2)
PROT SERPL-MCNC: 4.8 G/DL (ref 6–8.5)
RBC # BLD AUTO: 2.67 10*6/MM3 (ref 3.77–5.28)
SODIUM SERPL-SCNC: 135 MMOL/L (ref 136–145)
SODIUM SERPL-SCNC: 136 MMOL/L (ref 136–145)
WBC NRBC COR # BLD: 9.2 10*3/MM3 (ref 3.4–10.8)

## 2022-12-04 PROCEDURE — 63710000001 INSULIN LISPRO (HUMAN) PER 5 UNITS: Performed by: NURSE PRACTITIONER

## 2022-12-04 PROCEDURE — 80053 COMPREHEN METABOLIC PANEL: CPT | Performed by: NURSE PRACTITIONER

## 2022-12-04 PROCEDURE — 85025 COMPLETE CBC W/AUTO DIFF WBC: CPT | Performed by: NURSE PRACTITIONER

## 2022-12-04 PROCEDURE — 82962 GLUCOSE BLOOD TEST: CPT

## 2022-12-04 PROCEDURE — 85384 FIBRINOGEN ACTIVITY: CPT | Performed by: NURSE PRACTITIONER

## 2022-12-04 PROCEDURE — 94799 UNLISTED PULMONARY SVC/PX: CPT

## 2022-12-04 PROCEDURE — 25010000002 CALCIUM GLUCONATE-NACL 1-0.675 GM/50ML-% SOLUTION: Performed by: NURSE PRACTITIONER

## 2022-12-04 PROCEDURE — 94660 CPAP INITIATION&MGMT: CPT

## 2022-12-04 PROCEDURE — 99232 SBSQ HOSP IP/OBS MODERATE 35: CPT | Performed by: NURSE PRACTITIONER

## 2022-12-04 PROCEDURE — 25010000002 ONDANSETRON PER 1 MG: Performed by: INTERNAL MEDICINE

## 2022-12-04 PROCEDURE — 83735 ASSAY OF MAGNESIUM: CPT | Performed by: INTERNAL MEDICINE

## 2022-12-04 PROCEDURE — 84100 ASSAY OF PHOSPHORUS: CPT | Performed by: INTERNAL MEDICINE

## 2022-12-04 PROCEDURE — 63710000001 INSULIN GLARGINE PER 5 UNITS: Performed by: INTERNAL MEDICINE

## 2022-12-04 PROCEDURE — 25010000002 CALCIUM GLUCONATE-NACL 1-0.675 GM/50ML-% SOLUTION: Performed by: INTERNAL MEDICINE

## 2022-12-04 PROCEDURE — 82330 ASSAY OF CALCIUM: CPT | Performed by: INTERNAL MEDICINE

## 2022-12-04 RX ORDER — TRAMADOL HYDROCHLORIDE 50 MG/1
50 TABLET ORAL EVERY 6 HOURS PRN
Status: DISCONTINUED | OUTPATIENT
Start: 2022-12-04 | End: 2022-12-06 | Stop reason: HOSPADM

## 2022-12-04 RX ORDER — LIDOCAINE 50 MG/G
1 PATCH TOPICAL
Status: DISCONTINUED | OUTPATIENT
Start: 2022-12-04 | End: 2022-12-06 | Stop reason: HOSPADM

## 2022-12-04 RX ORDER — CALCIUM GLUCONATE 20 MG/ML
1 INJECTION, SOLUTION INTRAVENOUS ONCE
Status: COMPLETED | OUTPATIENT
Start: 2022-12-04 | End: 2022-12-04

## 2022-12-04 RX ORDER — BUMETANIDE 0.25 MG/ML
2 INJECTION INTRAMUSCULAR; INTRAVENOUS EVERY 8 HOURS
Status: DISCONTINUED | OUTPATIENT
Start: 2022-12-04 | End: 2022-12-06 | Stop reason: HOSPADM

## 2022-12-04 RX ADMIN — POTASSIUM PHOSPHATE, MONOBASIC AND POTASSIUM PHOSPHATE, DIBASIC 15 MMOL: 224; 236 INJECTION, SOLUTION, CONCENTRATE INTRAVENOUS at 06:43

## 2022-12-04 RX ADMIN — LOPERAMIDE HYDROCHLORIDE 4 MG: 2 CAPSULE ORAL at 19:49

## 2022-12-04 RX ADMIN — TRAMADOL HYDROCHLORIDE 50 MG: 50 TABLET, COATED ORAL at 13:29

## 2022-12-04 RX ADMIN — INSULIN LISPRO 4 UNITS: 100 INJECTION, SOLUTION INTRAVENOUS; SUBCUTANEOUS at 11:53

## 2022-12-04 RX ADMIN — BUMETANIDE 2 MG: 0.25 INJECTION, SOLUTION INTRAMUSCULAR; INTRAVENOUS at 16:08

## 2022-12-04 RX ADMIN — INSULIN GLARGINE 5 UNITS: 100 INJECTION, SOLUTION SUBCUTANEOUS at 10:24

## 2022-12-04 RX ADMIN — HYDROCORTISONE 1 APPLICATION: 1 OINTMENT TOPICAL at 10:26

## 2022-12-04 RX ADMIN — MIDODRINE HYDROCHLORIDE 20 MG: 5 TABLET ORAL at 22:33

## 2022-12-04 RX ADMIN — LOPERAMIDE HYDROCHLORIDE 4 MG: 2 CAPSULE ORAL at 06:43

## 2022-12-04 RX ADMIN — HYDROCORTISONE 1 APPLICATION: 1 OINTMENT TOPICAL at 21:24

## 2022-12-04 RX ADMIN — LOPERAMIDE HYDROCHLORIDE 4 MG: 2 CAPSULE ORAL at 13:29

## 2022-12-04 RX ADMIN — TRAMADOL HYDROCHLORIDE 50 MG: 50 TABLET, COATED ORAL at 06:43

## 2022-12-04 RX ADMIN — MIDODRINE HYDROCHLORIDE 20 MG: 5 TABLET ORAL at 13:29

## 2022-12-04 RX ADMIN — INSULIN LISPRO 4 UNITS: 100 INJECTION, SOLUTION INTRAVENOUS; SUBCUTANEOUS at 01:23

## 2022-12-04 RX ADMIN — POTASSIUM CHLORIDE 40 MEQ: 1.5 POWDER, FOR SOLUTION ORAL at 10:24

## 2022-12-04 RX ADMIN — CALCIUM GLUCONATE 1 G: 20 INJECTION, SOLUTION INTRAVENOUS at 01:57

## 2022-12-04 RX ADMIN — PANTOPRAZOLE SODIUM 40 MG: 40 INJECTION, POWDER, FOR SOLUTION INTRAVENOUS at 10:23

## 2022-12-04 RX ADMIN — PANTOPRAZOLE SODIUM 40 MG: 40 INJECTION, POWDER, FOR SOLUTION INTRAVENOUS at 22:33

## 2022-12-04 RX ADMIN — MIDODRINE HYDROCHLORIDE 20 MG: 5 TABLET ORAL at 06:43

## 2022-12-04 RX ADMIN — BUMETANIDE 1 MG: 0.25 INJECTION, SOLUTION INTRAMUSCULAR; INTRAVENOUS at 02:00

## 2022-12-04 RX ADMIN — ONDANSETRON 4 MG: 2 INJECTION INTRAMUSCULAR; INTRAVENOUS at 01:58

## 2022-12-04 RX ADMIN — Medication 10 ML: at 22:34

## 2022-12-04 RX ADMIN — LIDOCAINE 1 PATCH: 700 PATCH TOPICAL at 10:24

## 2022-12-04 RX ADMIN — CALCIUM GLUCONATE 1 G: 20 INJECTION, SOLUTION INTRAVENOUS at 06:43

## 2022-12-04 RX ADMIN — BUMETANIDE 1 MG: 0.25 INJECTION, SOLUTION INTRAMUSCULAR; INTRAVENOUS at 10:24

## 2022-12-04 RX ADMIN — POTASSIUM CHLORIDE 40 MEQ: 1.5 POWDER, FOR SOLUTION ORAL at 06:57

## 2022-12-04 RX ADMIN — COLLAGENASE SANTYL 1 APPLICATION: 250 OINTMENT TOPICAL at 10:26

## 2022-12-04 RX ADMIN — ANTI-FUNGAL POWDER MICONAZOLE NITRATE TALC FREE: 1.42 POWDER TOPICAL at 10:26

## 2022-12-04 RX ADMIN — ACETAMINOPHEN 650 MG: 650 SOLUTION ORAL at 01:27

## 2022-12-04 RX ADMIN — Medication 10 ML: at 10:25

## 2022-12-04 RX ADMIN — ANTI-FUNGAL POWDER MICONAZOLE NITRATE TALC FREE: 1.42 POWDER TOPICAL at 21:24

## 2022-12-04 RX ADMIN — TRAMADOL HYDROCHLORIDE 50 MG: 50 TABLET, COATED ORAL at 19:49

## 2022-12-04 NOTE — PROGRESS NOTES
[facility] Medicine Services Daily Progress Note    Patient Name: Christiane Nazario  : 1950  MRN: [id number]  Primary Care Physician:  Margarita Melissa, TOMMY  Date of admission: 2022      Subjective      Chief Complaint:     Shortness of breath    Patient Reports       Seen and examined patient still requiring high flow oxygen up to 40% FiO2  Seen by pulmonary service phosphorus is low 1.9 and will replace per protocol potassium is better today  Significant hypocalcemia noted on replacement protocol has been placed  Platelets are 80 today seems to be trending down  But she has chronic thrombocytopenia as well    ROS   All other systems reviewed and negative except as above      Objective      Vitals:   Temp:  [97.3 °F (36.3 °C)-98.4 °F (36.9 °C)] 97.5 °F (36.4 °C)  Heart Rate:  [64-93] 73  Resp:  [20-27] 24  BP: ()/(42-57) 98/43  Flow (L/min):  [50] 50    Physical Exam      GENERAL APPEARANCE: Moderate distress on BiPAP  HEAD: normocephalic.  EYES: PERRL, EOMI. vision intact grossly.  EARS: Intact hearing.  No gross abnormalities.  NOSE: No nasal discharge.  THROAT: Clear   NECK: Neck supple, non-tender without lymphadenopathy, masses or thyromegaly.  CARDIAC: Normal S1 and S2. No S3, S4 or murmurs. Rhythm is regular. There is no peripheral edema, cyanosis or pallor. Extremities are warm and well perfused. Capillary refill is less than 2 seconds. No carotid bruits.  LUNGS: Clear to auscultation and percussion without rales, rhonchi, wheezing or diminished breath sounds.  ABDOMEN: Positive bowel sounds. Soft, nondistended, nontender. No guarding or rebound. No masses.  MUSKULOSKELETAL: No deformity or swelling   BACK: No abnormalities noted     EXTREMITIES: Bilateral extremity swelling 2      NEUROLOGICAL: CN II-XII intact. Strength and sensation symmetric and intact throughout. Reflexes 2+ throughout. Cerebellar testing normal.  SKIN: Skin normal color, texture and  turgor with no lesions or eruptions.  PSYCHIATRIC: Alert cooperative not suicidal         Result Review    Result Review:  I have personally reviewed the results from the time of this admission to 12/4/2022 13:29 EST and agree with these findings:  []  Laboratory  []  Microbiology  []  Radiology  []  EKG/Telemetry   []  Cardiology/Vascular   []  Pathology  []  Old records  []  Other:  Most notable findings include:       Wounds (last 24 hours)     LDA Wound     Row Name 12/04/22 0400 12/04/22 0000 12/03/22 2000       Wound 09/28/22 0007 Bilateral upper coccyx Pressure Injury    Wound - Properties Group Placement Date: 09/28/22 -AW Placement Time: 0007 -AW Present on Hospital Admission: Y  -AW Side: Bilateral  -AW Orientation: upper  -AW Location: coccyx  -AW Primary Wound Type: Pressure inj  -AW    Pressure Injury Stage -- -- 3  -AB    Dressing Appearance -- -- dry;intact  -AB    Closure Adhesive bandage  -AB Adhesive bandage  -AB Adhesive bandage  -AB    Base dressing in place, unable to visualize  -AB dressing in place, unable to visualize  -AB dressing in place, unable to visualize  -AB    Periwound non-blanchable;redness  -AB non-blanchable;redness  -AB non-blanchable;redness  -AB    Periwound Temperature warm  -AB warm  -AB warm  -AB    Periwound Skin Turgor soft  -AB soft  -AB soft  -AB    Edges open  -AB open  -AB open  -AB    Periwound Care -- -- dry periwound area maintained  -AB    Retired Wound - Properties Group Placement Date: 09/28/22  -AW Placement Time: 0007 -AW Present on Hospital Admission: Y  -AW Side: Bilateral  -AW Orientation: upper  -AW Location: coccyx  -AW Primary Wound Type: Pressure inj  -AW    Retired Wound - Properties Group Date first assessed: 09/28/22 -AW Time first assessed: 0007 -AW Present on Hospital Admission: Y  -AW Side: Bilateral  -AW Location: coccyx  -AW Primary Wound Type: Pressure inj  -AW       Wound 11/10/22 0901 Left heel Pressure Injury    Wound - Properties Group  Placement Date: 11/10/22  -CC Placement Time: 0901 -CC Side: Left  -CC Location: heel  -CC Primary Wound Type: Pressure inj  -CC Additional Comments: Redness to left heel, right heel seems to show normal skin  -CC    Pressure Injury Stage -- -- 1  -AB    Dressing Appearance -- -- intact;dry  -AB    Closure Open to air  -AB Open to air  -AB Open to air  -AB    Base non-blanchable;dry;red  -AB non-blanchable;dry;red  -AB non-blanchable;dry;red  -AB    Periwound dry;intact  -AB dry;intact  -AB dry;intact  -AB    Periwound Temperature warm  -AB warm  -AB warm  -AB    Periwound Skin Turgor soft  -AB soft  -AB soft  -AB    Care, Wound -- -- pressure removed  -AB    Dressing Care -- -- open to air  -AB    Retired Wound - Properties Group Placement Date: 11/10/22  -CC Placement Time: 0901 -CC Side: Left  -CC Location: heel  -CC Primary Wound Type: Pressure inj  -CC Additional Comments: Redness to left heel, right heel seems to show normal skin  -CC    Retired Wound - Properties Group Date first assessed: 11/10/22  -CC Time first assessed: 0901 -CC Side: Left  -CC Location: heel  -CC Primary Wound Type: Pressure inj  -CC Additional Comments: Redness to left heel, right heel seems to show normal skin  -CC       Wound 11/22/22 0300 Bilateral labia MASD (Moisture associated skin damage)    Wound - Properties Group Placement Date: 11/22/22  - Placement Time: 0300  - Present on Hospital Admission: N  - Side: Bilateral  - Location: labia  - Primary Wound Type: MASD  -    Dressing Appearance -- -- open to air  -AB    Closure Open to air  -AB Open to air  -AB Open to air  -AB    Base moist;red  -AB moist;red  -AB moist;red  -AB    Periwound pink  -AB pink  -AB pink  -AB    Care, Wound -- -- cleansed with;sterile normal saline  -AB    Dressing Care -- -- skin barrier agent applied  -AB    Periwound Care -- -- topical treatment applied;barrier ointment applied;dry periwound area maintained  -AB    Retired Wound -  Properties Group Placement Date: 11/22/22  - Placement Time: 0300 - Present on Hospital Admission: N  - Side: Bilateral  - Location: labia  - Primary Wound Type: MASD  -    Retired Wound - Properties Group Date first assessed: 11/22/22  - Time first assessed: 0300 - Present on Hospital Admission: N  - Side: Bilateral  - Location: labia  - Primary Wound Type: MASD  -          User Key  (r) = Recorded By, (t) = Taken By, (c) = Cosigned By    Initials Name Provider Type    Nika Fish, RN Registered Nurse    Carlie Marquez, RN Registered Nurse    Brenda Bailey, RN Registered Nurse    Dilan Odom RN Registered Nurse                  Assessment & Plan      Brief Patient Summary:  Christiane Nazario is a 72 y.o. female who         bumetanide, 1 mg, Intravenous, Q6H  collagenase, 1 application, Topical, Q24H  hydrocortisone-bacitracin-zinc oxide-nystatin, 1 application, Topical, BID  insulin glargine, 5 Units, Subcutaneous, Daily  insulin lispro, 4-24 Units, Subcutaneous, Q6H  lidocaine, 1 patch, Transdermal, Q24H  miconazole, , Topical, Q12H  midodrine, 20 mg, Nasogastric, Q8H  pantoprazole, 40 mg, Intravenous, Q12H  potassium chloride, 40 mEq, Nasogastric, Daily  sodium chloride, 10 mL, Intravenous, Q12H       Pharmacy Consult - Pharmacy to dose,   Pharmacy Consult - Steroid Insulin Protocol,          Active Hospital Problems:  Active Hospital Problems    Diagnosis    • **COVID    • DILLON (acute kidney injury) (HCC)    • Pleural effusion on left    • Sepsis, unspecified organism (HCC)    • Choledocholithiasis    • Melena    • Metastatic cancer, multiple hepatic, osseous and pleural lesions (HCC)    • Acute UTI (urinary tract infection) Enterobacter Cloacae    • Essential hypertension    • Acquired hypothyroidism    • Primary malignant neoplasm of female breast (HCC)    • Type 2 diabetes mellitus with microalbuminuria, with long-term current use of insulin (HCC)      Plan:    History of Present Illness: Christiane Nazario is a 72 y.o. female with a past medical history to include DM2 with peripheral neuropathy, breast/liver/bone ca currently on chemotherapy, hyperlipidemia, hypertension and hypothyroidism who presented to [facility] on [facility]/7/2022 complaining of diarrhea for 6 weeks along with recent mild cough/congestion and shortness of air.  She denies having any hematochezia or melena.  She denies emesis.  She denies any nausea but reports having 1 episode of vomiting yesterday.  She denies having lightheadedness, dizziness or syncopal episodes.  She denies having chest pain but reports mild cough and shortness of air.  She denies abdominal pain.  She denies having lower extremity pain.  She reports some dysuria and frequency.  According to record she has had several evaluations for diarrhea and noted negative PCR's.  She is currently alternating Lomotil and Imodium without relief.     Emergency room work-up pulse 94, BP 94/48.  Respirations 18-20.  2 L nasal cannula 93%.  Afebrile.  UA positive.  Urine culture pending.  COVID-19 detected.  Gastrointestinal panel negative.  C. difficile negative.  Glucose 173.  Creatinine 0.99.  Potassium 4.3.  Chloride 109.  CO2 21.0.  Calcium 7.0.  Albumin 2.60.  ALT 16, AST 24.  Alk phos 125.  Lipase 8.  WBC 2.9.  Hemoglobin 7.3.     CT abdomen pelvis without contrast there is subtle wall thickening of the transverse colon, descending colon and sigmoid colon suggesting mild infectious or inflammatory colitis.  There is no bowel obstruction.  There is a stone that appears to be lodged in the distal CBD near the level of ampulla with mild extra hepatic biliary ductal dilatation.  Hepatic metastatic disease is again noted.  Couple of lesions are slightly smaller, which may reflect positive interval response to therapy in the appropriate clinical context.  Features of pleural-based metastatic disease on the left lower thorax are seen to  better advantage on prior CT imaging. extensive osseous metastatic disease is not thought to be significantly changed. However, there is a new mild compression fracture of the superior endplate of the L5 vertebral body without retropulsion  or subluxation. A previously seen T12 compression fracture has had slight progressive loss of vertebral body height in the interval. Stable small to moderate left basilar pleural effusion with passive left basilar atelectasis.      Chest x-ray right upper extremity PICC line tip at the cavoatrial junction. Mild bibasilar atelectasis and small pleural effusions, stable. Pulmonary nodules better assessed on recent chest CT.       Assessment/plan    Acute respiratory failure with hypoxia / ARDS  • Likely due to combination of COVID-19 pneumonia, pleural effusion and CHF.  • Completed antibiotics for possible superimposed bacterial infection.  ID signed off  • Echocardiogram with normal LVEF.    • - On diuretics by nephrologist  • Off/on BiPAP alternating with high flow nasal cannula  • Late stage ARDS, no benefit for steroids.  DC further steroids.  -Consult pulmonologist outside ICU    Septic shock, due to COVID-pneumonia & UTI  UTI Enterobacter cloacae, present on admission  Colitis  • Off norepinephrine.  Continue with high-dose midodrine.  • Finished antibiotic course.     Acute on chronic diastolic heart failure / Volume Overload / Right pleural effusion  • Currently decompensated.   •    Diuretics managed by nephrologist.  • Last ECHO showed an EF of 65%, grade 1 LV diastolic dysfunction.  • Monitor Input/Output very closely. Follow daily weights.   • Net IO Since Admission: 19,336.88 mL [12/03/22 1343]      Acute Kidney Injury:   • Remains non oliguric.   • Likely cardiorenal, continue with diuresis  • Monitor Input/Output very closely.   • Defer to nephrologist      Severe hypokalemia: On aggressive potassium replacement.    Managed by nephrologist  Repeat  evaluation  Received replacement today  Magnesium 1.9     Duodenal ulcers with bleeding / Acute blood loss anemia /anemia of chronic disease  • Multifactorial, undergoing chemo and metastatic disease  • EGD 11/23, with findings of actively bleeding duodenal ulcers x4 as well as a nonbleeding gastric ulcer.   • Continue Protonix  • GI signed off  • Received 1 PRBC since admission.     Essential hypertension    • On high-dose midodrine.  Home antihypertensives on hold.     Common bile duct stone  -Underwent ERCP 11/8/2022 with stone retraction and stenting of the CBD  -GI signed off      Breast CA-Metastatic : Outpatient follow-up with heme-onc.     Diabetes mellitus Type 2, not insulin-dependent : well controlled.   • Accu checks before meals and at bedtime, c/w humalog coverage as needed.   • Continue Lantus 5 units daily.  • A1c of 6.2.     Primary hypothyroidism: Chronic and stable. Continue synthroid.     Morbid Obesity: Body mass index is 38.67 kg/m².    Hypocalcemia and hypokalemia  Replacement per protocol  Reevaluate    Chronic diarrhea  Initially thought may need rectal tube but has not been placed    Electrolyte replacement protocol in place for significant electrolyte abnormalities normal  Nephrology is also following    DVT prophylaxis: SCDs  No anticoagulants secondary to GI bleed and gastric ulcers  High risk for DVT and PE  Consider screening Doppler  Discussed with patient and family    Mechanical DVT prophylaxis orders are present.    CODE STATUS:    Level Of Support Discussed With: Patient  Code Status (Patient has no pulse and is not breathing): CPR (Attempt to Resuscitate)  Medical Interventions (Patient has pulse or is breathing): Full Support      Disposition:  I expect patient to be discharged rehab.    This patient has been examined wearing appropriate Personal Protective Equipment and discussed with hospital infection control department. 12/04/22      Electronically signed by Gregor Hunter MD,  12/04/22, 13:29 EST.  [facility] Hospitalist Team

## 2022-12-04 NOTE — PROGRESS NOTES
[facility] Progress Note      Patient Name: Christiane Nazario  : 1950  MRN: [id number]  Primary Care Physician:  Margarita Melissa APRN  Date of admission: 2022    Subjective     Interval History:     Patient continues to be BiPAP dependent.  No event overnight.  Urine output of 1650 cc    Review of Systems:   As noted above    Objective     Vitals:   Temp:  [97.3 °F (36.3 °C)-98.2 °F (36.8 °C)] 97.5 °F (36.4 °C)  Heart Rate:  [64-93] 73  Resp:  [20-27] 24  BP: ()/(42-57) 98/43  Flow (L/min):  [50] 50    Intake/Output Summary (Last 24 hours) at 2022 1507  Last data filed at 2022 0630  Gross per 24 hour   Intake --   Output 1250 ml   Net -1250 ml       Physical Exam:    General Appearance: Patient on BiPAP  Skin: warm and dry  HEENT: oral mucosa normal, nonicteric sclera  Neck: supple, no JVD  Lungs: Crackles bibasal  Heart: RRR, normal S1 and S2  Abdomen: soft, nontender, nondistended  : no palpable bladder  Extremities: Anasarca.  Bilateral lower and upper extremities edema  Neuro: Alert X3 no focalization    Scheduled Meds:     bumetanide, 1 mg, Intravenous, Q6H  collagenase, 1 application, Topical, Q24H  hydrocortisone-bacitracin-zinc oxide-nystatin, 1 application, Topical, BID  insulin glargine, 5 Units, Subcutaneous, Daily  insulin lispro, 4-24 Units, Subcutaneous, Q6H  lidocaine, 1 patch, Transdermal, Q24H  miconazole, , Topical, Q12H  midodrine, 20 mg, Nasogastric, Q8H  pantoprazole, 40 mg, Intravenous, Q12H  potassium chloride, 40 mEq, Nasogastric, Daily  sodium chloride, 10 mL, Intravenous, Q12H      IV Meds:   Pharmacy Consult - Pharmacy to dose,   Pharmacy Consult - Steroid Insulin Protocol,         Results Reviewed:   I have personally reviewed the results from the time of this admission to 2022 15:07 EST     Results from last 7 days   Lab Units 22  1345 22  0442 22  1551 22  0428 22  1730 22  0439    SODIUM mmol/L 135* 136 137 138  --  143   POTASSIUM mmol/L 4.4 3.5 4.4 2.8*   < > 3.2*   CHLORIDE mmol/L 96* 98 97* 94*  --  95*   CO2 mmol/L 29.0 31.0* 30.0* 33.0*  --  33.0*   BUN mg/dL 51* 47* 44* 41*  --  32*   CREATININE mg/dL 1.04* 1.07* 1.06* 1.15*  --  1.28*   CALCIUM mg/dL 6.9* 6.8* 6.5* 6.5*  --  7.1*   BILIRUBIN mg/dL  --  0.7  --  0.9  --  1.0   ALK PHOS U/L  --  128*  --  149*  --  160*   ALT (SGPT) U/L  --  16  --  15  --  15   AST (SGOT) U/L  --  19  --  21  --  24   GLUCOSE mg/dL 150* 121* 248* 191*  --  115*    < > = values in this interval not displayed.       Estimated Creatinine Clearance: 50.2 mL/min (A) (by C-G formula based on SCr of 1.04 mg/dL (H)).    Results from last 7 days   Lab Units 12/04/22 0442 12/03/22 0428 12/02/22  1730 12/02/22  0439   MAGNESIUM mg/dL 1.8 1.9 2.0 1.4*   PHOSPHORUS mg/dL 1.9* 3.1  --  2.3*             Results from last 7 days   Lab Units 12/04/22  0442 12/03/22  0428 12/02/22  0439 12/01/22  0449 11/30/22  0508   WBC 10*3/mm3 9.20 10.50 16.10* 11.70* 12.70*   HEMOGLOBIN g/dL 8.2* 8.6* 9.0* 8.7* 8.8*   PLATELETS 10*3/mm3 80* 101* 160 137* 132*             Assessment / Plan              ASSESSMENT:    1.  Acute kidney injury Nonoliguric ATN.  Renal function improved volume status generous which seems to be third spacing  2.  COVID-19 infection/respiratory failure.    On isolation completed for 2 weeks followed by respiratory therapy,  3.  Edema/ Anasarca .  Seems to be third spacing due to inflammatory state, nutritional, prolonged hospital stay 4.   Hypernatremia now hyponatremic likely secondary to volume overload  5.  Acute on chronic normocytic anemia secondary to GI bleeding from duodenal ulcers S/p EGD and sclerotherapy 2 duodenal ulcers.  Follow H&H closely transfuse as needed  6. Hypokalemia.    Better 7. Hypophosphatemia.  Following closely replacing accordingly  8.  Urinary tract infection with isolation of Enterobacter as per primary  team    PLAN:  Patient continues to have hypervolemia  Will increase Bumex to 2 milligram 3 times daily  Replace phosphorus  Continue midodrine  Surveillance labs           Thank you for involving us in the care of Christiane Nazario.  Please feel free to call with any questions.    Tamy Arroyo MD  12/04/22  15:07 [facility]    [facility]  [phone]    Please note that portions of this note were completed with a voice recognition program.

## 2022-12-04 NOTE — PROGRESS NOTES
"PULMONARY CRITICAL CARE PROGRESS  NOTE      PATIENT IDENTIFICATION:  Name: Christiane Nazario  MRN: [id number]  :  1950     Age: 72 y.o.  Sex: female    DATE OF Note:  2022   Referring Physician: No admitting provider for patient encounter.                  Subjective:   Patient called ICU level more awake more responsive she is PCU now  On BiPAP no SOB, no chest pain,  Tolerating tube feed no nausea or vomiting, no change in bowel habit, no dysuria,  no new  skin rash or itching.      Objective:  tMax 24 hrs: Temp (24hrs), Av.8 °F (36.6 °C), Min:97.3 °F (36.3 °C), Max:98.4 °F (36.9 °C)      Vitals Ranges:   Temp:  [97.3 °F (36.3 °C)-98.4 °F (36.9 °C)] 97.5 °F (36.4 °C)  Heart Rate:  [64-93] 73  Resp:  [20-27] 24  BP: ()/(42-57) 98/43    Intake and Output Last 3 Shifts:   I/O last 3 completed shifts:  In: 1521 [I.V.:206; Other:751; NG/GT:564]  Out: 2200 [Urine:2200]    Exam:  BP 98/43   Pulse 73   Temp 97.5 °F (36.4 °C) (Axillary)   Resp 24   Ht 152.4 cm (60\")   Wt 89.8 kg (198 lb)   LMP  (LMP Unknown)   SpO2 95%   BMI 38.67 kg/m²     General Appearance:     HEENT:  Normocephalic, without obvious abnormality. Conjunctivae/corneas clear.  Normal external ear canals. Nares normal, no drainage     Neck:  Supple, symmetrical, trachea midline. No JVD.  Lungs /Chest wall:   Bilateral basal rhonchi, respirations unlabored, symmetrical wall movement.     Heart:  Regular rate and rhythm, systolic murmur PMI left sternal border  Abdomen: Soft, nontender, no masses, no organomegaly.    Extremities: Trace edema, no clubbing or cyanosis        Medications:  bumetanide, 1 mg, Intravenous, Q6H  collagenase, 1 application, Topical, Q24H  hydrocortisone-bacitracin-zinc oxide-nystatin, 1 application, Topical, BID  insulin glargine, 5 Units, Subcutaneous, Daily  insulin lispro, 4-24 Units, Subcutaneous, Q6H  lidocaine, 1 patch, Transdermal, Q24H  miconazole, , Topical, Q12H  midodrine, 20 mg, " Nasogastric, Q8H  pantoprazole, 40 mg, Intravenous, Q12H  potassium chloride, 40 mEq, Nasogastric, Daily  sodium chloride, 10 mL, Intravenous, Q12H        Infusion:  Pharmacy Consult - Pharmacy to dose,   Pharmacy Consult - Steroid Insulin Protocol,          PRN:  •  acetaminophen **OR** acetaminophen **OR** acetaminophen  •  albuterol  •  aluminum-magnesium hydroxide-simethicone  •  Calcium Gluconate-NaCl **AND** calcium gluconate **AND** Calcium, Ionized  •  dextrose  •  dextrose  •  glucagon (human recombinant)  •  hydrOXYzine  •  hydrOXYzine  •  loperamide  •  magnesium sulfate **OR** magnesium sulfate in D5W 1g/100mL (PREMIX) **OR** magnesium sulfate  •  melatonin  •  midazolam  •  nitroglycerin  •  ondansetron **OR** ondansetron  •  Pharmacy Consult - Pharmacy to dose  •  Pharmacy Consult - Steroid Insulin Protocol  •  potassium chloride **OR** potassium chloride **OR** potassium chloride  •  potassium phosphate infusion greater than 15 mMoles **OR** potassium phosphates **OR** potassium phosphate **OR** sodium phosphate IVPB **OR** sodium phosphate IVPB **OR** Sodium Phosphate  •  [COMPLETED] Insert peripheral IV **AND** sodium chloride  •  sodium chloride  •  traMADol  Data Review:  All labs (24hrs):   Recent Results (from the past 24 hour(s))   Basic Metabolic Panel    Collection Time: 12/03/22  3:51 PM    Specimen: Blood   Result Value Ref Range    Glucose 248 (H) 65 - 99 mg/dL    BUN 44 (H) 8 - 23 mg/dL    Creatinine 1.06 (H) 0.57 - 1.00 mg/dL    Sodium 137 136 - 145 mmol/L    Potassium 4.4 3.5 - 5.2 mmol/L    Chloride 97 (L) 98 - 107 mmol/L    CO2 30.0 (H) 22.0 - 29.0 mmol/L    Calcium 6.5 (L) 8.6 - 10.5 mg/dL    BUN/Creatinine Ratio 41.5 (H) 7.0 - 25.0    Anion Gap 10.0 5.0 - 15.0 mmol/L    eGFR 56.6 (L) >60.0 mL/min/1.73   Calcium, Ionized    Collection Time: 12/03/22  5:04 PM    Specimen: Blood   Result Value Ref Range    Ionized Calcium 0.89 (L) 1.20 - 1.30 mmol/L   POC Glucose Once    Collection  Time: 12/03/22  5:04 PM    Specimen: Blood   Result Value Ref Range    Glucose 224 (H) 70 - 105 mg/dL   POC Glucose Once    Collection Time: 12/04/22 12:15 AM    Specimen: Blood   Result Value Ref Range    Glucose 160 (H) 70 - 105 mg/dL   Magnesium    Collection Time: 12/04/22  4:42 AM    Specimen: Blood, Central Line   Result Value Ref Range    Magnesium 1.8 1.6 - 2.4 mg/dL   Comprehensive Metabolic Panel    Collection Time: 12/04/22  4:42 AM    Specimen: Blood, Central Line   Result Value Ref Range    Glucose 121 (H) 65 - 99 mg/dL    BUN 47 (H) 8 - 23 mg/dL    Creatinine 1.07 (H) 0.57 - 1.00 mg/dL    Sodium 136 136 - 145 mmol/L    Potassium 3.5 3.5 - 5.2 mmol/L    Chloride 98 98 - 107 mmol/L    CO2 31.0 (H) 22.0 - 29.0 mmol/L    Calcium 6.8 (L) 8.6 - 10.5 mg/dL    Total Protein 4.8 (L) 6.0 - 8.5 g/dL    Albumin 3.40 (L) 3.50 - 5.20 g/dL    ALT (SGPT) 16 1 - 33 U/L    AST (SGOT) 19 1 - 32 U/L    Alkaline Phosphatase 128 (H) 39 - 117 U/L    Total Bilirubin 0.7 0.0 - 1.2 mg/dL    Globulin 1.4 gm/dL    A/G Ratio 2.4 g/dL    BUN/Creatinine Ratio 43.9 (H) 7.0 - 25.0    Anion Gap 7.0 5.0 - 15.0 mmol/L    eGFR 56.0 (L) >60.0 mL/min/1.73   CBC Auto Differential    Collection Time: 12/04/22  4:42 AM    Specimen: Blood, Central Line   Result Value Ref Range    WBC 9.20 3.40 - 10.80 10*3/mm3    RBC 2.67 (L) 3.77 - 5.28 10*6/mm3    Hemoglobin 8.2 (L) 12.0 - 15.9 g/dL    Hematocrit 24.4 (L) 34.0 - 46.6 %    MCV 91.5 79.0 - 97.0 fL    MCH 30.7 26.6 - 33.0 pg    MCHC 33.5 31.5 - 35.7 g/dL    RDW 18.1 (H) 12.3 - 15.4 %    RDW-SD 60.4 (H) 37.0 - 54.0 fl    MPV 9.1 6.0 - 12.0 fL    Platelets 80 (L) 140 - 450 10*3/mm3    Neutrophil % 90.3 (H) 42.7 - 76.0 %    Lymphocyte % 2.3 (L) 19.6 - 45.3 %    Monocyte % 6.0 5.0 - 12.0 %    Eosinophil % 1.1 0.3 - 6.2 %    Basophil % 0.3 0.0 - 1.5 %    Neutrophils, Absolute 8.30 (H) 1.70 - 7.00 10*3/mm3    Lymphocytes, Absolute 0.20 (L) 0.70 - 3.10 10*3/mm3    Monocytes, Absolute 0.60 0.10 - 0.90  10*3/mm3    Eosinophils, Absolute 0.10 0.00 - 0.40 10*3/mm3    Basophils, Absolute 0.00 0.00 - 0.20 10*3/mm3    nRBC 0.0 0.0 - 0.2 /100 WBC   Calcium, Ionized    Collection Time: 12/04/22  4:42 AM    Specimen: Blood, Central Line   Result Value Ref Range    Ionized Calcium 0.96 (L) 1.20 - 1.30 mmol/L   Phosphorus    Collection Time: 12/04/22  4:42 AM    Specimen: Blood, Central Line   Result Value Ref Range    Phosphorus 1.9 (C) 2.5 - 4.5 mg/dL   POC Glucose Once    Collection Time: 12/04/22 11:43 AM    Specimen: Blood   Result Value Ref Range    Glucose 179 (H) 70 - 105 mg/dL        Imaging:  Adult Transthoracic Echo Complete w/ Color, Spectral and Contrast if Necessary Per Protocol  •  Left ventricular systolic function is normal. Left ventricular ejection   fraction appears to be 61 - 65%.  •  Left ventricular diastolic function is consistent with (grade I)   impaired relaxation.       ASSESSMENT:  Acute hypoxic respiratory failure  COVID 19 pneumonia  Obstructive sleep apnea    Essential hypertension    Acquired hypothyroidism    Primary malignant neoplasm of female breast (HCC)    Type 2 diabetes mellitus with microalbuminuria, with long-term current use of insulin (HCC)    Acute UTI (urinary tract infection) Enterobacter Cloacae    Metastatic cancer, multiple hepatic, osseous and pleural lesions (HCC)    Choledocholithiasis    Sepsis, unspecified organism (HCC)    DILLON (acute kidney injury) (HCC)    Pleural effusion on left    Melena       PLAN:  BiPAP as needed     Chest PT  Continue with antibiotics per infectious disease  Bronchodilator  Inhaled corticosteroids  Electrolytes/ glycemic control  DVT and GI prophylaxis.    Total Critical care time in direct medical management (   ) minutes. This time specifically excludes time spent performing procedures.  Adalberto Rebolledo MD. D, ABSM.     12/4/2022  12:17 EST

## 2022-12-04 NOTE — PROGRESS NOTES
Hematology/Oncology Inpatient Progress Note    PATIENT NAME: Christiane Nazario  : 1950  MRN: [id number]    CHIEF COMPLAINT: Diarrhea    HISTORY OF PRESENT ILLNESS:      Christiane Nazario is a 72 y.o. female who presented to [facility] on 2022 with complaints of persistent diarrhea x6 weeks.  Past medical history significant for metastatic breast cancer, diabetes type 2, hypertension, hyperlipidemia, and hypothyroidism.  She reported diarrhea that was not resolving despite alternating Lomotil and Imodium.  She stated she continued to have more than 10 loose bowel movements daily.  She denied any bright red blood per rectum or melena.  She did admit to generalized abdominal pain with intermittent.  She did report nausea and vomiting, with the last episode being 2 days prior to presentation.  She denied any hematemesis or coffee-ground emesis.  She reported a decreased appetite.  She did complain of some mild dysphagia with pills.  She denied any fever.  She reported a 50 pound weight loss over the past 4 weeks.     Evaluation in the ER showed a WBC of 2.90, hemoglobin 7.3, MCV 80.6, platelets 317,000, ANC 2.15, normal kidney function and normal liver functions except for mildly elevated alk phos at 125, low calcium at 7.0, low albumin 2.60, no lipase elevation, urinalysis indicative of UTI, C. difficile toxin negative, respiratory panel positive for COVID-19, chest x-ray showed mild bibasilar atelectasis and small pleural effusions which were stable, CT of the abdomen and pelvis without contrast showed subtle wall thickening of the transverse colon, descending colon, sigmoid colon suggesting features of mild infectious or inflammatory colitis.  A stone.  Lodged within the distal CBD near the level of ampulla, with mild extrahepatic biliary ductal dilation.  Consider ERCP correlation for diagnostic and therapeutic purposes.  Hepatic metastatic disease again noted.  A couple of lesions are  slightly smaller, which may reflect positive interval response to therapy, extensive osseous metastatic disease not thought to be significantly changed.  However there is a new mild compression fracture of the superior endplate of the L5 vertebral body without retropulsion or subluxation.     11/07/22  Hematology/Oncology was consulted known to us and followed in the office by Dr. Sarkar for her diagnosis of metastatic breast cancer.  Patient had initially early-stage invasive moderately differentiated ductal carcinoma of the breast that was ER positive IL positive and HER2 negative.  Patient had left lumpectomy with sentinel lymph node biopsy followed by adjuvant radiation.  Patient has been on Arimidex till September 2020 when she presented to the hospital with abdominal pain and diarrhea.  Imaging studies suggest widely metastatic disease.  Patient had biopsy of one of the liver lesions which showed metastatic breast cancer.  She was then placed on combination of paclitaxel and gemcitabine due to visceral crises along with monthly zoledronic acid for bone metastasis.  She was on chemotherapy up until this admission. Will resume chemotherapy after discharge/recovery from illness.     He/She  has a past medical history of Cataract, DM type 2 (diabetes mellitus, type 2) (HCC), Ductal carcinoma in situ (DCIS) of left breast, Fracture, femur (HCC) (2014), Hyperlipidemia, Hypertension, Hypothyroidism, Osteopenia, and Pulmonary hypertension (HCC).     PCP: Margarita Melissa APRN    History of present illness was reviewed and is unchanged from the previous visit. 12/15/22      Subjective      Patient has no new issues today      ROS:    Review of Systems   Constitutional: Positive for fatigue. Negative for chills and fever.   HENT: Negative for congestion, drooling, ear discharge, rhinorrhea, sinus pressure and tinnitus.    Eyes: Negative for photophobia, pain and discharge.   Respiratory: Negative for apnea, choking  "and stridor.    Cardiovascular: Negative for palpitations.   Gastrointestinal: Negative for abdominal distention, abdominal pain and anal bleeding.   Endocrine: Negative for polydipsia and polyphagia.   Genitourinary: Negative for decreased urine volume, flank pain and genital sores.   Musculoskeletal: Negative for gait problem, neck pain and neck stiffness.   Skin: Negative for color change, rash and wound.   Neurological: Positive for weakness. Negative for tremors, seizures, syncope, facial asymmetry and speech difficulty.   Hematological: Negative for adenopathy.   Psychiatric/Behavioral: Negative for agitation, confusion, hallucinations and self-injury. The patient is not hyperactive.         MEDICATIONS:    Scheduled Meds:     Continuous Infusions:  No current facility-administered medications for this encounter.     PRN Meds:       ALLERGIES:    Allergies   Allergen Reactions   • Calcium-Containing Compounds Nausea Only   • Sulfa Antibiotics Unknown - Low Severity       Objective    VITALS:   /75   Pulse 78   Temp 97.1 °F (36.2 °C) (Axillary)   Resp 21   Ht 152.4 cm (60\")   Wt 90.8 kg (200 lb 2.8 oz)   LMP  (LMP Unknown)   SpO2 93%   BMI 39.09 kg/m²     PHYSICAL EXAM: (performed by MD)  Physical Exam  Vitals and nursing note reviewed.   Constitutional:       General: She is not in acute distress.     Appearance: She is not diaphoretic.   HENT:      Head: Normocephalic and atraumatic.   Eyes:      General: No scleral icterus.        Right eye: No discharge.         Left eye: No discharge.      Conjunctiva/sclera: Conjunctivae normal.   Neck:      Thyroid: No thyromegaly.   Cardiovascular:      Rate and Rhythm: Normal rate and regular rhythm.      Heart sounds: Normal heart sounds.     No friction rub. No gallop.   Pulmonary:      Effort: Pulmonary effort is normal. No respiratory distress.      Breath sounds: No stridor. No wheezing.   Abdominal:      General: Bowel sounds are normal.      " Palpations: There is no mass.      Tenderness: There is no abdominal tenderness. There is no guarding or rebound.   Musculoskeletal:         General: No tenderness. Normal range of motion.      Cervical back: Normal range of motion and neck supple.   Lymphadenopathy:      Cervical: No cervical adenopathy.   Skin:     General: Skin is warm.      Findings: No erythema or rash.   Neurological:      Mental Status: She is alert and oriented to person, place, and time.      Motor: No abnormal muscle tone.   Psychiatric:         Behavior: Behavior normal.       I have reexamined the patient and the results are consistent with the previously documented exam. TOMMY Newton     RECENT LABS:     Lab Results (last 24 hours)     Procedure Component Value Units Date/Time    POC Glucose Once [[id number]]  (Abnormal) Collected: 12/06/22 1130    Specimen: Blood Updated: 12/06/22 1132     Glucose 214 mg/dL      Comment: Serial Number: [id number]:  [id number]       Calcium, Ionized [[id number]]  (Abnormal) Collected: 12/06/22 0356    Specimen: Blood Updated: 12/06/22 0607     Ionized Calcium 1.03 mmol/L     Comprehensive Metabolic Panel [[id number]]  (Abnormal) Collected: 12/06/22 0356    Specimen: Blood Updated: 12/06/22 0545     Glucose 142 mg/dL      BUN 57 mg/dL      Creatinine 1.04 mg/dL      Sodium 133 mmol/L      Potassium 3.0 mmol/L      Chloride 92 mmol/L      CO2 31.0 mmol/L      Calcium 7.5 mg/dL      Total Protein 5.3 g/dL      Albumin 3.60 g/dL      ALT (SGPT) 18 U/L      AST (SGOT) 18 U/L      Alkaline Phosphatase 124 U/L      Total Bilirubin 0.7 mg/dL      Globulin 1.7 gm/dL      A/G Ratio 2.1 g/dL      BUN/Creatinine Ratio 54.8     Anion Gap 10.0 mmol/L      eGFR 57.9 mL/min/1.73      Comment: National Kidney Foundation and American Society of Nephrology (ASN) Task Force recommended calculation based on the Chronic Kidney Disease Epidemiology Collaboration (CKD-EPI) equation refit without adjustment for  race.       Narrative:      GFR Normal >60  Chronic Kidney Disease <60  Kidney Failure <15      Phosphorus [[id number]]  (Normal) Collected: 12/06/22 0356    Specimen: Blood Updated: 12/06/22 0545     Phosphorus 3.1 mg/dL     Magnesium [[id number]]  (Normal) Collected: 12/06/22 0356    Specimen: Blood Updated: 12/06/22 0545     Magnesium 2.0 mg/dL     CBC & Differential [[id number]]  (Abnormal) Collected: 12/06/22 0356    Specimen: Blood Updated: 12/06/22 0503    Narrative:      The following orders were created for panel order CBC & Differential.  Procedure                               Abnormality         Status                     ---------                               -----------         ------                     CBC Auto Differential[[id number]]        Abnormal            Final result                 Please view results for these tests on the individual orders.    CBC Auto Differential [[id number]]  (Abnormal) Collected: 12/06/22 0356    Specimen: Blood Updated: 12/06/22 0503     WBC 8.60 10*3/mm3      RBC 2.84 10*6/mm3      Hemoglobin 8.5 g/dL      Hematocrit 26.7 %      MCV 94.0 fL      MCH 30.1 pg      MCHC 32.0 g/dL      RDW 19.3 %      RDW-SD 64.3 fl      MPV 9.9 fL      Platelets 87 10*3/mm3      Neutrophil % 88.5 %      Lymphocyte % 3.4 %      Monocyte % 6.7 %      Eosinophil % 1.1 %      Basophil % 0.3 %      Neutrophils, Absolute 7.60 10*3/mm3      Lymphocytes, Absolute 0.30 10*3/mm3      Monocytes, Absolute 0.60 10*3/mm3      Eosinophils, Absolute 0.10 10*3/mm3      Basophils, Absolute 0.00 10*3/mm3      nRBC 0.0 /100 WBC     POC Glucose Once [[id number]]  (Abnormal) Collected: 12/05/22 2315    Specimen: Blood Updated: 12/05/22 2316     Glucose 174 mg/dL      Comment: Serial Number: [id number]:  [id number]       aPTT [[id number]]  (Normal) Collected: 12/05/22 1815    Specimen: Blood Updated: 12/05/22 1852     PTT 24.3 seconds     Protime-INR [[id number]]  (Abnormal) Collected: 12/05/22 1815     Specimen: Blood Updated: 12/05/22 1852     Protime 12.0 Seconds      INR 1.18    Calcium, Ionized [[id number]]  (Abnormal) Collected: 12/05/22 1815    Specimen: Blood Updated: 12/05/22 1831     Ionized Calcium 1.01 mmol/L     POC Glucose Once [[id number]]  (Abnormal) Collected: 12/05/22 1709    Specimen: Blood Updated: 12/05/22 1710     Glucose 226 mg/dL      Comment: Serial Number: [id number]:  [id number]             PENDING RESULTS:     IMAGING REVIEWED:  No radiology results for the last day    Assessment & Plan   ASSESSMENT:  1. Metastatic breast carcinoma to the lungs, liver, bones.  ER positive, GA positive, HER2/willi negative: Currently on treatment with paclitaxel and gemcitabine, zoledronic acid for bone metastasis.  Plan to continue chemotherapy after discharge.  2. Anemia/chemotherapy-induced cytopenias/Duodenal Ulcers/Gastric ulcer:  EGD 11/23/22 shows actively bleeding duodenal ulcers x4 and non-bleeding gastric ulcer. Baseline Hgb between 11-12. Reports of patient with dark stools. GI signed off with recommendation for PPI and Carafate, and considering outpatient colonoscopy when patient recovered. Received 1 unit PRBC's this admission.  Patient is currently having stools every day with imodium given daily.  Nurse reports stools are not dark and no blood was seen today when bowels moved.  Hgb 8.2  3. Thrombocytopenia: Platelet 80 today.  Fibrinogen ordered.  4. Diarrhea: GI following.  Work-up in progress.  Possible colonoscopy in the future but deferred now due to COVID infection.  On Lomotil, Imodium, cholestyramine.  Negative C. difficile and gastrointestinal panel.  Begin to wean down on steroids per primary  5. Folate deficiency on folate replacement  6. Possible choledocholithiasis on CT: GI following and plan for MRCP for further evaluation.  7. Hypokalemia/Hypocalcemia: replacement per protocol. Nephrology on board  8. UTI: resolved  9. Septic Shock due to COVID pneumonia/Pleural  Effusion/CHF: high dose midodrine. Finished antibiotic course  10. DILLON: remains oliguric, nephrology on board  11. Acute Respiratory Failure/ARDS: On/off biPAP alternation with high flow oxygen. Consult to pulmonology     PLAN    1. Monitor CBC and   2. Transfuse PRBC as needed for hemoglobin 7.0 or below  3. Holding chemotherapy till discharge  4. Monitor for bleeding - stools light brown  5. Continue to monitor with nephrology, pulmonology    Electronically signed by TOMMY Newton, 12/15/22, 1:56 PM EST.

## 2022-12-05 ENCOUNTER — APPOINTMENT (OUTPATIENT)
Dept: GENERAL RADIOLOGY | Facility: HOSPITAL | Age: 72
End: 2022-12-05

## 2022-12-05 LAB
ALBUMIN SERPL-MCNC: 3.4 G/DL (ref 3.5–5.2)
ALBUMIN/GLOB SERPL: 2.1 G/DL
ALP SERPL-CCNC: 128 U/L (ref 39–117)
ALT SERPL W P-5'-P-CCNC: 14 U/L (ref 1–33)
ANION GAP SERPL CALCULATED.3IONS-SCNC: 12 MMOL/L (ref 5–15)
APTT PPP: 24.3 SECONDS (ref 24–31)
AST SERPL-CCNC: 20 U/L (ref 1–32)
BASOPHILS # BLD AUTO: 0.1 10*3/MM3 (ref 0–0.2)
BASOPHILS NFR BLD AUTO: 1 % (ref 0–1.5)
BILIRUB SERPL-MCNC: 0.8 MG/DL (ref 0–1.2)
BUN SERPL-MCNC: 52 MG/DL (ref 8–23)
BUN/CREAT SERPL: 50.5 (ref 7–25)
CA-I SERPL ISE-MCNC: 0.94 MMOL/L (ref 1.2–1.3)
CA-I SERPL ISE-MCNC: 1.01 MMOL/L (ref 1.2–1.3)
CALCIUM SPEC-SCNC: 7 MG/DL (ref 8.6–10.5)
CHLORIDE SERPL-SCNC: 97 MMOL/L (ref 98–107)
CO2 SERPL-SCNC: 26 MMOL/L (ref 22–29)
CREAT SERPL-MCNC: 1.03 MG/DL (ref 0.57–1)
DEPRECATED RDW RBC AUTO: 65.2 FL (ref 37–54)
EGFRCR SERPLBLD CKD-EPI 2021: 58.6 ML/MIN/1.73
EOSINOPHIL # BLD AUTO: 0.4 10*3/MM3 (ref 0–0.4)
EOSINOPHIL NFR BLD AUTO: 4 % (ref 0.3–6.2)
ERYTHROCYTE [DISTWIDTH] IN BLOOD BY AUTOMATED COUNT: 19 % (ref 12.3–15.4)
GLOBULIN UR ELPH-MCNC: 1.6 GM/DL
GLUCOSE BLDC GLUCOMTR-MCNC: 124 MG/DL (ref 70–105)
GLUCOSE BLDC GLUCOMTR-MCNC: 174 MG/DL (ref 70–105)
GLUCOSE BLDC GLUCOMTR-MCNC: 226 MG/DL (ref 70–105)
GLUCOSE SERPL-MCNC: 173 MG/DL (ref 65–99)
HCT VFR BLD AUTO: 25.8 % (ref 34–46.6)
HGB BLD-MCNC: 8.5 G/DL (ref 12–15.9)
INR PPP: 1.18 (ref 0.93–1.1)
LYMPHOCYTES # BLD AUTO: 0.3 10*3/MM3 (ref 0.7–3.1)
LYMPHOCYTES NFR BLD AUTO: 3.7 % (ref 19.6–45.3)
MAGNESIUM SERPL-MCNC: 1.7 MG/DL (ref 1.6–2.4)
MCH RBC QN AUTO: 30.3 PG (ref 26.6–33)
MCHC RBC AUTO-ENTMCNC: 32.7 G/DL (ref 31.5–35.7)
MCV RBC AUTO: 92.7 FL (ref 79–97)
MONOCYTES # BLD AUTO: 0.5 10*3/MM3 (ref 0.1–0.9)
MONOCYTES NFR BLD AUTO: 5.5 % (ref 5–12)
NEUTROPHILS NFR BLD AUTO: 7.6 10*3/MM3 (ref 1.7–7)
NEUTROPHILS NFR BLD AUTO: 85.8 % (ref 42.7–76)
NRBC BLD AUTO-RTO: 0 /100 WBC (ref 0–0.2)
PHOSPHATE SERPL-MCNC: 2.9 MG/DL (ref 2.5–4.5)
PLATELET # BLD AUTO: 87 10*3/MM3 (ref 140–450)
PMV BLD AUTO: 10.1 FL (ref 6–12)
POTASSIUM SERPL-SCNC: 3.6 MMOL/L (ref 3.5–5.2)
PROT SERPL-MCNC: 5 G/DL (ref 6–8.5)
PROTHROMBIN TIME: 12 SECONDS (ref 9.6–11.7)
QT INTERVAL: 438 MS
RBC # BLD AUTO: 2.79 10*6/MM3 (ref 3.77–5.28)
SODIUM SERPL-SCNC: 135 MMOL/L (ref 136–145)
WBC NRBC COR # BLD: 8.9 10*3/MM3 (ref 3.4–10.8)

## 2022-12-05 PROCEDURE — 82330 ASSAY OF CALCIUM: CPT | Performed by: NURSE PRACTITIONER

## 2022-12-05 PROCEDURE — 94761 N-INVAS EAR/PLS OXIMETRY MLT: CPT

## 2022-12-05 PROCEDURE — 94799 UNLISTED PULMONARY SVC/PX: CPT

## 2022-12-05 PROCEDURE — 85730 THROMBOPLASTIN TIME PARTIAL: CPT | Performed by: INTERNAL MEDICINE

## 2022-12-05 PROCEDURE — 99232 SBSQ HOSP IP/OBS MODERATE 35: CPT | Performed by: INTERNAL MEDICINE

## 2022-12-05 PROCEDURE — 94660 CPAP INITIATION&MGMT: CPT

## 2022-12-05 PROCEDURE — 85610 PROTHROMBIN TIME: CPT | Performed by: INTERNAL MEDICINE

## 2022-12-05 PROCEDURE — 82330 ASSAY OF CALCIUM: CPT | Performed by: INTERNAL MEDICINE

## 2022-12-05 PROCEDURE — 63710000001 PREDNISONE PER 1 MG: Performed by: INTERNAL MEDICINE

## 2022-12-05 PROCEDURE — 36415 COLL VENOUS BLD VENIPUNCTURE: CPT | Performed by: NURSE PRACTITIONER

## 2022-12-05 PROCEDURE — 85025 COMPLETE CBC W/AUTO DIFF WBC: CPT | Performed by: NURSE PRACTITIONER

## 2022-12-05 PROCEDURE — 25010000002 MAGNESIUM SULFATE 2 GM/50ML SOLUTION: Performed by: NURSE PRACTITIONER

## 2022-12-05 PROCEDURE — 74018 RADEX ABDOMEN 1 VIEW: CPT

## 2022-12-05 PROCEDURE — 83735 ASSAY OF MAGNESIUM: CPT | Performed by: INTERNAL MEDICINE

## 2022-12-05 PROCEDURE — 82962 GLUCOSE BLOOD TEST: CPT

## 2022-12-05 PROCEDURE — 71045 X-RAY EXAM CHEST 1 VIEW: CPT

## 2022-12-05 PROCEDURE — 84100 ASSAY OF PHOSPHORUS: CPT | Performed by: HOSPITALIST

## 2022-12-05 PROCEDURE — 63710000001 INSULIN GLARGINE PER 5 UNITS: Performed by: INTERNAL MEDICINE

## 2022-12-05 PROCEDURE — 63710000001 INSULIN LISPRO (HUMAN) PER 5 UNITS: Performed by: NURSE PRACTITIONER

## 2022-12-05 PROCEDURE — 25010000002 CALCIUM GLUCONATE-NACL 1-0.675 GM/50ML-% SOLUTION: Performed by: INTERNAL MEDICINE

## 2022-12-05 PROCEDURE — 80053 COMPREHEN METABOLIC PANEL: CPT | Performed by: NURSE PRACTITIONER

## 2022-12-05 RX ORDER — PREDNISONE 20 MG/1
20 TABLET ORAL
Status: DISCONTINUED | OUTPATIENT
Start: 2022-12-05 | End: 2022-12-06 | Stop reason: HOSPADM

## 2022-12-05 RX ADMIN — MIDODRINE HYDROCHLORIDE 20 MG: 5 TABLET ORAL at 05:31

## 2022-12-05 RX ADMIN — TRAMADOL HYDROCHLORIDE 50 MG: 50 TABLET, COATED ORAL at 01:46

## 2022-12-05 RX ADMIN — MIDODRINE HYDROCHLORIDE 20 MG: 5 TABLET ORAL at 21:39

## 2022-12-05 RX ADMIN — LOPERAMIDE HYDROCHLORIDE 4 MG: 2 CAPSULE ORAL at 01:46

## 2022-12-05 RX ADMIN — ANTI-FUNGAL POWDER MICONAZOLE NITRATE TALC FREE: 1.42 POWDER TOPICAL at 21:39

## 2022-12-05 RX ADMIN — HYDROCORTISONE 1 APPLICATION: 1 OINTMENT TOPICAL at 21:39

## 2022-12-05 RX ADMIN — INSULIN LISPRO 4 UNITS: 100 INJECTION, SOLUTION INTRAVENOUS; SUBCUTANEOUS at 05:31

## 2022-12-05 RX ADMIN — Medication 5 MG: at 23:31

## 2022-12-05 RX ADMIN — INSULIN LISPRO 4 UNITS: 100 INJECTION, SOLUTION INTRAVENOUS; SUBCUTANEOUS at 23:31

## 2022-12-05 RX ADMIN — LOPERAMIDE HYDROCHLORIDE 4 MG: 2 CAPSULE ORAL at 09:47

## 2022-12-05 RX ADMIN — LOPERAMIDE HYDROCHLORIDE 4 MG: 2 CAPSULE ORAL at 17:41

## 2022-12-05 RX ADMIN — Medication 5 MG: at 01:46

## 2022-12-05 RX ADMIN — PANTOPRAZOLE SODIUM 40 MG: 40 INJECTION, POWDER, FOR SOLUTION INTRAVENOUS at 21:39

## 2022-12-05 RX ADMIN — TRAMADOL HYDROCHLORIDE 50 MG: 50 TABLET, COATED ORAL at 17:41

## 2022-12-05 RX ADMIN — TRAMADOL HYDROCHLORIDE 50 MG: 50 TABLET, COATED ORAL at 23:31

## 2022-12-05 RX ADMIN — CALCIUM GLUCONATE 1 G: 20 INJECTION, SOLUTION INTRAVENOUS at 09:47

## 2022-12-05 RX ADMIN — BUMETANIDE 2 MG: 0.25 INJECTION, SOLUTION INTRAMUSCULAR; INTRAVENOUS at 09:07

## 2022-12-05 RX ADMIN — MIDODRINE HYDROCHLORIDE 20 MG: 5 TABLET ORAL at 14:01

## 2022-12-05 RX ADMIN — LOPERAMIDE HYDROCHLORIDE 4 MG: 2 CAPSULE ORAL at 23:31

## 2022-12-05 RX ADMIN — LIDOCAINE 1 PATCH: 700 PATCH TOPICAL at 09:10

## 2022-12-05 RX ADMIN — BUMETANIDE 2 MG: 0.25 INJECTION, SOLUTION INTRAMUSCULAR; INTRAVENOUS at 23:31

## 2022-12-05 RX ADMIN — INSULIN GLARGINE 5 UNITS: 100 INJECTION, SOLUTION SUBCUTANEOUS at 09:06

## 2022-12-05 RX ADMIN — Medication 10 ML: at 21:39

## 2022-12-05 RX ADMIN — BUMETANIDE 2 MG: 0.25 INJECTION, SOLUTION INTRAMUSCULAR; INTRAVENOUS at 17:41

## 2022-12-05 RX ADMIN — HYDROCORTISONE 1 APPLICATION: 1 OINTMENT TOPICAL at 09:15

## 2022-12-05 RX ADMIN — PANTOPRAZOLE SODIUM 40 MG: 40 INJECTION, POWDER, FOR SOLUTION INTRAVENOUS at 09:07

## 2022-12-05 RX ADMIN — TRAMADOL HYDROCHLORIDE 50 MG: 50 TABLET, COATED ORAL at 09:47

## 2022-12-05 RX ADMIN — INSULIN LISPRO 8 UNITS: 100 INJECTION, SOLUTION INTRAVENOUS; SUBCUTANEOUS at 17:43

## 2022-12-05 RX ADMIN — MAGNESIUM SULFATE HEPTAHYDRATE 2 G: 2 INJECTION, SOLUTION INTRAVENOUS at 09:16

## 2022-12-05 RX ADMIN — ANTI-FUNGAL POWDER MICONAZOLE NITRATE TALC FREE: 1.42 POWDER TOPICAL at 09:16

## 2022-12-05 RX ADMIN — Medication 10 ML: at 09:16

## 2022-12-05 RX ADMIN — COLLAGENASE SANTYL 1 APPLICATION: 250 OINTMENT TOPICAL at 09:16

## 2022-12-05 RX ADMIN — PREDNISONE 20 MG: 20 TABLET ORAL at 09:10

## 2022-12-05 RX ADMIN — BUMETANIDE 2 MG: 0.25 INJECTION, SOLUTION INTRAMUSCULAR; INTRAVENOUS at 01:13

## 2022-12-05 RX ADMIN — POTASSIUM CHLORIDE 40 MEQ: 1.5 POWDER, FOR SOLUTION ORAL at 09:10

## 2022-12-05 NOTE — PROGRESS NOTES
Hematology/Oncology Inpatient Progress Note    PATIENT NAME: Christiane Nazario  : 1950  MRN: [id number]    CHIEF COMPLAINT: Diarrhea    HISTORY OF PRESENT ILLNESS:      Christiane Nazario is a 72 y.o. female who presented to [facility] on 2022 with complaints of persistent diarrhea x6 weeks.  Past medical history significant for metastatic breast cancer, diabetes type 2, hypertension, hyperlipidemia, and hypothyroidism.  She reported diarrhea that was not resolving despite alternating Lomotil and Imodium.  She stated she continued to have more than 10 loose bowel movements daily.  She denied any bright red blood per rectum or melena.  She did admit to generalized abdominal pain with intermittent.  She did report nausea and vomiting, with the last episode being 2 days prior to presentation.  She denied any hematemesis or coffee-ground emesis.  She reported a decreased appetite.  She did complain of some mild dysphagia with pills.  She denied any fever.  She reported a 50 pound weight loss over the past 4 weeks.     Evaluation in the ER showed a WBC of 2.90, hemoglobin 7.3, MCV 80.6, platelets 317,000, ANC 2.15, normal kidney function and normal liver functions except for mildly elevated alk phos at 125, low calcium at 7.0, low albumin 2.60, no lipase elevation, urinalysis indicative of UTI, C. difficile toxin negative, respiratory panel positive for COVID-19, chest x-ray showed mild bibasilar atelectasis and small pleural effusions which were stable, CT of the abdomen and pelvis without contrast showed subtle wall thickening of the transverse colon, descending colon, sigmoid colon suggesting features of mild infectious or inflammatory colitis.  A stone.  Lodged within the distal CBD near the level of ampulla, with mild extrahepatic biliary ductal dilation.  Consider ERCP correlation for diagnostic and therapeutic purposes.  Hepatic metastatic disease again noted.  A couple of lesions are  slightly smaller, which may reflect positive interval response to therapy, extensive osseous metastatic disease not thought to be significantly changed.  However there is a new mild compression fracture of the superior endplate of the L5 vertebral body without retropulsion or subluxation.     11/07/22  Hematology/Oncology was consulted known to us and followed in the office by Dr. Sarkar for her diagnosis of metastatic breast cancer.  Patient had initially early-stage invasive moderately differentiated ductal carcinoma of the breast that was ER positive [place] positive and HER2 negative.  Patient had left lumpectomy with sentinel lymph node biopsy followed by adjuvant radiation.  Patient has been on Arimidex till September 2020 when she presented to the hospital with abdominal pain and diarrhea.  Imaging studies suggest widely metastatic disease.  Patient had biopsy of one of the liver lesions which showed metastatic breast cancer.  She was then placed on combination of paclitaxel and gemcitabine due to visceral crises along with monthly zoledronic acid for bone metastasis.  She was on chemotherapy up until this admission.     He/She  has a past medical history of Cataract, DM type 2 (diabetes mellitus, type 2) (HCC), Ductal carcinoma in situ (DCIS) of left breast, Fracture, femur (HCC) (2014), Hyperlipidemia, Hypertension, Hypothyroidism, Osteopenia, and Pulmonary hypertension (HCC).     PCP: Margarita Melissa, APRN    Subjective        Patient remains on BiPAP    ROS:    Review of Systems   Constitutional: Positive for fatigue. Negative for chills and fever.   HENT: Negative for congestion, drooling, ear discharge, rhinorrhea, sinus pressure and tinnitus.    Eyes: Negative for photophobia, pain and discharge.   Respiratory: Negative for apnea, choking and stridor.    Cardiovascular: Negative for palpitations.   Gastrointestinal: Negative for abdominal distention, abdominal pain and anal bleeding.   Endocrine:  Negative for polydipsia and polyphagia.   Genitourinary: Negative for decreased urine volume, flank pain and genital sores.   Musculoskeletal: Negative for gait problem, neck pain and neck stiffness.   Skin: Negative for color change, rash and wound.   Neurological: Positive for weakness. Negative for tremors, seizures, syncope, facial asymmetry and speech difficulty.   Hematological: Negative for adenopathy.   Psychiatric/Behavioral: Negative for agitation, confusion, hallucinations and self-injury. The patient is not hyperactive.         MEDICATIONS:    Scheduled Meds:  bumetanide, 2 mg, Intravenous, Q8H  collagenase, 1 application, Topical, Q24H  hydrocortisone-bacitracin-zinc oxide-nystatin, 1 application, Topical, BID  insulin glargine, 5 Units, Subcutaneous, Daily  insulin lispro, 4-24 Units, Subcutaneous, Q6H  lidocaine, 1 patch, Transdermal, Q24H  miconazole, , Topical, Q12H  midodrine, 20 mg, Nasogastric, Q8H  pantoprazole, 40 mg, Intravenous, Q12H  potassium chloride, 40 mEq, Nasogastric, Daily  predniSONE, 20 mg, Oral, Daily With Breakfast  sodium chloride, 10 mL, Intravenous, Q12H       Continuous Infusions:  Pharmacy Consult - Pharmacy to dose,        PRN Meds:  •  acetaminophen **OR** acetaminophen **OR** acetaminophen  •  albuterol  •  aluminum-magnesium hydroxide-simethicone  •  Calcium Gluconate-NaCl **AND** calcium gluconate **AND** Calcium, Ionized  •  dextrose  •  dextrose  •  glucagon (human recombinant)  •  hydrOXYzine  •  hydrOXYzine  •  loperamide  •  magnesium sulfate **OR** magnesium sulfate in D5W 1g/100mL (PREMIX) **OR** magnesium sulfate  •  melatonin  •  midazolam  •  nitroglycerin  •  ondansetron **OR** ondansetron  •  Pharmacy Consult - Pharmacy to dose  •  potassium chloride **OR** potassium chloride **OR** potassium chloride  •  potassium phosphate infusion greater than 15 mMoles **OR** potassium phosphates **OR** potassium phosphate **OR** sodium phosphate IVPB **OR** sodium  "phosphate IVPB **OR** Sodium Phosphate  •  [COMPLETED] Insert peripheral IV **AND** sodium chloride  •  sodium chloride  •  traMADol     ALLERGIES:    Allergies   Allergen Reactions   • Calcium-Containing Compounds Nausea Only   • Sulfa Antibiotics Unknown - Low Severity       Objective    VITALS:   /52   Pulse 61   Temp 98.1 °F (36.7 °C) (Axillary)   Resp 22   Ht 152.4 cm (60\")   Wt 94.1 kg (207 lb 7.3 oz)   LMP  (LMP Unknown)   SpO2 97%   BMI 40.52 kg/m²     PHYSICAL EXAM: (performed by MD)  Physical Exam  Vitals and nursing note reviewed.   Constitutional:       General: She is not in acute distress.     Appearance: She is not diaphoretic.   HENT:      Head: Normocephalic and atraumatic.   Eyes:      General: No scleral icterus.        Right eye: No discharge.         Left eye: No discharge.      Conjunctiva/sclera: Conjunctivae normal.   Neck:      Thyroid: No thyromegaly.   Cardiovascular:      Rate and Rhythm: Normal rate and regular rhythm.      Heart sounds: Normal heart sounds.     No friction rub. No gallop.   Pulmonary:      Effort: Pulmonary effort is normal. No respiratory distress.      Breath sounds: No stridor. No wheezing.   Abdominal:      General: Bowel sounds are normal.      Palpations: Abdomen is soft. There is no mass.      Tenderness: There is no abdominal tenderness. There is no guarding or rebound.   Musculoskeletal:         General: No tenderness. Normal range of motion.      Cervical back: Normal range of motion and neck supple.   Lymphadenopathy:      Cervical: No cervical adenopathy.   Skin:     General: Skin is warm.      Findings: No erythema or rash.   Neurological:      Mental Status: She is alert and oriented to person, place, and time.      Motor: No abnormal muscle tone.   Psychiatric:         Behavior: Behavior normal.       I have reexamined the patient and the results are consistent with the previously documented exam. Venita Sarkar MD     RECENT LABS: "     Lab Results (last 24 hours)     Procedure Component Value Units Date/Time    POC Glucose Once [[id number]]  (Abnormal) Collected: 12/05/22 1112    Specimen: Blood Updated: 12/05/22 1113     Glucose 124 mg/dL      Comment: Serial Number: [id number]:  [id number]       Comprehensive Metabolic Panel [[id number]]  (Abnormal) Collected: 12/05/22 0423    Specimen: Blood Updated: 12/05/22 0507     Glucose 173 mg/dL      BUN 52 mg/dL      Creatinine 1.03 mg/dL      Sodium 135 mmol/L      Potassium 3.6 mmol/L      Comment: Slight hemolysis detected by analyzer. Results may be affected.        Chloride 97 mmol/L      CO2 26.0 mmol/L      Calcium 7.0 mg/dL      Total Protein 5.0 g/dL      Albumin 3.40 g/dL      ALT (SGPT) 14 U/L      AST (SGOT) 20 U/L      Comment: Slight hemolysis detected by analyzer. Results may be affected.        Alkaline Phosphatase 128 U/L      Total Bilirubin 0.8 mg/dL      Globulin 1.6 gm/dL      A/G Ratio 2.1 g/dL      BUN/Creatinine Ratio 50.5     Anion Gap 12.0 mmol/L      eGFR 58.6 mL/min/1.73      Comment: National Kidney Foundation and American Society of Nephrology (ASN) Task Force recommended calculation based on the Chronic Kidney Disease Epidemiology Collaboration (CKD-EPI) equation refit without adjustment for race.       Narrative:      GFR Normal >60  Chronic Kidney Disease <60  Kidney Failure <15      Phosphorus [[id number]]  (Normal) Collected: 12/05/22 0423    Specimen: Blood Updated: 12/05/22 0456     Phosphorus 2.9 mg/dL     Magnesium [[id number]]  (Normal) Collected: 12/05/22 0423    Specimen: Blood Updated: 12/05/22 0456     Magnesium 1.7 mg/dL     Calcium, Ionized [[id number]]  (Abnormal) Collected: 12/05/22 0423    Specimen: Blood Updated: 12/05/22 0448     Ionized Calcium 0.94 mmol/L     CBC & Differential [[id number]]  (Abnormal) Collected: 12/05/22 0423    Specimen: Blood Updated: 12/05/22 0438    Narrative:      The following orders were created for panel order CBC &  Differential.  Procedure                               Abnormality         Status                     ---------                               -----------         ------                     CBC Auto Differential[[id number]]        Abnormal            Final result                 Please view results for these tests on the individual orders.    CBC Auto Differential [[id number]]  (Abnormal) Collected: 12/05/22 0423    Specimen: Blood Updated: 12/05/22 0438     WBC 8.90 10*3/mm3      RBC 2.79 10*6/mm3      Hemoglobin 8.5 g/dL      Hematocrit 25.8 %      MCV 92.7 fL      MCH 30.3 pg      MCHC 32.7 g/dL      RDW 19.0 %      RDW-SD 65.2 fl      MPV 10.1 fL      Platelets 87 10*3/mm3      Neutrophil % 85.8 %      Lymphocyte % 3.7 %      Monocyte % 5.5 %      Eosinophil % 4.0 %      Basophil % 1.0 %      Neutrophils, Absolute 7.60 10*3/mm3      Lymphocytes, Absolute 0.30 10*3/mm3      Monocytes, Absolute 0.50 10*3/mm3      Eosinophils, Absolute 0.40 10*3/mm3      Basophils, Absolute 0.10 10*3/mm3      nRBC 0.0 /100 WBC     POC Glucose Once [[id number]]  (Abnormal) Collected: 12/04/22 2337    Specimen: Blood Updated: 12/04/22 2338     Glucose 134 mg/dL      Comment: Serial Number: [id number]:  [id number]       POC Glucose Once [[id number]]  (Abnormal) Collected: 12/04/22 1703    Specimen: Blood Updated: 12/04/22 1704     Glucose 130 mg/dL      Comment: Serial Number: [id number]:  [id number]       Fibrinogen [[id number]]  (Normal) Collected: 12/04/22 1619    Specimen: Blood Updated: 12/04/22 1655     Fibrinogen 305 mg/dL           PENDING RESULTS:     IMAGING REVIEWED:  XR Chest 1 View    Result Date: 12/5/2022  1.     Increased opacities in the right upper lobe which could indicate atelectasis or worsening infiltrate. 2.     Small pleural effusions and diffuse patchy airspace opacities, as before.  Electronically Signed By-Abdirahman Saldaña MD On:12/5/2022 3:12 PM This report was finalized on [id number] by  Abdirahman  MD Piotr.    XR Abdomen KUB    Result Date: 12/5/2022  1. Esophagogastric tube tip below the diaphragm overlying the distal stomach. 2. Increased gaseous distention of the colon which may relate to ileus.  Electronically Signed By-Morgan Rodriguez MD On:12/5/2022 3:14 PM This report was finalized on [id number] by  Morgan Rodriguez MD.      Assessment & Plan   ASSESSMENT:  1. Metastatic breast carcinoma to the lungs, liver, bones.  ER positive, IL positive, HER2/willi negative: Currently on treatment with paclitaxel and gemcitabine, zoledronic acid for bone metastasis.  Plan to continue chemotherapy after discharge.  2. Anemia/chemotherapy-induced cytopenias/Duodenal Ulcers/Gastric ulcer:  EGD 11/23/22 shows actively bleeding duodenal ulcers x4 and non-bleeding gastric ulcer. Baseline Hgb between 11-12. Reports of patient with dark stools. GI signed off with recommendation for PPI and Carafate, and considering outpatient colonoscopy when patient recovered. Received 1 unit PRBC's this admission.  Patient is currently having stools every day with imodium given daily.  Nurse reports stools are not dark and no blood was seen today when bowels moved.  Hgb 8.2.  3. Thrombocytopenia: Multifactorial including medications.  No evidence of DIC as her fibrinogen level is normal.  We will also check coagulation panel.  4. Diarrhea: GI following.  Work-up in progress.  Possible colonoscopy in the future but deferred now due to COVID infection.  On Lomotil, Imodium, cholestyramine.  Negative C. difficile and gastrointestinal panel.  Begin to wean down on steroids per primary  5. Folate deficiency on folate replacement  6. Possible choledocholithiasis on CT: GI following and plan for MRCP for further evaluation.  7. Hypokalemia/Hypocalcemia: replacement per protocol. Nephrology on board  8. UTI: resolved  9. Septic Shock due to COVID pneumonia/Pleural Effusion/CHF: high dose midodrine. Finished antibiotic course.  10. DILLON: remains  oliguric, nephrology on board  11. Acute Respiratory Failure/ARDS: On/off biPAP alternation with high flow oxygen.  Remains in the ICU     PLAN    1. Daily CBCs  2. Check coagulation panel  3. Transfuse PRBC as needed for hemoglobin 7.0 or below  4. Continue to hold chemotherapy  5. Monitor for bleeding - stools light brown  6. Continue to monitor with nephrology, pulmonology  7. Discussed with nursing      Electronically signed by Venita Sarkar MD, 12/05/22, 5:08 PM EST.

## 2022-12-05 NOTE — PROGRESS NOTES
[facility] Progress Note      Patient Name: Christiane Nazario  : 1950  MRN: [id number]  Primary Care Physician:  Margarita Melissa, TOMMY  Date of admission: 2022    Subjective     Interval History:     Patient continues to be BiPAP dependent.  No event overnight.    Review of Systems:   As noted above    Objective     Vitals:   Temp:  [97.5 °F (36.4 °C)-98.2 °F (36.8 °C)] 98.2 °F (36.8 °C)  Heart Rate:  [62-90] 70  Resp:  [20-24] 22  BP: ()/(44-72) 99/50  Flow (L/min):  [40] 40    Intake/Output Summary (Last 24 hours) at 2022 0818  Last data filed at 2022 0500  Gross per 24 hour   Intake 5438 ml   Output 525 ml   Net 4913 ml       Physical Exam:    General Appearance: Patient on BiPAP  Skin: warm and dry  HEENT: oral mucosa normal, nonicteric sclera  Neck: supple, no JVD  Lungs: Crackles bibasal  Heart: RRR, normal S1 and S2  Abdomen: soft, nontender, nondistended  : no palpable bladder  Extremities: Anasarca.  Bilateral lower and upper extremities edema  Neuro: Alert X3 no focalization    Scheduled Meds:     bumetanide, 2 mg, Intravenous, Q8H  collagenase, 1 application, Topical, Q24H  hydrocortisone-bacitracin-zinc oxide-nystatin, 1 application, Topical, BID  insulin glargine, 5 Units, Subcutaneous, Daily  insulin lispro, 4-24 Units, Subcutaneous, Q6H  lidocaine, 1 patch, Transdermal, Q24H  miconazole, , Topical, Q12H  midodrine, 20 mg, Nasogastric, Q8H  pantoprazole, 40 mg, Intravenous, Q12H  potassium chloride, 40 mEq, Nasogastric, Daily  sodium chloride, 10 mL, Intravenous, Q12H      IV Meds:   Pharmacy Consult - Pharmacy to dose,         Results Reviewed:   I have personally reviewed the results from the time of this admission to 2022 08:18 EST     Results from last 7 days   Lab Units 22  0423 22  1345 22  0442 22  1551 22  0428   SODIUM mmol/L 135* 135* 136   < > 138   POTASSIUM mmol/L 3.6 4.4 3.5   < > 2.8*    CHLORIDE mmol/L 97* 96* 98   < > 94*   CO2 mmol/L 26.0 29.0 31.0*   < > 33.0*   BUN mg/dL 52* 51* 47*   < > 41*   CREATININE mg/dL 1.03* 1.04* 1.07*   < > 1.15*   CALCIUM mg/dL 7.0* 6.9* 6.8*   < > 6.5*   BILIRUBIN mg/dL 0.8  --  0.7  --  0.9   ALK PHOS U/L 128*  --  128*  --  149*   ALT (SGPT) U/L 14  --  16  --  15   AST (SGOT) U/L 20  --  19  --  21   GLUCOSE mg/dL 173* 150* 121*   < > 191*    < > = values in this interval not displayed.       Estimated Creatinine Clearance: 52.1 mL/min (A) (by C-G formula based on SCr of 1.03 mg/dL (H)).    Results from last 7 days   Lab Units 12/05/22  0423 12/04/22  1345 12/04/22  0442 12/03/22  0428   MAGNESIUM mg/dL 1.7  --  1.8 1.9   PHOSPHORUS mg/dL 2.9 2.9 1.9* 3.1             Results from last 7 days   Lab Units 12/05/22  0423 12/04/22  0442 12/03/22  0428 12/02/22  0439 12/01/22  0449   WBC 10*3/mm3 8.90 9.20 10.50 16.10* 11.70*   HEMOGLOBIN g/dL 8.5* 8.2* 8.6* 9.0* 8.7*   PLATELETS 10*3/mm3 87* 80* 101* 160 137*             Assessment / Plan              ASSESSMENT:    1.  Acute kidney injury Nonoliguric ATN.  Renal function improved volume status generous which seems to be third spacing  2.  COVID-19 infection/respiratory failure.    On isolation completed for 2 weeks.  3.  Edema/ Anasarca .  Seems to be third spacing due to inflammatory state, nutritional, prolonged hospital stay   4. Acute on chronic normocytic anemia secondary to GI bleeding from duodenal ulcers S/p EGD and sclerotherapy 2 duodenal ulcers.  Follow H&H closely transfuse as needed  5. Hypokalemia.    Better  6.  Urinary tract infection with isolation of Enterobacter as per primary team    PLAN:  Continue IV Bumex to 2 milligram 3 times daily  Monitor and replace electrolytes as needed  Continue midodrine  Surveillance labs           Thank you for involving us in the care of Christiane Nazario.  Please feel free to call with any questions.    Iron Ward MD  12/05/22  08:18 EST    Nephrology  [facility]  [phone]    Please note that portions of this note were completed with a voice recognition program.

## 2022-12-05 NOTE — CASE MANAGEMENT/SOCIAL WORK
Continued Stay Note  JAZLYN Renner     Patient Name: Christiane Nazario  MRN: [id number]  Today's Date: 12/5/2022    Admit Date: 11/7/2022    Plan: Accepted at [facility] with bed available 12/5.   Discharge Plan     Row Name 12/05/22 1316       Plan    Plan Accepted at [facility] with bed available 12/5.    Plan Comments Barrier to dc to [facility]: per bedside nurse pulmonology declined  dc to [facility] untl tomorrow. Leatha at [facility] notfied                   Expected Discharge Date and Time     Expected Discharge Date Expected Discharge Time    Dec 8, 2022             Radha Posadas RN

## 2022-12-05 NOTE — PLAN OF CARE
Goal Outcome Evaluation:      Patient's calcium, phosphorus, and potassium were replaced. Patient was given imodium for frequent loose stools. Patient was put on airvo, but did not tolerate. She was then placed back on AVAPS. Patient is currently resting in bed with no complaints. Will continue to monitor and follow plan of care.

## 2022-12-05 NOTE — PROGRESS NOTES
[facility] Medicine Services Daily Progress Note    Patient Name: Christiane Nazario  : 1950  MRN: [id number]  Primary Care Physician:  Margarita Melissa, APRN  Date of admission: 2022      Subjective      Chief Complaint:     Shortness of breath    Patient Reports       12/5  Blood pressure on the lower side  Patient is on noninvasive ventilation with 50% oxygen and saturating 100%  Renal function seems to be stable  Fluid balance shows +4.9 L.  Not sure how accurate.  checkFu cxr   ROS   All other systems reviewed and negative except as above      Objective      Vitals:   Temp:  [97.5 °F (36.4 °C)-98.2 °F (36.8 °C)] 98.2 °F (36.8 °C)  Heart Rate:  [61-90] 61  Resp:  [20-24] 22  BP: ()/(44-72) 99/50  Flow (L/min):  [40] 40    Physical Exam      GENERAL APPEARANCE: Moderate distress on BiPAP  HEAD: normocephalic.  EYES: PERRL, EOMI. vision intact grossly.  EARS: Intact hearing.  No gross abnormalities.  NOSE: No nasal discharge.  THROAT: Clear   NECK: Neck supple, non-tender without lymphadenopathy, masses or thyromegaly.  CARDIAC: Normal S1 and S2. No S3, S4 or murmurs. Rhythm is regular. There is no peripheral edema, cyanosis or pallor. Extremities are warm and well perfused. Capillary refill is less than 2 seconds. No carotid bruits.  LUNGS: Clear to auscultation and percussion without rales, rhonchi, wheezing or diminished breath sounds.  ABDOMEN: Positive bowel sounds. Soft, nondistended, nontender. No guarding or rebound. No masses.  MUSKULOSKELETAL: No deformity or swelling   BACK: No abnormalities noted     EXTREMITIES: Bilateral extremity swelling 2      NEUROLOGICAL: CN II-XII intact. Strength and sensation symmetric and intact throughout. Reflexes 2+ throughout. Cerebellar testing normal.  SKIN: Skin normal color, texture and turgor with no lesions or eruptions.  PSYCHIATRIC: Alert cooperative not suicidal         Result Review    Result Review:  I have  personally reviewed the results from the time of this admission to 12/5/2022 11:13 EST and agree with these findings:  []  Laboratory  []  Microbiology  []  Radiology  []  EKG/Telemetry   []  Cardiology/Vascular   []  Pathology  []  Old records  []  Other:  Most notable findings include:       Wounds (last 24 hours)     LDA Wound     Row Name 12/05/22 0756 12/04/22 1900 12/04/22 1630       Wound 09/28/22 0007 Bilateral upper coccyx Pressure Injury    Wound - Properties Group Placement Date: 09/28/22  -AW Placement Time: 0007 -AW Present on Hospital Admission: Y  -AW Side: Bilateral  -AW Orientation: upper  -AW Location: coccyx  -AW Primary Wound Type: Pressure inj  -AW    Dressing Appearance -- open to air  - --    Closure Adhesive bandage  -SE -- Adhesive bandage  -SN    Base dressing in place, unable to visualize  -SE moist;pink  - dressing in place, unable to visualize  -SN    Periwound non-blanchable;redness  -SE non-blanchable;redness  - non-blanchable;redness  -SN    Periwound Temperature warm  -SE -- warm  -SN    Periwound Skin Turgor soft  -SE -- soft  -SN    Care, Wound -- collagenase agent applied  - --    Retired Wound - Properties Group Placement Date: 09/28/22  -AW Placement Time: 0007 -AW Present on Hospital Admission: Y  -AW Side: Bilateral  -AW Orientation: upper  -AW Location: coccyx  -AW Primary Wound Type: Pressure inj  -AW    Retired Wound - Properties Group Date first assessed: 09/28/22  -AW Time first assessed: 0007 -AW Present on Hospital Admission: Y  -AW Side: Bilateral  -AW Location: coccyx  -AW Primary Wound Type: Pressure inj  -AW       Wound 11/10/22 0901 Left heel Pressure Injury    Wound - Properties Group Placement Date: 11/10/22  -CC Placement Time: 0901  -CC Side: Left  -CC Location: heel  -CC Primary Wound Type: Pressure inj  -CC Additional Comments: Redness to left heel, right heel seems to show normal skin  -CC    Dressing Appearance -- open to air  - --    Closure  Open to air  -SE -- Open to air  -SN    Base non-blanchable;red;dry  -SE non-blanchable;red  - non-blanchable;red;dry  -SN    Periwound intact;dry  -SE -- intact;dry  -SN    Periwound Temperature warm  -SE -- warm  -SN    Periwound Skin Turgor soft  -SE -- soft  -SN    Drainage Amount none  -SE -- none  -SN    Retired Wound - Properties Group Placement Date: 11/10/22  -CC Placement Time: 0901 -CC Side: Left  -CC Location: heel  -CC Primary Wound Type: Pressure inj  -CC Additional Comments: Redness to left heel, right heel seems to show normal skin  -CC    Retired Wound - Properties Group Date first assessed: 11/10/22  -CC Time first assessed: 0901 -CC Side: Left  -CC Location: heel  -CC Primary Wound Type: Pressure inj  -CC Additional Comments: Redness to left heel, right heel seems to show normal skin  -CC       Wound 11/22/22 0300 Bilateral labia MASD (Moisture associated skin damage)    Wound - Properties Group Placement Date: 11/22/22  - Placement Time: 0300  - Present on Hospital Admission: N  - Side: Bilateral  - Location: labia  - Primary Wound Type: MASD  -    Dressing Appearance -- open to air  - --    Closure Open to air  -SE -- Open to air  -SN    Base moist;red  -SE moist;red;other (see comments)  multiple ulcerated areas  - moist;red  -SN    Periwound pink  -SE excoriated  - pink  -SN    Care, Wound -- cleansed with;soap and water;barrier applied  - --    Retired Wound - Properties Group Placement Date: 11/22/22  - Placement Time: 0300  - Present on Hospital Admission: N  - Side: Bilateral  - Location: labia  - Primary Wound Type: MASD  -    Retired Wound - Properties Group Date first assessed: 11/22/22  - Time first assessed: 0300  - Present on Hospital Admission: N  - Side: Bilateral  - Location: labia  - Primary Wound Type: MASD  -    Row Name 12/04/22 1230             Wound 09/28/22 0007 Bilateral upper coccyx Pressure Injury    Wound - Properties  Group Placement Date: 09/28/22 -AW Placement Time: 0007 -AW Present on Hospital Admission: Y  -AW Side: Bilateral  -AW Orientation: upper  -AW Location: coccyx  -AW Primary Wound Type: Pressure inj  -AW    Closure Adhesive bandage  -SN      Base dressing in place, unable to visualize  -SN      Periwound non-blanchable;redness  -SN      Periwound Temperature warm  -SN      Periwound Skin Turgor soft  -SN      Retired Wound - Properties Group Placement Date: 09/28/22 -AW Placement Time: 0007 -AW Present on Hospital Admission: Y  -AW Side: Bilateral  -AW Orientation: upper  -AW Location: coccyx  -AW Primary Wound Type: Pressure inj  -AW    Retired Wound - Properties Group Date first assessed: 09/28/22 -AW Time first assessed: 0007 -AW Present on Hospital Admission: Y  -AW Side: Bilateral  -AW Location: coccyx  -AW Primary Wound Type: Pressure inj  -AW       Wound 11/10/22 0901 Left heel Pressure Injury    Wound - Properties Group Placement Date: 11/10/22  -CC Placement Time: 0901 -CC Side: Left  -CC Location: heel  -CC Primary Wound Type: Pressure inj  -CC Additional Comments: Redness to left heel, right heel seems to show normal skin  -CC    Closure Open to air  -SN      Base non-blanchable;red;dry  -SN      Periwound intact;dry  -SN      Periwound Temperature warm  -SN      Periwound Skin Turgor soft  -SN      Drainage Amount none  -SN      Retired Wound - Properties Group Placement Date: 11/10/22  -CC Placement Time: 0901 -CC Side: Left  -CC Location: heel  -CC Primary Wound Type: Pressure inj  -CC Additional Comments: Redness to left heel, right heel seems to show normal skin  -CC    Retired Wound - Properties Group Date first assessed: 11/10/22  -CC Time first assessed: 0901 -CC Side: Left  -CC Location: heel  -CC Primary Wound Type: Pressure inj  -CC Additional Comments: Redness to left heel, right heel seems to show normal skin  -CC       Wound 11/22/22 0300 Bilateral labia MASD (Moisture associated  skin damage)    Wound - Properties Group Placement Date: 11/22/22  - Placement Time: 0300 - Present on Hospital Admission: N  - Side: Bilateral  - Location: labia  - Primary Wound Type: MASD  -    Closure Open to air  -SN      Base moist;red  -SN      Periwound pink  -SN      Retired Wound - Properties Group Placement Date: 11/22/22  - Placement Time: 0300  - Present on Hospital Admission: N  - Side: Bilateral  - Location: labia  - Primary Wound Type: MASD  -    Retired Wound - Properties Group Date first assessed: 11/22/22  - Time first assessed: 0300  - Present on Hospital Admission: N  - Side: Bilateral  - Location: labia  - Primary Wound Type: MASD  -          User Key  (r) = Recorded By, (t) = Taken By, (c) = Cosigned By    Initials Name Provider Type    Pearl Reyes, RN Registered Nurse    Carlie Marquez RN Registered Nurse    Starr Murphy RN Registered Nurse    Brenda Bailey RN Registered Nurse    Dilan Odom RN Registered Nurse                  Assessment & Plan      Brief Patient Summary:  Christiane Nazario is a 72 y.o. female who         bumetanide, 2 mg, Intravenous, Q8H  collagenase, 1 application, Topical, Q24H  hydrocortisone-bacitracin-zinc oxide-nystatin, 1 application, Topical, BID  insulin glargine, 5 Units, Subcutaneous, Daily  insulin lispro, 4-24 Units, Subcutaneous, Q6H  lidocaine, 1 patch, Transdermal, Q24H  miconazole, , Topical, Q12H  midodrine, 20 mg, Nasogastric, Q8H  pantoprazole, 40 mg, Intravenous, Q12H  potassium chloride, 40 mEq, Nasogastric, Daily  predniSONE, 20 mg, Oral, Daily With Breakfast  sodium chloride, 10 mL, Intravenous, Q12H       Pharmacy Consult - Pharmacy to dose,          Active Hospital Problems:  Active Hospital Problems    Diagnosis    • **COVID    • DILLON (acute kidney injury) (HCC)    • Pleural effusion on left    • Sepsis, unspecified organism (HCC)    • Choledocholithiasis    • Melena    • Metastatic  cancer, multiple hepatic, osseous and pleural lesions (HCC)    • Acute UTI (urinary tract infection) Enterobacter Cloacae    • Essential hypertension    • Acquired hypothyroidism    • Primary malignant neoplasm of female breast (HCC)    • Type 2 diabetes mellitus with microalbuminuria, with long-term current use of insulin (HCC)      Plan:   History of Present Illness: Christiane Nazario is a 72 y.o. female with a past medical history to include DM2 with peripheral neuropathy, breast/liver/bone ca currently on chemotherapy, hyperlipidemia, hypertension and hypothyroidism who presented to [facility] on 11/7/2022 complaining of diarrhea for 6 weeks along with recent mild cough/congestion and shortness of air.  She denies having any hematochezia or melena.  She denies emesis.  She denies any nausea but reports having 1 episode of vomiting yesterday.  She denies having lightheadedness, dizziness or syncopal episodes.  She denies having chest pain but reports mild cough and shortness of air.  She denies abdominal pain.  She denies having lower extremity pain.  She reports some dysuria and frequency.  According to record she has had several evaluations for diarrhea and noted negative PCR's.  She is currently alternating Lomotil and Imodium without relief.     Emergency room work-up pulse 94, BP 94/48.  Respirations 18-20.  2 L nasal cannula 93%.  Afebrile.  UA positive.  Urine culture pending.  COVID-19 detected.  Gastrointestinal panel negative.  C. difficile negative.  Glucose 173.  Creatinine 0.99.  Potassium 4.3.  Chloride 109.  CO2 21.0.  Calcium 7.0.  Albumin 2.60.  ALT 16, AST 24.  Alk phos 125.  Lipase 8.  WBC 2.9.  Hemoglobin 7.3.     CT abdomen pelvis without contrast there is subtle wall thickening of the transverse colon, descending colon and sigmoid colon suggesting mild infectious or inflammatory colitis.  There is no bowel obstruction.  There is a stone that appears to be lodged in the distal CBD  near the level of ampulla with mild extra hepatic biliary ductal dilatation.  Hepatic metastatic disease is again noted.  Couple of lesions are slightly smaller, which may reflect positive interval response to therapy in the appropriate clinical context.  Features of pleural-based metastatic disease on the left lower thorax are seen to better advantage on prior CT imaging. extensive osseous metastatic disease is not thought to be significantly changed. However, there is a new mild compression fracture of the superior endplate of the L5 vertebral body without retropulsion  or subluxation. A previously seen T12 compression fracture has had slight progressive loss of vertebral body height in the interval. Stable small to moderate left basilar pleural effusion with passive left basilar atelectasis.      Chest x-ray right upper extremity PICC line tip at the cavoatrial junction. Mild bibasilar atelectasis and small pleural effusions, stable. Pulmonary nodules better assessed on recent chest CT.       Assessment/plan    Acute respiratory failure with hypoxia / ARDS  • Likely due to combination of COVID-19 pneumonia, pleural effusion and CHF.  • Completed antibiotics for possible superimposed bacterial infection.  ID signed off  • Echocardiogram with normal LVEF.    • - On diuretics by nephrologist  • Off/on BiPAP alternating with high flow nasal cannula  • Late stage ARDS, no benefit for steroids.  DC further steroids.  -Consult pulmonologist outside ICU    Septic shock, due to COVID-pneumonia & UTI  UTI Enterobacter cloacae, present on admission  Colitis  • Off norepinephrine.  Continue with high-dose midodrine.  • Finished antibiotic course.     Acute on chronic diastolic heart failure / Volume Overload / Right pleural effusion  • Currently decompensated.   •    Diuretics managed by nephrologist.  • Last ECHO showed an EF of 65%, grade 1 LV diastolic dysfunction.  • Monitor Input/Output very closely. Follow daily  weights.   • Net IO Since Admission: 19,336.88 mL [12/03/22 1343]      Acute Kidney Injury:   • Remains non oliguric.   • Likely cardiorenal, continue with diuresis  • Monitor Input/Output very closely.   • Defer to nephrologist      Severe hypokalemia: On aggressive potassium replacement.    Managed by nephrologist  Repeat evaluation  Received replacement today  Magnesium 1.9     Duodenal ulcers with bleeding / Acute blood loss anemia /anemia of chronic disease  • Multifactorial, undergoing chemo and metastatic disease  • EGD 11/23, with findings of actively bleeding duodenal ulcers x4 as well as a nonbleeding gastric ulcer.   • Continue Protonix  • GI signed off  • Received 1 PRBC since admission.  - currently with dubhof TF    Essential hypertension    • On high-dose midodrine.  Home antihypertensives on hold.     Common bile duct stone  -Underwent ERCP 11/8/2022 with stone retraction and stenting of the CBD  -GI signed off      Breast CA-Metastatic : Outpatient follow-up with heme-onc.     Diabetes mellitus Type 2, not insulin-dependent : well controlled.   • Accu checks before meals and at bedtime, c/w humalog coverage as needed.   • Continue Lantus 5 units daily.  • A1c of 6.2.     Primary hypothyroidism: Chronic and stable. Continue synthroid.     Morbid Obesity: Body mass index is 38.67 kg/m².    Hypocalcemia and hypokalemia  Replacement per protocol  Reevaluate    Chronic diarrhea  Initially thought may need rectal tube but has not been placed    Elevated alkaline phosphatase 128, mildWill monitor    Electrolyte replacement protocol in place for significant electrolyte abnormalities normal  Nephrology is also following    DVT prophylaxis: SCDs  No anticoagulants secondary to GI bleed and gastric ulcers  High risk for DVT and PE  Consider screening Doppler  Discussed with patient and family    Mechanical DVT prophylaxis orders are present.    CODE STATUS:    Level Of Support Discussed With: Patient  Code  Status (Patient has no pulse and is not breathing): CPR (Attempt to Resuscitate)  Medical Interventions (Patient has pulse or is breathing): Full Support      Disposition:  I expect patient to be discharged rehab.    This patient has been examined wearing appropriate Personal Protective Equipment and discussed with hospital infection control department. 12/05/22      Electronically signed by Gregor Hunter MD, 12/05/22, 11:13 EST.  [facility]ist Team

## 2022-12-05 NOTE — PLAN OF CARE
"Goal Outcome Evaluation:    Patient had a open bed at [facility] today. Dr. Ward was ok with discharge but Dr. Victor said to wait another day. I asked Dr. Hunter if patient could have a baird catheter related to her stage 3 bed sore, excoriation and incontinence she has. Dr. Hunter said to ask Dr. Ward and Dr. Ward said, \"Sure.\" Vitals have been stable. Will continue to monitor.   "

## 2022-12-05 NOTE — PROGRESS NOTES
"PULMONARY CRITICAL CARE PROGRESS  NOTE      PATIENT IDENTIFICATION:  Name: Christiane Nazario  MRN: [id number]  :  1950     Age: 72 y.o.  Sex: female    DATE OF Note:  2022   Referring Physician: No admitting provider for patient encounter.                  Subjective:     On BiPAP, SOB, no chest pain,  Tolerating tube feed no nausea or vomiting, no change in bowel habit, no dysuria,  no new  skin rash or itching.      Objective:  tMax 24 hrs: Temp (24hrs), Av.9 °F (36.6 °C), Min:97.5 °F (36.4 °C), Max:98.2 °F (36.8 °C)      Vitals Ranges:   Temp:  [97.5 °F (36.4 °C)-98.2 °F (36.8 °C)] 98.2 °F (36.8 °C)  Heart Rate:  [62-90] 70  Resp:  [20-24] 22  BP: ()/(44-72) 99/50    Intake and Output Last 3 Shifts:   I/O last 3 completed shifts:  In: 5438 [Other:3083; NG/GT:2355]  Out: 1075 [Urine:1075]    Exam:  BP 99/50   Pulse 70   Temp 98.2 °F (36.8 °C) (Axillary)   Resp 22   Ht 152.4 cm (60\")   Wt 94.1 kg (207 lb 7.3 oz)   LMP  (LMP Unknown)   SpO2 99%   BMI 40.52 kg/m²     General Appearance:     HEENT:  Normocephalic, without obvious abnormality. Conjunctivae/corneas clear.  Normal external ear canals. Nares normal, no drainage     Neck:  Supple, symmetrical, trachea midline. No JVD.  Lungs /Chest wall:   Bilateral basal rhonchi,  symmetrical wall movement.     Heart:  Regular rate and rhythm, systolic murmur PMI left sternal border  Abdomen: Soft, nontender, no masses, no organomegaly.    Extremities: Trace edema, no clubbing or cyanosis        Medications:  bumetanide, 2 mg, Intravenous, Q8H  collagenase, 1 application, Topical, Q24H  hydrocortisone-bacitracin-zinc oxide-nystatin, 1 application, Topical, BID  insulin glargine, 5 Units, Subcutaneous, Daily  insulin lispro, 4-24 Units, Subcutaneous, Q6H  lidocaine, 1 patch, Transdermal, Q24H  miconazole, , Topical, Q12H  midodrine, 20 mg, Nasogastric, Q8H  pantoprazole, 40 mg, Intravenous, Q12H  potassium chloride, 40 mEq, Nasogastric, " Daily  sodium chloride, 10 mL, Intravenous, Q12H        Infusion:  Pharmacy Consult - Pharmacy to dose,          PRN:  •  acetaminophen **OR** acetaminophen **OR** acetaminophen  •  albuterol  •  aluminum-magnesium hydroxide-simethicone  •  Calcium Gluconate-NaCl **AND** calcium gluconate **AND** Calcium, Ionized  •  dextrose  •  dextrose  •  glucagon (human recombinant)  •  hydrOXYzine  •  hydrOXYzine  •  loperamide  •  magnesium sulfate **OR** magnesium sulfate in D5W 1g/100mL (PREMIX) **OR** magnesium sulfate  •  melatonin  •  midazolam  •  nitroglycerin  •  ondansetron **OR** ondansetron  •  Pharmacy Consult - Pharmacy to dose  •  potassium chloride **OR** potassium chloride **OR** potassium chloride  •  potassium phosphate infusion greater than 15 mMoles **OR** potassium phosphates **OR** potassium phosphate **OR** sodium phosphate IVPB **OR** sodium phosphate IVPB **OR** Sodium Phosphate  •  [COMPLETED] Insert peripheral IV **AND** sodium chloride  •  sodium chloride  •  traMADol  Data Review:  All labs (24hrs):   Recent Results (from the past 24 hour(s))   POC Glucose Once    Collection Time: 12/04/22 11:43 AM    Specimen: Blood   Result Value Ref Range    Glucose 179 (H) 70 - 105 mg/dL   Basic Metabolic Panel    Collection Time: 12/04/22  1:45 PM    Specimen: Blood   Result Value Ref Range    Glucose 150 (H) 65 - 99 mg/dL    BUN 51 (H) 8 - 23 mg/dL    Creatinine 1.04 (H) 0.57 - 1.00 mg/dL    Sodium 135 (L) 136 - 145 mmol/L    Potassium 4.4 3.5 - 5.2 mmol/L    Chloride 96 (L) 98 - 107 mmol/L    CO2 29.0 22.0 - 29.0 mmol/L    Calcium 6.9 (L) 8.6 - 10.5 mg/dL    BUN/Creatinine Ratio 49.0 (H) 7.0 - 25.0    Anion Gap 10.0 5.0 - 15.0 mmol/L    eGFR 57.9 (L) >60.0 mL/min/1.73   Phosphorus    Collection Time: 12/04/22  1:45 PM    Specimen: Blood   Result Value Ref Range    Phosphorus 2.9 2.5 - 4.5 mg/dL   Fibrinogen    Collection Time: 12/04/22  4:19 PM    Specimen: Blood   Result Value Ref Range    Fibrinogen 305  210 - 450 mg/dL   POC Glucose Once    Collection Time: 12/04/22  5:03 PM    Specimen: Blood   Result Value Ref Range    Glucose 130 (H) 70 - 105 mg/dL   POC Glucose Once    Collection Time: 12/04/22 11:37 PM    Specimen: Blood   Result Value Ref Range    Glucose 134 (H) 70 - 105 mg/dL   Magnesium    Collection Time: 12/05/22  4:23 AM    Specimen: Blood   Result Value Ref Range    Magnesium 1.7 1.6 - 2.4 mg/dL   Comprehensive Metabolic Panel    Collection Time: 12/05/22  4:23 AM    Specimen: Blood   Result Value Ref Range    Glucose 173 (H) 65 - 99 mg/dL    BUN 52 (H) 8 - 23 mg/dL    Creatinine 1.03 (H) 0.57 - 1.00 mg/dL    Sodium 135 (L) 136 - 145 mmol/L    Potassium 3.6 3.5 - 5.2 mmol/L    Chloride 97 (L) 98 - 107 mmol/L    CO2 26.0 22.0 - 29.0 mmol/L    Calcium 7.0 (L) 8.6 - 10.5 mg/dL    Total Protein 5.0 (L) 6.0 - 8.5 g/dL    Albumin 3.40 (L) 3.50 - 5.20 g/dL    ALT (SGPT) 14 1 - 33 U/L    AST (SGOT) 20 1 - 32 U/L    Alkaline Phosphatase 128 (H) 39 - 117 U/L    Total Bilirubin 0.8 0.0 - 1.2 mg/dL    Globulin 1.6 gm/dL    A/G Ratio 2.1 g/dL    BUN/Creatinine Ratio 50.5 (H) 7.0 - 25.0    Anion Gap 12.0 5.0 - 15.0 mmol/L    eGFR 58.6 (L) >60.0 mL/min/1.73   Calcium, Ionized    Collection Time: 12/05/22  4:23 AM    Specimen: Blood   Result Value Ref Range    Ionized Calcium 0.94 (L) 1.20 - 1.30 mmol/L   CBC Auto Differential    Collection Time: 12/05/22  4:23 AM    Specimen: Blood   Result Value Ref Range    WBC 8.90 3.40 - 10.80 10*3/mm3    RBC 2.79 (L) 3.77 - 5.28 10*6/mm3    Hemoglobin 8.5 (L) 12.0 - 15.9 g/dL    Hematocrit 25.8 (L) 34.0 - 46.6 %    MCV 92.7 79.0 - 97.0 fL    MCH 30.3 26.6 - 33.0 pg    MCHC 32.7 31.5 - 35.7 g/dL    RDW 19.0 (H) 12.3 - 15.4 %    RDW-SD 65.2 (H) 37.0 - 54.0 fl    MPV 10.1 6.0 - 12.0 fL    Platelets 87 (L) 140 - 450 10*3/mm3    Neutrophil % 85.8 (H) 42.7 - 76.0 %    Lymphocyte % 3.7 (L) 19.6 - 45.3 %    Monocyte % 5.5 5.0 - 12.0 %    Eosinophil % 4.0 0.3 - 6.2 %    Basophil % 1.0  0.0 - 1.5 %    Neutrophils, Absolute 7.60 (H) 1.70 - 7.00 10*3/mm3    Lymphocytes, Absolute 0.30 (L) 0.70 - 3.10 10*3/mm3    Monocytes, Absolute 0.50 0.10 - 0.90 10*3/mm3    Eosinophils, Absolute 0.40 0.00 - 0.40 10*3/mm3    Basophils, Absolute 0.10 0.00 - 0.20 10*3/mm3    nRBC 0.0 0.0 - 0.2 /100 WBC   Phosphorus    Collection Time: 12/05/22  4:23 AM    Specimen: Blood   Result Value Ref Range    Phosphorus 2.9 2.5 - 4.5 mg/dL        Imaging:  Adult Transthoracic Echo Complete w/ Color, Spectral and Contrast if Necessary Per Protocol  •  Left ventricular systolic function is normal. Left ventricular ejection   fraction appears to be 61 - 65%.  •  Left ventricular diastolic function is consistent with (grade I)   impaired relaxation.       ASSESSMENT:  Acute hypoxic respiratory failure  COVID 19 pneumonia  Obstructive sleep apnea    Essential hypertension    Acquired hypothyroidism    Primary malignant neoplasm of female breast (HCC)    Type 2 diabetes mellitus with microalbuminuria, with long-term current use of insulin (HCC)    Acute UTI (urinary tract infection) Enterobacter Cloacae    Metastatic cancer, multiple hepatic, osseous and pleural lesions (HCC)    Choledocholithiasis    Sepsis, unspecified organism (HCC)    DILLON (acute kidney injury) (HCC)    Pleural effusion on left    Melena       PLAN:  BiPAP as needed  Prednisone  Chest PT  Continue with antibiotics per infectious disease  Bronchodilator  Inhaled corticosteroids  Electrolytes/ glycemic control  DVT and GI prophylaxis.    I reviewed the recent clinical results    Much of this encounter note is an electronic transcription/translation of spoken language to printed text using Dragon Software which might include inadvertent errors in transcription.     12/5/2022  08:50 EST

## 2022-12-05 NOTE — PROGRESS NOTES
Nutrition Services    Patient Name: Christiane Nazario  YOB: 1950  MRN: [id number]  Admission date: 11/7/2022    PPE Documentation        PPE Worn By Provider Did not enter room for this encounter   PPE Worn By Patient  N/A     PROGRESS NOTE      Encounter Information: Check on for tube feeding.  Patient discussed in AM rounds.  Continues at goal rate, Diabetisource AC @ 60 mL/hr + Prosource TF BID.  Continuous bipap. Phos, calcium and potassium replaced yesterday. Slight hyponatremia, stable x 2 d; will continue to monitor and adjust water flush if downward trend.        PO Diet: NPO Diet NPO Type: Ice Chips   PO Supplements: None ordered    PO Intake:  NPO       Current nutrition support: Diabetisource AC 60 ml/hr + 75 ml/hr water flush + Prosource TF BID   Nutrition support review: Documented as above per EMR        Labs (reviewed below): Hyponatremia, slight but stable  -will continue to monitor closely.  Hypokalemia - resolved  Hyperglycemia  Hypomagnesemia -- resolved  Hypophosphatemia - resolved       GI Function:  Last BM 12/5 (today)  Residuals WNL        Nutrition Intervention Updates: Continue current tube feeding Diabetisource AC at 60 mL/hour (goal rate) + 75 mL/hour water flush         Results from last 7 days   Lab Units 12/05/22  0423 12/04/22  1345 12/04/22  0442 12/03/22  1551 12/03/22  0428   SODIUM mmol/L 135* 135* 136   < > 138   POTASSIUM mmol/L 3.6 4.4 3.5   < > 2.8*   CHLORIDE mmol/L 97* 96* 98   < > 94*   CO2 mmol/L 26.0 29.0 31.0*   < > 33.0*   BUN mg/dL 52* 51* 47*   < > 41*   CREATININE mg/dL 1.03* 1.04* 1.07*   < > 1.15*   CALCIUM mg/dL 7.0* 6.9* 6.8*   < > 6.5*   BILIRUBIN mg/dL 0.8  --  0.7  --  0.9   ALK PHOS U/L 128*  --  128*  --  149*   ALT (SGPT) U/L 14  --  16  --  15   AST (SGOT) U/L 20  --  19  --  21   GLUCOSE mg/dL 173* 150* 121*   < > 191*    < > = values in this interval not displayed.     Results from last 7 days   Lab Units 12/05/22  0423 12/04/22  6758  12/04/22  0442 12/03/22  0428   MAGNESIUM mg/dL 1.7  --  1.8 1.9   PHOSPHORUS mg/dL 2.9   < > 1.9* 3.1   HEMOGLOBIN g/dL 8.5*  --  8.2* 8.6*   HEMATOCRIT % 25.8*  --  24.4* 25.8*    < > = values in this interval not displayed.     COVID19   Date Value Ref Range Status   11/07/2022 Detected (C) Not Detected - Ref. Range Final     Lab Results   Component Value Date    HGBA1C 6.2 (H) 08/08/2022     RD to follow up per protocol.    Electronically signed by:  Radha Crystal  12/05/22 07:48 EST

## 2022-12-06 VITALS
HEART RATE: 78 BPM | BODY MASS INDEX: 39.3 KG/M2 | WEIGHT: 200.18 LBS | TEMPERATURE: 97.1 F | DIASTOLIC BLOOD PRESSURE: 75 MMHG | RESPIRATION RATE: 21 BRPM | HEIGHT: 60 IN | SYSTOLIC BLOOD PRESSURE: 146 MMHG | OXYGEN SATURATION: 93 %

## 2022-12-06 LAB
ALBUMIN SERPL-MCNC: 3.6 G/DL (ref 3.5–5.2)
ALBUMIN/GLOB SERPL: 2.1 G/DL
ALP SERPL-CCNC: 124 U/L (ref 39–117)
ALT SERPL W P-5'-P-CCNC: 18 U/L (ref 1–33)
ANION GAP SERPL CALCULATED.3IONS-SCNC: 10 MMOL/L (ref 5–15)
AST SERPL-CCNC: 18 U/L (ref 1–32)
BASOPHILS # BLD AUTO: 0 10*3/MM3 (ref 0–0.2)
BASOPHILS NFR BLD AUTO: 0.3 % (ref 0–1.5)
BILIRUB SERPL-MCNC: 0.7 MG/DL (ref 0–1.2)
BUN SERPL-MCNC: 57 MG/DL (ref 8–23)
BUN/CREAT SERPL: 54.8 (ref 7–25)
CA-I SERPL ISE-MCNC: 1.03 MMOL/L (ref 1.2–1.3)
CALCIUM SPEC-SCNC: 7.5 MG/DL (ref 8.6–10.5)
CHLORIDE SERPL-SCNC: 92 MMOL/L (ref 98–107)
CO2 SERPL-SCNC: 31 MMOL/L (ref 22–29)
CREAT SERPL-MCNC: 1.04 MG/DL (ref 0.57–1)
DEPRECATED RDW RBC AUTO: 64.3 FL (ref 37–54)
EGFRCR SERPLBLD CKD-EPI 2021: 57.9 ML/MIN/1.73
EOSINOPHIL # BLD AUTO: 0.1 10*3/MM3 (ref 0–0.4)
EOSINOPHIL NFR BLD AUTO: 1.1 % (ref 0.3–6.2)
ERYTHROCYTE [DISTWIDTH] IN BLOOD BY AUTOMATED COUNT: 19.3 % (ref 12.3–15.4)
GLOBULIN UR ELPH-MCNC: 1.7 GM/DL
GLUCOSE BLDC GLUCOMTR-MCNC: 214 MG/DL (ref 70–105)
GLUCOSE SERPL-MCNC: 142 MG/DL (ref 65–99)
HCT VFR BLD AUTO: 26.7 % (ref 34–46.6)
HGB BLD-MCNC: 8.5 G/DL (ref 12–15.9)
LYMPHOCYTES # BLD AUTO: 0.3 10*3/MM3 (ref 0.7–3.1)
LYMPHOCYTES NFR BLD AUTO: 3.4 % (ref 19.6–45.3)
MAGNESIUM SERPL-MCNC: 2 MG/DL (ref 1.6–2.4)
MCH RBC QN AUTO: 30.1 PG (ref 26.6–33)
MCHC RBC AUTO-ENTMCNC: 32 G/DL (ref 31.5–35.7)
MCV RBC AUTO: 94 FL (ref 79–97)
MONOCYTES # BLD AUTO: 0.6 10*3/MM3 (ref 0.1–0.9)
MONOCYTES NFR BLD AUTO: 6.7 % (ref 5–12)
NEUTROPHILS NFR BLD AUTO: 7.6 10*3/MM3 (ref 1.7–7)
NEUTROPHILS NFR BLD AUTO: 88.5 % (ref 42.7–76)
NRBC BLD AUTO-RTO: 0 /100 WBC (ref 0–0.2)
PHOSPHATE SERPL-MCNC: 3.1 MG/DL (ref 2.5–4.5)
PLATELET # BLD AUTO: 87 10*3/MM3 (ref 140–450)
PMV BLD AUTO: 9.9 FL (ref 6–12)
POTASSIUM SERPL-SCNC: 3 MMOL/L (ref 3.5–5.2)
PROT SERPL-MCNC: 5.3 G/DL (ref 6–8.5)
RBC # BLD AUTO: 2.84 10*6/MM3 (ref 3.77–5.28)
SODIUM SERPL-SCNC: 133 MMOL/L (ref 136–145)
WBC NRBC COR # BLD: 8.6 10*3/MM3 (ref 3.4–10.8)

## 2022-12-06 PROCEDURE — 63710000001 INSULIN GLARGINE PER 5 UNITS: Performed by: INTERNAL MEDICINE

## 2022-12-06 PROCEDURE — 63710000001 PREDNISONE PER 1 MG: Performed by: INTERNAL MEDICINE

## 2022-12-06 PROCEDURE — 94799 UNLISTED PULMONARY SVC/PX: CPT

## 2022-12-06 PROCEDURE — 82962 GLUCOSE BLOOD TEST: CPT

## 2022-12-06 PROCEDURE — 85025 COMPLETE CBC W/AUTO DIFF WBC: CPT | Performed by: NURSE PRACTITIONER

## 2022-12-06 PROCEDURE — 82330 ASSAY OF CALCIUM: CPT | Performed by: NURSE PRACTITIONER

## 2022-12-06 PROCEDURE — 83735 ASSAY OF MAGNESIUM: CPT | Performed by: INTERNAL MEDICINE

## 2022-12-06 PROCEDURE — 94660 CPAP INITIATION&MGMT: CPT

## 2022-12-06 PROCEDURE — 80053 COMPREHEN METABOLIC PANEL: CPT | Performed by: NURSE PRACTITIONER

## 2022-12-06 PROCEDURE — 63710000001 INSULIN LISPRO (HUMAN) PER 5 UNITS: Performed by: NURSE PRACTITIONER

## 2022-12-06 PROCEDURE — 25010000002 CEFEPIME PER 500 MG: Performed by: INTERNAL MEDICINE

## 2022-12-06 PROCEDURE — 94761 N-INVAS EAR/PLS OXIMETRY MLT: CPT

## 2022-12-06 PROCEDURE — 84100 ASSAY OF PHOSPHORUS: CPT | Performed by: INTERNAL MEDICINE

## 2022-12-06 RX ORDER — LOPERAMIDE HYDROCHLORIDE 2 MG/1
4 CAPSULE ORAL 4 TIMES DAILY PRN
Qty: 30 CAPSULE | Refills: 2
Start: 2022-12-06

## 2022-12-06 RX ORDER — PANTOPRAZOLE SODIUM 40 MG/10ML
40 INJECTION, POWDER, LYOPHILIZED, FOR SOLUTION INTRAVENOUS EVERY 12 HOURS SCHEDULED
Start: 2022-12-06

## 2022-12-06 RX ORDER — MIDODRINE HYDROCHLORIDE 10 MG/1
20 TABLET ORAL EVERY 8 HOURS SCHEDULED
Start: 2022-12-06

## 2022-12-06 RX ORDER — ALUMINA, MAGNESIA, AND SIMETHICONE 2400; 2400; 240 MG/30ML; MG/30ML; MG/30ML
15 SUSPENSION ORAL EVERY 6 HOURS PRN
Start: 2022-12-06

## 2022-12-06 RX ORDER — HYDROXYZINE HYDROCHLORIDE 25 MG/1
25 TABLET, FILM COATED ORAL 3 TIMES DAILY PRN
Start: 2022-12-06

## 2022-12-06 RX ORDER — ALBUTEROL SULFATE 2.5 MG/3ML
2.5 SOLUTION RESPIRATORY (INHALATION) EVERY 6 HOURS PRN
Refills: 12
Start: 2022-12-06

## 2022-12-06 RX ORDER — PREDNISONE 20 MG/1
20 TABLET ORAL
Qty: 10 TABLET | Refills: 0 | Status: SHIPPED | OUTPATIENT
Start: 2022-12-07

## 2022-12-06 RX ORDER — ACETAMINOPHEN 325 MG/1
650 TABLET ORAL EVERY 4 HOURS PRN
Start: 2022-12-06

## 2022-12-06 RX ORDER — BUMETANIDE 0.25 MG/ML
2 INJECTION INTRAMUSCULAR; INTRAVENOUS EVERY 8 HOURS
Start: 2022-12-06

## 2022-12-06 RX ADMIN — PREDNISONE 20 MG: 20 TABLET ORAL at 09:42

## 2022-12-06 RX ADMIN — Medication 10 ML: at 09:45

## 2022-12-06 RX ADMIN — COLLAGENASE SANTYL 1 APPLICATION: 250 OINTMENT TOPICAL at 09:43

## 2022-12-06 RX ADMIN — CEFEPIME 2 G: 2 INJECTION, POWDER, FOR SOLUTION INTRAVENOUS at 09:41

## 2022-12-06 RX ADMIN — HYDROCORTISONE 1 APPLICATION: 1 OINTMENT TOPICAL at 09:44

## 2022-12-06 RX ADMIN — ANTI-FUNGAL POWDER MICONAZOLE NITRATE TALC FREE 1 APPLICATION: 1.42 POWDER TOPICAL at 09:44

## 2022-12-06 RX ADMIN — BUMETANIDE 2 MG: 0.25 INJECTION, SOLUTION INTRAMUSCULAR; INTRAVENOUS at 15:06

## 2022-12-06 RX ADMIN — INSULIN GLARGINE 5 UNITS: 100 INJECTION, SOLUTION SUBCUTANEOUS at 09:42

## 2022-12-06 RX ADMIN — POTASSIUM CHLORIDE 40 MEQ: 1.5 POWDER, FOR SOLUTION ORAL at 06:01

## 2022-12-06 RX ADMIN — PANTOPRAZOLE SODIUM 40 MG: 40 INJECTION, POWDER, FOR SOLUTION INTRAVENOUS at 09:42

## 2022-12-06 RX ADMIN — LOPERAMIDE HYDROCHLORIDE 4 MG: 2 CAPSULE ORAL at 06:01

## 2022-12-06 RX ADMIN — INSULIN LISPRO 8 UNITS: 100 INJECTION, SOLUTION INTRAVENOUS; SUBCUTANEOUS at 12:32

## 2022-12-06 RX ADMIN — BUMETANIDE 2 MG: 0.25 INJECTION, SOLUTION INTRAMUSCULAR; INTRAVENOUS at 09:42

## 2022-12-06 RX ADMIN — POTASSIUM CHLORIDE 40 MEQ: 1.5 POWDER, FOR SOLUTION ORAL at 09:42

## 2022-12-06 RX ADMIN — MIDODRINE HYDROCHLORIDE 20 MG: 5 TABLET ORAL at 15:06

## 2022-12-06 RX ADMIN — TRAMADOL HYDROCHLORIDE 50 MG: 50 TABLET, COATED ORAL at 06:01

## 2022-12-06 RX ADMIN — POTASSIUM CHLORIDE 40 MEQ: 1.5 POWDER, FOR SOLUTION ORAL at 11:03

## 2022-12-06 RX ADMIN — MIDODRINE HYDROCHLORIDE 20 MG: 5 TABLET ORAL at 06:01

## 2022-12-06 RX ADMIN — LIDOCAINE 1 PATCH: 700 PATCH TOPICAL at 09:43

## 2022-12-06 RX ADMIN — POTASSIUM CHLORIDE 40 MEQ: 1.5 POWDER, FOR SOLUTION ORAL at 15:06

## 2022-12-06 NOTE — DISCHARGE SUMMARY
[facility] Medicine Services  DISCHARGE SUMMARY    Patient Name: Christiane Nazario  : 1950  MRN: [id number]    Date of Admission: 2022  Discharge Diagnosis: acute hypoxic respiratory failure 2/2 covid pneumonia  Date of Discharge:  2022  Primary Care Physician: Margarita Melissa APRN      Presenting Problem:   Choledocholithiasis [K80.50]  Urinary tract infection without hematuria, site unspecified [N39.0]  Anemia, unspecified type [D64.9]  Diarrhea, unspecified type [R19.7]  COVID [U07.1]  COVID-19 [U07.1]    Active and Resolved Hospital Problems:  Active Hospital Problems    Diagnosis POA   • **COVID [U07.1] Yes   • DILLNO (acute kidney injury) (HCC) [N17.9] Unknown   • Pleural effusion on left [J90] No   • Sepsis, unspecified organism (HCC) [A41.9] Yes   • Choledocholithiasis [K80.50] Yes   • Melena [K92.1] Unknown   • Metastatic cancer, multiple hepatic, osseous and pleural lesions (HCC) [C79.9] Yes   • Acute UTI (urinary tract infection) Enterobacter Cloacae [N39.0] Yes   • Essential hypertension [I10] Yes   • Acquired hypothyroidism [E03.9] Yes   • Primary malignant neoplasm of female breast (HCC) [C50.919] Yes   • Type 2 diabetes mellitus with microalbuminuria, with long-term current use of insulin (HCC) [E11.29, R80.9, Z79.4] Not Applicable      Resolved Hospital Problems   No resolved problems to display.         Hospital Course     Hospital Course:  Christiane Nazario is a 72 y.o. female with a past medical history of Cataract, DM type 2 (diabetes mellitus, type 2) (HCC), Ductal carcinoma in situ (DCIS) of left breast, Fracture, femur (HCC) (), Hyperlipidemia, Hypertension, Hypothyroidism, Osteopenia, and Pulmonary hypertension (HCC).   Patient presented to [facility] on 2022 with complaint of diarrhea.  Patient presented to emergency room with complaint of diarrhea x6 weeks as well as recent mild cough/congestion and shortness of air.   At time of admission she did not have complaint of hematochezia or melena, chest pain, dizziness, or syncopal episodes.  She did report some nausea and episodes of vomiting prior to coming emergency room.  She reported mild cough and shortness of air.  No abdominal pain.  No urinary abnormalities.  CT obtained of pelvis showed wall thickening of transverse colon, descending colon, and sigmoid colon suggesting mild infectious or inflammatory colitis.  There was no suggestion of bowel obstruction.  There is also a stone that appeared to be lodged in the distal CBD near the level of ampulla with mild extra hepatic biliary ductal dilation.  There was also noted hepatic metastatic disease.  Patient was admitted to hospitalist group.  Patient was swabbed for COVID and was found to be positive.  She had been on 5 L nasal cannula until 11/15/2022 in which her oxygenation began to decline.  She was placed on BiPAP.  Pulmonology was consulted for her respiratory status, and patient had left chest tube placed for significant effusion.  Throughout the day she was noted to be hypotensive and given sepsis fluid bolus as well as started on low-dose dopamine.  Nephrology was consulted for acute kidney injury, secondary to her hypotension.  IV Lasix.  As diuresis exacerbated her hypotension.  Cardiology was consulted related to her sinus arrhythmia, per note plan for medical management.  Infectious disease was consulted for her continued sepsis.  As her blood pressure did not respond to treatment, and dopamine did not appear to be effective heart rate elevated above 120, patient was started on Levophed and transferred to ICU.  Patient had an extended stay in the icu requiring intubation, pressors, and multi-disciplinary care.  She was weaned to Bipap and Airvo.  Due to multiple comorbidities, she was discharged to [facility] on 12/6/2022.        DISCHARGE Follow Up Recommendations for labs and diagnostics:       [facility]  -  Bipap  QHS and PRN, alternate with Airvo, pulm to be consulted at [facility]  - Continue Bumex 2mg IV q8h, nephrology to be consulted at [facility]  - Cefepime 2g IV q12h x 14 doses  - Continue Midodrine 20mg q8h  - Diabetisource AC at 60 (goal) mL/hr +Prosource TF BID + 75ml/hr FWF  - Cleared by nephrology and pulm for discharge to [facility]  - Discharge to [facility] on 12/6/2022      Day of Discharge     Vital Signs:  Temp:  [96.4 °F (35.8 °C)-99.6 °F (37.6 °C)] 99.6 °F (37.6 °C)  Heart Rate:  [] 66  Resp:  [20-22] 20  BP: ()/(44-69) 117/61    Physical Exam:  Physical Exam   GENERAL APPEARANCE: Stable on BiPAP  HEAD: normocephalic.  EYES: PERRL, EOMI. vision intact grossly.  EARS: Intact hearing.  No gross abnormalities.  NOSE: No nasal discharge.  THROAT: Clear   NECK: Neck supple, non-tender without lymphadenopathy, masses or thyromegaly.  CARDIAC: Normal S1 and S2. No S3, S4 or murmurs. Rhythm is regular. There is no peripheral edema, cyanosis or pallor. Extremities are warm and well perfused. Capillary refill is less than 2 seconds. No carotid bruits.  LUNGS: Clear to auscultation and percussion without rales, rhonchi, wheezing or diminished breath sounds.  ABDOMEN: Positive bowel sounds. Soft, nondistended, nontender. No guarding or rebound. No masses.  MUSKULOSKELETAL: No deformity or swelling   BACK: No abnormalities noted      EXTREMITIES: Bilateral extremity swelling 2        NEUROLOGICAL: CN II-XII intact. Strength and sensation symmetric and intact throughout. Reflexes 2+ throughout. Cerebellar testing normal.  SKIN: Skin normal color, texture and turgor with no lesions or eruptions.  PSYCHIATRIC: Alert cooperative not suicidal    Pertinent  and/or Most Recent Results     LAB RESULTS:      Lab 12/06/22  0356 12/05/22  1815 12/05/22  0423 12/04/22  0442 12/03/22  0428 12/02/22  0439   WBC 8.60  --  8.90 9.20 10.50 16.10*   HEMOGLOBIN 8.5*  --  8.5* 8.2* 8.6* 9.0*   HEMATOCRIT 26.7*  --  25.8* 24.4* 25.8* 28.2*    PLATELETS 87*  --  87* 80* 101* 160   NEUTROS ABS 7.60*  --  7.60* 8.30* 9.50* 14.20*   LYMPHS ABS 0.30*  --  0.30* 0.20* 0.20* 0.40*   MONOS ABS 0.60  --  0.50 0.60 0.70 1.50*   EOS ABS 0.10  --  0.40 0.10 0.00 0.00   MCV 94.0  --  92.7 91.5 91.2 91.7   PROTIME  --  12.0*  --   --   --   --    APTT  --  24.3  --   --   --   --          Lab 12/06/22  0356 12/05/22  1815 12/05/22  0423 12/04/22  1345 12/04/22  0442 12/03/22  1704 12/03/22  1551 12/03/22  0428 12/02/22  1730   SODIUM 133*  --  135* 135* 136  --  137 138  --    POTASSIUM 3.0*  --  3.6 4.4 3.5  --  4.4 2.8* 3.2*   CHLORIDE 92*  --  97* 96* 98  --  97* 94*  --    CO2 31.0*  --  26.0 29.0 31.0*  --  30.0* 33.0*  --    ANION GAP 10.0  --  12.0 10.0 7.0  --  10.0 11.0  --    BUN 57*  --  52* 51* 47*  --  44* 41*  --    CREATININE 1.04*  --  1.03* 1.04* 1.07*  --  1.06* 1.15*  --    EGFR 57.9*  --  58.6* 57.9* 56.0*  --  56.6* 51.4*  --    GLUCOSE 142*  --  173* 150* 121*  --  248* 191*  --    CALCIUM 7.5*  --  7.0* 6.9* 6.8*  --  6.5* 6.5*  --    IONIZED CALCIUM 1.03* 1.01* 0.94*  --  0.96* 0.89*  --   --   --    MAGNESIUM 2.0  --  1.7  --  1.8  --   --  1.9 2.0   PHOSPHORUS 3.1  --  2.9 2.9 1.9*  --   --  3.1  --          Lab 12/06/22  0356 12/05/22  0423 12/04/22  0442 12/03/22  0428 12/02/22  0439   TOTAL PROTEIN 5.3* 5.0* 4.8* 5.0* 5.3*   ALBUMIN 3.60 3.40* 3.40* 3.70 4.10   GLOBULIN 1.7 1.6 1.4 1.3 1.2   ALT (SGPT) 18 14 16 15 15   AST (SGOT) 18 20 19 21 24   BILIRUBIN 0.7 0.8 0.7 0.9 1.0   ALK PHOS 124* 128* 128* 149* 160*         Lab 12/05/22  1815   PROTIME 12.0*   INR 1.18*                 Brief Urine Lab Results  (Last result in the past 365 days)      Color   Clarity   Blood   Leuk Est   Nitrite   Protein   CREAT   Urine HCG        11/10/22 0854 Yellow   Cloudy   Negative   Trace   Negative   30 mg/dL (1+)               Microbiology Results (last 10 days)     ** No results found for the last 240 hours. **          CT Abdomen Pelvis Without  Contrast    Result Date: 11/23/2022  Impression: Impression: 1. Increased bilateral pleural effusions and bibasilar consolidations which likely reflect passive atelectasis. 2. Cholelithiasis. 3. Hepatic metastatic disease again noted. 4. No acute infectious or inflammatory process in the abdomen or pelvis. Electronically signed by:  Abdirahman Pichardo M.D.  11/23/2022 4:37 AM Mountain Time    CT Abdomen Pelvis Without Contrast    Result Date: 11/7/2022  Impression:  1. There is subtle wall thickening of the transverse colon, descending colon, sigmoid colon, suggesting features of mild infectious or inflammatory colitis. There is no indication of bowel obstruction. 2. A stone appears lodged within the distal CBD near the level of ampulla, with mild extra hepatic biliary ductal dilation. Consider ERCP correlation for diagnostic and therapeutic purposes. 3. Hepatic metastatic disease is again noted. A couple of the lesions are slightly smaller, which may reflect positive interval response to therapy in the appropriate clinical context. 4. Features of pleural-based metastatic disease in the left lower thorax are seen to better advantage on prior CT chest imaging 5. extensive osseous metastatic disease is not thought to be significantly changed. However, there is a new mild compression fracture of the superior endplate of the L5 vertebral body without retropulsion or subluxation. A previously seen T12 compression fracture has had slight progressive loss of vertebral body height in the interval. 6. Stable small to moderate left basilar pleural effusion with passive left basilar atelectasis.    Electronically Signed By-Berta Hurley MD On:11/7/2022 9:19 AM This report was finalized on [id number] by  Berta Hurley MD.    XR Chest 1 View    Result Date: 12/5/2022  Impression: 1.     Increased opacities in the right upper lobe which could indicate atelectasis or worsening infiltrate. 2.     Small pleural effusions and  diffuse patchy airspace opacities, as before.  Electronically Signed By-Abdirahman Saldaña MD On:12/5/2022 3:12 PM This report was finalized on [id number] by  Abdirahman Saldaña MD.    XR Chest 1 View    Result Date: 12/1/2022  Impression: 1. Cardiomegaly with vascular congestion, pulmonary edema and small bilateral pleural effusions. Correlate for congestive heart failure. 2. Low lung volumes. 3. The stomach is significantly distended with air. Slot 67 Electronically signed by:  Mitch Reed M.D.  12/1/2022 9:25 PM Mountain Time    XR Chest 1 View    Result Date: 11/30/2022  Impression: IMPRESSION :  1. Lines and tubes as above. 2. Cardiomegaly and pulmonary vascular congestion, pulmonary edema which appears similar to prior study given differences in technique 3. Bilateral pleural-parenchymal changes at the lung bases right greater than left. Findings appear slightly improved on the right without significant change in left[  Electronically Signed By-Isma Mcgrath On:11/30/2022 7:58 AM This report was finalized on [id number] by  Isma Mcgrath, .    XR Chest 1 View    Result Date: 11/29/2022  Impression: IMPRESSION : Cardiomegaly and pulmonary vascular congestion  Worsening bilateral pleural and parenchymal opacities which are greater on the right compared to the left[  Electronically Signed By-Isma Mcgrath On:11/29/2022 7:38 AM This report was finalized on [id number] by  Isma Mcgrath, .    XR Chest 1 View    Result Date: 11/27/2022  Impression: Impression: Increasing airspace opacities and pleural effusion on the right. Electronically signed by:  Darek Leonard M.D.  11/27/2022 7:40 PM Mountain Time    XR Chest 1 View    Result Date: 11/26/2022  Impression: IMPRESSION :  1. ET tube is just above the krishna. Consider repositioning 2 to 3 cm more proximal more optimal positioning. 2. Right upper extremity PICC line appears stable 3. Low lung volume exam with dependent opacities left greater than  right which may represent atelectasis, pulmonary edema or pneumonia in the correct clinical setting[  Findings were called to the patient's nurse in the ICU at the time of interpretation  Electronically Signed By-Isma Mcgrath On:11/26/2022 7:16 AM This report was finalized on [id number] by  Isma Mcgrath, .    XR Chest 1 View    Result Date: 11/26/2022  Impression: Right main stem intubation without other significant change. Recommend retracting ET tube 5.0 cm. Critical findings were reported to Nurse Practitioner Gustavo at 11/25/2022 10:12 PM [facility] by Abdirahman Pichardo MD. Electronically signed by:  Abdirahman Pichardo M.D.  11/25/2022 10:13 PM Mountain Time    XR Chest 1 View    Result Date: 11/25/2022  Impression:  1. The left hemithorax is now completely opaque. 2. Persistent mixed interstitial/airspace disease in the right lung with a small pleural effusion. 3. Cardiomegaly.  Electronically Signed By-Jerald Salazar MD On:11/25/2022 9:20 PM This report was finalized on [id number] by  Jerald Salazar MD.    XR Chest 1 View    Result Date: 11/25/2022  Impression: Bibasilar pneumonia with small bilateral pleural effusions, progressed on the right as compared to the previous study.  Electronically Signed By-Viki Chatman MD On:11/25/2022 9:36 AM This report was finalized on [id number] by  Viki Chatman MD.    XR Chest 1 View    Result Date: 11/24/2022  Impression: Interval intubation without other significant change. Electronically signed by:  Abdirahman Pichardo M.D.  11/24/2022 4:32 AM Mountain Time    XR Chest 1 View    Result Date: 11/23/2022  Impression: 1.     Tip of the endotracheal tube terminates in the midthoracic trachea approximately 4 to 5 cm above the krishna. 2.     Improved aeration of the lungs compared to today's earlier chest x-ray. Apparent decreased bibasilar opacities may be due to differences in positioning (supine versus erect), causing differences in appearance of layering pleural effusions. 3.      Persistent bilateral perihilar airspace opacities, concerning for pulmonary edema and/or pneumonia.  Electronically Signed By-Darleen Franco MD On:11/23/2022 3:18 PM This report was finalized on [id number] by  Darleen Franco MD.    XR Chest 1 View    Result Date: 11/23/2022  Impression: Dense bibasilar atelectasis or pneumonia with small to moderate bilateral pleural effusions, without significant change. Stable cardiomegaly.  Electronically Signed By-Berta Hurley MD On:11/23/2022 1:06 PM This report was finalized on [id number] by  Berta Hurley MD.    XR Chest 1 View    Result Date: 11/21/2022  Impression: 1.     No pneumothorax visible following left basilar pleural catheter removal. 2.     Increased airspace opacities in both lungs, right more diffuse than left, which may be due to pulmonary edema and/or pneumonia with possible right pleural effusion.  Electronically Signed By-Darleen Franco MD On:11/21/2022 10:33 PM This report was finalized on [id number] by  Darleen Franco MD.    XR Chest 1 View    Result Date: 11/19/2022  Impression: Improving bilateral opacities which are more platelike in appearance on the current study. Otherwise no significant radiographic change from one day ago.  Electronically Signed By-Cezar Parrish DO On:11/19/2022 9:54 AM This report was finalized on [id number] by  Cezar Parrish DO.    XR Chest 1 View    Result Date: 11/18/2022  Impression: 1. Stable small left apical pneumothorax. 2. Multifocal bilateral airspace disease which may relate to pulmonary edema and/or multifocal pneumonia with small bilateral pleural effusions, unchanged. 3. Stable right upper extremity PICC line.  Electronically Signed By-Morgan Rodriguez MD On:11/18/2022 7:46 AM This report was finalized on [id number] by  Morgan Rodriguez MD.    XR Chest 1 View    Result Date: 11/17/2022  Impression:  1. Slight interval increase in size of left apical pneumothorax 2. Stable multifocal bilateral  airspace disease and small right pleural effusion  Electronically Signed By-Sathya Benítez On:11/17/2022 7:49 AM This report was finalized on [id number] by  Sathya Benítez, .    XR Chest 1 View    Result Date: 11/16/2022  Impression: 1. Placement of a left-sided chest tube with decrease in size of left pleural effusion. Development of a small left apical pneumothorax measuring 10 mm. 2. Cardiomegaly and interstitial thickening consistent with pulmonary edema with persistent bibasilar airspace disease right greater than left. 3. Stable right upper extremity PICC line.  Electronically Signed By-Morgan Rodriguez MD On:11/16/2022 2:34 PM This report was finalized on [id number] by  Morgan Rodriguez MD.    XR Chest 1 View    Result Date: 11/16/2022  Impression: 1.     No visible pneumothorax. 2.     Improved aeration of the left lower lung with persistent airspace opacities throughout the mid to lower left lung, likely due to residual pleural fluid and underlying atelectasis/pneumonia. 3.     Increasing right basilar airspace opacities, which may be due to atelectasis and/or pneumonia.  Electronically Signed By-Darleen Franco MD On:11/16/2022 11:10 AM This report was finalized on [id number] by  Darleen Franco MD.    XR Chest 1 View    Result Date: 11/16/2022  Impression: There is a new large left pleural effusion. New patchy parenchymal changes are seen within the right lung which could be inflammatory or infectious. The cardiac silhouette appears at least moderately enlarged and obscured by the pleural effusion. Right-sided PICC line is unchanged. Electronically signed by:  Prem Guerrero D.O.  11/15/2022 11:47 PM Mountain Time    XR Chest 1 View    Result Date: 11/14/2022  Impression:  1. Left perihilar and left lower lobe airspace disease is favored to represent subsegmental atelectasis. Minimal linear subsegmental atelectasis is present in the right midlung. The findings appear similar to 11/7/2022. 2. Stable  small left pleural effusion.  Electronically Signed By-Berta Hurley MD On:11/14/2022 8:58 AM This report was finalized on [id number] by  Berta Hurley MD.    XR Chest 1 View    Result Date: 11/11/2022  Impression: Stable mid to lower lung zone linear subsegmental atelectasis and small left pleural effusion, without significant change from the 11/7/2022 examination.  Electronically Signed By-Berta Hurley MD On:11/11/2022 8:19 AM This report was finalized on [id number] by  Berta Hurley MD.    XR Chest 1 View    Result Date: 11/7/2022  Impression: 1. Right upper extremity PICC line tip at the cavoatrial junction. 2. Mild bibasilar atelectasis and small pleural effusions, stable. 3. Pulmonary nodules better assessed on recent chest CT.  Electronically Signed By-Morgan Rodriguez MD On:11/7/2022 8:20 AM This report was finalized on [id number] by  Morgan Rodriguez MD.    MRI abdomen wo contrast mrcp    Result Date: 11/7/2022  Impression: IMPRESSION : 1.     Choledocholithiasis with what looks like probably 2 stones in the distal common bile duct 1 around the ampulla. 2.     Cholelithiasis without definite findings of cholecystitis. 3.     Hepatic and splenic lesions which could reflect metastatic disease. 4.     Left pleural effusion. 5.     There are some signal changes within the axial skeleton that could be reflective of metastatic disease.  Electronically Signed By-Roderick Avila MD On:11/7/2022 8:48 PM This report was finalized on [id number] by  Roderick Avila MD.    XR Abdomen KUB    Result Date: 12/5/2022  Impression: 1. Esophagogastric tube tip below the diaphragm overlying the distal stomach. 2. Increased gaseous distention of the colon which may relate to ileus.  Electronically Signed By-Morgan Rodriguez MD On:12/5/2022 3:14 PM This report was finalized on [id number] by  Morgan Rodriguez MD.    XR Abdomen KUB    Result Date: 11/29/2022  Impression: Tip of feeding tube projects in expected position of the  distal stomach or proximal small bowel. Recommend advancement beginning with an additional 5 to 7 cm if postpyloric positioning is desired  Electronically Signed By-Isma Mcgrath On:11/29/2022 11:39 AM This report was finalized on [id number] by  Isma Mcgrath, .    XR Abdomen KUB    Result Date: 11/23/2022  Impression: impression: Weighted tip enteric tube terminates in the stomach. Right-sided PICC line terminates over the distal SVC. Dilated bowel in the upper abdomen partially imaged. Bilateral effusions and bibasilar opacification. Cardiomegaly. No compression fractures not well evaluated. Old right rib fractures. Electronically signed by:  Rajani Bowen M.D.  11/22/2022 10:18 PM Mountain Time    XR Abdomen KUB    Result Date: 11/22/2022  Impression: Esophagogastric tube tip below the diaphragm overlying the expected region of the distal stomach.  Electronically Signed By-Morgan Rodriguez MD On:11/22/2022 1:53 PM This report was finalized on [id number] by  Morgan Rodriguez MD.    XR Abdomen KUB    Result Date: 11/18/2022  Impression: Weighted feeding tube as above projects at the gastric fundus  Electronically Signed By-Isma Mcgrath On:11/18/2022 7:06 PM This report was finalized on [id number] by  Isma Mcgrath, .    FL ercp biliary duct only    Result Date: 11/8/2022  Impression: ERCP with fluoroscopy. Please refer to the procedure report for findings and recommendations.  Electronically Signed By-Berta Hurley MD On:11/8/2022 3:48 PM This report was finalized on [id number] by  Berta Hurley MD.      Results for orders placed during the hospital encounter of 11/07/22    Duplex Venous Lower Extremity - Bilateral CAR    Interpretation Summary  •  Technically difficult and limited examination due to patient intolerance.  •  Distal right great saphenous vein; and left peroneal, gastrocnemius, small and distal great saphenous veins not visualized.  •  All other veins appeared normal  bilaterally.      Results for orders placed during the hospital encounter of 11/07/22    Duplex Venous Lower Extremity - Bilateral CAR    Interpretation Summary  •  Technically difficult and limited examination due to patient intolerance.  •  Distal right great saphenous vein; and left peroneal, gastrocnemius, small and distal great saphenous veins not visualized.  •  All other veins appeared normal bilaterally.      Results for orders placed during the hospital encounter of 11/07/22    Adult Transthoracic Echo Complete w/ Color, Spectral and Contrast if Necessary Per Protocol    Interpretation Summary  •  Left ventricular systolic function is normal. Left ventricular ejection fraction appears to be 61 - 65%.  •  Left ventricular diastolic function is consistent with (grade I) impaired relaxation.      Labs Pending at Discharge:      Procedures Performed  Procedure(s):  ESOPHAGOGASTRODUODENOSCOPY with sclerotherapy to duodenal ulcers and endo clipping x 5         Consults:   Consults     Date and Time Order Name Status Description    12/4/2022  5:57 AM Inpatient Pulmonology Consult      12/3/2022  9:45 AM Inpatient Hospitalist Consult      11/29/2022  8:39 AM Inpatient Gastroenterology Consult      11/25/2022 10:05 PM Inpatient Intensivist Consult Completed     11/25/2022  3:57 PM Inpatient Pulmonology Consult Completed     11/16/2022  1:53 PM Inpatient Infectious Diseases Consult Completed     11/14/2022  9:28 AM Inpatient Pulmonology Consult Completed     11/13/2022  9:46 AM Inpatient Cardiology Consult Completed     11/9/2022  7:54 AM Inpatient Nephrology Consult Completed     11/7/2022  3:20 PM Hematology & Oncology Inpatient Consult Completed     11/7/2022 10:40 AM Gastroenterology (on-call MD unless specified) Completed             Discharge Details        Discharge Medications      New Medications      Instructions Start Date   acetaminophen 325 MG tablet  Commonly known as: TYLENOL   650 mg, Oral, Every 4  Hours PRN      albuterol (2.5 MG/3ML) 0.083% nebulizer solution  Commonly known as: PROVENTIL   2.5 mg, Nebulization, Every 6 Hours PRN      aluminum-magnesium hydroxide-simethicone 400-400-40 MG/5ML suspension  Commonly known as: MAALOX MAX   15 mL, Oral, Every 6 Hours PRN      bumetanide 0.25 MG/ML injection  Commonly known as: BUMEX   2 mg, Intravenous, Every 8 Hours      cefepime 2 gm IVPB in 100 ml NS (MBP)   2 g, Intravenous, Every 12 Hours      hydrOXYzine 25 MG tablet  Commonly known as: ATARAX   25 mg, Oral, 3 Times Daily PRN      insulin glargine 100 UNIT/ML injection  Commonly known as: LANTUS, SEMGLEE   5 Units, Subcutaneous, Daily   Start Date: December 7, 2022     midodrine 10 MG tablet  Commonly known as: PROAMATINE   20 mg, Oral, Every 8 Hours Scheduled      pantoprazole 40 MG injection  Commonly known as: PROTONIX   40 mg, Intravenous, Every 12 Hours Scheduled      predniSONE 20 MG tablet  Commonly known as: DELTASONE   20 mg, Oral, Daily With Breakfast   Start Date: December 7, 2022        Changes to Medications      Instructions Start Date   loperamide 2 MG capsule  Commonly known as: IMODIUM  What changed: additional instructions   4 mg, Oral, 4 Times Daily PRN         Stop These Medications    acetaZOLAMIDE 250 MG tablet  Commonly known as: DIAMOX     aspirin 81 MG EC tablet     atorvastatin 40 MG tablet  Commonly known as: LIPITOR     CALCIUM PO     cholestyramine light 4 g packet  Commonly known as: Prevalite     Claritin 10 MG tablet  Generic drug: loratadine     CRANBERRY PO     D3 50 MCG (2000 UT) tablet  Generic drug: Cholecalciferol     diphenoxylate-atropine 2.5-0.025 MG per tablet  Commonly known as: LOMOTIL     furosemide 40 MG tablet  Commonly known as: LASIX     HYDROcodone-acetaminophen 5-325 MG per tablet  Commonly known as: NORCO     insulin NPH-insulin regular (70-30) 100 UNIT/ML injection  Commonly known as: humuLIN 70/30,novoLIN 70/30     levothyroxine 88 MCG tablet  Commonly  known as: SYNTHROID, LEVOTHROID     Magnesium Oxide 400 (240 Mg) MG tablet     montelukast 10 MG tablet  Commonly known as: SINGULAIR     ondansetron 8 MG tablet  Commonly known as: Zofran     potassium chloride 20 MEQ CR tablet  Commonly known as: K-DUR,KLOR-CON     PROBIOTIC PO     ramipril 1.25 MG capsule  Commonly known as: ALTACE     sAXagliptin-metFORMIN ER 2.5-1000 MG tablet sustained-release 24 hour            Allergies   Allergen Reactions   • Calcium-Containing Compounds Nausea Only   • Sulfa Antibiotics Unknown - Low Severity         Discharge Disposition: LTACH  Long Term Care (DC - External)  Condition on discharge: fair    Diet:  Hospital:  Diet Order   Procedures   • NPO Diet NPO Type: Ice Chips         Discharge Activity:         CODE STATUS:  Code Status and Medical Interventions:   Ordered at: 11/07/22 1144     Level Of Support Discussed With:    Patient     Code Status (Patient has no pulse and is not breathing):    CPR (Attempt to Resuscitate)     Medical Interventions (Patient has pulse or is breathing):    Full Support         Future Appointments   Date Time Provider Department Center   2/14/2023  1:00 PM Venita Sarkar MD [facility]   2/14/2023  1:00 PM LAB MD [facility]   3/14/2023  9:00 AM LAB [facility]  [facility] None   3/21/2023  2:30 PM Kosta Frank MD [facility]           Time spent on Discharge including face to face service:  35 minutes    This patient has been examined wearing appropriate Personal Protective Equipment and discussed with Patient and nursing and CM. 12/06/22      Signature: Electronically signed by Jordan López DO, 12/06/22, 5:04 PM EST.

## 2022-12-06 NOTE — DISCHARGE INSTRUCTIONS
- Transfer to LTACH  - Bipap  QHS and PRN, alternate with Airvo, pulm to be consulted at LTACH  - Continue Bumex 2mg IV q8h, nephrology to be consulted at LTACH  - Cefepime 2g IV q12h x 14 doses  - Continue Midodrine 20mg q8h  - Diabetisource AC at 60 (goal) mL/hr +Prosource TF BID + 75ml/hr [facility]  - [facility]rology and [facility]  - [facility]

## 2022-12-06 NOTE — PROGRESS NOTES
Daily Progress Note          Assessment      Acute hypoxic respiratory failure  COVID 19 pneumonia  Obstructive sleep apnea    Essential hypertension    Acquired hypothyroidism    Primary malignant neoplasm of female breast (HCC)    Type 2 diabetes mellitus with microalbuminuria, with long-term current use of insulin (HCC)    Acute UTI (urinary tract infection) Enterobacter Cloacae    Metastatic cancer, multiple hepatic, osseous and pleural lesions (HCC)    Choledocholithiasis    Sepsis, unspecified organism (HCC)    DILLON (acute kidney injury) (HCC)    Pleural effusion on left    Melena        PLAN:  BiPAP as needed: Alternate with airVo as tolerated  Prednisone  Chest PT  Chest x-ray with new right upper lobe infiltrate: Started on cefepime x1 week  Bronchodilator  Inhaled corticosteroids  Electrolytes/ glycemic control  DVT and GI prophylaxis.           LOS: 29 days     Subjective     Patient reports shortness of breath but comfortable on the BiPAP    Objective     Vital signs for last 24 hours:  Vitals:    12/06/22 0500 12/06/22 0530 12/06/22 0600 12/06/22 0630   BP: 96/50 104/54 109/51 101/50   Pulse: 58 60 63 60   Resp:       Temp:       TempSrc:       SpO2: 96% 100% 99% 100%   Weight: 90.8 kg (200 lb 2.8 oz)      Height:           Intake/Output last 3 shifts:  I/O last 3 completed shifts:  In: 7260.7 [I.V.:83.7; Other:4127; NG/GT:3050]  Out: 1575 [Urine:1575]  Intake/Output this shift:  No intake/output data recorded.      Radiology  Imaging Results (Last 24 Hours)     Procedure Component Value Units Date/Time    XR Abdomen KUB [[id number]] Collected: 12/05/22 1512     Updated: 12/05/22 1516    Narrative:      Examination: XR ABDOMEN KUB-     Date of Exam: 12/5/2022 1:40 PM     Indication: abd distension; U07.1-COVID-19; R19.7-Diarrhea, unspecified;  D64.9-Anemia, unspecified; N39.0-Urinary tract infection, site not  specified; K80.50-Calculus of bile duct without cholangitis or  cholecystitis without  obstruction; K92.1-Melena; T17.500A-Unspecified  foreign body in bronchus causing asphyxiation, initial encounter;  J96.01-Acute respiratory failure with hypoxia.     Comparison: KUB 11/29/2022     Technique: Single AP view of the abdomen was obtained radiographically.     Findings:  Esophagogastric tube below the diaphragm overlying the distal stomach.  Bibasilar airspace disease and small bilateral pleural effusions  partially imaged. There is gaseous distention of the colon which is  increased in the prior study. No gross pneumoperitoneum within limits of  supine technique. Advanced left hip osteoarthritis with severe joint  space narrowing and flattening of the left humeral head.       Impression:      1. Esophagogastric tube tip below the diaphragm overlying the distal  stomach.  2. Increased gaseous distention of the colon which may relate to ileus.     Electronically Signed By-Morgan Rodriguez MD On:12/5/2022 3:14 PM  This report was finalized on [id number] by  Morgan Rodriguez MD.    XR Chest 1 View [[id number]] Collected: 12/05/22 1510     Updated: 12/05/22 1514    Narrative:      DATE OF EXAM:  12/5/2022 1:40 PM     PROCEDURE:  XR CHEST 1 VW-     INDICATIONS:  hypoxemia; U07.1-COVID-19; R19.7-Diarrhea, unspecified; D64.9-Anemia,  unspecified; N39.0-Urinary tract infection, site not specified;  K80.50-Calculus of bile duct without cholangitis or cholecystitis  without obstruction; K92.1-Melena; T17.500A-Unspecified foreign body in  bronchus causing asphyxiation, initial encounter; J96.01-Acute  respiratory failure with hypoxia     COMPARISON:  12/01/2022     TECHNIQUE:   Single radiographic view of the chest was obtained.     FINDINGS:  There is a right arm catheter with tip projecting at the cavoatrial  junction. Enteric tube is present extending into the upper abdomen.   Stable cardiomegaly.  There appear to be small bilateral pleural  effusions. There are increased opacities in the right upper lobe.  Patchy  bilateral airspace opacities otherwise appear unchanged. There is  gaseous distention of the stomach.       Impression:      1.     Increased opacities in the right upper lobe which could indicate  atelectasis or worsening infiltrate.  2.     Small pleural effusions and diffuse patchy airspace opacities, as  before.     Electronically Signed By-Abdirahman Saldaña MD On:12/5/2022 3:12 PM  This report was finalized on [id number] by  Abdirahman Saldaña MD.          Labs:  Results from last 7 days   Lab Units 12/06/22  0356   WBC 10*3/mm3 8.60   HEMOGLOBIN g/dL 8.5*   HEMATOCRIT % 26.7*   PLATELETS 10*3/mm3 87*     Results from last 7 days   Lab Units 12/06/22  0356   SODIUM mmol/L 133*   POTASSIUM mmol/L 3.0*   CHLORIDE mmol/L 92*   CO2 mmol/L 31.0*   BUN mg/dL 57*   CREATININE mg/dL 1.04*   CALCIUM mg/dL 7.5*   BILIRUBIN mg/dL 0.7   ALK PHOS U/L 124*   ALT (SGPT) U/L 18   AST (SGOT) U/L 18   GLUCOSE mg/dL 142*         Results from last 7 days   Lab Units 12/06/22  0356 12/05/22  0423 12/04/22  0442   ALBUMIN g/dL 3.60 3.40* 3.40*             Results from last 7 days   Lab Units 12/06/22  0356   MAGNESIUM mg/dL 2.0     Results from last 7 days   Lab Units 12/05/22  1815   INR  1.18*   APTT seconds 24.3               Meds:   SCHEDULE  bumetanide, 2 mg, Intravenous, Q8H  collagenase, 1 application, Topical, Q24H  hydrocortisone-bacitracin-zinc oxide-nystatin, 1 application, Topical, BID  insulin glargine, 5 Units, Subcutaneous, Daily  insulin lispro, 4-24 Units, Subcutaneous, Q6H  lidocaine, 1 patch, Transdermal, Q24H  miconazole, , Topical, Q12H  midodrine, 20 mg, Nasogastric, Q8H  pantoprazole, 40 mg, Intravenous, Q12H  potassium chloride, 40 mEq, Nasogastric, Daily  predniSONE, 20 mg, Oral, Daily With Breakfast  sodium chloride, 10 mL, Intravenous, Q12H      Infusions  Pharmacy Consult - Pharmacy to dose,       PRNs  •  acetaminophen **OR** acetaminophen **OR** acetaminophen  •  albuterol  •  aluminum-magnesium  hydroxide-simethicone  •  Calcium Gluconate-NaCl **AND** calcium gluconate **AND** Calcium, Ionized  •  dextrose  •  dextrose  •  glucagon (human recombinant)  •  hydrOXYzine  •  hydrOXYzine  •  loperamide  •  magnesium sulfate **OR** magnesium sulfate in D5W 1g/100mL (PREMIX) **OR** magnesium sulfate  •  melatonin  •  midazolam  •  nitroglycerin  •  ondansetron **OR** ondansetron  •  Pharmacy Consult - Pharmacy to dose  •  potassium chloride **OR** potassium chloride **OR** potassium chloride  •  potassium phosphate infusion greater than 15 mMoles **OR** potassium phosphates **OR** potassium phosphate **OR** sodium phosphate IVPB **OR** sodium phosphate IVPB **OR** Sodium Phosphate  •  [COMPLETED] Insert peripheral IV **AND** sodium chloride  •  sodium chloride  •  traMADol    Physical Exam:  General Appearance:  Alert and appears to be comfortable on BiPAP  HEENT:  Normocephalic, without obvious abnormality, Conjunctiva/corneas clear,.   Nares normal, no drainage     Neck:  Supple, symmetrical, trachea midline.   Lungs /Chest wall:   Bilateral  rhonchi, respirations unlabored on BiPAP, symmetrical wall movement.     Heart:  Regular rate and rhythm, S1 S2 normal  Abdomen: Soft, non-tender, no masses, no organomegaly.    Extremities: No edema, no clubbing or cyanosis     ROS  Constitutional: Negative for chills, fever and positive for malaise/fatigue.   HENT: Negative.    Eyes: Negative.    Cardiovascular: Negative.    Respiratory: Positive for  shortness of breath.    Skin: Negative.    Musculoskeletal: Negative.    Gastrointestinal: Negative.    Genitourinary: Negative.    Neurological: Negative.    Psychiatric/Behavioral: Negative.      I reviewed the recent clinical results    Much of this encounter note is an electronic transcription/translation of spoken language to printed text using Dragon Software which might include inadvertent errors in transcription.

## 2022-12-06 NOTE — SIGNIFICANT NOTE
Patient discharging to [facility] in [place] via EMS.  Patient accompanied by  who is taking her personal belongings.  Report given to Omk[facility] at [facility].

## 2022-12-06 NOTE — PROGRESS NOTES
[facility] Progress Note      Patient Name: Christiane Nazario  : 1950  MRN: [id number]  Primary Care Physician:  Margarita Melissa, TOMMY  Date of admission: 2022    Subjective     Interval History:     Patient continues to be BiPAP dependent.  No event overnight.  Good urine output    Review of Systems:   As noted above    Objective     Vitals:   Temp:  [96.4 °F (35.8 °C)-99.6 °F (37.6 °C)] 99.6 °F (37.6 °C)  Heart Rate:  [58-77] 65  Resp:  [20-22] 22  BP: ()/(44-69) 115/69    Intake/Output Summary (Last 24 hours) at 2022 0954  Last data filed at 2022 0601  Gross per 24 hour   Intake 1822.67 ml   Output 1050 ml   Net 772.67 ml       Physical Exam:    General Appearance: Patient on BiPAP  Skin: warm and dry  HEENT: oral mucosa normal, nonicteric sclera  Neck: supple, no JVD  Lungs: Crackles bibasal  Heart: RRR, normal S1 and S2  Abdomen: soft, nontender, nondistended  : no palpable bladder  Extremities: Anasarca.  Bilateral lower and upper extremities edema  Neuro: Alert X3 no focalization    Scheduled Meds:     bumetanide, 2 mg, Intravenous, Q8H  cefepime, 2 g, Intravenous, Once  cefepime, 2 g, Intravenous, Q12H  collagenase, 1 application, Topical, Q24H  hydrocortisone-bacitracin-zinc oxide-nystatin, 1 application, Topical, BID  insulin glargine, 5 Units, Subcutaneous, Daily  insulin lispro, 4-24 Units, Subcutaneous, Q6H  lidocaine, 1 patch, Transdermal, Q24H  miconazole, , Topical, Q12H  midodrine, 20 mg, Nasogastric, Q8H  pantoprazole, 40 mg, Intravenous, Q12H  potassium chloride, 40 mEq, Nasogastric, Daily  predniSONE, 20 mg, Oral, Daily With Breakfast  sodium chloride, 10 mL, Intravenous, Q12H      IV Meds:   Pharmacy Consult - Pharmacy to dose,         Results Reviewed:   I have personally reviewed the results from the time of this admission to 2022 09:54 EST     Results from last 7 days   Lab Units 22  0356 22  0423  12/04/22  1345 12/04/22  0442   SODIUM mmol/L 133* 135* 135* 136   POTASSIUM mmol/L 3.0* 3.6 4.4 3.5   CHLORIDE mmol/L 92* 97* 96* 98   CO2 mmol/L 31.0* 26.0 29.0 31.0*   BUN mg/dL 57* 52* 51* 47*   CREATININE mg/dL 1.04* 1.03* 1.04* 1.07*   CALCIUM mg/dL 7.5* 7.0* 6.9* 6.8*   BILIRUBIN mg/dL 0.7 0.8  --  0.7   ALK PHOS U/L 124* 128*  --  128*   ALT (SGPT) U/L 18 14  --  16   AST (SGOT) U/L 18 20  --  19   GLUCOSE mg/dL 142* 173* 150* 121*       Estimated Creatinine Clearance: 50.5 mL/min (A) (by C-G formula based on SCr of 1.04 mg/dL (H)).    Results from last 7 days   Lab Units 12/06/22  0356 12/05/22  0423 12/04/22  1345 12/04/22  0442   MAGNESIUM mg/dL 2.0 1.7  --  1.8   PHOSPHORUS mg/dL 3.1 2.9 2.9 1.9*             Results from last 7 days   Lab Units 12/06/22  0356 12/05/22  0423 12/04/22  0442 12/03/22  0428 12/02/22  0439   WBC 10*3/mm3 8.60 8.90 9.20 10.50 16.10*   HEMOGLOBIN g/dL 8.5* 8.5* 8.2* 8.6* 9.0*   PLATELETS 10*3/mm3 87* 87* 80* 101* 160       Results from last 7 days   Lab Units 12/05/22  1815   INR  1.18*       Assessment / Plan              ASSESSMENT:    1.  Acute kidney injury Nonoliguric ATN.  Renal function improved volume status generous which seems to be third spacing  2.  COVID-19 infection/respiratory failure.   isolation completed for 2 weeks.  3.  Edema/ Anasarca .  Seems to be third spacing due to inflammatory state, nutritional, prolonged hospital stay   4. Acute on chronic normocytic anemia secondary to GI bleeding from duodenal ulcers S/p EGD and sclerotherapy 2 duodenal ulcers.  Follow H&H closely transfuse as needed  5. Hypokalemia.      PLAN:  Continue IV Bumex to 2 milligram 3 times daily  Monitor and replace electrolytes as needed  Continue midodrine  Surveillance labs           Thank you for involving us in the care of Christiane Nazario.  Please feel free to call with any questions.    Iron Ward MD  12/06/22  09:54 EST    [facility]  [phone]    Please note that portions of this note were completed with a voice recognition program.

## 2022-12-06 NOTE — CASE MANAGEMENT/SOCIAL WORK
Continued Stay Note  JAZLYN Renner     Patient Name: Christiane Nazario  MRN: [id number]  Today's Date: 12/6/2022    Admit Date: 11/7/2022    Plan: Accepted at [facility] to ICU bed 229.   Discharge Plan     Row Name 12/06/22 1155       Plan    Plan Accepted at [facility] to ICU bed 229.    Plan Comments Bedside nurse and MD notfied via [employer] chat that patients bed is ready at [facility]. Report to be called to [phone].                            Expected Discharge Date and Time     Expected Discharge Date Expected Discharge Time    Dec 6, 2022             Radha Posadas RN

## 2022-12-06 NOTE — CONSULTS
Nutrition Services    Patient Name: Christiane Nazario  YOB: 1950  MRN: [id number]  Admission date: 2022    Comment:    Adjust water flush to 10 ml/hr.     Continue TF at goal rate (60 ml/hr) + Prosource BID.    PPE Documentation        PPE Worn By Provider Mask, Gloves, Gown    PPE Worn By Patient  None      CLINICAL NUTRITION ASSESSMENT      Reason for Assessment Follow up protocol  : New consult for tube feeding   Follow up protocol  : Wound     H&P      Past Medical History:   Diagnosis Date   • Cataract    • DM type 2 (diabetes mellitus, type 2) (HCC)    • Ductal carcinoma in situ (DCIS) of left breast    • Fracture, femur (HCC)     fracture of left femur   • Hyperlipidemia    • Hypertension    • Hypothyroidism    • Osteopenia    • Pulmonary hypertension (HCC)        Past Surgical History:   Procedure Laterality Date   • BREAST BIOPSY  2018   • CATARACT EXTRACTION     •  SECTION     • ENDOSCOPY N/A 2022    Procedure: ESOPHAGOGASTRODUODENOSCOPY with sclerotherapy to duodenal ulcers and endo clipping x 5;  Surgeon: Rubia Pham MD;  Location: [facility] ENDOSCOPY;  Service: Gastroenterology;  Laterality: N/A;  gastric ulcer x 3, duodenal ulcer x 4   • ERCP N/A 2022    Procedure: ENDOSCOPIC RETROGRADE CHOLANGIOPANCREATOGRAPHY, shincterotomy and balloon clearace of bile duct with 9-12mm balloon (up to 12mm);  Surgeon: Fortino Valencia MD;  Location: [facility] ENDOSCOPY;  Service: Gastroenterology;  Laterality: N/A;  post: common bile duct stone, gastritis deformity in CBD ampula   • FEMUR SURGERY Left 2014    @ [facility] - Dr Winston   • MASTECTOMY Left 2019    Dr Bills   • TUBAL ABDOMINAL LIGATION          Current Problems   Chronic diarrhea  - S/P EGD     UTI, acute     Anemia     COVID-19  - intubated , extubated   - pulmonology following  -isolation protocol completed    Breast/liver/bone metastasis  -Followed by  oncology  -Palliative care following    Diabetes Mellitus Type 2      Diabetic nephropathy  - Nephrology following  - wound care following    Lower extremity pitting edema,     Hypothyroidism     Hyperlipidemia       Encounter Information        Trending Narrative     12/6: Follow up for TF. Pt reached goal TF rate (60 ml/hr) on 12/1 and since, TF has been running at goal, per documentation, with good tolerance and residuals WNL. Per EMR, pt to d/c tomorrow. Discussed pt in AM rounds. RN decreased water flush in light of hyponatremia to 10 ml/hr. Will adjust the order to reflect current rate. Visited pt in room and visually, TF running at 60 ml/hr + 10 ml/hr water flush. NFPE completed and not consistent with nutrition diagnosis of malnutrition at this time using AND/ASPEN criteria     11/29: Since last review, patient has again been without nutrition for 5 days due to NG tube removed with EGD 11/24 and planned gut rest.  NG tube to be replaced and tube feeding to restart related to SLP recommending NPO yesterday.  Plans for VFSS today.  On levo.  RD visited patient at bedside.      11/22: TF consult received. Pt has been w/o nutrition for 6 days d/t refusal of tube placement. Secure messaged w/ RN confirmed pt has had tube placed today. Pt is receiving replacement of phos and k currently per RN. NFPE completed and not consistent with nutrition diagnosis of malnutrition at this time using AND/ASPEN criteria.     11/16: Visited pt in room, son also present. Pt now w/ higher oxygen requirements, currently NPO. Pt was eating ~50% prior to respiratory status change. Informed pt and son RD to follow pt respiratory status and monitor if any changes are made to the diet order if oxygen requirements decrease. Informed pt and son if oxygen requirements do not decrease enteral nutrition may be an option if aligned with goals of care. Pt and son w/o questions at this time.     11/9: Visited pt in room. Pt states appetite has  "not been great lately d/t diarrhea. Pt reports diarrhea is improving. Pt diet just advanced today to low residue, pt states she ate about 25% of her lunch on her new diet. NFPE completed and not consistent with nutrition diagnosis of malnutrition at this time using AND/ASPEN criteria. Encourage pt to increase PO intake as able.        Anthropometrics        Current Height, Weight Height: 152.4 cm (60\")  Weight: 90.8 kg (200 lb 2.8 oz) (12/06/22 0500)       Ideal Body Weight (IBW) 100 lb   Usual Body Weight (UBW) 230lb       Trending Weight Hx     This admission: 12/6: 200# (1.9% wt loss x 1 wk, + an additional 7L from last review 11/29, per I/Os)  11/29: 7.3% weight gain since last RD review (+10.3 L per I/Os since 11/15)   11/22: 190lb, wt down 5% x 1 week, likely d/t lack of nutrition for 6 days   11/16: 201#, wt up 3% since admission  11/9: 195lb upon admission, 180lb stated             PTA: 11/9: wt down 6% x 9 months    Wt Readings from Last 30 Encounters:   12/06/22 0500 90.8 kg (200 lb 2.8 oz)   12/05/22 0600 94.1 kg (207 lb 7.3 oz)   12/02/22 1536 89.8 kg (198 lb)   11/30/22 0500 90.1 kg (198 lb 10.2 oz)   11/29/22 0500 92.9 kg (204 lb 12.9 oz)   11/26/22 0357 92.3 kg (203 lb 7.8 oz)   11/23/22 1509 86.4 kg (190 lb 7.6 oz)   11/22/22 0500 86.4 kg (190 lb 7.6 oz)   11/21/22 0600 94.2 kg (207 lb 10.8 oz)   11/18/22 0100 92 kg (202 lb 13.2 oz)   11/17/22 0446 89.8 kg (197 lb 15.6 oz)   11/16/22 2015 92.5 kg (203 lb 14.8 oz)   11/16/22 0519 91.3 kg (201 lb 4.5 oz)   11/15/22 2006 91.3 kg (201 lb 4.5 oz)   11/08/22 1621 88.7 kg (195 lb 8.8 oz)   11/07/22 0708 81.6 kg (180 lb)   11/04/22 0945 82.1 kg (181 lb)   11/01/22 0756 82.1 kg (181 lb)   11/01/22 1008 82.1 kg (181 lb)   10/27/22 1029 82.1 kg (181 lb)   10/27/22 1111 82.1 kg (181 lb)   10/25/22 0738 82.2 kg (181 lb 3.2 oz)   10/25/22 1001 82.2 kg (181 lb 3.5 oz)   10/18/22 0748 83 kg (183 lb)   10/18/22 0803 83 kg (183 lb)   10/12/22 1433 83 kg (183 lb) "   10/11/22 0753 83.4 kg (183 lb 14.4 oz)   10/10/22 0816 85.7 kg (189 lb)   10/07/22 1229 85.7 kg (189 lb)   09/27/22 2345 86 kg (189 lb 11.2 oz)   09/27/22 1938 86.2 kg (190 lb)   08/16/22 1348 87.5 kg (192 lb 12.8 oz)   08/15/22 1303 86.6 kg (191 lb)   05/09/22 1243 90.7 kg (200 lb)   03/15/22 1245 92 kg (202 lb 12.8 oz)   01/17/22 1344 93.4 kg (206 lb)   01/13/22 1249 94.3 kg (208 lb)   11/15/21 1310 94.8 kg (209 lb)   07/19/21 1318 95.3 kg (210 lb)   07/13/21 1308 94.8 kg (209 lb)   07/12/21 1044 95.8 kg (211 lb 3.2 oz)   03/22/21 1012 97.5 kg (215 lb)   01/14/21 1122 96.6 kg (213 lb)   01/11/21 1058 96.2 kg (212 lb)   08/19/20 1304 92.9 kg (204 lb 12.8 oz)   07/14/20 1122 91.2 kg (201 lb)      BMI kg/m2 Body mass index is 39.09 kg/m².       Labs        Pertinent Labs Hyponatremia  -adjusting water flush today.     Hypokalemia  -replacements given today per RN in rounds     Results from last 7 days   Lab Units 12/06/22  0356 12/05/22  0423 12/04/22  1345 12/04/22  0442   SODIUM mmol/L 133* 135* 135* 136   POTASSIUM mmol/L 3.0* 3.6 4.4 3.5   CHLORIDE mmol/L 92* 97* 96* 98   CO2 mmol/L 31.0* 26.0 29.0 31.0*   BUN mg/dL 57* 52* 51* 47*   CREATININE mg/dL 1.04* 1.03* 1.04* 1.07*   CALCIUM mg/dL 7.5* 7.0* 6.9* 6.8*   BILIRUBIN mg/dL 0.7 0.8  --  0.7   ALK PHOS U/L 124* 128*  --  128*   ALT (SGPT) U/L 18 14  --  16   AST (SGOT) U/L 18 20  --  19   GLUCOSE mg/dL 142* 173* 150* 121*     Results from last 7 days   Lab Units 12/06/22  0356 12/05/22  0423 12/04/22  1345 12/04/22  0442   MAGNESIUM mg/dL 2.0 1.7  --  1.8   PHOSPHORUS mg/dL 3.1 2.9   < > 1.9*   HEMOGLOBIN g/dL 8.5* 8.5*  --  8.2*   HEMATOCRIT % 26.7* 25.8*  --  24.4*    < > = values in this interval not displayed.     COVID19   Date Value Ref Range Status   11/07/2022 Detected (C) Not Detected - Ref. Range Final     Lab Results   Component Value Date    HGBA1C 6.2 (H) 08/08/2022        Medications    Scheduled Medications bumetanide, 2 mg, Intravenous,  Q8H  collagenase, 1 application, Topical, Q24H  hydrocortisone-bacitracin-zinc oxide-nystatin, 1 application, Topical, BID  insulin glargine, 5 Units, Subcutaneous, Daily  insulin lispro, 4-24 Units, Subcutaneous, Q6H  lidocaine, 1 patch, Transdermal, Q24H  miconazole, , Topical, Q12H  midodrine, 20 mg, Nasogastric, Q8H  pantoprazole, 40 mg, Intravenous, Q12H  potassium chloride, 40 mEq, Nasogastric, Daily  predniSONE, 20 mg, Oral, Daily With Breakfast  sodium chloride, 10 mL, Intravenous, Q12H        Infusions Pharmacy Consult - Pharmacy to dose,         PRN Medications •  acetaminophen **OR** acetaminophen **OR** acetaminophen  •  albuterol  •  aluminum-magnesium hydroxide-simethicone  •  Calcium Gluconate-NaCl **AND** calcium gluconate **AND** Calcium, Ionized  •  dextrose  •  dextrose  •  glucagon (human recombinant)  •  hydrOXYzine  •  hydrOXYzine  •  loperamide  •  magnesium sulfate **OR** magnesium sulfate in D5W 1g/100mL (PREMIX) **OR** magnesium sulfate  •  melatonin  •  midazolam  •  nitroglycerin  •  ondansetron **OR** ondansetron  •  Pharmacy Consult - Pharmacy to dose  •  potassium chloride **OR** potassium chloride **OR** potassium chloride  •  potassium phosphate infusion greater than 15 mMoles **OR** potassium phosphates **OR** potassium phosphate **OR** sodium phosphate IVPB **OR** sodium phosphate IVPB **OR** Sodium Phosphate  •  [COMPLETED] Insert peripheral IV **AND** sodium chloride  •  sodium chloride  •  traMADol     Physical Findings        Trending Physical   Appearance, NFPE 12/6: NFPE completed and not consistent with nutrition diagnosis of malnutrition at this time using AND/ASPEN criteria     11/29: NFPE completed and not consistent with nutrition diagnosis of malnutrition at this time using AND/ASPEN criteria     11/22: NFPE completed and not consistent with nutrition diagnosis of malnutrition at this time using AND/ASPEN criteria    11/16: Visually agree     11/9: NFPE completed and  "not consistent with nutrition diagnosis of malnutrition at this time using AND/ASPEN criteria      --  Edema  2+ wrists  3+ generalized, arms, hands, legs, ankles, feet      Bowel Function Last BM 12/6     Residuals WNL     Tubes NG tube      Chewing/Swallowing SLP following, unable to perform VFSS d/t continuous AVAPS     Skin Per WOCN note 11/23:  Full-thickness stage III sacral pressure injury        Estimated/Assessed Needs    Assessed 11/17, remain appropriate 12/6   Energy Requirements    Height for Calculation  Height: 152.4 cm (60\")   Weight for Calculation 89.8 kg ABW   Method for Estimation  [place] St Jeor x 1.2-1.3   EST Needs (kcal/day) 3028-6146 kcal/day       Protein Requirements    Weight for Calculation 45 kg IBW   EST Protein Needs (g/kg) 2-2.5   EST Daily Needs (g/day)  g/day       Fluid Requirements     Estimated Needs (mL/day) 1 ml/kcal       Fluid Deficit    Current Na Level (mEq/L) 148   Desired Na Level (mEq/L) 140-142   Estimated Fluid Deficit (L)  1.8-2.4L      Current Nutrition Orders & Evaluation of Intake       Oral Nutrition     Food Allergies NKFA   Current PO Diet NPO Diet NPO Type: Ice Chips   Supplement None    PO Evaluation     Trending % PO Intake 12/6: NPO  11/29: NPO   11/22: NPO  11/16: Currently NPO, previously low res diet with ~50% intakes, on average.  11/9: No intakes on clear liquid, diet advanced to low residue today     Enteral Nutrition    Enteral Route NG   TF Modular    TF Delivery Method Continuous   Current TF Order Diabetisource AC at 60 (goal) mL/hr   +Prosource TF BID   Current Water Flush 75 ml/hr water flush    TF Residual/Tolerance WNL    TF Observation  Visually running as above.     TF started 11/22, cancelled 11/23, restarted 11/29, reached goal 12/1.    Parenteral Nutrition     TPN Route    Current TPN Order    Lipids (mL/%/frequency)     MVI Frequency     Trace Element Frequency     Total # Days on TPN    TPN Observation         Nutrition Course " Details    PO Diets:    Nutrition Support:      Nutritional Risk Screening        NRS-2002 Score          Nutrition Diagnosis         Nutrition Dx Problem 1 Inadequate oral intake related to clinical course AEB need for EN support.       Nutrition Dx Problem 2        Intervention Goal         Intervention Goal(s) Continued TF tolerance     Nutrition Intervention        RD Action Adjust water flush to 10 ml/hr.   Keep TF at goal rate (60 ml/hr) + Prosource TF BID     Nutrition Prescription          Diet Prescription NPO   Supplement Prescription None    --  Enteral Prescription     Diabetisource AC at 60 (goal) mL/hr + 10 ml/hr water flush  +Prosource TF BID     Calories  1584 kcals  80 kcal Prosource TF  1664 kcal total (in range)    Protein  79 g  22 g Prosource TF  101 g (in range)    Free water  1082 mL   Flushes  220 ml     The above end goal rate is for 22 hrs/day to assume interruptions for ADLs. Water flushes adjusted based on clinical picture + Rx flushes/IV fluids     Specialized formula chosen r/t Hx DM and possible CHO-controlled formula          TPN Prescription      Monitor/Evaluation        Monitor PO intake, Weight, Skin status, GI status         Electronically signed by:  Radha Crystal  12/06/22 07:32 EST

## 2022-12-07 NOTE — CASE MANAGEMENT/SOCIAL WORK
Case Management Discharge Note           Provided Post Acute Provider List?: N/A  Post Acute Provider List: Inpatient Rehab  Provided Post Acute Provider Quality & Resource List?: N/A  Post Acute Provider Quality and Resource List: Inpatient Rehab  Delivered To: Support Person  Support Person: Spouse Saman, and Son Bladimir  Method of Delivery: Telephone    Selected Continued Care - Discharged on 12/6/2022 Admission date: 11/7/2022 - Discharge disposition: Long Term Care (DC - External)    Destination Coordination complete.    Service Provider Selected Services Address Phone Fax Patient Preferred    [facility] [address] [address] [phone] --              Community & DME    No services have been selected for the patient.                Selected Continued Care - Prior Encounters Includes continued care and service providers with selected services from prior encounters from 8/9/2022 to 12/6/2022    Discharged on 9/30/2022 Admission date: 9/27/2022 - Discharge disposition: [place] [facility] [place] Medical Care     Service Provider Selected Services Address Phone Fax Patient Preferred    [facility] [address] [place] [address] [phone] --                    Transportation Services  Ambulance: [facility]    Final Discharge Disposition Code: 63 - LTCH

## 2022-12-21 ENCOUNTER — DOCUMENTATION (OUTPATIENT)
Dept: ICU | Facility: HOSPITAL | Age: 72
End: 2022-12-21

## (undated) DEVICE — SPHINCTEROTOME: Brand: DREAMTOME™ RX 44

## (undated) DEVICE — BITEBLOCK ENDO W/STRAP 60F A/ LF DISP

## (undated) DEVICE — THE CARR-LOCKE INJECTION NEEDLE IS A SINGLE USE, DISPOSABLE, FLEXIBLE SHEATH INJECTION NEEDLE USED FOR THE INJECTION OF VARIOUS TYPES OF MEDIA THROUGH FLEXIBLE ENDOSCOPES.

## (undated) DEVICE — PROB ELECTROHEMO GLD STD/PLG 7F 300

## (undated) DEVICE — DEV POSITION LK AND BIOP CAP SYS

## (undated) DEVICE — PK ENDO GI 50

## (undated) DEVICE — RETRIEVAL BALLOON CATHETER: Brand: EXTRACTOR™ PRO RX

## (undated) DEVICE — KT SYR GEL ORISE SNGL PK 10ML